# Patient Record
Sex: FEMALE | Race: BLACK OR AFRICAN AMERICAN | NOT HISPANIC OR LATINO | Employment: OTHER | URBAN - METROPOLITAN AREA
[De-identification: names, ages, dates, MRNs, and addresses within clinical notes are randomized per-mention and may not be internally consistent; named-entity substitution may affect disease eponyms.]

---

## 2017-01-03 ENCOUNTER — OFFICE VISIT (OUTPATIENT)
Dept: GYNECOLOGIC ONCOLOGY | Facility: CLINIC | Age: 77
End: 2017-01-03
Payer: MEDICARE

## 2017-01-03 ENCOUNTER — LAB VISIT (OUTPATIENT)
Dept: LAB | Facility: HOSPITAL | Age: 77
End: 2017-01-03
Attending: OBSTETRICS & GYNECOLOGY
Payer: MEDICARE

## 2017-01-03 ENCOUNTER — TELEPHONE (OUTPATIENT)
Dept: GYNECOLOGIC ONCOLOGY | Facility: CLINIC | Age: 77
End: 2017-01-03

## 2017-01-03 VITALS
SYSTOLIC BLOOD PRESSURE: 158 MMHG | HEIGHT: 66 IN | RESPIRATION RATE: 18 BRPM | HEART RATE: 77 BPM | BODY MASS INDEX: 37.84 KG/M2 | WEIGHT: 235.44 LBS | DIASTOLIC BLOOD PRESSURE: 79 MMHG

## 2017-01-03 DIAGNOSIS — Z12.31 SCREENING MAMMOGRAM, ENCOUNTER FOR: ICD-10-CM

## 2017-01-03 DIAGNOSIS — C54.1 ENDOMETRIAL CA: Primary | ICD-10-CM

## 2017-01-03 DIAGNOSIS — G89.29 CHRONIC RIGHT-SIDED LOW BACK PAIN WITHOUT SCIATICA: ICD-10-CM

## 2017-01-03 DIAGNOSIS — C54.1 ENDOMETRIAL CA: ICD-10-CM

## 2017-01-03 DIAGNOSIS — C54.1 MALIGNANT NEOPLASM OF ENDOMETRIUM: ICD-10-CM

## 2017-01-03 DIAGNOSIS — F51.01 PRIMARY INSOMNIA: ICD-10-CM

## 2017-01-03 DIAGNOSIS — M54.50 CHRONIC RIGHT-SIDED LOW BACK PAIN WITHOUT SCIATICA: ICD-10-CM

## 2017-01-03 LAB — CANCER AG125 SERPL-ACNC: 8 U/ML

## 2017-01-03 PROCEDURE — 1126F AMNT PAIN NOTED NONE PRSNT: CPT | Mod: S$GLB,,, | Performed by: OBSTETRICS & GYNECOLOGY

## 2017-01-03 PROCEDURE — 99214 OFFICE O/P EST MOD 30 MIN: CPT | Mod: S$GLB,,, | Performed by: OBSTETRICS & GYNECOLOGY

## 2017-01-03 PROCEDURE — 1157F ADVNC CARE PLAN IN RCRD: CPT | Mod: S$GLB,,, | Performed by: OBSTETRICS & GYNECOLOGY

## 2017-01-03 PROCEDURE — 99999 PR PBB SHADOW E&M-EST. PATIENT-LVL III: CPT | Mod: PBBFAC,,, | Performed by: OBSTETRICS & GYNECOLOGY

## 2017-01-03 PROCEDURE — 1160F RVW MEDS BY RX/DR IN RCRD: CPT | Mod: S$GLB,,, | Performed by: OBSTETRICS & GYNECOLOGY

## 2017-01-03 PROCEDURE — 1159F MED LIST DOCD IN RCRD: CPT | Mod: S$GLB,,, | Performed by: OBSTETRICS & GYNECOLOGY

## 2017-01-03 RX ORDER — ZOLPIDEM TARTRATE 5 MG/1
5 TABLET ORAL NIGHTLY PRN
Qty: 30 TABLET | Refills: 2 | Status: SHIPPED | OUTPATIENT
Start: 2017-01-03 | End: 2018-01-11 | Stop reason: SDUPTHER

## 2017-01-03 RX ORDER — HYDROCODONE BITARTRATE AND ACETAMINOPHEN 5; 325 MG/1; MG/1
1 TABLET ORAL EVERY 4 HOURS PRN
Qty: 40 TABLET | Refills: 0 | Status: SHIPPED | OUTPATIENT
Start: 2017-01-03 | End: 2018-01-11 | Stop reason: SDUPTHER

## 2017-01-03 NOTE — MR AVS SNAPSHOT
[facility] GYN Oncology  [address] [facility] [address]  Phone: [phone]                  Jodi Cole   1/3/2017 11:15 AM   Office Visit    Description:  Female : 1940   Provider:  Pepe Jordan MD   Department:  [facility] - GYN Oncology           Reason for Visit     Endometrial Cancer           Diagnoses this Visit        Comments    Endometrial ca    -  Primary     Chronic right-sided low back pain without sciatica         Malignant neoplasm of endometrium         Primary insomnia         Screening mammogram, encounter for                To Do List           Goals (5 Years of Data)     None      Follow-Up and Disposition     Return in about 3 months (around 4/3/2017).       These Medications        Disp Refills Start End    hydrocodone-acetaminophen 5-325mg (NORCO) 5-325 mg per tablet 40 tablet 0 1/3/2017     Take 1 tablet by mouth every 4 (four) hours as needed for Pain. - Oral    Pharmacy: [facility] [id number] [place] [address] [address] [facility] Ph #: [phone]       zolpidem (AMBIEN) 5 MG Tab 30 tablet 2 1/3/2017 2017    Take 1 tablet (5 mg total) by mouth nightly as needed. - Oral    Pharmacy: [facility] [id number] [place] [address] [address] [facility] [facility] Ph #: [phone]         [facility] On Call     [facility] On Call Nurse Care Line -  Assistance  Registered nurses in the [facility] On Call Center provide clinical advisement, health education, appointment booking, and other advisory services.  Call for this free service at [phone].             Medications           Message regarding Medications     Verify the changes and/or additions to your medication regime listed below are the same as discussed with your clinician today.  If any of these changes or additions are incorrect, please notify your healthcare provider.             Verify that the below list of medications  "is an accurate representation of the medications you are currently taking.  If none reported, the list may be blank. If incorrect, please contact your healthcare provider. Carry this list with you in case of emergency.           Current Medications     hydrocodone-acetaminophen 5-325mg (NORCO) 5-325 mg per tablet Take 1 tablet by mouth every 4 (four) hours as needed for Pain.    ibuprofen (ADVIL,MOTRIN) 600 MG tablet Take 1 tablet (600 mg total) by mouth every 6 (six) hours as needed for Pain.    multivitamin capsule Take 1 capsule by mouth once daily.    vitamins B1 B6 B12 Tab Take by mouth once daily.    zolpidem (AMBIEN) 5 MG Tab Take 1 tablet (5 mg total) by mouth nightly as needed.           Clinical Reference Information           Vital Signs - Last Recorded  Most recent update: 1/3/2017 10:50 AM by Neela Oneill RN    BP Pulse Resp Ht Wt BMI    (!) 158/79 77 18 5' 6" (1.676 m) 106.8 kg (235 lb 7 oz) 38 kg/m2      Blood Pressure          Most Recent Value    BP  (!)  158/79      Allergies as of 1/3/2017     No Known Allergies      Immunizations Administered on Date of Encounter - 1/3/2017     None      Orders Placed During Today's Visit     Future Labs/Procedures Expected by Expires    Mammo Digital Screening Bilat with CAD  3/2/2017 1/3/2018      4/3/2017 1/3/2018      [facility] Sign-Up     Activating your [facility] account is as easy as 1-2-3!     1) Visit [email or web address], select Sign Up Now, enter this activation code and your date of birth, then select Next.  [email or web address]  Expires: 2/17/2017 11:19 AM      2) Create a username and password to use when you visit [facility] in the future and select a security question in case you lose your password and select Next.    3) Enter your e-mail address and click Sign Up!    Additional Information  If you have questions, please e-mail [email or web address] or call [phone] to talk to our [facility] staff. Remember, [facility] is NOT to be used for " urgent needs. For medical emergencies, dial 911.

## 2017-01-03 NOTE — TELEPHONE ENCOUNTER
----- Message from Pepe Jordan MD sent at 1/3/2017  3:36 PM CST -----  Patient informed of normal .

## 2017-01-03 NOTE — PROGRESS NOTES
"Subjective:       Patient ID: Jodi Cole is a 76 y.o. female.    Chief Complaint: Endometrial Cancer (3 month follow-up)    HPI     Patient comes in today for follow up for endometrial cancer.   Denies vaginal bleeding.       is normal at 8.       Mammogram: Feb 29, 2016: normal   Colonoscopy: never       Her oncologic history is:    She underwent a robotic-assisted laparoscopic hysterectomy with bilateral salpingo-oophorectomy and bilateral pelvic and para-aortic lymphadenectomy on 5/11/2015. 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 The depth of invasion was 2 mm out of 27 mm. Nodes were negative as was the cytologic washing.    Finished vaginal cuff brachytherapy on 7/20/2015.    Completed 6 cycles of taxol and carboplatin in October 2015.    CT scan after completion was normal.  was 8.   Review of Systems   Constitutional: Negative for chills, fatigue and fever.   Respiratory: Negative for cough, shortness of breath and wheezing.    Cardiovascular: Negative for chest pain, palpitations and leg swelling.   Gastrointestinal: Negative for abdominal pain, constipation, diarrhea, nausea and vomiting.   Genitourinary: Negative for difficulty urinating, dysuria, frequency, genital sores, hematuria, urgency, vaginal bleeding, vaginal discharge and vaginal pain.   Musculoskeletal: Positive for back pain.   Neurological: Negative for weakness.   Hematological: Negative for adenopathy. Does not bruise/bleed easily.   Psychiatric/Behavioral: The patient is not nervous/anxious.        Objective:       Visit Vitals    BP (!) 158/79    Pulse 77    Resp 18    Ht 5' 6" (1.676 m)    Wt 106.8 kg (235 lb 7 oz)    BMI 38 kg/m2       Physical Exam   Constitutional: She is oriented to person, place, and time. She appears well-developed and well-nourished.   HENT:   Head: Normocephalic and atraumatic.   Eyes: No scleral icterus.   Neck: Neck supple. No tracheal " deviation present. No thyroid mass and no thyromegaly present.   Cardiovascular: Normal rate and regular rhythm.    Pulmonary/Chest: Effort normal and breath sounds normal. She has no wheezes.   Abdominal: Soft. She exhibits no distension and no mass. There is no hepatosplenomegaly. There is no tenderness. There is no rebound and no guarding.   Genitourinary:   Genitourinary Comments: Bimanual exam:  Vulva: no lesions. Normal appearance  Urethra: Normal size and location. No lesions  Bladder: No masses or tenderness.  Vagina: normal mucosa. No lesion  Cervix: absent.   Uterus: absent.  Adnexa: no masses.  Rectovaginal: Not performed     Musculoskeletal: She exhibits no edema or tenderness.   Lymphadenopathy:     She has no cervical adenopathy.     She has no axillary adenopathy.        Right: No inguinal and no supraclavicular adenopathy present.        Left: No inguinal and no supraclavicular adenopathy present.   Neurological: She is alert and oriented to person, place, and time.   Skin: Skin is warm and dry.   Psychiatric: She has a normal mood and affect. Her behavior is normal. Judgment and thought content normal.       Assessment:       1. Endometrial ca    2. Chronic right-sided low back pain without sciatica    3. Malignant neoplasm of endometrium    4. Primary insomnia    5. Screening mammogram, encounter for        Plan:   Endometrial ca   PEG    RTC in 3 months.     -     ; Future; Expected date: 4/3/17    Chronic right-sided low back pain without sciatica    Malignant neoplasm of endometrium  -     hydrocodone-acetaminophen 5-325mg (NORCO) 5-325 mg per tablet; Take 1 tablet by mouth every 4 (four) hours as needed for Pain.  Dispense: 40 tablet; Refill: 0    Primary insomnia  -     zolpidem (AMBIEN) 5 MG Tab; Take 1 tablet (5 mg total) by mouth nightly as needed.  Dispense: 30 tablet; Refill: 2    Screening mammogram, encounter for  -     Mammo Digital Screening Bilat with CAD; Future; Expected  date: 3/2/17

## 2017-03-02 ENCOUNTER — HOSPITAL ENCOUNTER (OUTPATIENT)
Dept: RADIOLOGY | Facility: OTHER | Age: 77
Discharge: HOME | End: 2017-03-02
Attending: OBSTETRICS & GYNECOLOGY
Payer: MEDICARE

## 2017-03-02 DIAGNOSIS — Z12.31 SCREENING MAMMOGRAM, ENCOUNTER FOR: ICD-10-CM

## 2017-03-02 PROCEDURE — 77067 SCR MAMMO BI INCL CAD: CPT | Mod: TC

## 2017-03-02 PROCEDURE — 77063 BREAST TOMOSYNTHESIS BI: CPT | Mod: 26,,, | Performed by: RADIOLOGY

## 2017-03-02 PROCEDURE — 77067 SCR MAMMO BI INCL CAD: CPT | Mod: 26,,, | Performed by: RADIOLOGY

## 2017-04-12 ENCOUNTER — OFFICE VISIT (OUTPATIENT)
Dept: GYNECOLOGIC ONCOLOGY | Facility: CLINIC | Age: 77
End: 2017-04-12
Payer: MEDICARE

## 2017-04-12 ENCOUNTER — LAB VISIT (OUTPATIENT)
Dept: LAB | Facility: HOSPITAL | Age: 77
End: 2017-04-12
Attending: OBSTETRICS & GYNECOLOGY
Payer: MEDICARE

## 2017-04-12 VITALS
BODY MASS INDEX: 37.91 KG/M2 | HEART RATE: 86 BPM | SYSTOLIC BLOOD PRESSURE: 177 MMHG | HEIGHT: 66 IN | DIASTOLIC BLOOD PRESSURE: 86 MMHG | WEIGHT: 235.88 LBS

## 2017-04-12 DIAGNOSIS — Z12.89 ENCOUNTER FOR PELVIC SCREENING FOR MALIGNANT NEOPLASM: ICD-10-CM

## 2017-04-12 DIAGNOSIS — Z01.419 WELL WOMAN EXAM WITH ROUTINE GYNECOLOGICAL EXAM: ICD-10-CM

## 2017-04-12 DIAGNOSIS — C54.1 ENDOMETRIAL CA: ICD-10-CM

## 2017-04-12 DIAGNOSIS — C54.1 ENDOMETRIAL CA: Primary | ICD-10-CM

## 2017-04-12 DIAGNOSIS — Z12.31 SCREENING MAMMOGRAM, ENCOUNTER FOR: ICD-10-CM

## 2017-04-12 LAB — CANCER AG125 SERPL-ACNC: 8 U/ML

## 2017-04-12 PROCEDURE — 86304 IMMUNOASSAY TUMOR CA 125: CPT

## 2017-04-12 PROCEDURE — G0101 CA SCREEN;PELVIC/BREAST EXAM: HCPCS | Mod: S$GLB,,, | Performed by: OBSTETRICS & GYNECOLOGY

## 2017-04-12 PROCEDURE — 36415 COLL VENOUS BLD VENIPUNCTURE: CPT

## 2017-04-12 PROCEDURE — 99999 PR PBB SHADOW E&M-EST. PATIENT-LVL III: CPT | Mod: PBBFAC,,, | Performed by: OBSTETRICS & GYNECOLOGY

## 2017-04-12 NOTE — MR AVS SNAPSHOT
[facility] - GYN Oncology  [address]  [facility] [address]  Phone: [phone]                  Jodi Cole   2017 8:45 AM   Office Visit    Description:  Female : 1940   Provider:  Pepe Jordan MD   Department:  [facility] - [facility] On[facility]           Reason for Visit     Endometrial Cancer           Diagnoses this Visit        Comments    Endometrial ca    -  Primary     Well woman exam with routine gynecological exam         Encounter for pelvic screening for malignant neoplasm         Screening mammogram, encounter for                To Do List           Goals (5 Years of Data)     None      Follow-Up and Disposition     Return in about 3 months (around 2017).      [facility] On Call     [facility] On Call Nurse Care Line -  Assistance  Unless otherwise directed by your provider, please contact [facility] On-Call, our nurse care line that is available for  assistance.     Registered nurses in the [facility] On Call Center provide: appointment scheduling, clinical advisement, health education, and other advisory services.  Call: [phone] (toll free)               Medications           Message regarding Medications     Verify the changes and/or additions to your medication regime listed below are the same as discussed with your clinician today.  If any of these changes or additions are incorrect, please notify your healthcare provider.             Verify that the below list of medications is an accurate representation of the medications you are currently taking.  If none reported, the list may be blank. If incorrect, please contact your healthcare provider. Carry this list with you in case of emergency.           Current Medications     hydrocodone-acetaminophen 5-325mg (NORCO) 5-325 mg per tablet Take 1 tablet by mouth every 4 (four) hours as needed for Pain.    ibuprofen (ADVIL,MOTRIN) 600 MG tablet Take 1 tablet (600 mg total) by mouth every 6 (six) hours as needed for  "Pain.    multivitamin capsule Take 1 capsule by mouth once daily.    vitamins B1 B6 B12 Tab Take by mouth once daily.    zolpidem (AMBIEN) 5 MG Tab Take 1 tablet (5 mg total) by mouth nightly as needed.           Clinical Reference Information           Your Vitals Were     BP Pulse Height Weight BMI    177/86 86 5' 6" (1.676 m) 107 kg (235 lb 14.3 oz) 38.07 kg/m2      Blood Pressure          Most Recent Value    BP  (!)  177/86      Allergies as of 4/12/2017     No Known Allergies      Immunizations Administered on Date of Encounter - 4/12/2017     None      Orders Placed During Today's Visit     Future Labs/Procedures Expected by Expires      4/12/2017 4/12/2018    Mammo Digital Screening Bilat with CAD  3/5/2018 4/12/2018      [facility] Sign-Up     Activating your [facility] account is as easy as 1-2-3!     1) Visit [email or web address], select Sign Up Now, enter this activation code and your date of birth, then select Next.  [email or web address]  Expires: 5/27/2017  9:10 AM      2) Create a username and password to use when you visit [facility] in the future and select a security question in case you lose your password and select Next.    3) Enter your e-mail address and click Sign Up!    Additional Information  If you have questions, please e-mail [email or web address] or call [phone] to talk to our [facility] staff. Remember, [facility] is NOT to be used for urgent needs. For medical emergencies, dial 911.         Language Assistance Services     ATTENTION: Language assistance services are available, free of charge. Please call [phone].      ATENCIÓN: Si habla español, tiene a lucero disposición servicios gratuitos de asistencia lingüística. Llame al [phone].     ANTONIETTA Ý: N?u b?n nói Ti?ng Vi?t, có các d?ch v? h? tr? ngôn ng? mi?n phí dành cho b?n. G?i s? [phone].         [facility] complies with applicable Federal civil rights laws and does not discriminate on the basis of race, " color, national origin, age, disability, or sex.

## 2017-04-12 NOTE — PROGRESS NOTES
"Subjective:       Patient ID: Jodi Cole is a 76 y.o. female.    Chief Complaint: Endometrial Cancer (3 mth)    HPI     Patient comes in today for follow up for endometrial cancer.   Denies vaginal bleeding.       is normal at 8.       Mammogram: Feb 29, 2016: normal   Colonoscopy: never       Her oncologic history is:    She underwent a robotic-assisted laparoscopic hysterectomy with bilateral salpingo-oophorectomy and bilateral pelvic and para-aortic lymphadenectomy on 5/11/2015. 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 The depth of invasion was 2 mm out of 27 mm. Nodes were negative as was the cytologic washing.    Finished vaginal cuff brachytherapy on 7/20/2015.    Completed 6 cycles of taxol and carboplatin in October 2015.    CT scan after completion was normal.  was 8.     Review of Systems   Constitutional: Negative for chills, fatigue and fever.   Eyes: Negative for visual disturbance.   Respiratory: Negative for cough, shortness of breath and wheezing.    Cardiovascular: Negative for chest pain, palpitations and leg swelling.   Gastrointestinal: Negative for abdominal distention, abdominal pain, constipation, diarrhea, nausea and vomiting.   Genitourinary: Negative for difficulty urinating, dysuria, frequency, genital sores, hematuria, pelvic pain, urgency, vaginal bleeding, vaginal discharge and vaginal pain.   Musculoskeletal: Negative for gait problem and neck stiffness.   Skin: Negative for rash.   Neurological: Negative for seizures and weakness.   Hematological: Negative for adenopathy. Does not bruise/bleed easily.   Psychiatric/Behavioral: The patient is not nervous/anxious.        Objective:     BP (!) 177/86  Pulse 86  Ht 5' 6" (1.676 m)  Wt 107 kg (235 lb 14.3 oz)  BMI 38.07 kg/m2    Physical Exam   Constitutional: She is oriented to person, place, and time. She appears well-developed and well-nourished.   HENT:   Head: " Normocephalic and atraumatic.   Eyes: No scleral icterus.   Neck: No tracheal deviation present. No thyroid mass and no thyromegaly present.   Cardiovascular: Normal rate and regular rhythm.    Pulmonary/Chest: Effort normal and breath sounds normal. She has no wheezes. Right breast exhibits no mass, no nipple discharge, no skin change and no tenderness. Left breast exhibits no mass, no nipple discharge, no skin change and no tenderness.   Abdominal: She exhibits no distension and no mass. There is no hepatosplenomegaly. There is no tenderness. There is no rebound and no guarding.   Genitourinary:   Genitourinary Comments: Bimanual exam:  Vulva: no lesions. Normal appearance  Urethra: Normal size and location. No lesions  Bladder: No masses or tenderness.  Vagina: normal mucosa. No lesion  Cervix: absent.   Uterus: absent.  Adnexa: no masses.  Rectovaginal: No posterior cul de sac thickening or nodularity.  Rectal: no masses. Nontender. Normal tone.      Musculoskeletal: She exhibits no edema or tenderness.   Lymphadenopathy:     She has no cervical adenopathy.     She has no axillary adenopathy.        Right: No inguinal and no supraclavicular adenopathy present.        Left: No inguinal and no supraclavicular adenopathy present.   Neurological: She is alert and oriented to person, place, and time.   Skin: Skin is warm and dry. No rash noted.   Psychiatric: She has a normal mood and affect. Her behavior is normal. Judgment and thought content normal.       Assessment:       1. Endometrial ca    2. Well woman exam with routine gynecological exam    3. Encounter for pelvic screening for malignant neoplasm    4. Screening mammogram, encounter for        Plan:   Endometrial ca   PEG   RTC in 4 months      -     ; Future; Expected date: 4/12/17    Well woman exam with routine gynecological exam  Counseling time of 10 minutes discussing calcium, vitamin D and exercise. Questions answered.     Encounter for pelvic  screening for malignant neoplasm    Screening mammogram, encounter for  -     Mammo Digital Screening Bilat with CAD; Future; Expected date: 3/5/18

## 2017-07-06 ENCOUNTER — LAB VISIT (OUTPATIENT)
Dept: LAB | Facility: HOSPITAL | Age: 77
End: 2017-07-06
Attending: OBSTETRICS & GYNECOLOGY
Payer: MEDICARE

## 2017-07-06 ENCOUNTER — OFFICE VISIT (OUTPATIENT)
Dept: GYNECOLOGIC ONCOLOGY | Facility: CLINIC | Age: 77
End: 2017-07-06
Payer: MEDICARE

## 2017-07-06 VITALS
HEART RATE: 92 BPM | DIASTOLIC BLOOD PRESSURE: 81 MMHG | HEIGHT: 66 IN | SYSTOLIC BLOOD PRESSURE: 136 MMHG | WEIGHT: 233.94 LBS | BODY MASS INDEX: 37.6 KG/M2

## 2017-07-06 DIAGNOSIS — C54.1 ENDOMETRIAL CA: Primary | ICD-10-CM

## 2017-07-06 DIAGNOSIS — C54.1 ENDOMETRIAL CA: ICD-10-CM

## 2017-07-06 LAB — CANCER AG125 SERPL-ACNC: 9 U/ML

## 2017-07-06 PROCEDURE — 1126F AMNT PAIN NOTED NONE PRSNT: CPT | Mod: S$GLB,,, | Performed by: OBSTETRICS & GYNECOLOGY

## 2017-07-06 PROCEDURE — 86304 IMMUNOASSAY TUMOR CA 125: CPT

## 2017-07-06 PROCEDURE — 1159F MED LIST DOCD IN RCRD: CPT | Mod: S$GLB,,, | Performed by: OBSTETRICS & GYNECOLOGY

## 2017-07-06 PROCEDURE — 36415 COLL VENOUS BLD VENIPUNCTURE: CPT

## 2017-07-06 PROCEDURE — 99214 OFFICE O/P EST MOD 30 MIN: CPT | Mod: S$GLB,,, | Performed by: OBSTETRICS & GYNECOLOGY

## 2017-07-06 PROCEDURE — 99999 PR PBB SHADOW E&M-EST. PATIENT-LVL III: CPT | Mod: PBBFAC,,, | Performed by: OBSTETRICS & GYNECOLOGY

## 2017-07-06 RX ORDER — CYCLOBENZAPRINE HCL 10 MG
TABLET ORAL
COMMUNITY
Start: 2017-06-14 | End: 2017-07-06

## 2017-07-06 RX ORDER — FLUCONAZOLE 100 MG/1
TABLET ORAL
COMMUNITY
Start: 2017-07-05 | End: 2017-07-06

## 2017-07-06 RX ORDER — TRIAMTERENE/HYDROCHLOROTHIAZID 37.5-25 MG
TABLET ORAL
COMMUNITY
Start: 2017-06-14 | End: 2019-11-19

## 2017-07-06 RX ORDER — PRAVASTATIN SODIUM 20 MG/1
TABLET ORAL
COMMUNITY
Start: 2017-07-05 | End: 2018-01-11

## 2017-07-06 RX ORDER — CLOTRIMAZOLE AND BETAMETHASONE DIPROPIONATE 10; .64 MG/G; MG/G
CREAM TOPICAL
COMMUNITY
Start: 2017-07-05 | End: 2017-07-06

## 2017-07-06 NOTE — PROGRESS NOTES
"Subjective:       Patient ID: Jodi Cole is a 76 y.o. female.    Chief Complaint: Endometrial Cancer (3 mth )    HPI     Patient comes in today for follow up for endometrial cancer.   Denies vaginal bleeding.       is normal at 9.       Mammogram: March 2, 2017: normal   Colonoscopy: never. She is to schedule with her PCP.       Her oncologic history is:    She underwent a robotic-assisted laparoscopic hysterectomy with bilateral salpingo-oophorectomy and bilateral pelvic and para-aortic lymphadenectomy on 5/11/2015. 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 The depth of invasion was 2 mm out of 27 mm. Nodes were negative as was the cytologic washing.    Finished vaginal cuff brachytherapy on 7/20/2015.    Completed 6 cycles of taxol and carboplatin in October 2015.    CT scan after completion was normal.  was 8.      Review of Systems   Constitutional: Negative for chills, fatigue and fever.   Respiratory: Negative for cough, shortness of breath and wheezing.    Cardiovascular: Negative for chest pain, palpitations and leg swelling.   Gastrointestinal: Negative for abdominal pain, constipation, diarrhea, nausea and vomiting.   Genitourinary: Negative for difficulty urinating, dysuria, frequency, genital sores, hematuria, urgency, vaginal bleeding, vaginal discharge and vaginal pain.   Neurological: Negative for weakness.   Hematological: Negative for adenopathy. Does not bruise/bleed easily.   Psychiatric/Behavioral: The patient is not nervous/anxious.        Objective:     /81   Pulse 92   Ht 5' 6" (1.676 m)   Wt 106.1 kg (233 lb 14.5 oz)   BMI 37.75 kg/m²     Physical Exam   Constitutional: She is oriented to person, place, and time. She appears well-developed and well-nourished.   HENT:   Head: Normocephalic and atraumatic.   Eyes: No scleral icterus.   Neck: Neck supple. No tracheal deviation present. No thyroid mass and no " thyromegaly present.   Cardiovascular: Normal rate and regular rhythm.    Pulmonary/Chest: Effort normal and breath sounds normal. She has no wheezes.   Abdominal: Soft. She exhibits no distension and no mass. There is no hepatosplenomegaly. There is no tenderness. There is no rebound and no guarding.   Genitourinary:   Genitourinary Comments: Bimanual exam:  Vulva: no lesions. Normal appearance  Urethra: Normal size and location. No lesions  Bladder: No masses or tenderness.  Vagina: normal mucosa. No lesion  Cervix: absent.   Uterus: absent.  Adnexa: no masses.  Rectovaginal: Not performed     Musculoskeletal: She exhibits no edema or tenderness.   Lymphadenopathy:     She has no cervical adenopathy.     She has no axillary adenopathy.        Right: No inguinal and no supraclavicular adenopathy present.        Left: No inguinal and no supraclavicular adenopathy present.   Neurological: She is alert and oriented to person, place, and time.   Skin: Skin is warm and dry.   Psychiatric: She has a normal mood and affect. Her behavior is normal. Judgment and thought content normal.       Assessment:       1. Endometrial ca        Plan:   Endometrial ca   PEG   RTC 6 months        -     ; Future; Expected date: 01/06/2018

## 2017-12-05 ENCOUNTER — TELEPHONE (OUTPATIENT)
Dept: GYNECOLOGIC ONCOLOGY | Facility: CLINIC | Age: 77
End: 2017-12-05

## 2017-12-05 NOTE — TELEPHONE ENCOUNTER
----- Message from Shaun Mccabe sent at 12/5/2017 12:38 PM CST -----  Contact: Pt   Will like to schedule 3 month f/u lab and appt with Dr. Jordan    Contact::[phone]

## 2018-01-11 ENCOUNTER — LAB VISIT (OUTPATIENT)
Dept: LAB | Facility: HOSPITAL | Age: 78
End: 2018-01-11
Attending: OBSTETRICS & GYNECOLOGY
Payer: MEDICARE

## 2018-01-11 ENCOUNTER — OFFICE VISIT (OUTPATIENT)
Dept: GYNECOLOGIC ONCOLOGY | Facility: CLINIC | Age: 78
End: 2018-01-11
Payer: MEDICARE

## 2018-01-11 VITALS
BODY MASS INDEX: 35.89 KG/M2 | HEART RATE: 96 BPM | HEIGHT: 66 IN | SYSTOLIC BLOOD PRESSURE: 142 MMHG | WEIGHT: 223.31 LBS | DIASTOLIC BLOOD PRESSURE: 82 MMHG

## 2018-01-11 DIAGNOSIS — F51.01 PRIMARY INSOMNIA: ICD-10-CM

## 2018-01-11 DIAGNOSIS — C54.1 ENDOMETRIAL CA: Primary | ICD-10-CM

## 2018-01-11 DIAGNOSIS — G89.29 CHRONIC RIGHT-SIDED LOW BACK PAIN WITHOUT SCIATICA: ICD-10-CM

## 2018-01-11 DIAGNOSIS — C54.1 ENDOMETRIAL CA: ICD-10-CM

## 2018-01-11 DIAGNOSIS — C54.1 MALIGNANT NEOPLASM OF ENDOMETRIUM: ICD-10-CM

## 2018-01-11 DIAGNOSIS — M54.50 CHRONIC RIGHT-SIDED LOW BACK PAIN WITHOUT SCIATICA: ICD-10-CM

## 2018-01-11 LAB — CANCER AG125 SERPL-ACNC: 7 U/ML

## 2018-01-11 PROCEDURE — 99999 PR PBB SHADOW E&M-EST. PATIENT-LVL III: CPT | Mod: PBBFAC,,, | Performed by: OBSTETRICS & GYNECOLOGY

## 2018-01-11 PROCEDURE — 86304 IMMUNOASSAY TUMOR CA 125: CPT

## 2018-01-11 PROCEDURE — 36415 COLL VENOUS BLD VENIPUNCTURE: CPT

## 2018-01-11 PROCEDURE — 99214 OFFICE O/P EST MOD 30 MIN: CPT | Mod: S$GLB,,, | Performed by: OBSTETRICS & GYNECOLOGY

## 2018-01-11 RX ORDER — ZOLPIDEM TARTRATE 5 MG/1
5 TABLET ORAL NIGHTLY PRN
Qty: 30 TABLET | Refills: 2 | Status: SHIPPED | OUTPATIENT
Start: 2018-01-11 | End: 2018-04-19 | Stop reason: SDUPTHER

## 2018-01-11 RX ORDER — HYDROCODONE BITARTRATE AND ACETAMINOPHEN 5; 325 MG/1; MG/1
1 TABLET ORAL EVERY 6 HOURS PRN
Qty: 20 TABLET | Refills: 0 | Status: SHIPPED | OUTPATIENT
Start: 2018-01-11 | End: 2018-07-10 | Stop reason: SDUPTHER

## 2018-01-11 RX ORDER — PRAVASTATIN SODIUM 40 MG/1
TABLET ORAL
Refills: 3 | COMMUNITY
Start: 2017-11-29 | End: 2019-11-19 | Stop reason: SDUPTHER

## 2018-03-05 ENCOUNTER — HOSPITAL ENCOUNTER (OUTPATIENT)
Dept: RADIOLOGY | Facility: OTHER | Age: 78
Discharge: HOME | End: 2018-03-05
Attending: OBSTETRICS & GYNECOLOGY
Payer: MEDICARE

## 2018-03-05 VITALS — HEIGHT: 66 IN | BODY MASS INDEX: 35.84 KG/M2 | WEIGHT: 223 LBS

## 2018-03-05 DIAGNOSIS — Z12.31 SCREENING MAMMOGRAM, ENCOUNTER FOR: ICD-10-CM

## 2018-03-05 PROCEDURE — 77063 BREAST TOMOSYNTHESIS BI: CPT | Mod: 26,,, | Performed by: RADIOLOGY

## 2018-03-05 PROCEDURE — 77067 SCR MAMMO BI INCL CAD: CPT | Mod: 26,,, | Performed by: RADIOLOGY

## 2018-03-05 PROCEDURE — 77067 SCR MAMMO BI INCL CAD: CPT | Mod: TC

## 2018-04-19 DIAGNOSIS — F51.01 PRIMARY INSOMNIA: ICD-10-CM

## 2018-04-19 RX ORDER — ZOLPIDEM TARTRATE 5 MG/1
5 TABLET ORAL NIGHTLY PRN
Qty: 30 TABLET | Refills: 2 | Status: SHIPPED | OUTPATIENT
Start: 2018-04-19 | End: 2018-07-10 | Stop reason: SDUPTHER

## 2018-05-06 ENCOUNTER — HOSPITAL ENCOUNTER (OUTPATIENT)
Facility: HOSPITAL | Age: 78
Discharge: HOME HEALTH | End: 2018-05-07
Attending: EMERGENCY MEDICINE | Admitting: EMERGENCY MEDICINE
Payer: MEDICARE

## 2018-05-06 DIAGNOSIS — M79.10 MUSCLE PAIN: ICD-10-CM

## 2018-05-06 DIAGNOSIS — M25.569 KNEE PAIN: ICD-10-CM

## 2018-05-06 DIAGNOSIS — M25.561 ACUTE PAIN OF RIGHT KNEE: ICD-10-CM

## 2018-05-06 DIAGNOSIS — I10 HYPERTENSION: ICD-10-CM

## 2018-05-06 DIAGNOSIS — M17.11 TRICOMPARTMENT OSTEOARTHRITIS OF RIGHT KNEE: Primary | ICD-10-CM

## 2018-05-06 DIAGNOSIS — M17.11 OSTEOARTHRITIS OF RIGHT KNEE, UNSPECIFIED OSTEOARTHRITIS TYPE: ICD-10-CM

## 2018-05-06 PROBLEM — E66.9 OBESE: Status: ACTIVE | Noted: 2018-05-06

## 2018-05-06 LAB
ALBUMIN SERPL BCP-MCNC: 3.5 G/DL
ALP SERPL-CCNC: 76 U/L
ALT SERPL W/O P-5'-P-CCNC: 7 U/L
ANION GAP SERPL CALC-SCNC: 11 MMOL/L
AST SERPL-CCNC: 12 U/L
BASOPHILS # BLD AUTO: 0.03 K/UL
BASOPHILS NFR BLD: 0.4 %
BILIRUB SERPL-MCNC: 0.3 MG/DL
BUN SERPL-MCNC: 23 MG/DL
CALCIUM SERPL-MCNC: 9.3 MG/DL
CHLORIDE SERPL-SCNC: 106 MMOL/L
CO2 SERPL-SCNC: 27 MMOL/L
CREAT SERPL-MCNC: 0.9 MG/DL
CRP SERPL-MCNC: 7.7 MG/L
DIFFERENTIAL METHOD: ABNORMAL
EOSINOPHIL # BLD AUTO: 0.2 K/UL
EOSINOPHIL NFR BLD: 3.2 %
ERYTHROCYTE [DISTWIDTH] IN BLOOD BY AUTOMATED COUNT: 15.9 %
ERYTHROCYTE [SEDIMENTATION RATE] IN BLOOD BY WESTERGREN METHOD: 39 MM/HR
EST. GFR  (AFRICAN AMERICAN): >60 ML/MIN/1.73 M^2
EST. GFR  (NON AFRICAN AMERICAN): >60 ML/MIN/1.73 M^2
GLUCOSE SERPL-MCNC: 94 MG/DL
HCT VFR BLD AUTO: 35.4 %
HGB BLD-MCNC: 11.1 G/DL
IMM GRANULOCYTES # BLD AUTO: 0.02 K/UL
IMM GRANULOCYTES NFR BLD AUTO: 0.3 %
LYMPHOCYTES # BLD AUTO: 1.6 K/UL
LYMPHOCYTES NFR BLD: 20.6 %
MCH RBC QN AUTO: 26 PG
MCHC RBC AUTO-ENTMCNC: 31.4 G/DL
MCV RBC AUTO: 83 FL
MONOCYTES # BLD AUTO: 0.5 K/UL
MONOCYTES NFR BLD: 6.2 %
NEUTROPHILS # BLD AUTO: 5.3 K/UL
NEUTROPHILS NFR BLD: 69.3 %
NRBC BLD-RTO: 0 /100 WBC
PLATELET # BLD AUTO: 230 K/UL
PMV BLD AUTO: 10.6 FL
POTASSIUM SERPL-SCNC: 3.7 MMOL/L
PROT SERPL-MCNC: 7.2 G/DL
RBC # BLD AUTO: 4.27 M/UL
SODIUM SERPL-SCNC: 144 MMOL/L
WBC # BLD AUTO: 7.59 K/UL

## 2018-05-06 PROCEDURE — 85651 RBC SED RATE NONAUTOMATED: CPT

## 2018-05-06 PROCEDURE — 63600175 PHARM REV CODE 636 W HCPCS: Performed by: PSYCHIATRY & NEUROLOGY

## 2018-05-06 PROCEDURE — 25000003 PHARM REV CODE 250: Performed by: HOSPITALIST

## 2018-05-06 PROCEDURE — 85025 COMPLETE CBC W/AUTO DIFF WBC: CPT

## 2018-05-06 PROCEDURE — 25000003 PHARM REV CODE 250: Performed by: EMERGENCY MEDICINE

## 2018-05-06 PROCEDURE — 86140 C-REACTIVE PROTEIN: CPT

## 2018-05-06 PROCEDURE — 99283 EMERGENCY DEPT VISIT LOW MDM: CPT | Mod: ,,, | Performed by: EMERGENCY MEDICINE

## 2018-05-06 PROCEDURE — G0378 HOSPITAL OBSERVATION PER HR: HCPCS

## 2018-05-06 PROCEDURE — 99220 PR INITIAL OBSERVATION CARE,LEVL III: CPT | Mod: ,,, | Performed by: HOSPITALIST

## 2018-05-06 PROCEDURE — 80053 COMPREHEN METABOLIC PANEL: CPT

## 2018-05-06 PROCEDURE — 25000003 PHARM REV CODE 250: Performed by: PSYCHIATRY & NEUROLOGY

## 2018-05-06 PROCEDURE — 99285 EMERGENCY DEPT VISIT HI MDM: CPT

## 2018-05-06 RX ORDER — DICLOFENAC SODIUM 10 MG/G
GEL TOPICAL 3 TIMES DAILY
Status: DISCONTINUED | OUTPATIENT
Start: 2018-05-06 | End: 2018-05-07 | Stop reason: HOSPADM

## 2018-05-06 RX ORDER — PANTOPRAZOLE SODIUM 40 MG/1
40 TABLET, DELAYED RELEASE ORAL DAILY
Status: DISCONTINUED | OUTPATIENT
Start: 2018-05-06 | End: 2018-05-07 | Stop reason: HOSPADM

## 2018-05-06 RX ORDER — METHYLPREDNISOLONE 4 MG/1
TABLET ORAL
Qty: 1 PACKAGE | Refills: 0 | Status: SHIPPED | OUTPATIENT
Start: 2018-05-06 | End: 2018-05-07 | Stop reason: HOSPADM

## 2018-05-06 RX ORDER — HYDROCODONE BITARTRATE AND ACETAMINOPHEN 5; 325 MG/1; MG/1
1 TABLET ORAL EVERY 6 HOURS PRN
Qty: 10 TABLET | Refills: 0 | Status: SHIPPED | OUTPATIENT
Start: 2018-05-06 | End: 2018-05-07 | Stop reason: HOSPADM

## 2018-05-06 RX ORDER — ENOXAPARIN SODIUM 100 MG/ML
40 INJECTION SUBCUTANEOUS EVERY 24 HOURS
Status: DISCONTINUED | OUTPATIENT
Start: 2018-05-07 | End: 2018-05-07 | Stop reason: HOSPADM

## 2018-05-06 RX ORDER — HYDROCODONE BITARTRATE AND ACETAMINOPHEN 5; 325 MG/1; MG/1
1 TABLET ORAL
Status: COMPLETED | OUTPATIENT
Start: 2018-05-06 | End: 2018-05-06

## 2018-05-06 RX ORDER — PREDNISONE 20 MG/1
60 TABLET ORAL
Status: COMPLETED | OUTPATIENT
Start: 2018-05-06 | End: 2018-05-06

## 2018-05-06 RX ORDER — HYDRALAZINE HYDROCHLORIDE 20 MG/ML
10 INJECTION INTRAMUSCULAR; INTRAVENOUS
Status: DISCONTINUED | OUTPATIENT
Start: 2018-05-06 | End: 2018-05-06

## 2018-05-06 RX ORDER — HYDRALAZINE HYDROCHLORIDE 25 MG/1
25 TABLET, FILM COATED ORAL EVERY 8 HOURS PRN
Status: DISCONTINUED | OUTPATIENT
Start: 2018-05-06 | End: 2018-05-07 | Stop reason: HOSPADM

## 2018-05-06 RX ORDER — TRIAMTERENE AND HYDROCHLOROTHIAZIDE 37.5; 25 MG/1; MG/1
1 CAPSULE ORAL
Status: COMPLETED | OUTPATIENT
Start: 2018-05-06 | End: 2018-05-06

## 2018-05-06 RX ORDER — MELOXICAM 7.5 MG/1
15 TABLET ORAL NIGHTLY
Status: DISCONTINUED | OUTPATIENT
Start: 2018-05-06 | End: 2018-05-07 | Stop reason: HOSPADM

## 2018-05-06 RX ADMIN — HYDRALAZINE HYDROCHLORIDE 25 MG: 25 TABLET, FILM COATED ORAL at 10:05

## 2018-05-06 RX ADMIN — PREDNISONE 60 MG: 20 TABLET ORAL at 06:05

## 2018-05-06 RX ADMIN — PANTOPRAZOLE SODIUM 40 MG: 40 TABLET, DELAYED RELEASE ORAL at 10:05

## 2018-05-06 RX ADMIN — MELOXICAM 15 MG: 7.5 TABLET ORAL at 10:05

## 2018-05-06 RX ADMIN — TRIAMTERENE AND HYDROCHLOROTHIAZIDE 1 CAPSULE: 25; 37.5 CAPSULE ORAL at 07:05

## 2018-05-06 RX ADMIN — HYDROCODONE BITARTRATE AND ACETAMINOPHEN 1 TABLET: 5; 325 TABLET ORAL at 06:05

## 2018-05-06 NOTE — ED PROVIDER NOTES
"Encounter Date: 5/6/2018    SCRIBE #1 NOTE: I, Karlie Mott, am scribing for, and in the presence of,  Dr. Reno. I have scribed the following portions of the note - the EKG reading.       History     Chief Complaint   Patient presents with    Hypertension     HPI   Pt with PMH as reviewed and detailed below presents with her "right knee locked up on me."  The right knee doesn't hurt unless she moves it.  She has had trouble with her knees in the past and has had fluid drained from them and has had cortisone injections in her knees in the past.  She denies falls, trauma or twisting her knee.    She did take Hydrocodone which helped a little.    She denies HA, CP, SOB, recent fever, chills, N/v/D, flank pain, abd pain, new weakness, numbness or tingling in her extremities.    She did not take her BP medications this AM.    Review of patient's allergies indicates:  No Known Allergies  Past Medical History:   Diagnosis Date    Anemia associated with chemotherapy 9/1/2015    Anemia due to chemotherapy 9/22/2015    Chemotherapy induced nausea and vomiting 6/9/2015    Chemotherapy-induced neutropenia 7/28/2015    Constipation 7/7/2015    Endometrial ca 5/26/2015    Endometrial ca 8/10/2015    Ovarian cancer     Pain in both hands 2/19/2016    Right-sided low back pain without sciatica 6/23/2016    Uterine cancer     Well woman exam with routine gynecological exam 2/19/2016     Past Surgical History:   Procedure Laterality Date    D&C Hysteroscopy  March 18, 2015    DILATION AND CURETTAGE OF UTERUS  1969     D&C and uterine suspension.     HYSTERECTOMY  5/11/15    robotic for endometrial cancer    LYMPHADENECTOMY      PELVIC AND PARA-AORTIC LYMPH NODE DISSECTION      AK REMOVAL OF OVARY/TUBE(S)      robotic TLH/BSO and bilatyeral pelvic and para-aortic LND     Family History   Problem Relation Age of Onset    Cancer Brother 65        pancreatic    Ovarian cancer Neg Hx     Uterine cancer Neg Hx  "    Breast cancer Neg Hx     Colon cancer Neg Hx      Social History   Substance Use Topics    Smoking status: Never Smoker    Smokeless tobacco: Never Used    Alcohol use No     Review of Systems   Musculoskeletal: Positive for arthralgias.        Right knee pain.   All other systems reviewed and are negative.      Physical Exam     Initial Vitals [05/06/18 1725]   BP Pulse Resp Temp SpO2   (!) 198/100 86 18 98.2 °F (36.8 °C) (!) 94 %      MAP       132.67         Vitals:    05/06/18 1725 05/06/18 1748 05/06/18 1749   BP: (!) 198/100 (!) 190/97    Pulse: 86  80   Resp: 18     Temp: 98.2 °F (36.8 °C)     TempSrc: Oral     SpO2: (!) 94%  95%       X-Ray Knee 1 or 2 View Right   Final Result      Advanced tricompartmental degenerative changes and small suprapatellar joint effusion.         Electronically signed by: Joey Escalante MD   Date:    05/06/2018   Time:    19:02          Physical Exam  Gen/Constitutional: Interactive. No acute distress  Head: Normocephalic, Atraumatic  Neck: supple, no masses or LAD  Eyes: PERRLA, conjunctiva clear  Ears, Nose and Throat: No rhinorrhea or stridor.  Cardiac: Reg Rhythm, No murmur  Pulmonary: CTA Bilat, no wheezes, rhonchi, rales.  GI: Abdomen soft, non-tender, non-distended; no rebound or guarding  : No CVA tenderness.  Musculoskeletal: Extremities warm, well perfused, no erythema, no edema  Right DP palpable.  Right knee with pain with ROM.  Also some pain around lateral aspect of knee with palpation, no erythema or effusion.  No bony point ttp in the right   Skin: No rashes  Neuro: Alert and Oriented x 3; No focal motor or sensory deficits.    Psych: Normal affect    ED Course   Procedures  Labs Reviewed   CBC W/ AUTO DIFFERENTIAL   COMPREHENSIVE METABOLIC PANEL   SEDIMENTATION RATE, MANUAL   C-REACTIVE PROTEIN     X-Ray Knee 1 or 2 View Right   Final Result      Advanced tricompartmental degenerative changes and small suprapatellar joint effusion.          Electronically signed by: Joey Escalante MD   Date:    05/06/2018   Time:    19:02             EKG: NSR with premature supraventricular complexes, no MARCUS's or STD's, non-specific twave pattern, no STEMI       Medical Decision Making:   History:   Old Medical Records: I decided to obtain old medical records.  Independently Interpreted Test(s):   I have ordered and independently interpreted EKG Reading(s) - see prior notes  Clinical Tests:   Medical Tests: Ordered and Reviewed       APC / Resident Notes:   77 year old female presented to ED for th evaluation of right knee pain and swelling. She reported (right knee locked up on me).    Differentials:  -- Right knee Meniscus or ligament pathology   -- Right Knee arthritic problem    -- HTN - she missed her dose today     Imaging:  -- Knee X-Ray: Advanced tricompartmental degenerative changes and small suprapatellar joint effusion.    Plan:  -- Fall precaution   -- Right knee X ray 2 view   -- Home dose of MAXZIDE-25 - one time ED  -- Prednisone 60 mg one time in ED  -- [place] 5-325 one time ED   -- XR neg for fracture, but positive for tricompartmental osteoarthritis and small suprapatellar joint effusion.  --attempted road test after norco and prednisone given in the ED with knee immobilizer.  Patient refused to attempt to ambulate due to right knee pain.  Clinically I did not feel she had septic arthritis, gouty arthritis or occult fracture.  I felt her pain was due to severe arthritis of the right knee.  Her elderly  is not able to help her ambulate at home and they are requesting admission to the hospital because she stays at home along during the day while he is at work and she cannot move around to get food, water or use the restroom due to severe right knee pain which is why they took an ambulance to the ER today.  I felt she warratned observation on medicine for pain control and PT/OT eval.  Medicine accepte.d  -- Case discuss with Dr Reno           Scribe Attestation:   Scribe #1: I performed the above scribed service and the documentation accurately describes the services I performed. I attest to the accuracy of the note.    Attending Attestation:   Physician Attestation Statement for Resident:  As the supervising MD   Physician Attestation Statement: I have personally seen and examined this patient.   I agree with the above history. -:   As the supervising MD I agree with the above PE.    As the supervising MD I agree with the above treatment, course, plan, and disposition.  I have reviewed and agree with the residents interpretation of the following: x-rays and EKG.            I, Dr. John Reno, personally performed the services described in this documentation. All medical record entries made by the scribe were at my direction and in my presence.  I have reviewed the chart and agree that the record reflects my personal performance and is accurate and complete. John Reno MD.  8:01 PM 05/06/2018               Clinical Impression:   The primary encounter diagnosis was Acute pain of right knee. Diagnoses of Hypertension, Knee pain, and Osteoarthritis of right knee, unspecified osteoarthritis type were also pertinent to this visit.    Disposition:   Disposition: Discharged  Condition: Stable                        Roldan Shaver II, MD  Resident  05/06/18 1856       Roldan Shaver II, MD  Resident  05/06/18 1907       John Reno MD  05/06/18 2002

## 2018-05-06 NOTE — ED TRIAGE NOTES
Patient states that she called the ambulance for she was unable to walk for her right knee is hurting.

## 2018-05-06 NOTE — ED NOTES
LOC: The patient is awake, alert and aware of environment with an appropriate affect, the patient is oriented x 3 and speaking appropriately.  APPEARANCE: Patient resting comfortably and in no acute distress, patient is clean and well groomed, patient's clothing is properly fastened.    SKIN: The skin is warm and dry, patient has normal skin turgor and moist mucus membranes, skin intact, no breakdown or brusing noted.    MUSKULOSKELETAL: Patient is unable to stand right leg due to pain in knee area.  Patient is unable to bend her right knee or tolerate weight bearing.     RESPIRATORY: Airway is open and patent, respirations are spontaneous, patient has a normal effort and rate. Breath sounds are clear and equal bilaterally.    CARDIAC: Normal heart sounds. No peripheral edema. Patient noted to have increased BP     ABDOMEN: Soft and non tender to palpation, no distention noted. Bowel sounds present.    NEURO: No neuro deficits, hand grasp equal, no drift noted, no facial droop noted. Speech is clear.

## 2018-05-07 VITALS
SYSTOLIC BLOOD PRESSURE: 158 MMHG | DIASTOLIC BLOOD PRESSURE: 74 MMHG | WEIGHT: 223.31 LBS | TEMPERATURE: 98 F | RESPIRATION RATE: 16 BRPM | HEART RATE: 65 BPM | HEIGHT: 66 IN | OXYGEN SATURATION: 99 % | BODY MASS INDEX: 35.89 KG/M2

## 2018-05-07 PROBLEM — M25.561 ACUTE PAIN OF RIGHT KNEE: Status: RESOLVED | Noted: 2018-05-06 | Resolved: 2018-05-07

## 2018-05-07 PROCEDURE — G0378 HOSPITAL OBSERVATION PER HR: HCPCS

## 2018-05-07 PROCEDURE — G8978 MOBILITY CURRENT STATUS: HCPCS | Mod: CJ

## 2018-05-07 PROCEDURE — G8989 SELF CARE D/C STATUS: HCPCS | Mod: CI

## 2018-05-07 PROCEDURE — G8987 SELF CARE CURRENT STATUS: HCPCS | Mod: CI

## 2018-05-07 PROCEDURE — G8980 MOBILITY D/C STATUS: HCPCS | Mod: CJ

## 2018-05-07 PROCEDURE — 99217 PR OBSERVATION CARE DISCHARGE: CPT | Mod: ,,, | Performed by: HOSPITALIST

## 2018-05-07 PROCEDURE — 25000003 PHARM REV CODE 250: Performed by: HOSPITALIST

## 2018-05-07 PROCEDURE — G8979 MOBILITY GOAL STATUS: HCPCS | Mod: CJ

## 2018-05-07 PROCEDURE — G8988 SELF CARE GOAL STATUS: HCPCS | Mod: CI

## 2018-05-07 PROCEDURE — 97165 OT EVAL LOW COMPLEX 30 MIN: CPT

## 2018-05-07 PROCEDURE — 97530 THERAPEUTIC ACTIVITIES: CPT

## 2018-05-07 PROCEDURE — 97161 PT EVAL LOW COMPLEX 20 MIN: CPT

## 2018-05-07 RX ORDER — MELOXICAM 15 MG/1
15 TABLET ORAL NIGHTLY
Qty: 30 TABLET | Refills: 2 | Status: SHIPPED | OUTPATIENT
Start: 2018-05-07 | End: 2019-09-26

## 2018-05-07 RX ORDER — PANTOPRAZOLE SODIUM 40 MG/1
40 TABLET, DELAYED RELEASE ORAL DAILY
Qty: 30 TABLET | Refills: 11 | Status: SHIPPED | OUTPATIENT
Start: 2018-05-08 | End: 2018-07-10

## 2018-05-07 RX ORDER — AMLODIPINE BESYLATE 10 MG/1
10 TABLET ORAL DAILY
Qty: 30 TABLET | Refills: 11 | Status: SHIPPED | OUTPATIENT
Start: 2018-05-08 | End: 2019-11-19

## 2018-05-07 RX ORDER — AMLODIPINE BESYLATE 10 MG/1
10 TABLET ORAL DAILY
Status: DISCONTINUED | OUTPATIENT
Start: 2018-05-07 | End: 2018-05-07 | Stop reason: HOSPADM

## 2018-05-07 RX ORDER — DICLOFENAC SODIUM 10 MG/G
GEL TOPICAL 3 TIMES DAILY
Qty: 100 G | Refills: 1 | Status: SHIPPED | OUTPATIENT
Start: 2018-05-07 | End: 2019-04-15

## 2018-05-07 RX ADMIN — DICLOFENAC: 10 GEL TOPICAL at 08:05

## 2018-05-07 RX ADMIN — PANTOPRAZOLE SODIUM 40 MG: 40 TABLET, DELAYED RELEASE ORAL at 08:05

## 2018-05-07 RX ADMIN — HYDRALAZINE HYDROCHLORIDE 25 MG: 25 TABLET, FILM COATED ORAL at 08:05

## 2018-05-07 RX ADMIN — AMLODIPINE BESYLATE 10 MG: 10 TABLET ORAL at 09:05

## 2018-05-07 NOTE — DISCHARGE SUMMARY
[facility] Hospital Medicine  Discharge Summary      Patient Name: Jodi Cole  MRN: [id number]  Admission Date: 5/6/2018  Hospital Length of Stay: 0 days  Discharge Date and Time:  05/07/2018 11:52 AM  Attending Physician: Minh Felix, *   Discharging Provider: Minh Felix MD  Primary Care Provider: Jose Umana MD  [facility] Medicine Team: [facility] MED S Minh Felix MD    HPI:   Ms. Jodi Cole is a 77 y.o. female with history of endometrial cancer s/p hysterectomy, essential hypertension and obesity who presents to the ER because of right sided knee pain.  She was in her usual state of health until Wednesday, when she went to ambulate and her knee just locked up.  She took some Aleve with mild relief and her pain resolved.  She was pain free until Saturday when her pain returned.  Since, then she has been unable to ambulate. She endorses some mild swelling of the right knee, but denies any trauma, numbness, or tingling down the legs.  She does mention that she has had some right knee pain in the past and has required steroid injections.  She denies any chest pain, SOB.     While in the ED, xrays were done which showed tricompartmental osteoarthritis of the right knee.  She was given Prednisone 60mg and a Norco.  She was placed in observation until Hospital Medicine for PT/OT evaluation.    * No surgery found *      Hospital Course:   Ms. Cole was started on mobic and voltaren while in house; prednisone was given while in ED. The following morning, she reported that her knee pain was greatly improved. She worked with PT and will have home health PT. BPs were elevated while in house and she was started on amlodipine. Will f/u with PCP in 1-2 weeks as well as Ortho for joint injection.    Acute Pain of Right Knee  Tricompartment Osteoarthritis of Right Knee  · XR with OA  · Was given Prednisone and Norco in the ED  · Start Mobic 15mg PO daily and  topical Voltaren gel - started PPI to prevent ulcer formation  · Will need Ortho outpatient for steroid injections, referral sent  · PT/OT: will discharge with rolling walker and home health PT     History of Endometrial Cancer  S/p Laparoscopic Hysterectomy  · Chronic and stable  · No acute issues  · Follows with Dr. Jordan for surveillance     Essential HTN  · Reports to never having a problem with her blood pressure at home, although it seems they run in high 140s so still above goal  · BP >190 on admit, reports no current pain in her knee but mentions she didn't take her BP medicine for the last 3 days and has been using alleve  · Continue Triamterene-HCTZ 37.5-25mg PO daily  · Initiate amlodipine 10mg daily; will f/u with PCP in 1-2 weeks for BP check and medication titration     Obese  · Encourage diet, weight loss, and exercise when able - currently limited by knee pain    Vitals:    05/07/18 0833   BP: (!) 194/91   Pulse: 67   Resp: 16   Temp: 97 °F (36.1 °C)       Constitutional: Appears well-developed and well-nourished.   Head: Normocephalic and atraumatic.   Mouth/Throat: Oropharynx is clear and moist.   Eyes: EOM are normal. Pupils are equal, round, and reactive to light. No scleral icterus.   Neck: Normal range of motion. Neck supple.   Cardiovascular: Normal rate and regular rhythm.  No murmur heard.  Pulmonary/Chest: Effort normal and breath sounds normal. No respiratory distress. No wheezes, rales, or rhonchi  Abdominal: Soft. Bowel sounds are normal.  No distension or tenderness  Musculoskeletal: Right knee with mild swelling and painful ROM, but no warmth, erythema, or tenderness to palpation.  Neurological: Alert and oriented to person, place, and time.   Skin: Skin is warm and dry.   Psychiatric: Normal mood and affect. Behavior is normal.      Consults:     No new Assessment & Plan notes have been filed under this hospital service since the last note was generated.  Service: [facility]  Medicine    Final Active Diagnoses:    Diagnosis Date Noted POA    PRINCIPAL PROBLEM:  Tricompartment osteoarthritis of right knee [M17.11] 05/06/2018 Yes    Obese [E66.9] 05/06/2018 Yes    Endometrial ca [C54.1] 05/26/2015 Yes    Essential hypertension [I10] 05/12/2015 Yes    S/P laparoscopic hysterectomy [Z90.710] 05/11/2015 Yes      Problems Resolved During this Admission:    Diagnosis Date Noted Date Resolved POA    Acute pain of right knee [M25.561] 05/06/2018 05/07/2018 Yes       Discharged Condition: good    Disposition: Home or Self Care    Follow Up:  Follow-up Information     Jose Umana MD In 1 week.    Specialty:  Geriatric Medicine  Why:  For follow up on blood pressure and knee pain  Contact information:  [address] [address]  [phone]             [facility].    Specialty:  Emergency Medicine  Why:  As needed, If symptoms worsen  Contact information:  [address]  [facility] [place] [facility] [address] [phone]           [facility].    Specialty:  Orthopedics  Why:  Call for an oppointment if dont hear by tuesday  Contact information:  [address]  [facility] [place] [address] [phone]  Additional information:  [facility] - [address]               Patient Instructions:     Ambulatory Referral to Orthopedics   Referral Priority: Urgent Referral Type: Consultation   Requested Specialty: Orthopedic Surgery    Number of Visits Requested: 1      Ambulatory Referral to Orthopedics   Referral Priority: Routine Referral Type: Consultation   Requested Specialty: Orthopedic Surgery    Number of Visits Requested: 1      Diet Cardiac     Activity as tolerated     Notify your health care provider if you experience any of the following:  severe uncontrolled pain     Notify your health care provider if you experience any of the following:  severe persistent headache     Notify your health care provider if you experience any of  the following:  difficulty breathing or increased cough         Significant Diagnostic Studies: Radiology: X-Ray: knee with osteoarthritis    Pending Diagnostic Studies:     None         Medications:  Reconciled Home Medications:      Medication List      START taking these medications    amLODIPine 10 MG tablet  Commonly known as:  NORVASC  Take 1 tablet (10 mg total) by mouth once daily.  Start taking on:  5/8/2018     diclofenac sodium 1 % Gel  Apply topically 3 (three) times daily.     meloxicam 15 MG tablet  Commonly known as:  MOBIC  Take 1 tablet (15 mg total) by mouth every evening.     pantoprazole 40 MG tablet  Commonly known as:  PROTONIX  Take 1 tablet (40 mg total) by mouth once daily.  Start taking on:  5/8/2018        CONTINUE taking these medications    hydrocodone-acetaminophen 5-325mg 5-325 mg per tablet  Commonly known as:  NORCO  Take 1 tablet by mouth every 6 (six) hours as needed for Pain.     multivitamin capsule  Take 1 capsule by mouth once daily.     pravastatin 40 MG tablet  Commonly known as:  PRAVACHOL  TK 1 T PO D     triamterene-hydrochlorothiazide 37.5-25 mg 37.5-25 mg per tablet  Commonly known as:  MAXZIDE-25     vitamins B1 B6 B12 Tab  Take by mouth once daily.     zolpidem 5 MG Tab  Commonly known as:  AMBIEN  Take 1 tablet (5 mg total) by mouth nightly as needed.            Indwelling Lines/Drains at time of discharge:   Lines/Drains/Airways          No matching active lines, drains, or airways          Time spent on the discharge of patient: 30 minutes  Patient was seen and examined on the date of discharge and determined to be suitable for discharge.         Minh Felix MD  Department of Hospital Medicine  [facility]

## 2018-05-07 NOTE — H&P
Hospital Medicine  History and Physical      Patient Name: Jodi Cole  MRN:  [id number]  [facility] Medicine Team: [facility] Kiara Rueda MD  Date of Admission:  5/6/2018     Principal Problem:  Acute pain of right knee   Primary Care Physician: Jose Umana MD       History of Present Illness:    Ms. Jodi Cole is a 77 y.o. female with history of endometrial cancer s/p hysterectomy, essential hypertension and obesity who presents to the ER because of right sided knee pain.  She was in her usual state of health until Wednesday, when she went to ambulate and her knee just locked up.  She took some Aleve with mild relief and her pain resolved.  She was pain free until Saturday when her pain returned.  Since, then she has been unable to ambulate. She endorses some mild swelling of the right knee, but denies any trauma, numbness, or tingling down the legs.  She does mention that she has had some right knee pain in the past and has required steroid injections.  She denies any chest pain, SOB.    While in the ED, xrays were done which showed tricompartmental osteoarthritis of the right knee.  She was given Prednisone 60mg and a Norco.  She was placed in observation until Hospital Medicine for PT/OT evaluation.      Review of Systems:  Constitutional: Negative for chills, fatigue, fever.   HENT: Negative for sore throat, trouble swallowing.    Eyes: Negative for photophobia, visual disturbance.   Respiratory: Negative for cough, shortness of breath.    Cardiovascular: Negative for chest pain, palpitations, leg swelling.   Gastrointestinal: Negative for abdominal pain, constipation, diarrhea, nausea, vomiting.   Endocrine: Negative for cold intolerance, heat intolerance.   Genitourinary: Negative for dysuria, frequency.   Musculoskeletal: + right knee pain  Skin: Negative for rash, wound, erythema   Neurological: Negative for dizziness, syncope, weakness, light-headedness.   Psychiatric/Behavioral:  Negative for confusion, hallucinations, anxiety  All other systems reviewed and are negative.      Past Medical History: Patient has a past medical history of Anemia associated with chemotherapy (9/1/2015); Anemia due to chemotherapy (9/22/2015); Chemotherapy induced nausea and vomiting (6/9/2015); Chemotherapy-induced neutropenia (7/28/2015); Constipation (7/7/2015); Endometrial ca (5/26/2015); Endometrial ca (8/10/2015); Ovarian cancer; Pain in both hands (2/19/2016); Right-sided low back pain without sciatica (6/23/2016); Uterine cancer; and Well woman exam with routine gynecological exam (2/19/2016).    Past Surgical History: Patient has a past surgical history that includes D&C Hysteroscopy (March 18, 2015); Dilation and curettage of uterus (1969 ); Lymphadenectomy; Hysterectomy (5/11/15); Pelvic and para-aortic lymph node dissection; and pr removal of ovary/tube(s).    Social History: Patient reports that she has never smoked. She has never used smokeless tobacco. She reports that she does not drink alcohol or use drugs.    Family History: family history includes Cancer (age of onset: 65) in her brother.    Medications: Scheduled Meds:   diclofenac sodium   Topical (Top) TID     Continuous Infusions:  PRN Meds:.    Allergies: Patient has No Known Allergies.      Physical Exam:    Temp:  [98.2 °F (36.8 °C)]   Pulse:  [80-86]   Resp:  [18]   BP: (190-198)/()   SpO2:  [94 %-95 %]     Constitutional: Appears well-developed and well-nourished.   Head: Normocephalic and atraumatic.   Mouth/Throat: Oropharynx is clear and moist.   Eyes: EOM are normal. Pupils are equal, round, and reactive to light. No scleral icterus.   Neck: Normal range of motion. Neck supple.   Cardiovascular: Normal rate and regular rhythm.  No murmur heard.  Pulmonary/Chest: Effort normal and breath sounds normal. No respiratory distress. No wheezes, rales, or rhonchi  Abdominal: Soft. Bowel sounds are normal.  No distension or  tenderness  Musculoskeletal: Right knee with mild swelling and painful ROM, but no warmth, erythema, or tenderness to palpation.  Neurological: Alert and oriented to person, place, and time.   Skin: Skin is warm and dry.   Psychiatric: Normal mood and affect. Behavior is normal.       No intake or output data in the 24 hours ending 05/06/18 2039    Recent Labs  Lab 05/06/18 1952   WBC 7.59   HGB 11.1*   HCT 35.4*          Recent Labs  Lab 05/06/18 1952      K 3.7      CO2 27   BUN 23   CREATININE 0.9   GLU 94   CALCIUM 9.3       Recent Labs  Lab 05/06/18 1952   ALKPHOS 76   ALT 7*   AST 12   ALBUMIN 3.5   PROT 7.2   BILITOT 0.3      No results for input(s): POCTGLUCOSE in the last 168 hours.  No results for input(s): CPK, CPKMB, MB, TROPONINI in the last 72 hours.    No results for input(s): LACTATE in the last 72 hours.       Assessment and Plan:    Ms. Jodi Cole is a 77 y.o. female who presented to [facility] on 5/6/2018 with right knee pain.    Active Hospital Problems    Diagnosis  POA    *Acute pain of right knee [M25.561]  Yes    Tricompartment osteoarthritis of right knee [M17.11]  Yes    Endometrial ca [C54.1]  Yes    Essential hypertension [I10]  Yes    S/P laparoscopic hysterectomy [Z90.710]  Yes      Resolved Hospital Problems    Diagnosis Date Resolved POA   No resolved problems to display.       Acute Pain of Right Knee  Tricompartment Osteoarthritis of Right Knee  · XR with OA  · Was given Prednisone and Norco in the ED.  · Start Mobic 15mg PO daily and topical Voltaren gel - start PPI to prevent ulcer formation  · Will need Ortho outpatient for steroid injections  · PT/OT    History of Endometrial Cancer  S/p Laparoscopic Hysterectomy  · Chronic and stable  · No acute issues  · Follows with Dr. Jordan for surveillance    Essential HTN  · Reports to never having a problem with her blood pressure at home  · BP >190 on admit, reports no current pain in her knee but mentions  she didn't take her BP medicine for the last 3 days  · Continue Triamterene-HCTZ 37.5-25mg PO daily  · Can consider starting Norvasc if BP doesn't get under better control    Obese  · Encourage diet, weight loss, and exercise when able - currently limited by knee pain    Diet:  Cardiac  GI PPx:  PPI  DVT PPx:  Lovenox 40mg PO daily  Goals of Care:  Full    Disposition:  Likely home with home health  Discharge Needs:  Ortho f/u outpatient for steroid injections    Kiara Rueda MD  [facility]  Cell:  [phone]  Spectra:  [phone]  Pager:  [phone]

## 2018-05-07 NOTE — NURSING
Pt discharge to home, condition stable. IV removed no redness or swelling noted to site. Verbalizes understanding of discharge instructions.

## 2018-05-07 NOTE — PLAN OF CARE
Problem: Physical Therapy Goal  Goal: Physical Therapy Goal  Outcome: Outcome(s) achieved Date Met: 05/07/18  No goals set

## 2018-05-07 NOTE — PLAN OF CARE
Problem: Patient Care Overview  Goal: Plan of Care Review  Outcome: Ongoing (interventions implemented as appropriate)  Pt verbalizes understanding of plan of care review.

## 2018-05-07 NOTE — NURSING
Pt awake, alert, resp even and unlabored. Skin warm and  Dry to touch. Speech clear able to make her needs known. Blood pressure 194/91 . Hydralazine 25 mg po given at 0838 per order. Safety precautions at the bedside. Spouse at the bedside.

## 2018-05-07 NOTE — ED NOTES
Spoke with Dr. Kulkarni that patient is not wanting to be discharged from hospital for she is unable to ambulate.  Patient is having difficulty with unable to bear weight or move right leg.

## 2018-05-07 NOTE — HOSPITAL COURSE
Ms. Cole was started on mobic and voltaren while in house; prednisone was given while in ED. The following morning, she reported that her knee pain was greatly improved. She worked with PT and will have home health PT. BPs were elevated while in house and she was started on amlodipine. Will f/u with PCP in 1-2 weeks as well as Ortho for joint injection.    Acute Pain of Right Knee  Tricompartment Osteoarthritis of Right Knee  · XR with OA  · Was given Prednisone and Norco in the ED  · Start Mobic 15mg PO daily and topical Voltaren gel - started PPI to prevent ulcer formation  · Will need Ortho outpatient for steroid injections, referral sent  · PT/OT: will discharge with rolling walker and home health PT     History of Endometrial Cancer  S/p Laparoscopic Hysterectomy  · Chronic and stable  · No acute issues  · Follows with Dr. Jordan for surveillance     Essential HTN  · Reports to never having a problem with her blood pressure at home, although it seems they run in high 140s so still above goal  · BP >190 on admit, reports no current pain in her knee but mentions she didn't take her BP medicine for the last 3 days and has been using alleve  · Continue Triamterene-HCTZ 37.5-25mg PO daily  · Initiate amlodipine 10mg daily; will f/u with PCP in 1-2 weeks for BP check and medication titration     Obese  · Encourage diet, weight loss, and exercise when able - currently limited by knee pain    Vitals:    05/07/18 0833   BP: (!) 194/91   Pulse: 67   Resp: 16   Temp: 97 °F (36.1 °C)       Constitutional: Appears well-developed and well-nourished.   Head: Normocephalic and atraumatic.   Mouth/Throat: Oropharynx is clear and moist.   Eyes: EOM are normal. Pupils are equal, round, and reactive to light. No scleral icterus.   Neck: Normal range of motion. Neck supple.   Cardiovascular: Normal rate and regular rhythm.  No murmur heard.  Pulmonary/Chest: Effort normal and breath sounds normal. No respiratory distress. No  wheezes, rales, or rhonchi  Abdominal: Soft. Bowel sounds are normal.  No distension or tenderness  Musculoskeletal: Right knee with mild swelling and painful ROM, but no warmth, erythema, or tenderness to palpation.  Neurological: Alert and oriented to person, place, and time.   Skin: Skin is warm and dry.   Psychiatric: Normal mood and affect. Behavior is normal.

## 2018-05-07 NOTE — PLAN OF CARE
Covering SW sent home health referral to [facility].     Debora Chandra, ANDREW  [facility]  Ext. [phone]

## 2018-05-07 NOTE — PT/OT/SLP EVAL
Occupational Therapy   Evaluation and Discharge Note    Name: Jodi Cole  MRN: [id number]  Admitting Diagnosis:  Tricompartment osteoarthritis of right knee      Recommendations:     Discharge Recommendations: home  Discharge Equipment Recommendations:  walker, rolling, grab bar  Barriers to discharge:  Decreased caregiver support    History:     Occupational Profile:  Living Environment: Per pt report, pt lives with  in a two story home with no steps to entry. Pt bedroom/bathroom is located on the first floor. Pt has a tub/shower combo.   Previous level of function: Per pt report, pt was independent with all ADLs and mobility prior. Pt was driving prior.   Roles and Routines: Wife, spending time with family   Equipment Owned:  raised toilet, shower chair  Assistance upon Discharge: Per pt report, pt will have limited assist at home secondary to children and spouse working full time.     Past Medical History:   Diagnosis Date    Anemia associated with chemotherapy 9/1/2015    Anemia due to chemotherapy 9/22/2015    Chemotherapy induced nausea and vomiting 6/9/2015    Chemotherapy-induced neutropenia 7/28/2015    Constipation 7/7/2015    Endometrial ca 5/26/2015    Endometrial ca 8/10/2015    Ovarian cancer     Pain in both hands 2/19/2016    Right-sided low back pain without sciatica 6/23/2016    Uterine cancer     Well woman exam with routine gynecological exam 2/19/2016       Past Surgical History:   Procedure Laterality Date    D&C Hysteroscopy  March 18, 2015    DILATION AND CURETTAGE OF UTERUS  1969     D&C and uterine suspension.     HYSTERECTOMY  5/11/15    robotic for endometrial cancer    LYMPHADENECTOMY      PELVIC AND PARA-AORTIC LYMPH NODE DISSECTION      AR REMOVAL OF OVARY/TUBE(S)      robotic TLH/BSO and bilatyeral pelvic and para-aortic LND       Subjective     Chief Complaint: headache   Patient/Family stated goals: To get home.  Communicated with: RN prior to session. Pt  agreeable to participate with OT evaluation.   Pain/Comfort:  · Pain Rating 1: 5/10  · Location - Side 1: Bilateral  · Location - Orientation 1: generalized  · Location 1: head  · Pain Addressed 1: Reposition, Distraction, Cessation of Activity  · Pain Rating Post-Intervention 1: 5/10    Patients cultural, spiritual, Gnosticism conflicts given the current situation: none noted or reported     Objective:     Patient found with: peripheral IV    General Precautions: Standard, fall   Orthopedic Precautions:N/A   Braces: N/A     Occupational Performance:    Bed Mobility:    · Patient completed Rolling/Turning to Left with  supervision  · Patient completed Supine to Sit with supervision with HOB elevated     Functional Mobility/Transfers:  · Patient completed Sit <> Stand Transfer with stand by assistance  with  rolling walker   · Functional Mobility: Pt performed functional mobility for household/community distances (100 feet) with SBA using RW with min standing rest breaks required secondary to noted/reported fatigue.     Activities of Daily Living:  · UB Dressing: modified independence to don gown like a jacket sitting EOB   · LB Dressing: stand by assistance to don shoes/socks sitting EOB     Cognitive/Visual Perceptual:  Cognitive/Psychosocial Skills:     -       Oriented to: Person, Place, Time and Situation   -       Follows Commands/attention:Follows multistep  commands  -       Safety awareness/insight to disability: intact   Visual/Perceptual:      -Intact    Physical Exam:  Balance:    -       SBA dynamic standing balance   Postural examination/scapula alignment:    -       Rounded shoulders  Skin integrity: Visible skin intact  Edema:  None noted  Sensation:    -       Intact  Dominant hand:    -       Right   Upper Extremity Range of Motion:     -       Right Upper Extremity: WFL  -       Left Upper Extremity: WFL  Upper Extremity Strength:    -       Right Upper Extremity: WFL  -       Left Upper Extremity:  "WFL   Strength:    -       Right Upper Extremity: WFL  -       Left Upper Extremity: WFL    Patient left sitting EOB  with all lines intact, call button in reach and family  present    [facility] 6 Click:  [facility] Total Score: 22    Treatment & Education:  · Pt and pt family educated on safety awareness with functional transfers and with completion of ADLS and OT POC   · Pt and pt family educated on role of OT and purpose of evaluation and goals for therapy  · Pt completed ADLS and functional mobility for treatment session as noted above  · Pt and pt family educated on importance of completing OOB with nursing staff assistance to increase pt functional activity tolerance and for the prevention of secondary complications   · Pt educated on line management in various set-ups (sitting in recliner, supine in bed, functional mobility tasks)  · White board/Communication board updated     Education:    Assessment:     Jodi Cole is a 77 y.o. female with a medical diagnosis of Tricompartment osteoarthritis of right knee. At this time, patient is functioning at their prior level of function and does not require further acute OT services.     Clinical Decision Makin.  OT Low:  "Pt evaluation falls under low complexity for evaluation coding due to performance deficits noted in 1-3 areas as stated above and 0 co-morbities affecting current functional status. Data obtained from problem focused assessments. No modifications or assistance was required for completion of evaluation. Only brief occupational profile and history review completed."     Plan:     During this hospitalization, patient does not require further acute OT services.  Please re-consult if situation changes.    · Plan of Care Reviewed with: patient, spouse    This Plan of care has been discussed with the patient who was involved in its development and understands and is in agreement with the identified goals and treatment plan    GOALS:    Occupational " Therapy Goals     Not on file          Multidisciplinary Problems (Resolved)        Problem: Occupational Therapy Goal    Goal Priority Disciplines Outcome Interventions   Occupational Therapy Goal   (Resolved)     OT, PT/OT Outcome(s) achieved                    Time Tracking:     OT Date of Treatment: 05/07/18  OT Start Time: 1110  OT Stop Time: 1130  OT Total Time (min): 20 min    Billable Minutes:Evaluation 20    Jamar Ceja OT  5/7/2018

## 2018-05-07 NOTE — PT/OT/SLP EVAL
Physical Therapy Evaluation and Discharge Note    Patient Name:  Jodi Cole   MRN:  [id number]    Recommendations:     Discharge Recommendations:  home health PT   Discharge Equipment Recommendations: grab bar, walker, rolling   Barriers to discharge: None    Assessment:     Jodi Cole is a 77 y.o. female admitted with a medical diagnosis of Tricompartment osteoarthritis of right knee. .  At this time, patient is functioning at their prior level of function and does not require further acute PT services.     Recent Surgery: * No surgery found *      Plan:     During this hospitalization, patient does not require further acute PT services.  Please re-consult if situation changes.     Plan of Care Reviewed with: spouse    Subjective     Communicated with nursing prior to session.  Patient found supine upon PT entry to room, agreeable to evaluation.      Chief Complaint: HA and (R) knee soreness  Patient comments/goals: to go home  Pain/Comfort:  · Pain Rating 1: 5/10  · Location - Orientation 1: generalized  · Location 1: head (and (R) knee soreness)  · Pain Addressed 1: Reposition, Cessation of Activity, Nurse notified  · Pain Rating Post-Intervention 1: 5/10    Patients cultural, spiritual, Mu-ism conflicts given the current situation: no    Living Environment:  Pt. Lives with spouse in 2-story home, but bedroom and bathroom on 1st floor and no MARCUS.  Prior to admission, patients level of function was indep.  Patient has the following equipment: raised toilet, shower chair.  Upon discharge, patient will have assistance from spouse, when he is not at work.    Objective:     Patient found with: peripheral IV     General Precautions: Standard, fall   Orthopedic Precautions:N/A   Braces:       Exams:  · RUE ROM: WFL  · RUE Strength: WFL  · LUE ROM: WFL  · LUE Strength: WFL  · RLE ROM: WFL  · RLE Strength: WFL  · LLE ROM: WFL  · LLE Strength: WFL    Functional Mobility:  · Bed Mobility:     · Rolling Left:   supervision  · Scooting: supervision  · Supine to Sit: supervision  · Sit to Supine: supervision  · Transfers:     · Sit to Stand:  supervision with no AD  · Gait: 100' with RW and SBA without LOB  · Balance: fair+    AM-PAC 6 CLICK MOBILITY  Total Score:22       Therapeutic Activities and Exercises:   Discussed therapy/DME needs, benefits of HH PT, and PT POC.    Patient left supine with all lines intact and call button in reach.    GOALS:    Physical Therapy Goals     Not on file          Multidisciplinary Problems (Resolved)        Problem: Physical Therapy Goal    Goal Priority Disciplines Outcome Goal Variances Interventions   Physical Therapy Goal   (Resolved)     PT/OT, PT Outcome(s) achieved                     History:     Past Medical History:   Diagnosis Date    Anemia associated with chemotherapy 9/1/2015    Anemia due to chemotherapy 9/22/2015    Chemotherapy induced nausea and vomiting 6/9/2015    Chemotherapy-induced neutropenia 7/28/2015    Constipation 7/7/2015    Endometrial ca 5/26/2015    Endometrial ca 8/10/2015    Ovarian cancer     Pain in both hands 2/19/2016    Right-sided low back pain without sciatica 6/23/2016    Uterine cancer     Well woman exam with routine gynecological exam 2/19/2016       Past Surgical History:   Procedure Laterality Date    D&C Hysteroscopy  March 18, 2015    DILATION AND CURETTAGE OF UTERUS  1969     D&C and uterine suspension.     HYSTERECTOMY  5/11/15    robotic for endometrial cancer    LYMPHADENECTOMY      PELVIC AND PARA-AORTIC LYMPH NODE DISSECTION      [place] REMOVAL OF OVARY/TUBE(S)      robotic TLH/BSO and bilatyeral pelvic and para-aortic LND       Clinical Decision Making:     History  Co-morbidities and personal factors that may impact the plan of care Examination  Body Structures and Functions, activity limitations and participation restrictions that may impact the plan of care Clinical Presentation   Decision Making/ Complexity Score    Co-morbidities:   [] Time since onset of injury / illness / exacerbation  [] Status of current condition  []Patient's cognitive status and safety concerns    [] Multiple Medical Problems (see med hx)  Personal Factors:   [] Patient's age  [] Prior Level of function   [] Patient's home situation (environment and family support)  [] Patient's level of motivation  [] Expected progression of patient      HISTORY:(criteria)    [] 69065 - no personal factors/history    [] 34234 - has 1-2 personal factor/comorbidity     [] 39957 - has >3 personal factor/comorbidity     Body Regions:  [] Objective examination findings  [] Head     []  Neck  [] Trunk   [] Upper Extremity  [] Lower Extremity    Body Systems:  [] For communication ability, affect, cognition, language, and learning style: the assessment of the ability to make needs known, consciousness, orientation (person, place, and time), expected emotional /behavioral responses, and learning preferences (eg, learning barriers, education  needs)  [] For the neuromuscular system: a general assessment of gross coordinated movement (eg, balance, gait, locomotion, transfers, and transitions) and motor function  (motor control and motor learning)  [] For the musculoskeletal system: the assessment of gross symmetry, gross range of motion, gross strength, height, and weight  [] For the integumentary system: the assessment of pliability(texture), presence of scar formation, skin color, and skin integrity  [] For cardiovascular/pulmonary system: the assessment of heart rate, respiratory rate, blood pressure, and edema     Activity limitations:    [] Patient's cognitive status and saf ety concerns          [] Status of current condition      [] Weight bearing restriction  [] Cardiopulmunary Restriction    Participation Restrictions:   [] Goals and goal agreement with the patient     [] Rehab potential (prognosis) and probable outcome      Examination of Body System: (criteria)    []  15159 - addressing 1-2 elements    [] 07551 - addressing a total of 3 or more elements     [] 54723 -  Addressing a total of 4 or more elements         Clinical Presentation: (criteria)  Choose one     On examination of body system using standardized tests and measures patient presents with (CHOOSE ONE) elements from any of the following: body structures and functions, activity limitations, and/or participation restrictions.  Leading to a clinical presentation that is considered (CHOOSE ONE)                              Clinical Decision Making  (Eval Complexity):  Choose One     Time Tracking:     PT Received On: 05/07/18  PT Start Time: 1110     PT Stop Time: 1130  PT Total Time (min): 20 min     Billable Minutes: Evaluation 12 and Therapeutic Activity 8      Shelton Arellano, PT  05/07/2018

## 2018-05-07 NOTE — PLAN OF CARE
Spoke to the pt at the bedside, she confirmed address and contact number on the face sheet are correct. Advised she was being d/c'd with  and orders for a FWW. Confirmed she does not have any DME at home and she has no preference as to any particular HH agency as long as they accept her insurance. Advised will arrange for FWW to be delivered to her room prior to d/c and someone from  will call her tomorrow. SHe stated she understood.     1233- Spoke to Guerline with [facility], advised her of this pt, she stated she would review her and let me  Know if they can accept.      05/07/18 1223   Discharge Assessment   Assessment Type Discharge Planning Assessment   Confirmed/corrected address and phone number on facesheet? Yes   Assessment information obtained from? Patient   Prior to hospitilization cognitive status: Alert/Oriented   Prior to hospitalization functional status: Independent   Current cognitive status: Alert/Oriented   Current Functional Status: Independent   Lives With spouse   Able to Return to Prior Arrangements yes   Is patient able to care for self after discharge? Yes   Patient's perception of discharge disposition home health   Readmission Within The Last 30 Days no previous admission in last 30 days   Patient currently being followed by outpatient case management? No   Patient currently receives any other outside agency services? No   Equipment Currently Used at Home none   Do you have any problems affording any of your prescribed medications? No   Is the patient taking medications as prescribed? yes   Does the patient have transportation home? Yes   Transportation Available family or friend will provide   Does the patient receive services at the [facility]? No   Discharge Plan A Home;Home with family;Home Health   Discharge Plan B Home;Home with family;Home Health   Patient/Family In Agreement With Plan yes

## 2018-05-07 NOTE — HPI
Ms. Jodi Cole is a 77 y.o. female with history of endometrial cancer s/p hysterectomy, essential hypertension and obesity who presents to the ER because of right sided knee pain.  She was in her usual state of health until Wednesday, when she went to ambulate and her knee just locked up.  She took some Aleve with mild relief and her pain resolved.  She was pain free until Saturday when her pain returned.  Since, then she has been unable to ambulate. She endorses some mild swelling of the right knee, but denies any trauma, numbness, or tingling down the legs.  She does mention that she has had some right knee pain in the past and has required steroid injections.  She denies any chest pain, SOB.     While in the ED, xrays were done which showed tricompartmental osteoarthritis of the right knee.  She was given Prednisone 60mg and a Norco.  She was placed in observation until Hospital Medicine for PT/OT evaluation.

## 2018-05-07 NOTE — PLAN OF CARE
Problem: Occupational Therapy Goal  Goal: Occupational Therapy Goal  Outcome: Outcome(s) achieved Date Met: 05/07/18  Pt OT initial evaluation completed. Pt does not require skilled OT services at this time, as patient is currently functioning at [facility]. Please re-consult therapy if functional status changes.     Jamar Ceja, OT  5/7/2018

## 2018-05-07 NOTE — PLAN OF CARE
CM visited with pt to inform plan to dc home with home health. CM offered HH list, declined. Wishes for first available. Address correct on face sheet. Pt aware of dc today. Will order rolling walker. [facility] due [facility]. CM left voice mail to Debora SW.       05/07/18 1200   Discharge Assessment   Assessment Type Discharge Planning Assessment   Confirmed/corrected address and phone number on facesheet? Yes   Assessment information obtained from? Patient   Expected Length of Stay (days) 1   Communicated expected length of stay with patient/caregiver yes   Prior to hospitilization cognitive status: Alert/Oriented   Prior to hospitalization functional status: Independent   Current cognitive status: Alert/Oriented   Current Functional Status: Independent;Assistive Equipment   Lives With spouse   Able to Return to Prior Arrangements yes   Is patient able to care for self after discharge? Yes   Who are your caregiver(s) and their phone number(s)? Spouse, [phone] cell   Patient's perception of discharge disposition home or selfcare   Readmission Within The Last 30 Days no previous admission in last 30 days   Patient currently being followed by outpatient case management? No   Patient currently receives any other outside agency services? No   Equipment Currently Used at Home none   Do you have any problems affording any of your prescribed medications? No   Is the patient taking medications as prescribed? yes   Does the patient have transportation home? Yes   Transportation Available family or friend will provide   Discharge Plan A Home with family;Home Health   Discharge Plan B Home with family   Patient/Family In Agreement With Plan yes

## 2018-05-23 ENCOUNTER — TELEPHONE (OUTPATIENT)
Dept: ADMINISTRATIVE | Facility: CLINIC | Age: 78
End: 2018-05-23

## 2018-05-31 ENCOUNTER — TELEPHONE (OUTPATIENT)
Dept: GYNECOLOGIC ONCOLOGY | Facility: CLINIC | Age: 78
End: 2018-05-31

## 2018-05-31 NOTE — TELEPHONE ENCOUNTER
Left message on patient voice mail regarding lab appt 7/9 and f/u appt with Dr. Jordan 7/10 appt also mailed to patient

## 2018-06-05 ENCOUNTER — OFFICE VISIT (OUTPATIENT)
Dept: ORTHOPEDICS | Facility: CLINIC | Age: 78
End: 2018-06-05
Payer: MEDICARE

## 2018-06-05 VITALS
SYSTOLIC BLOOD PRESSURE: 135 MMHG | WEIGHT: 222 LBS | DIASTOLIC BLOOD PRESSURE: 79 MMHG | BODY MASS INDEX: 35.68 KG/M2 | HEART RATE: 81 BPM | HEIGHT: 66 IN

## 2018-06-05 DIAGNOSIS — M17.11 PRIMARY OSTEOARTHRITIS OF RIGHT KNEE: Primary | ICD-10-CM

## 2018-06-05 PROCEDURE — 3078F DIAST BP <80 MM HG: CPT | Mod: CPTII,S$GLB,, | Performed by: PHYSICIAN ASSISTANT

## 2018-06-05 PROCEDURE — 3075F SYST BP GE 130 - 139MM HG: CPT | Mod: CPTII,S$GLB,, | Performed by: PHYSICIAN ASSISTANT

## 2018-06-05 PROCEDURE — 99203 OFFICE O/P NEW LOW 30 MIN: CPT | Mod: 25,S$GLB,, | Performed by: PHYSICIAN ASSISTANT

## 2018-06-05 PROCEDURE — 20610 DRAIN/INJ JOINT/BURSA W/O US: CPT | Mod: RT,S$GLB,, | Performed by: PHYSICIAN ASSISTANT

## 2018-06-05 PROCEDURE — 99999 PR PBB SHADOW E&M-EST. PATIENT-LVL III: CPT | Mod: PBBFAC,,, | Performed by: PHYSICIAN ASSISTANT

## 2018-06-05 RX ORDER — TRIAMCINOLONE ACETONIDE 40 MG/ML
60 INJECTION, SUSPENSION INTRA-ARTICULAR; INTRAMUSCULAR
Status: COMPLETED | OUTPATIENT
Start: 2018-06-05 | End: 2018-06-05

## 2018-06-05 RX ADMIN — TRIAMCINOLONE ACETONIDE 60 MG: 40 INJECTION, SUSPENSION INTRA-ARTICULAR; INTRAMUSCULAR at 09:06

## 2018-06-05 NOTE — PROGRESS NOTES
"  SUBJECTIVE:     Chief Complaint : right knee pain    History of Present Illness:  Jodi Cole is a 77 y.o. female seen in clinic today with a chief complaint of right knee pain. Acute on chronic. Pt has had longstanding knee OA that she can deal with. One month ago she cleaned her house and developed right knee pain that was more severe and has been bothering her since. She has difficulty with ADL due to new level of pain. Pain is anterior and lateral. She has had difficulty weight bearing and was unable to walk without walker up until a few days ago. The last time she had injections was in 2004. Her left knee is historically her worse knee. She is taking Mobic, occasional Norco, and using topical voltaren.     Past Medical History:   Diagnosis Date    Anemia associated with chemotherapy 9/1/2015    Anemia due to chemotherapy 9/22/2015    Chemotherapy induced nausea and vomiting 6/9/2015    Chemotherapy-induced neutropenia 7/28/2015    Constipation 7/7/2015    Endometrial ca 5/26/2015    Endometrial ca 8/10/2015    Ovarian cancer     Pain in both hands 2/19/2016    Right-sided low back pain without sciatica 6/23/2016    Uterine cancer     Well woman exam with routine gynecological exam 2/19/2016       Review of Systems:  Constitutional: no fever or chills  ENT: no nasal congestion or sore throat  Respiratory: no cough or shortness of breath  Cardiovascular: no chest pain or palpitations  Gastrointestinal: no nausea or vomiting, tolerating diet    OBJECTIVE:     PHYSICAL EXAM:  Blood pressure 135/79, pulse 81, height 5' 6" (1.676 m), weight 100.7 kg (222 lb 0.1 oz).   General Appearance: WDWN, NAD  Gait: Abnormal  Neuro/Psych: Mood & affect appropriate  Lungs: Respirations equal and unlabored.   CV: 2+ bilateral upper and lower extremity pulses.   Skin: Intact throughout LE  Extremities: No LE edema    Right Knee Exam  Range of Motion:5-115 active   Effusion:yes  Condition of skin:intact  Location " of tenderness:Medial joint line and Lateral joint line   Strength:4 of 5 quadriceps strength and 4 of 5 hamstring strength    Left Knee Exam  Range of Motion:5-120 active   Effusion:none  Condition of skin:intact  Location of tenderness:Medial joint line and Lateral joint line   Strength:4 of 5 quadriceps strength and 4 of 5 hamstring strength    Right Hip Examination: no pain with PROM    RADIOGRAPHS: non-weight bearing AP and lateral from outside dept reveal advanced degenerative changes     ASSESSMENT/PLAN:   Primary osteoarthritis both knees  - Arthrocentesis and injection of right knee  - F/u prn    Knee Arthrocentesis With Injection Procedure Note  Diagnosis: right knee osteoarthritis with effusion  Indications: Knee pain  Procedure Details: Verbal consent obtained and allergies reviewed. Area prepped with alcohol.  An 18 gauge needle was inserted into superolateral aspect of knee. 20 ml of clear yellow was removed from the joint and discarded.  The joint was then injected through the same needle with 60 mg of Kenalog and 3 ml 1% lidocaine. The needle was removed and the area was cleansed and dressed.    Complications: None.

## 2018-07-09 ENCOUNTER — TELEPHONE (OUTPATIENT)
Dept: GYNECOLOGIC ONCOLOGY | Facility: CLINIC | Age: 78
End: 2018-07-09

## 2018-07-10 ENCOUNTER — LAB VISIT (OUTPATIENT)
Dept: LAB | Facility: HOSPITAL | Age: 78
End: 2018-07-10
Payer: MEDICARE

## 2018-07-10 ENCOUNTER — TELEPHONE (OUTPATIENT)
Dept: GYNECOLOGIC ONCOLOGY | Facility: CLINIC | Age: 78
End: 2018-07-10

## 2018-07-10 ENCOUNTER — OFFICE VISIT (OUTPATIENT)
Dept: GYNECOLOGIC ONCOLOGY | Facility: CLINIC | Age: 78
End: 2018-07-10
Payer: MEDICARE

## 2018-07-10 VITALS
HEART RATE: 93 BPM | DIASTOLIC BLOOD PRESSURE: 81 MMHG | SYSTOLIC BLOOD PRESSURE: 140 MMHG | WEIGHT: 222.69 LBS | BODY MASS INDEX: 35.79 KG/M2 | HEIGHT: 66 IN

## 2018-07-10 DIAGNOSIS — C54.1 ENDOMETRIAL CA: ICD-10-CM

## 2018-07-10 DIAGNOSIS — C54.1 ENDOMETRIAL CA: Primary | ICD-10-CM

## 2018-07-10 DIAGNOSIS — Z01.419 WELL WOMAN EXAM WITH ROUTINE GYNECOLOGICAL EXAM: ICD-10-CM

## 2018-07-10 DIAGNOSIS — F51.01 PRIMARY INSOMNIA: ICD-10-CM

## 2018-07-10 DIAGNOSIS — Z12.89 ENCOUNTER FOR PELVIC SCREENING FOR CANCER: ICD-10-CM

## 2018-07-10 DIAGNOSIS — C54.1 MALIGNANT NEOPLASM OF ENDOMETRIUM: ICD-10-CM

## 2018-07-10 LAB — CANCER AG125 SERPL-ACNC: 10 U/ML

## 2018-07-10 PROCEDURE — G0101 CA SCREEN;PELVIC/BREAST EXAM: HCPCS | Mod: S$GLB,,, | Performed by: OBSTETRICS & GYNECOLOGY

## 2018-07-10 PROCEDURE — 36415 COLL VENOUS BLD VENIPUNCTURE: CPT

## 2018-07-10 PROCEDURE — 99999 PR PBB SHADOW E&M-EST. PATIENT-LVL III: CPT | Mod: PBBFAC,,, | Performed by: OBSTETRICS & GYNECOLOGY

## 2018-07-10 PROCEDURE — 86304 IMMUNOASSAY TUMOR CA 125: CPT

## 2018-07-10 RX ORDER — HYDROCODONE BITARTRATE AND ACETAMINOPHEN 5; 325 MG/1; MG/1
1 TABLET ORAL EVERY 6 HOURS PRN
Qty: 20 TABLET | Refills: 0 | Status: SHIPPED | OUTPATIENT
Start: 2018-07-10 | End: 2019-01-23 | Stop reason: SDUPTHER

## 2018-07-10 RX ORDER — ZOLPIDEM TARTRATE 5 MG/1
5 TABLET ORAL NIGHTLY PRN
Qty: 30 TABLET | Refills: 2 | Status: ON HOLD | OUTPATIENT
Start: 2018-07-10 | End: 2019-10-02 | Stop reason: HOSPADM

## 2018-07-10 NOTE — PROGRESS NOTES
"Subjective:       Patient ID: Jodi Cole is a 77 y.o. female.    Chief Complaint: Endometrial Cancer (6 mth ) and Well Woman    HPI     Patient comes in today for her WWE and  follow up for endometrial cancer.   Denies vaginal bleeding. Reports knee pain. Takes [place] prn.        is 12.       Mammogram: March 2, 2017: normal   Colonoscopy: never. She is to schedule with her PCP.       Her oncologic history is:    She underwent a robotic-assisted laparoscopic hysterectomy with bilateral salpingo-oophorectomy and bilateral pelvic and para-aortic lymphadenectomy on 5/11/2015. 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 The depth of invasion was 2 mm out of 27 mm. Nodes were negative as was the cytologic washing.    Finished vaginal cuff brachytherapy on 7/20/2015.    Completed 6 cycles of taxol and carboplatin in October 2015.    CT scan after completion was normal.  was 8.      Review of Systems   Constitutional: Negative for chills, fatigue and fever.   Eyes: Negative for visual disturbance.   Respiratory: Negative for cough, shortness of breath and wheezing.    Cardiovascular: Negative for chest pain, palpitations and leg swelling.   Gastrointestinal: Negative for abdominal distention, abdominal pain, constipation, diarrhea, nausea and vomiting.   Genitourinary: Negative for difficulty urinating, dysuria, frequency, genital sores, hematuria, pelvic pain, urgency, vaginal bleeding, vaginal discharge and vaginal pain.   Musculoskeletal: Negative for gait problem and neck stiffness.   Skin: Negative for rash.   Neurological: Negative for seizures and weakness.   Hematological: Negative for adenopathy. Does not bruise/bleed easily.   Psychiatric/Behavioral: The patient is not nervous/anxious.        Objective:   BP (!) 140/81   Pulse 93   Ht 5' 6" (1.676 m)   Wt 101 kg (222 lb 10.6 oz)   BMI 35.94 kg/m²      Physical Exam   Constitutional: She is " oriented to person, place, and time. She appears well-developed and well-nourished.   HENT:   Head: Normocephalic and atraumatic.   Eyes: No scleral icterus.   Neck: No tracheal deviation present. No thyroid mass and no thyromegaly present.   Cardiovascular: Normal rate and regular rhythm.    Pulmonary/Chest: Effort normal and breath sounds normal. She has no wheezes. Right breast exhibits no mass, no nipple discharge, no skin change and no tenderness. Left breast exhibits no mass, no nipple discharge, no skin change and no tenderness.   Abdominal: She exhibits no distension and no mass. There is no hepatosplenomegaly. There is no tenderness. There is no rebound and no guarding.   Genitourinary:   Genitourinary Comments: Bimanual exam:  Vulva: no lesions. Normal appearance  Urethra: Normal size and location. No lesions  Bladder: No masses or tenderness.  Vagina: normal mucosa. No lesion  Cervix: absent.   Uterus: absent.  Adnexa: no masses.  Rectovaginal: No posterior cul de sac thickening or nodularity.  Rectal: no masses. Nontender. Normal tone.      Musculoskeletal: She exhibits no edema or tenderness.   Lymphadenopathy:     She has no cervical adenopathy.     She has no axillary adenopathy.        Right: No inguinal and no supraclavicular adenopathy present.        Left: No inguinal and no supraclavicular adenopathy present.   Neurological: She is alert and oriented to person, place, and time.   Skin: Skin is warm and dry. No rash noted.   Psychiatric: She has a normal mood and affect. Her behavior is normal. Judgment and thought content normal.       Assessment:       1. Endometrial ca    2. Well woman exam with routine gynecological exam    3. Encounter for pelvic screening for cancer        Plan:   Endometrial ca   PEG   RTC 6 months  -     ; Future; Expected date: 01/10/2019    Well woman exam with routine gynecological exam  Counseling time of 10 minutes discussing calcium, vitamin D and exercise.  Questions answered.     Encounter for pelvic screening for cancer

## 2018-07-10 NOTE — TELEPHONE ENCOUNTER
Called patient per Dr. Jordan letting her know her  is normal at a 10. Patient voiced understanding

## 2018-07-11 ENCOUNTER — TELEPHONE (OUTPATIENT)
Dept: GYNECOLOGIC ONCOLOGY | Facility: CLINIC | Age: 78
End: 2018-07-11

## 2018-07-30 ENCOUNTER — TELEPHONE (OUTPATIENT)
Dept: ENDOSCOPY | Facility: HOSPITAL | Age: 78
End: 2018-07-30

## 2018-07-30 NOTE — TELEPHONE ENCOUNTER
----- Message from Maira Wooten sent at 7/30/2018  9:21 AM CDT -----  RE: IQRA RODRÍGUEZ [[id number]]     Please contact Ms Rodríguez at [phone]     Dr Jordan (gyn/onc) put a case request in epic for Ms Rodríguez to have endoscopy contact her to have colonoscopy

## 2018-12-14 ENCOUNTER — TELEPHONE (OUTPATIENT)
Dept: GYNECOLOGIC ONCOLOGY | Facility: CLINIC | Age: 78
End: 2018-12-14

## 2019-01-21 NOTE — PROGRESS NOTES
"Subjective:       Patient ID: Jodi Cole is a 78 y.o. female.    Chief Complaint: Endometrial Cancer (6 month)    HPI   Patient comes in today for follow up for endometrial cancer.   Denies vaginal bleeding.       is .       Mammogram: March 5, 2018: normal CBE: July 2018: normal.   Colonoscopy: never. She is to schedule with her PCP.       Her oncologic history is:    May 2015: She underwent a robotic-assisted laparoscopic hysterectomy with bilateral salpingo-oophorectomy and bilateral pelvic and para-aortic lymphadenectomy on 5/11/2015. 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 The depth of invasion was 2 mm out of 27 mm. Nodes were negative as was the cytologic washing.    Finished vaginal cuff brachytherapy on 7/20/2015.    Oct 2015: Completed 6 cycles of taxol and carboplatin in October 2015.    CT scan after completion was normal.  was 8.      Review of Systems   Constitutional: Negative for chills, fatigue and fever.   Respiratory: Negative for cough, shortness of breath and wheezing.    Cardiovascular: Negative for chest pain, palpitations and leg swelling.   Gastrointestinal: Negative for abdominal pain, constipation, diarrhea, nausea and vomiting.   Genitourinary: Negative for difficulty urinating, dysuria, frequency, genital sores, hematuria, urgency, vaginal bleeding, vaginal discharge and vaginal pain.   Neurological: Negative for weakness.   Hematological: Negative for adenopathy. Does not bruise/bleed easily.   Psychiatric/Behavioral: The patient is not nervous/anxious.        Objective:   BP (!) 163/82   Pulse 86   Ht 5' 6" (1.676 m)   Wt 99.7 kg (219 lb 12.8 oz)   BMI 35.48 kg/m²      Physical Exam   Constitutional: She is oriented to person, place, and time. She appears well-developed and well-nourished.   HENT:   Head: Normocephalic and atraumatic.   Eyes: No scleral icterus.   Neck: Neck supple. No tracheal deviation present. " No thyroid mass and no thyromegaly present.   Cardiovascular: Normal rate and regular rhythm.   Pulmonary/Chest: Effort normal and breath sounds normal. She has no wheezes.   Abdominal: Soft. She exhibits no distension and no mass. There is no hepatosplenomegaly. There is no tenderness. There is no rebound and no guarding.   Genitourinary:   Genitourinary Comments: Bimanual exam:  Vulva: no lesions. Normal appearance  Urethra: Normal size and location. No lesions  Bladder: No masses or tenderness.  Vagina: normal mucosa. No lesion  Cervix: absent.   Uterus: absent.  Adnexa: no masses.  Rectovaginal: Not performed     Musculoskeletal: She exhibits no edema or tenderness.   Lymphadenopathy:     She has no cervical adenopathy.     She has no axillary adenopathy.        Right: No inguinal and no supraclavicular adenopathy present.        Left: No inguinal and no supraclavicular adenopathy present.   Neurological: She is alert and oriented to person, place, and time.   Skin: Skin is warm and dry.   Psychiatric: She has a normal mood and affect. Her behavior is normal. Judgment and thought content normal.       Assessment:       1. Endometrial ca    2. Screening mammogram, encounter for    3. Screen for colon cancer        Plan:   Endometrial ca   PEG   RTC 6 months for WWE and follow up   Refill norco for bilateral knee pain.     -     ; Future; Expected date: 07/23/2019    Screening mammogram, encounter for  -     Mammo Digital Screening Bilat; Future; Expected date: 03/06/2019  -     Case request GI: COLONOSCOPY    Screen for colon cancer

## 2019-01-22 ENCOUNTER — TELEPHONE (OUTPATIENT)
Dept: GYNECOLOGIC ONCOLOGY | Facility: CLINIC | Age: 79
End: 2019-01-22

## 2019-01-23 ENCOUNTER — TELEPHONE (OUTPATIENT)
Dept: GYNECOLOGIC ONCOLOGY | Facility: CLINIC | Age: 79
End: 2019-01-23

## 2019-01-23 ENCOUNTER — LAB VISIT (OUTPATIENT)
Dept: LAB | Facility: HOSPITAL | Age: 79
End: 2019-01-23
Attending: OBSTETRICS & GYNECOLOGY
Payer: MEDICARE

## 2019-01-23 ENCOUNTER — OFFICE VISIT (OUTPATIENT)
Dept: GYNECOLOGIC ONCOLOGY | Facility: CLINIC | Age: 79
End: 2019-01-23
Payer: MEDICARE

## 2019-01-23 VITALS
HEIGHT: 66 IN | WEIGHT: 219.81 LBS | HEART RATE: 86 BPM | DIASTOLIC BLOOD PRESSURE: 82 MMHG | BODY MASS INDEX: 35.32 KG/M2 | SYSTOLIC BLOOD PRESSURE: 163 MMHG

## 2019-01-23 DIAGNOSIS — C54.1 MALIGNANT NEOPLASM OF ENDOMETRIUM: ICD-10-CM

## 2019-01-23 DIAGNOSIS — Z12.11 SCREEN FOR COLON CANCER: ICD-10-CM

## 2019-01-23 DIAGNOSIS — C54.1 ENDOMETRIAL CA: Primary | ICD-10-CM

## 2019-01-23 DIAGNOSIS — C54.1 ENDOMETRIAL CA: ICD-10-CM

## 2019-01-23 DIAGNOSIS — Z12.31 SCREENING MAMMOGRAM, ENCOUNTER FOR: ICD-10-CM

## 2019-01-23 LAB — CANCER AG125 SERPL-ACNC: 8 U/ML

## 2019-01-23 PROCEDURE — 36415 COLL VENOUS BLD VENIPUNCTURE: CPT

## 2019-01-23 PROCEDURE — 1101F PR PT FALLS ASSESS DOC 0-1 FALLS W/OUT INJ PAST YR: ICD-10-PCS | Mod: CPTII,S$GLB,, | Performed by: OBSTETRICS & GYNECOLOGY

## 2019-01-23 PROCEDURE — 99999 PR PBB SHADOW E&M-EST. PATIENT-LVL III: CPT | Mod: PBBFAC,,, | Performed by: OBSTETRICS & GYNECOLOGY

## 2019-01-23 PROCEDURE — 86304 IMMUNOASSAY TUMOR CA 125: CPT

## 2019-01-23 PROCEDURE — 1101F PT FALLS ASSESS-DOCD LE1/YR: CPT | Mod: CPTII,S$GLB,, | Performed by: OBSTETRICS & GYNECOLOGY

## 2019-01-23 PROCEDURE — 3077F SYST BP >= 140 MM HG: CPT | Mod: CPTII,S$GLB,, | Performed by: OBSTETRICS & GYNECOLOGY

## 2019-01-23 PROCEDURE — 99214 OFFICE O/P EST MOD 30 MIN: CPT | Mod: S$GLB,,, | Performed by: OBSTETRICS & GYNECOLOGY

## 2019-01-23 PROCEDURE — 99999 PR PBB SHADOW E&M-EST. PATIENT-LVL III: ICD-10-PCS | Mod: PBBFAC,,, | Performed by: OBSTETRICS & GYNECOLOGY

## 2019-01-23 PROCEDURE — 99214 PR OFFICE/OUTPT VISIT, EST, LEVL IV, 30-39 MIN: ICD-10-PCS | Mod: S$GLB,,, | Performed by: OBSTETRICS & GYNECOLOGY

## 2019-01-23 PROCEDURE — 3079F PR MOST RECENT DIASTOLIC BLOOD PRESSURE 80-89 MM HG: ICD-10-PCS | Mod: CPTII,S$GLB,, | Performed by: OBSTETRICS & GYNECOLOGY

## 2019-01-23 PROCEDURE — 3079F DIAST BP 80-89 MM HG: CPT | Mod: CPTII,S$GLB,, | Performed by: OBSTETRICS & GYNECOLOGY

## 2019-01-23 PROCEDURE — 3077F PR MOST RECENT SYSTOLIC BLOOD PRESSURE >= 140 MM HG: ICD-10-PCS | Mod: CPTII,S$GLB,, | Performed by: OBSTETRICS & GYNECOLOGY

## 2019-01-23 RX ORDER — HYDROCODONE BITARTRATE AND ACETAMINOPHEN 5; 325 MG/1; MG/1
1 TABLET ORAL EVERY 6 HOURS PRN
Qty: 20 TABLET | Refills: 0 | Status: SHIPPED | OUTPATIENT
Start: 2019-01-23 | End: 2019-09-26

## 2019-03-08 ENCOUNTER — TELEPHONE (OUTPATIENT)
Dept: ENDOSCOPY | Facility: HOSPITAL | Age: 79
End: 2019-03-08

## 2019-03-08 ENCOUNTER — TELEPHONE (OUTPATIENT)
Dept: GYNECOLOGIC ONCOLOGY | Facility: CLINIC | Age: 79
End: 2019-03-08

## 2019-03-11 ENCOUNTER — TELEPHONE (OUTPATIENT)
Dept: ENDOSCOPY | Facility: HOSPITAL | Age: 79
End: 2019-03-11

## 2019-03-11 DIAGNOSIS — Z12.11 SPECIAL SCREENING FOR MALIGNANT NEOPLASMS, COLON: Primary | ICD-10-CM

## 2019-03-11 RX ORDER — POLYETHYLENE GLYCOL 3350, SODIUM SULFATE ANHYDROUS, SODIUM BICARBONATE, SODIUM CHLORIDE, POTASSIUM CHLORIDE 236; 22.74; 6.74; 5.86; 2.97 G/4L; G/4L; G/4L; G/4L; G/4L
4 POWDER, FOR SOLUTION ORAL ONCE
Qty: 4000 ML | Refills: 0 | Status: SHIPPED | OUTPATIENT
Start: 2019-03-11 | End: 2019-03-11

## 2019-03-20 ENCOUNTER — HOSPITAL ENCOUNTER (OUTPATIENT)
Dept: RADIOLOGY | Facility: OTHER | Age: 79
Discharge: HOME | End: 2019-03-20
Attending: OBSTETRICS & GYNECOLOGY
Payer: MEDICARE

## 2019-03-20 DIAGNOSIS — Z12.31 SCREENING MAMMOGRAM, ENCOUNTER FOR: ICD-10-CM

## 2019-03-20 PROCEDURE — 77067 SCR MAMMO BI INCL CAD: CPT | Mod: TC

## 2019-03-20 PROCEDURE — 77067 MAMMO DIGITAL SCREENING BILAT WITH CAD: ICD-10-PCS | Mod: 26,,, | Performed by: INTERNAL MEDICINE

## 2019-03-20 PROCEDURE — 77067 SCR MAMMO BI INCL CAD: CPT | Mod: 26,,, | Performed by: INTERNAL MEDICINE

## 2019-04-11 DIAGNOSIS — M25.561 PAIN IN BOTH KNEES, UNSPECIFIED CHRONICITY: Primary | ICD-10-CM

## 2019-04-11 DIAGNOSIS — M25.562 PAIN IN BOTH KNEES, UNSPECIFIED CHRONICITY: Primary | ICD-10-CM

## 2019-04-15 ENCOUNTER — OFFICE VISIT (OUTPATIENT)
Dept: ORTHOPEDICS | Facility: CLINIC | Age: 79
End: 2019-04-15
Payer: MEDICARE

## 2019-04-15 ENCOUNTER — HOSPITAL ENCOUNTER (OUTPATIENT)
Dept: RADIOLOGY | Facility: HOSPITAL | Age: 79
Discharge: HOME | End: 2019-04-15
Attending: ORTHOPAEDIC SURGERY
Payer: MEDICARE

## 2019-04-15 VITALS — WEIGHT: 228.19 LBS | HEIGHT: 66 IN | BODY MASS INDEX: 36.67 KG/M2

## 2019-04-15 DIAGNOSIS — M25.561 PAIN IN BOTH KNEES, UNSPECIFIED CHRONICITY: ICD-10-CM

## 2019-04-15 DIAGNOSIS — M17.12 PRIMARY OSTEOARTHRITIS OF LEFT KNEE: ICD-10-CM

## 2019-04-15 DIAGNOSIS — M25.562 PAIN IN BOTH KNEES, UNSPECIFIED CHRONICITY: ICD-10-CM

## 2019-04-15 DIAGNOSIS — M17.11 PRIMARY OSTEOARTHRITIS OF RIGHT KNEE: Primary | ICD-10-CM

## 2019-04-15 PROCEDURE — 73562 X-RAY EXAM OF KNEE 3: CPT | Mod: TC,50

## 2019-04-15 PROCEDURE — 1101F PT FALLS ASSESS-DOCD LE1/YR: CPT | Mod: CPTII,S$GLB,, | Performed by: ORTHOPAEDIC SURGERY

## 2019-04-15 PROCEDURE — 20610 PR DRAIN/INJECT LARGE JOINT/BURSA: ICD-10-PCS | Mod: 50,S$GLB,, | Performed by: ORTHOPAEDIC SURGERY

## 2019-04-15 PROCEDURE — 99214 PR OFFICE/OUTPT VISIT, EST, LEVL IV, 30-39 MIN: ICD-10-PCS | Mod: 25,S$GLB,, | Performed by: ORTHOPAEDIC SURGERY

## 2019-04-15 PROCEDURE — 99999 PR PBB SHADOW E&M-EST. PATIENT-LVL III: ICD-10-PCS | Mod: PBBFAC,,, | Performed by: ORTHOPAEDIC SURGERY

## 2019-04-15 PROCEDURE — 99214 OFFICE O/P EST MOD 30 MIN: CPT | Mod: 25,S$GLB,, | Performed by: ORTHOPAEDIC SURGERY

## 2019-04-15 PROCEDURE — 99999 PR PBB SHADOW E&M-EST. PATIENT-LVL III: CPT | Mod: PBBFAC,,, | Performed by: ORTHOPAEDIC SURGERY

## 2019-04-15 PROCEDURE — 20610 DRAIN/INJ JOINT/BURSA W/O US: CPT | Mod: 50,S$GLB,, | Performed by: ORTHOPAEDIC SURGERY

## 2019-04-15 PROCEDURE — 73562 XR KNEE ORTHO BILAT: ICD-10-PCS | Mod: 26,50,, | Performed by: RADIOLOGY

## 2019-04-15 PROCEDURE — 73562 X-RAY EXAM OF KNEE 3: CPT | Mod: 26,50,, | Performed by: RADIOLOGY

## 2019-04-15 PROCEDURE — 1101F PR PT FALLS ASSESS DOC 0-1 FALLS W/OUT INJ PAST YR: ICD-10-PCS | Mod: CPTII,S$GLB,, | Performed by: ORTHOPAEDIC SURGERY

## 2019-04-15 RX ORDER — TRIAMCINOLONE ACETONIDE 40 MG/ML
40 INJECTION, SUSPENSION INTRA-ARTICULAR; INTRAMUSCULAR
Status: COMPLETED | OUTPATIENT
Start: 2019-04-15 | End: 2019-04-15

## 2019-04-15 RX ADMIN — TRIAMCINOLONE ACETONIDE 40 MG: 40 INJECTION, SUSPENSION INTRA-ARTICULAR; INTRAMUSCULAR at 03:04

## 2019-04-15 NOTE — PROGRESS NOTES
Subjective:      Patient ID: Jodi Cole is a 78 y.o. female.    Chief Complaint: Pain of the Right Knee and Pain of the Left Knee    HPI  Jodi Cole is a 78 year old female here with a 15 year history of bilateral knee pain, left greater than right originally but now the right bothers her more than the left. . 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 There was not a history of trauma.  The pain is moderate The pain is located in the global aspect of both knees.  There is not catching or locking.   The pain is described as sharp and shooting pain all over the knees. The patient has not had prior surgery. It is aggravated by sitting and walking.  It is alleviated by rest. There is not numbness or tingling of the lower extremity.  There is not back pain.  She  has tried medications or injections.  She used to have her knees aspirated for swelling 40 years and had CSI. She had a recent CSI by Fátima into the right knee in June which gave her some relief for 4 months.  She does have difficulty getting in or out of a car, getting dressed, or going up or down stairs.  The patient does use an assistive device.    Past Medical History:   Diagnosis Date    Anemia associated with chemotherapy 9/1/2015    Anemia due to chemotherapy 9/22/2015    Chemotherapy induced nausea and vomiting 6/9/2015    Chemotherapy-induced neutropenia 7/28/2015    Constipation 7/7/2015    Endometrial ca 5/26/2015    Endometrial ca 8/10/2015    Essential hypertension     Ovarian cancer     Pain in both hands 2/19/2016    Right-sided low back pain without sciatica 6/23/2016    Uterine cancer     Well woman exam with routine gynecological exam 2/19/2016       Current Outpatient Medications on File Prior to Visit   Medication Sig Dispense Refill    amLODIPine (NORVASC) 10 MG tablet Take 1 tablet (10 mg total) by mouth once daily. 30 tablet 11    HYDROcodone-acetaminophen (NORCO) 5-325  mg per tablet Take 1 tablet by mouth every 6 (six) hours as needed for Pain. 20 tablet 0    multivitamin capsule Take 1 capsule by mouth once daily.      pravastatin (PRAVACHOL) 40 MG tablet TK 1 T PO D  3    triamterene-hydrochlorothiazide 37.5-25 mg (MAXZIDE-25) 37.5-25 mg per tablet       vitamins B1 B6 B12 Tab Take by mouth once daily.      meloxicam (MOBIC) 15 MG tablet Take 1 tablet (15 mg total) by mouth every evening. 30 tablet 2    zolpidem (AMBIEN) 5 MG Tab Take 1 tablet (5 mg total) by mouth nightly as needed. 30 tablet 2    [DISCONTINUED] diclofenac sodium 1 % Gel Apply topically 3 (three) times daily. 100 g 1     No current facility-administered medications on file prior to visit.        Past Surgical History:   Procedure Laterality Date    D&C Hysteroscopy  March 18, 2015    DILATION AND CURETTAGE OF UTERUS  1969     D&C and uterine suspension.     HYSTERECTOMY  5/11/15    robotic for endometrial cancer    GAVLICSOGCCS-[id number] N/A 3/18/2015    [facility] OR    LYMPHADENECTOMY      PELVIC AND PARA-AORTIC LYMPH NODE DISSECTION      [place] REMOVAL OF OVARY/TUBE(S)      robotic TLH/BSO and bilatyeral pelvic and para-aortic LND    ROBOT ASSISTED LAPAROSCOPIC SALPINGO-OOPHERECTOMY Bilateral 5/11/2015    [facility] 2ND FLR    ROBOTIC ASSISTED LAPAROSCOPIC HYSTERECTOMY WITH POSSIBLE STAGING Bilateral 5/11/2015    [facility] OR 2ND FLR    STAGING N/A 5/11/2015    [facility] OR 2ND FLR       Family History   Problem Relation Age of Onset    Cancer Brother 65        pancreatic    Ovarian cancer Neg Hx     Uterine cancer Neg Hx     Breast cancer Neg Hx     Colon cancer Neg Hx        Social History     Socioeconomic History    Marital status:      Spouse name: Not on file    Number of children: Not on file    Years of education: Not on file    Highest  education level: Not on file   Occupational History    Not on file   Social Needs    Financial resource strain: Not on file    Food insecurity:     Worry: Not on file     Inability: Not on file    Transportation needs:     Medical: Not on file     Non-medical: Not on file   Tobacco Use    Smoking status: Never Smoker    Smokeless tobacco: Never Used   Substance and Sexual Activity    Alcohol use: No    Drug use: No    Sexual activity: Yes     Partners: Male   Lifestyle    Physical activity:     Days per week: Not on file     Minutes per session: Not on file    Stress: Not on file   Relationships    Social connections:     Talks on phone: Not on file     Gets together: Not on file     Attends Gnosticist service: Not on file     Active member of club or organization: Not on file     Attends meetings of clubs or organizations: Not on file     Relationship status: Not on file   Other Topics Concern    Not on file   Social History Narrative    Not on file     Review of Systems   Constitution: Negative for fever.   HENT: Negative for congestion and ear discharge.    Eyes: Negative for pain.   Cardiovascular: Negative for chest pain.   Respiratory: Negative for shortness of breath.    Endocrine: Negative for cold intolerance.   Skin: Negative for dry skin.   Musculoskeletal: Negative for arthritis.   Gastrointestinal: Negative for anorexia.   Genitourinary: Negative for dysuria.   Neurological: Negative for brief paralysis.   Psychiatric/Behavioral: Negative for depression.         Objective:      Body mass index is 36.83 kg/m².        General    Nursing note and vitals reviewed.  Constitutional: She appears well-developed and well-nourished. No distress.   HENT:   Nose: Nose normal.   Eyes: Pupils are equal, round, and reactive to light.   Neck: Normal range of motion.   Cardiovascular: Normal rate.    Pulmonary/Chest: Effort normal.   Abdominal: Soft.   Neurological: She is alert.   Psychiatric: She has a  normal mood and affect.     General Musculoskeletal Exam   Gait: abnormal and antalgic       Right Knee Exam     Inspection   Erythema: absent  Scars: absent  Swelling: absent  Effusion: absent  Deformity: absent  Bruising: absent    Tenderness   The patient is tender to palpation of the lateral joint line, medial joint line and patella.    Crepitus   The patient has crepitus of the patella, lateral joint line and medial joint line.    Range of Motion   Extension: 15   Flexion: 100     Tests   Ligament Examination Lachman: normal (-1 to 2mm) PCL-Posterior Drawer: normal (0 to 2mm)     MCL - Valgus: normal (0 to 2mm)  LCL - Varus: normal  Patella   Passive Patellar Tilt: neutral    Other   Sensation: normal    Left Knee Exam     Inspection   Erythema: absent  Scars: absent  Swelling: absent  Effusion: absent  Deformity: absent  Bruising: absent    Tenderness   The patient tender to palpation of the medial joint line, lateral joint line and patella.    Crepitus   The patient has crepitus of the patella, lateral joint line and medial joint line.    Range of Motion   Extension: 10   Flexion: 100     Tests   Stability Lachman: normal (-1 to 2mm) PCL-Posterior Drawer: normal (0 to 2mm)  MCL - Valgus: normal (0 to 2mm)  LCL - Varus: normal (0 to 2mm)  Patella   Passive Patellar Tilt: neutral    Other   Sensation: normal    Muscle Strength   Right Lower Extremity   Hip Abduction: 5/5   Quadriceps:  5/5   Hamstrin/5   Left Lower Extremity   Hip Abduction: 5/5   Quadriceps:  5/5   Hamstrin/5     Reflexes     Left Side  Quadriceps:  2+  Achilles:  2+    Right Side   Quadriceps:  2+  Achilles:  2+    Vascular Exam     Right Pulses  Dorsalis Pedis:      1+          Left Pulses  Dorsalis Pedis:      1+          Edema  Right Lower Leg: absent  Left Lower Leg: absent  Radiographs taken today and reviewed by me demonstrate severe arthritic change of the bilateral hip(s).There  is not bone destruction.  There is not a  fracture.              Assessment:       Encounter Diagnoses   Name Primary?    Primary osteoarthritis of right knee Yes    Primary osteoarthritis of left knee           Plan:       Jodi was seen today for pain and pain.    Diagnoses and all orders for this visit:    Primary osteoarthritis of right knee    Primary osteoarthritis of left knee    Other orders  -     triamcinolone acetonide injection 40 mg  -     triamcinolone acetonide injection 40 mg      Treatment options have been discussed.  We have decided to proceed with a  cortico- steroid injection.  The risk of elevated blood glucose and the extremely low risk of infection were discussed. Verbal consent was obtained. .    After time out was performed and patient ID, side, and site were verified, both knees  were prepped in the standard sterile fashion.  A 22-gauge needle was introduced into the both knee joints from an jerilyn-lateral site without complication. The  knees were then injected with 40mg of triamcinolone.  Sterile dressing was applied.  The patient was informed that they may resume activities as tolerated. She was instructed to call if there were any problems.     We will see Jodi Cole  back in 6 weeks to see how she has responded.

## 2019-04-16 ENCOUNTER — TELEPHONE (OUTPATIENT)
Dept: ENDOSCOPY | Facility: HOSPITAL | Age: 79
End: 2019-04-16

## 2019-05-21 ENCOUNTER — ANESTHESIA EVENT (OUTPATIENT)
Dept: ENDOSCOPY | Facility: HOSPITAL | Age: 79
End: 2019-05-21
Payer: MEDICARE

## 2019-05-22 ENCOUNTER — HOSPITAL ENCOUNTER (OUTPATIENT)
Facility: HOSPITAL | Age: 79
Discharge: HOME | End: 2019-05-22
Attending: INTERNAL MEDICINE | Admitting: INTERNAL MEDICINE
Payer: MEDICARE

## 2019-05-22 ENCOUNTER — ANESTHESIA (OUTPATIENT)
Dept: ENDOSCOPY | Facility: HOSPITAL | Age: 79
End: 2019-05-22
Payer: MEDICARE

## 2019-05-22 VITALS
HEART RATE: 75 BPM | DIASTOLIC BLOOD PRESSURE: 86 MMHG | OXYGEN SATURATION: 97 % | TEMPERATURE: 98 F | SYSTOLIC BLOOD PRESSURE: 168 MMHG | RESPIRATION RATE: 16 BRPM | WEIGHT: 210 LBS | HEIGHT: 66 IN | BODY MASS INDEX: 33.75 KG/M2

## 2019-05-22 DIAGNOSIS — Z12.11 COLON CANCER SCREENING: Primary | ICD-10-CM

## 2019-05-22 PROCEDURE — 88305 TISSUE EXAM BY PATHOLOGIST: CPT | Mod: 26,,, | Performed by: PATHOLOGY

## 2019-05-22 PROCEDURE — 45385 COLONOSCOPY W/LESION REMOVAL: CPT | Mod: PT,,, | Performed by: INTERNAL MEDICINE

## 2019-05-22 PROCEDURE — E9220 PRA ENDO ANESTHESIA: HCPCS | Mod: PT,,, | Performed by: NURSE ANESTHETIST, CERTIFIED REGISTERED

## 2019-05-22 PROCEDURE — 25000003 PHARM REV CODE 250: Performed by: INTERNAL MEDICINE

## 2019-05-22 PROCEDURE — 63600175 PHARM REV CODE 636 W HCPCS: Performed by: NURSE ANESTHETIST, CERTIFIED REGISTERED

## 2019-05-22 PROCEDURE — 37000008 HC ANESTHESIA 1ST 15 MINUTES: Performed by: INTERNAL MEDICINE

## 2019-05-22 PROCEDURE — 27201089 HC SNARE, DISP (ANY): Performed by: INTERNAL MEDICINE

## 2019-05-22 PROCEDURE — 45385 COLONOSCOPY W/LESION REMOVAL: CPT | Performed by: INTERNAL MEDICINE

## 2019-05-22 PROCEDURE — 88305 TISSUE SPECIMEN TO PATHOLOGY - SURGERY: ICD-10-PCS | Mod: 26,,, | Performed by: PATHOLOGY

## 2019-05-22 PROCEDURE — E9220 PRA ENDO ANESTHESIA: ICD-10-PCS | Mod: PT,,, | Performed by: NURSE ANESTHETIST, CERTIFIED REGISTERED

## 2019-05-22 PROCEDURE — 37000009 HC ANESTHESIA EA ADD 15 MINS: Performed by: INTERNAL MEDICINE

## 2019-05-22 PROCEDURE — 45385 PR COLONOSCOPY,REMV LESN,SNARE: ICD-10-PCS | Mod: PT,,, | Performed by: INTERNAL MEDICINE

## 2019-05-22 PROCEDURE — 88305 TISSUE EXAM BY PATHOLOGIST: CPT | Mod: 59 | Performed by: PATHOLOGY

## 2019-05-22 RX ORDER — SODIUM CHLORIDE 9 MG/ML
INJECTION, SOLUTION INTRAVENOUS CONTINUOUS
Status: DISCONTINUED | OUTPATIENT
Start: 2019-05-22 | End: 2019-05-22 | Stop reason: HOSPADM

## 2019-05-22 RX ORDER — LIDOCAINE HCL/PF 100 MG/5ML
SYRINGE (ML) INTRAVENOUS
Status: DISCONTINUED | OUTPATIENT
Start: 2019-05-22 | End: 2019-05-22

## 2019-05-22 RX ORDER — PROPOFOL 10 MG/ML
VIAL (ML) INTRAVENOUS CONTINUOUS PRN
Status: DISCONTINUED | OUTPATIENT
Start: 2019-05-22 | End: 2019-05-22

## 2019-05-22 RX ORDER — PROPOFOL 10 MG/ML
VIAL (ML) INTRAVENOUS
Status: DISCONTINUED | OUTPATIENT
Start: 2019-05-22 | End: 2019-05-22

## 2019-05-22 RX ADMIN — PROPOFOL 125 MCG/KG/MIN: 10 INJECTION, EMULSION INTRAVENOUS at 11:05

## 2019-05-22 RX ADMIN — LIDOCAINE HYDROCHLORIDE 40 MG: 20 INJECTION, SOLUTION INTRAVENOUS at 11:05

## 2019-05-22 RX ADMIN — PROPOFOL 60 MG: 10 INJECTION, EMULSION INTRAVENOUS at 11:05

## 2019-05-22 RX ADMIN — SODIUM CHLORIDE: 0.9 INJECTION, SOLUTION INTRAVENOUS at 10:05

## 2019-05-22 NOTE — ANESTHESIA PREPROCEDURE EVALUATION
05/22/2019  Jodi Cole is a 78 y.o., female.  Patient Active Problem List   Diagnosis    S/P laparoscopic hysterectomy    Chronic anemia (with asymptomatic acute blood loss)    Essential hypertension    Endometrial ca    Insomnia    Muscle pain    Constipation    Chemotherapy-induced neutropenia    Anemia associated with chemotherapy    Anemia due to chemotherapy    Pain in both hands    Right-sided low back pain without sciatica    Tricompartment osteoarthritis of right knee    Obese    Colon cancer screening         Anesthesia Evaluation    I have reviewed the Patient Summary Reports.     I have reviewed the Medications.     Review of Systems  Anesthesia Hx:  No problems with previous Anesthesia  Denies Family Hx of Anesthesia complications.   Denies Personal Hx of Anesthesia complications.   Hematology/Oncology:  Hematology Normal   Oncology Normal     EENT/Dental:EENT/Dental Normal   Cardiovascular:   Exercise tolerance: good Hypertension    Pulmonary:  Pulmonary Normal    Renal/:  Renal/ Normal     Hepatic/GI:  Hepatic/GI Normal    Musculoskeletal:   Arthritis     Neurological:  Neurology Normal    Endocrine:  Endocrine Normal    Dermatological:  Skin Normal    Psych:  Psychiatric Normal           Physical Exam  General:  Obesity    Airway/Jaw/Neck:  Airway Findings: Mouth Opening: Normal Tongue: Normal  General Airway Assessment: Adult  TM Distance: Normal, at least 6 cm       Chest/Lungs:  Chest/Lungs Clear    Heart/Vascular:  Heart Findings: Normal Heart murmur: negative            Anesthesia Plan  Type of Anesthesia, risks & benefits discussed:  Anesthesia Type:  general  Patient's Preference:   Intra-op Monitoring Plan: standard ASA monitors  Intra-op Monitoring Plan Comments:   Post Op Pain Control Plan:   Post Op Pain Control Plan Comments:   Induction:   IV  Beta Blocker:   Patient is not currently on a Beta-Blocker (No further documentation required).       Informed Consent: Patient understands risks and agrees with Anesthesia plan.  Questions answered. Anesthesia consent signed with patient.  ASA Score: 3     Day of Surgery Review of History & Physical:    H&P update referred to the surgeon.         Ready For Surgery From Anesthesia Perspective.

## 2019-05-22 NOTE — H&P
Short Stay Endoscopy History and Physical    PCP - Jose Umana MD    Procedure - Colonoscopy  ASA - per anesthesia  Mallampati - per anesthesia  History of Anesthesia problems - no  Family history Anesthesia problems - no   Plan of anesthesia - General    HPI:  78 year old female with a history of HTN and Endometrial cancer who presents for colonoscopy.    ROS:  Constitutional: No fevers, chills, No weight loss  CV: No chest pain  Pulm: No cough, No shortness of breath  GI: see HPI  Derm: No rash    Medical History:  has a past medical history of Anemia associated with chemotherapy (9/1/2015), Anemia due to chemotherapy (9/22/2015), Chemotherapy induced nausea and vomiting (6/9/2015), Chemotherapy-induced neutropenia (7/28/2015), Constipation (7/7/2015), Endometrial ca (5/26/2015), Endometrial ca (8/10/2015), Essential hypertension, Ovarian cancer, Pain in both hands (2/19/2016), Right-sided low back pain without sciatica (6/23/2016), Uterine cancer, and Well woman exam with routine gynecological exam (2/19/2016).    Surgical History:  has a past surgical history that includes D&C Hysteroscopy (March 18, 2015); Dilation and curettage of uterus (1969 ); Lymphadenectomy; Hysterectomy (5/11/15); Pelvic and para-aortic lymph node dissection; and pr removal of ovary/tube(s).    Family History: family history includes Cancer (age of onset: 65) in her brother.. Otherwise no colon cancer, inflammatory bowel disease, or GI malignancies.    Social History:  reports that she has never smoked. She has never used smokeless tobacco. She reports that she does not drink alcohol or use drugs.    Review of patient's allergies indicates:  No Known Allergies    Medications:   Medications Prior to Admission   Medication Sig Dispense Refill Last Dose    meloxicam (MOBIC) 15 MG tablet Take 1 tablet (15 mg total) by mouth every evening. 30 tablet 2 Past Month at Unknown time    multivitamin capsule Take 1 capsule by mouth once  daily.   Past Week at Unknown time    pravastatin (PRAVACHOL) 40 MG tablet TK 1 T PO D  3 Past Week at Unknown time    triamterene-hydrochlorothiazide 37.5-25 mg (MAXZIDE-25) 37.5-25 mg per tablet    5/22/2019 at Unknown time    vitamins B1 B6 B12 Tab Take by mouth once daily.   Past Week at Unknown time    amLODIPine (NORVASC) 10 MG tablet Take 1 tablet (10 mg total) by mouth once daily. 30 tablet 11 Taking    HYDROcodone-acetaminophen (NORCO) 5-325 mg per tablet Take 1 tablet by mouth every 6 (six) hours as needed for Pain. 20 tablet 0 More than a month at Unknown time    zolpidem (AMBIEN) 5 MG Tab Take 1 tablet (5 mg total) by mouth nightly as needed. 30 tablet 2          Physical Exam:    Vital Signs:   Vitals:    05/22/19 1035   BP: (!) 148/86   Pulse: 104   Resp: 16   Temp: 97.2 °F (36.2 °C)       General Appearance: Well appearing in no acute distress  Eyes:    No scleral icterus  ENT: Neck supple, Lips, mucosa, and tongue normal; teeth and gums normal  Lungs: CTA bilaterally  Heart:  Sinus tachycardia  Abdomen: Soft, non tender, non distended with positive bowel sounds. No hepatosplenomegaly, ascites, or mass.  Extremities: 2+ pulses, no clubbing, cyanosis or edema  Skin: No rash      Labs:  Lab Results   Component Value Date    WBC 7.59 05/06/2018    HGB 11.1 (L) 05/06/2018    HCT 35.4 (L) 05/06/2018     05/06/2018    ALT 7 (L) 05/06/2018    AST 12 05/06/2018     05/06/2018    K 3.7 05/06/2018     05/06/2018    CREATININE 0.9 05/06/2018    BUN 23 05/06/2018    CO2 27 05/06/2018       I have explained the risks and benefits of endoscopy procedures to the patient including but not limited to bleeding, perforation, infection, and death.      Ines Poon M.D.  Gastroenterology Fellow, PGY-V  Pager: [phone]  [facility]

## 2019-05-22 NOTE — TRANSFER OF CARE
"Anesthesia Transfer of Care Note    Patient: Jodi Cole    Procedure(s) Performed: Procedure(s) (LRB):  COLONOSCOPY (N/A)    Patient location: PACU    Anesthesia Type: general    Transport from OR: Transported from OR on room air with adequate spontaneous ventilation    Post pain: adequate analgesia    Post assessment: no apparent anesthetic complications and tolerated procedure well    Post vital signs: stable    Level of consciousness: awake    Nausea/Vomiting: no nausea/vomiting    Complications: none    Transfer of care protocol was followed      Last vitals:   Visit Vitals  BP (!) 148/86 (BP Location: Left arm, Patient Position: Lying)   Pulse 104   Temp 36.2 °C (97.2 °F) (Temporal)   Resp 16   Ht 5' 6" (1.676 m)   Wt 95.3 kg (210 lb)   SpO2 97%   Breastfeeding? No   BMI 33.89 kg/m²     "

## 2019-05-22 NOTE — ANESTHESIA POSTPROCEDURE EVALUATION
Anesthesia Post Evaluation    Patient: Jodi Cole    Procedure(s) Performed: Procedure(s) (LRB):  COLONOSCOPY (N/A)    Final Anesthesia Type: general  Patient location during evaluation: GI PACU  Patient participation: Yes- Able to Participate  Level of consciousness: awake and alert, awake and oriented  Post-procedure vital signs: reviewed and stable  Pain management: adequate  Airway patency: patent  PONV status at discharge: No PONV  Anesthetic complications: no      Cardiovascular status: stable  Respiratory status: unassisted, spontaneous ventilation and room air  Hydration status: euvolemic  Follow-up not needed.          Vitals Value Taken Time   /86 5/22/2019 12:34 PM   Temp 36.5 °C (97.7 °F) 5/22/2019 12:07 PM   Pulse 75 5/22/2019 12:34 PM   Resp 16 5/22/2019 12:34 PM   SpO2 97 % 5/22/2019 12:34 PM         Event Time     Out of Recovery 12:36:57          Pain/Jose Score: Jose Score: 8 (5/22/2019 12:07 PM)

## 2019-05-22 NOTE — [employer]OVATION PATIENT INSTRUCTIONS
Discharge Summary/Instructions after an Endoscopic Procedure  Patient Name: Jodi Cole  Patient MRN: [id number]  Patient YOB: 1940  Wednesday, May 22, 2019  Kong Rodarte MD  RESTRICTIONS:  During your procedure today, you received medications for sedation.  These   medications may affect your judgment, balance and coordination.  Therefore,   for 24 hours, you have the following restrictions:   - DO NOT drive a car, operate machinery, make legal/financial decisions,   sign important papers or drink alcohol.    ACTIVITY:  Today: no heavy lifting, straining or running due to procedural   sedation/anesthesia.  The following day: return to full activity including work.  DIET:  Eat and drink normally unless instructed otherwise.     TREATMENT FOR COMMON SIDE EFFECTS:  - Mild abdominal pain, nausea, belching, bloating or excessive gas:  rest,   eat lightly and use a heating pad.  - Sore Throat: treat with throat lozenges and/or gargle with warm salt   water.  - Because air was used during the procedure, expelling large amounts of air   from your rectum or belching is normal.  - If a bowel prep was taken, you may not have a bowel movement for 1-3 days.    This is normal.  SYMPTOMS TO WATCH FOR AND REPORT TO YOUR PHYSICIAN:  1. Abdominal pain or bloating, other than gas cramps.  2. Chest pain.  3. Back pain.  4. Signs of infection such as: chills or fever occurring within 24 hours   after the procedure.  5. Rectal bleeding, which would show as bright red, maroon, or black stools.   (A tablespoon of blood from the rectum is not serious, especially if   hemorrhoids are present.)  6. Vomiting.  7. Weakness or dizziness.  GO DIRECTLY TO THE NEAREST EMERGENCY ROOM IF YOU HAVE ANY OF THE FOLLOWING:      Difficulty breathing              Chills and/or fever over 101 F   Persistent vomiting and/or vomiting blood   Severe abdominal pain   Severe chest pain   Black, tarry stools   Bleeding- more than one tablespoon   Any  other symptom or condition that you feel may need urgent attention  Your doctor recommends these additional instructions:  If any biopsies were taken, your doctors clinic will contact you in 1 to 2   weeks with any results.  - Patient has a contact number available for emergencies.  The signs and   symptoms of potential delayed complications were discussed with the   patient.  Return to normal activities tomorrow.  Written discharge   instructions were provided to the patient.   - Discharge patient to home.   - Await pathology results.   - Repeat colonoscopy is not recommended due to current age (66 years or   older) for surveillance based on pathology results.   - The findings and recommendations were discussed with the designated   responsible adult.  For questions, problems or results please call your physician - Kong Rodarte MD at Work:  [phone].  [facility], EMERGENCY ROOM PHONE NUMBER: [phone]  IF A COMPLICATION OR EMERGENCY SITUATION ARISES AND YOU ARE UNABLE TO REACH   YOUR PHYSICIAN - GO DIRECTLY TO THE EMERGENCY ROOM.  Kong Rodarte MD  5/22/2019 12:04:10 PM  This report has been verified and signed electronically.  [employer]

## 2019-05-22 NOTE — DISCHARGE INSTRUCTIONS
Colonoscopy     A camera attached to a flexible tube with a viewing lens is used to take video pictures.     Colonoscopy is a test to view the inside of your lower digestive tract (colon and rectum). Sometimes it can show the last part of the small intestine (ileum). During the test, small pieces of tissue may be removed for testing. This is called a biopsy. Small growths, such as polyps, may also be removed.   Why is colonoscopy done?  The test is done to help look for colon cancer. And it can help find the source of abdominal pain, bleeding, and changes in bowel habits. It may be needed once a year, depending on factors such as your:  · Age  · Health history  · Family health history  · Symptoms  · Results from any prior colonoscopy  Risks and possible complications  These include:  · Bleeding               · A puncture or tear in the colon   · Risks of anesthesia  · A cancer lesion not being seen  Getting ready   To prepare for the test:  · Talk with your healthcare provider about the risks of the test (see below). Also ask your healthcare provider about alternatives to the test.  · Tell your healthcare provider about any medicines you take. Also tell him or her about any health conditions you may have.  · Make sure your rectum and colon are empty for the test. Follow the diet and bowel prep instructions exactly. If you dont, the test may need to be rescheduled.  · Plan for a friend or family member to drive you home after the test.     Colonoscopy provides an inside view of the entire colon.     You may discuss the results with your doctor right away or at a future visit.  During the test   The test is usually done in the hospital on an outpatient basis. This means you go home the same day. The procedure takes about 30 minutes. During that time:  · You are given relaxing (sedating) medicine through an IV line. You may be drowsy, or fully asleep.  · The healthcare provider will first give you a physical exam to  check for anal and rectal problems.  · Then the anus is lubricated and the scope inserted.  · If you are awake, you may have a feeling similar to needing to have a bowel movement. You may also feel pressure as air is pumped into the colon. Its OK to pass gas during the procedure.  · Biopsy, polyp removal, or other treatments may be done during the test.  After the test   You may have gas right after the test. It can help to try to pass it to help prevent later bloating. Your healthcare provider may discuss the results with you right away. Or you may need to schedule a follow-up visit to talk about the results. After the test, you can go back to your normal eating and other activities. You may be tired from the sedation and need to rest for a few hours.  Date Last Reviewed: 11/1/2016 © 2000-2017 [employer] [place]. [address] [place] [address], [place] [address]. All rights reserved. This information is not intended as a substitute for professional medical care. Always follow your healthcare professional's instructions.

## 2019-05-29 ENCOUNTER — TELEPHONE (OUTPATIENT)
Dept: ENDOSCOPY | Facility: HOSPITAL | Age: 79
End: 2019-05-29

## 2019-07-23 ENCOUNTER — TELEPHONE (OUTPATIENT)
Dept: GYNECOLOGIC ONCOLOGY | Facility: CLINIC | Age: 79
End: 2019-07-23

## 2019-07-23 NOTE — PROGRESS NOTES
"Subjective:       Patient ID: Jodi Cole is a 78 y.o. female.    Chief Complaint: Endometrial Cancer; Follow-up; and Well Woman    HPI     Patient comes in today for her WWE and follow up for endometrial cancer.   Denies vaginal bleeding.       is 10.       Mammogram: March 20, 2019: normal CBE: July 2018: normal.   Colonoscopy: May 2019: normal. No need to repeat       Her oncologic history is:    May 2015: She underwent a robotic-assisted laparoscopic hysterectomy with bilateral salpingo-oophorectomy and bilateral pelvic and para-aortic lymphadenectomy on 5/11/2015. 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 The depth of invasion was 2 mm out of 27 mm. Nodes were negative as was the cytologic washing.    Finished vaginal cuff brachytherapy on 7/20/2015.    Oct 2015: Completed 6 cycles of taxol and carboplatin in October 2015.    CT scan after completion was normal.  was 10.      Review of Systems   Constitutional: Negative for chills, fatigue and fever.   Eyes: Negative for visual disturbance.   Respiratory: Negative for cough, shortness of breath and wheezing.    Cardiovascular: Negative for chest pain, palpitations and leg swelling.   Gastrointestinal: Negative for abdominal distention, abdominal pain, constipation, diarrhea, nausea and vomiting.   Genitourinary: Negative for difficulty urinating, dysuria, frequency, genital sores, hematuria, pelvic pain, urgency, vaginal bleeding, vaginal discharge and vaginal pain.   Musculoskeletal: Negative for gait problem and neck stiffness.   Skin: Negative for rash.   Neurological: Negative for seizures and weakness.   Hematological: Negative for adenopathy. Does not bruise/bleed easily.   Psychiatric/Behavioral: The patient is not nervous/anxious.        Objective:   BP (!) 152/74   Pulse 81   Ht 5' 6" (1.676 m)   Wt 105 kg (231 lb 7.7 oz)   BMI 37.36 kg/m²      Physical Exam   Constitutional: She is " oriented to person, place, and time. She appears well-developed and well-nourished.   HENT:   Head: Normocephalic and atraumatic.   Eyes: No scleral icterus.   Neck: No tracheal deviation present. No thyroid mass and no thyromegaly present.   Cardiovascular: Normal rate and regular rhythm.   Pulmonary/Chest: Effort normal and breath sounds normal. She has no wheezes. Right breast exhibits no mass, no nipple discharge, no skin change and no tenderness. Left breast exhibits no mass, no nipple discharge, no skin change and no tenderness.   Abdominal: She exhibits no distension and no mass. There is no hepatosplenomegaly. There is no tenderness. There is no rebound and no guarding.   Genitourinary:   Genitourinary Comments: Bimanual exam:  Vulva: no lesions. Normal appearance  Urethra: Normal size and location. No lesions  Bladder: No masses or tenderness.  Vagina: normal mucosa. No lesion  Cervix: absent.   Uterus: absent.  Adnexa: no masses.  Rectovaginal: No posterior cul de sac thickening or nodularity.  Rectal: no masses. Nontender. Normal tone.      Musculoskeletal: She exhibits no edema or tenderness.   Lymphadenopathy:     She has no cervical adenopathy.     She has no axillary adenopathy.        Right: No inguinal and no supraclavicular adenopathy present.        Left: No inguinal and no supraclavicular adenopathy present.   Neurological: She is alert and oriented to person, place, and time.   Skin: Skin is warm and dry. No rash noted.   Psychiatric: She has a normal mood and affect. Her behavior is normal. Judgment and thought content normal.       Assessment:       1. Endometrial ca    2. Well woman exam with routine gynecological exam        Plan:   Endometrial ca   PEG   RTC 6 months  -     ; Future; Expected date: 01/24/2020    Well woman exam with routine gynecological exam  Counseling time of 10 minutes discussing calcium, vitamin D and exercise. Questions answered.

## 2019-07-24 ENCOUNTER — OFFICE VISIT (OUTPATIENT)
Dept: GYNECOLOGIC ONCOLOGY | Facility: CLINIC | Age: 79
End: 2019-07-24
Payer: MEDICARE

## 2019-07-24 ENCOUNTER — LAB VISIT (OUTPATIENT)
Dept: LAB | Facility: HOSPITAL | Age: 79
End: 2019-07-24
Attending: OBSTETRICS & GYNECOLOGY
Payer: MEDICARE

## 2019-07-24 VITALS
HEART RATE: 81 BPM | HEIGHT: 66 IN | BODY MASS INDEX: 37.21 KG/M2 | DIASTOLIC BLOOD PRESSURE: 74 MMHG | SYSTOLIC BLOOD PRESSURE: 152 MMHG | WEIGHT: 231.5 LBS

## 2019-07-24 DIAGNOSIS — C54.1 ENDOMETRIAL CA: ICD-10-CM

## 2019-07-24 DIAGNOSIS — Z01.419 WELL WOMAN EXAM WITH ROUTINE GYNECOLOGICAL EXAM: ICD-10-CM

## 2019-07-24 DIAGNOSIS — C54.1 ENDOMETRIAL CA: Primary | ICD-10-CM

## 2019-07-24 LAB — CANCER AG125 SERPL-ACNC: 8 U/ML (ref 0–30)

## 2019-07-24 PROCEDURE — 3077F PR MOST RECENT SYSTOLIC BLOOD PRESSURE >= 140 MM HG: ICD-10-PCS | Mod: CPTII,S$GLB,, | Performed by: OBSTETRICS & GYNECOLOGY

## 2019-07-24 PROCEDURE — 86304 IMMUNOASSAY TUMOR CA 125: CPT

## 2019-07-24 PROCEDURE — 3077F SYST BP >= 140 MM HG: CPT | Mod: CPTII,S$GLB,, | Performed by: OBSTETRICS & GYNECOLOGY

## 2019-07-24 PROCEDURE — 99999 PR PBB SHADOW E&M-EST. PATIENT-LVL III: CPT | Mod: PBBFAC,,, | Performed by: OBSTETRICS & GYNECOLOGY

## 2019-07-24 PROCEDURE — 3078F PR MOST RECENT DIASTOLIC BLOOD PRESSURE < 80 MM HG: ICD-10-PCS | Mod: CPTII,S$GLB,, | Performed by: OBSTETRICS & GYNECOLOGY

## 2019-07-24 PROCEDURE — 3078F DIAST BP <80 MM HG: CPT | Mod: CPTII,S$GLB,, | Performed by: OBSTETRICS & GYNECOLOGY

## 2019-07-24 PROCEDURE — G0101 PR CA SCREEN;PELVIC/BREAST EXAM: ICD-10-PCS | Mod: S$GLB,,, | Performed by: OBSTETRICS & GYNECOLOGY

## 2019-07-24 PROCEDURE — G0101 CA SCREEN;PELVIC/BREAST EXAM: HCPCS | Mod: S$GLB,,, | Performed by: OBSTETRICS & GYNECOLOGY

## 2019-07-24 PROCEDURE — 99999 PR PBB SHADOW E&M-EST. PATIENT-LVL III: ICD-10-PCS | Mod: PBBFAC,,, | Performed by: OBSTETRICS & GYNECOLOGY

## 2019-07-24 PROCEDURE — 36415 COLL VENOUS BLD VENIPUNCTURE: CPT

## 2019-08-19 ENCOUNTER — OFFICE VISIT (OUTPATIENT)
Dept: ORTHOPEDICS | Facility: CLINIC | Age: 79
End: 2019-08-19
Payer: MEDICARE

## 2019-08-19 VITALS — BODY MASS INDEX: 36.95 KG/M2 | HEIGHT: 66 IN | WEIGHT: 229.94 LBS

## 2019-08-19 DIAGNOSIS — M17.11 PRIMARY OSTEOARTHRITIS OF RIGHT KNEE: Primary | ICD-10-CM

## 2019-08-19 PROCEDURE — 99999 PR PBB SHADOW E&M-EST. PATIENT-LVL III: ICD-10-PCS | Mod: PBBFAC,,, | Performed by: ORTHOPAEDIC SURGERY

## 2019-08-19 PROCEDURE — 1101F PR PT FALLS ASSESS DOC 0-1 FALLS W/OUT INJ PAST YR: ICD-10-PCS | Mod: CPTII,S$GLB,, | Performed by: ORTHOPAEDIC SURGERY

## 2019-08-19 PROCEDURE — 99213 PR OFFICE/OUTPT VISIT, EST, LEVL III, 20-29 MIN: ICD-10-PCS | Mod: S$GLB,,, | Performed by: ORTHOPAEDIC SURGERY

## 2019-08-19 PROCEDURE — 99213 OFFICE O/P EST LOW 20 MIN: CPT | Mod: S$GLB,,, | Performed by: ORTHOPAEDIC SURGERY

## 2019-08-19 PROCEDURE — 99999 PR PBB SHADOW E&M-EST. PATIENT-LVL III: CPT | Mod: PBBFAC,,, | Performed by: ORTHOPAEDIC SURGERY

## 2019-08-19 PROCEDURE — 1101F PT FALLS ASSESS-DOCD LE1/YR: CPT | Mod: CPTII,S$GLB,, | Performed by: ORTHOPAEDIC SURGERY

## 2019-08-19 NOTE — PROGRESS NOTES
"Subjective:      Patient ID: Jodi Cole is a 78 y.o. female.    Chief Complaint: Pain of the Left Knee and Pain of the Right Knee    HPI  Jodi Cole has bilateral knee pain. Right is much worse than left.  Injection lasted about 2 months.  The pain has worsened. The symptoms have worsened to the point where it is interfering with her activities of daily living.  She has difficulty with stairs, getting dressed and getting in an out of a car.  The pain is located in the global aspect of the knee.  There  is not radiation.  There is associated stiffness.   There is catching and locking. The pain is described as achy. The pain is aggravated by standing, walking, prolonged sitting.  It is alleviated by nothing consistently.  There is not associated back pain.  Her history, medications and problem list were reviewed.    Review of Systems   Constitution: Negative for chills, fever and night sweats.   HENT: Negative for hearing loss.    Eyes: Negative for blurred vision and double vision.   Cardiovascular: Negative for chest pain, claudication and leg swelling.   Respiratory: Negative for shortness of breath.    Endocrine: Negative for polydipsia, polyphagia and polyuria.   Hematologic/Lymphatic: Negative for adenopathy and bleeding problem. Does not bruise/bleed easily.   Skin: Negative for poor wound healing.   Gastrointestinal: Negative for diarrhea and heartburn.   Genitourinary: Negative for bladder incontinence.   Neurological: Negative for focal weakness, headaches, numbness, paresthesias and sensory change.   Psychiatric/Behavioral: The patient is not nervous/anxious.    Allergic/Immunologic: Negative for persistent infections.         Objective:      Body mass index is 37.11 kg/m².  Vitals:    08/19/19 1059   Weight: 104.3 kg (229 lb 15 oz)   Height: 5' 6" (1.676 m)           General    Constitutional: She is oriented to person, place, and time. She appears well-developed and well-nourished.   HENT: "   Head: Normocephalic and atraumatic.   Eyes: EOM are normal.   Cardiovascular: Normal rate.    Pulmonary/Chest: Effort normal.   Neurological: She is alert and oriented to person, place, and time.   Psychiatric: She has a normal mood and affect. Her behavior is normal.     General Musculoskeletal Exam   Gait: normal       Right Knee Exam     Inspection   Erythema: absent  Scars: absent  Swelling: present  Effusion: absent  Deformity: absent  Bruising: absent    Tenderness   The patient is tender to palpation of the medial joint line, patella and lateral joint line.    Crepitus   The patient has crepitus of the patella, medial joint line and lateral joint line.    Range of Motion   Extension: 10   Flexion: 100     Tests   Ligament Examination Lachman: normal (-1 to 2mm)   MCL - Valgus: normal (0 to 2mm)  LCL - Varus: normal  Patella   Passive Patellar Tilt: neutral    Other   Sensation: normal    Left Knee Exam     Inspection   Erythema: absent  Scars: absent  Swelling: present  Effusion: absent  Deformity: absent  Bruising: absent    Tenderness   The patient tender to palpation of the medial joint line, patella and lateral joint line.    Crepitus   The patient has crepitus of the patella, lateral joint line and medial joint line.    Range of Motion   Extension: 10   Flexion: 110     Tests   Stability Lachman: normal (-1 to 2mm)   MCL - Valgus: normal (0 to 2mm)  LCL - Varus: normal (0 to 2mm)  Patella   Passive Patellar Tilt: neutral    Other   Sensation: normal    Muscle Strength   Right Lower Extremity   Hip Abduction: 5/5   Quadriceps:  5/5   Hamstrin/5   Left Lower Extremity   Hip Abduction: 5/5   Quadriceps:  5/5   Hamstrin/5     Reflexes     Left Side  Quadriceps:  2+    Right Side   Quadriceps:  2+    Vascular Exam     Right Pulses  Dorsalis Pedis:      2+          Left Pulses  Dorsalis Pedis:      2+          Edema  Right Lower Leg: absent  Left Lower Leg: absent              Assessment:        Encounter Diagnosis   Name Primary?    Primary osteoarthritis of right knee Yes          Plan:       Jodi was seen today for pain and pain.    Diagnoses and all orders for this visit:    Primary osteoarthritis of right knee      Treatment options were discussed. The surgical process of right knee replacement was discussed in detail with the patient including a detailed discussion of the procedure itself (including visual model, x-ray review, and literature review). The typical perioperative and post-operative course was discussed and perioperative risks were discussed to the patient's satisfaction.  Risks and complications discussed included but were not limited to the risks of anesthetic complications, infection, bleeding, wound healing complications, stiffness, aseptic loosening, instability, limb length inequality, neurologic dysfunction including numbness and weakness, additional surgery,  DVT, pulmonary embolism, perioperative medical risks (cardiac, pulmonary, renal, neurologic), and death and the patient elects to proceed.  The patient should get medically cleared and attend the joint seminar.  ASA for DVT prophylaxis--endometrial CA in remission.

## 2019-08-22 DIAGNOSIS — M17.11 PRIMARY OSTEOARTHRITIS OF RIGHT KNEE: Primary | ICD-10-CM

## 2019-09-11 PROBLEM — M25.661 DECREASED ROM OF RIGHT KNEE: Status: ACTIVE | Noted: 2019-09-11

## 2019-09-11 PROBLEM — R29.898 WEAKNESS OF RIGHT LEG: Status: ACTIVE | Noted: 2019-09-11

## 2019-09-11 NOTE — PROGRESS NOTES
[facility]  Physical Therapy Initial Evaluation    Name: Jodi Cole  Clinic Number: [id number]    Therapy Diagnosis:   Encounter Diagnoses   Name Primary?    Decreased ROM of right knee     Weakness of right leg      Physician: Joey Fuentes MD    Physician Orders: PT Eval and Treat   Medical Diagnosis from Referral: Primary OA R knee  Evaluation Date: 9/12/2019  Authorization Period Expiration: 12/31/19  Plan of Care Expiration: 10/11/19  Visit # / Visits authorized: 1/ 20    Time In: 800 AM  Time Out: 900 AM  Total Billable Time: 60 minutes    Precautions: Standard    Subjective   Date of onset: Chronic  History of current condition - Jodi reports: she has been having a lot of trouble walking. When she walks it feels like something is sticking in both knees. She did have knee injections since she was in her 30's. She states they stopped working. Her R knee is the worst, and she will be having a TKR. Date of surgery scheduled for 10/3/19. Her MD wants her to do prehab. She is currently ambulating without an AD. IADLs and ADLs are challenging but she pushes through.    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    Anemia due to chemotherapy 9/22/2015    Chemotherapy induced nausea and vomiting 6/9/2015    Chemotherapy-induced neutropenia 7/28/2015    Constipation 7/7/2015    Endometrial ca 5/26/2015    Endometrial ca 8/10/2015    Essential hypertension     Ovarian cancer     Pain in both hands 2/19/2016    Right-sided low back pain without sciatica 6/23/2016    Uterine cancer     Well woman exam with routine gynecological exam 2/19/2016       Surgical History:   Jodi Cole  has a past surgical history that includes D&C Hysteroscopy (March 18, 2015); Dilation and curettage of uterus (1969 ); Lymphadenectomy; Hysterectomy (5/11/15); Pelvic and para-aortic lymph node dissection; pr removal of  ovary/tube(s); and Colonoscopy (N/A, 5/22/2019).    Medications:   Jodi has a current medication list which includes the following prescription(s): amlodipine, hydrocodone-acetaminophen, meloxicam, multivitamin, pravastatin, triamterene-hydrochlorothiazide 37.5-25 mg, vitamins b1 b6 b12, and zolpidem.    Allergies:   Review of patient's allergies indicates:  No Known Allergies     Imaging, see chart    Prior Therapy: No  Social History: Pt lives with their spouse. She has 12 steps in her house but she does not go up there.  Occupation: Pt is retired at this time.  Prior Level of Function: able to stand and walk further distance  Current Level of Function: only able to stand for 15 minutes and walk about a block before she needs a break    Pain:  Current 3/10, worst 7/10, best 2/10   Location: bilateral knee (R is the worst)  Description: sticking and stabbing  Aggravating Factors: Standing, Walking, Getting out of bed/chair and stairs  Easing Factors: massage, lying down, rest and over the counter creams    Pts goals: to learn what to do before surgery    Objective     Gait Observation: Amb with B knees in flexion throughout gait cycle, decreased heel strike at initial stance, decrease knee extension in mid stance, antalgic with slow deepak and decreased step length B    Range of Motion:   Knee Left active Left Passive Right Active R passive   Flexion 70 74 85 88   Extension -9 -7 -20 -17     Fair quad activation B with quad set    Lower Extremity Strength  Right LE  Left LE    Knee extension: 4+/5 Knee extension: 4+/5   Knee flexion: 4/5 Knee flexion: 4/5   Hip flexion: 4/5 Hip flexion: 4/5   Hip extension:  NT Hip extension: NT   Hip abduction: 4-/5 Hip abduction: 4-/5   Hip adduction: 4/5 Hip adduction 4/5   Ankle dorsiflexion: 4+/5 Ankle dorsiflexion: 4+/5   Ankle plantarflexion: 4/5 Ankle plantarflexion: 4/5       Function:    - SLS R: <3 sec  - SLS L: <3 sec  - Sit <--> Stand: B UE required, painful   -  "Bed Mobility: mod I      Joint Mobility: guarded patellar- hard to assess mobility, limited tibiofemoral P/A and A/P mobs    Palpation: R knee +TTP distal quad with significant muscle "knot" present, + TTP hamstring insertions at the knee both medial and lateral    Sensation: in tact      CMS Impairment/Limitation/Restriction for Knee    ** based on clinical judgement as FOTO was n ot issued at this visit  Therapist reviewed FOTO scores for Jodi Cole on 9/12/2019.   FOTO documents entered into [employer] - see Media section.    Limitation Score: 60%  Category: Mobility    Current : CK = at least 40% but < 60% impaired, limited or restricted  Goal: CK = at least 40% but < 60% impaired, limited or restricted  Discharge: CK = at least 40% but < 60% impaired, limited or restricted         TREATMENT   Treatment Time In: 835 AM  Treatment Time Out: 900 AM  Total Treatment time separate from Evaluation: 25 minutes    Jodi received therapeutic exercises to develop strength, endurance, ROM and flexibility for 25 minutes including:  Quad set 10 x 5s  SAQ 10 x 5s  Heel slides 10 x 10s  Glute sqeeuze 10 x 5s  SLR  2 x 10 B  SL abd x 10 B  Seated gastroc stretch 3 x 30s    Home Exercises and Patient Education Provided    Education provided:   - expectations with therapy post surgery    Written Home Exercises Provided: yes.  Exercises were reviewed and Jodi was able to demonstrate them prior to the end of the session.  Jodi demonstrated good  understanding of the education provided.     See EMR under Patient Instructions for exercises provided 9/12/2019.    Assessment   Jodi is a 78 y.o. female referred to outpatient Physical Therapy with a medical diagnosis of rehab for R knee OA. Pt presents with decreased R knee ROM, strength, and stability. R TKR scheduled for 10/1/19. Pt came to therapy for an HEP to perform to prepare her for surgery. She was able to demo independence with all issued activities.    Pt prognosis " is Good.   Pt will benefit from skilled outpatient Physical Therapy to address the deficits stated above and in the chart below, provide pt/family education, and to maximize pt's level of independence.     Plan of care discussed with patient: Yes  Pt's spiritual, cultural and educational needs considered and patient is agreeable to the plan of care and goals as stated below:     Anticipated Barriers for therapy: none    Medical Necessity is demonstrated by the following  History  Co-morbidities and personal factors that may impact the plan of care Co-morbidities:   HTN and anemia, hx of endometrial cancer, s/p hysterectomy    Personal Factors:   age     high   Examination  Body Structures and Functions, activity limitations and participation restrictions that may impact the plan of care Body Regions:   lower extremities    Body Systems:    gross symmetry  ROM  strength  gross coordinated movement    Participation Restrictions:   Limited with walking, standing, transitional movements    Activity limitations:   Learning and applying knowledge  no deficits    General Tasks and Commands  no deficits    Communication  no deficits    Mobility  lifting and carrying objects  walking  driving (bike, car, motorcycle)    Self care  no deficits    Domestic Life  shopping  cooking  doing house work (cleaning house, washing dishes, laundry)    Interactions/Relationships  family relationships    Life Areas  no deficits    Community and Social Life  community life  recreation and leisure         high   Clinical Presentation stable and uncomplicated low   Decision Making/ Complexity Score: low     No goals created as pt is discharged with intent to return to outpatient PT following surgery    Plan     Discharge from PT with home HEP to continue before surgery. Will return to therapy following R TKR in October.    Ashley Holstein, PT

## 2019-09-12 ENCOUNTER — CLINICAL SUPPORT (OUTPATIENT)
Dept: REHABILITATION | Facility: HOSPITAL | Age: 79
End: 2019-09-12
Attending: ORTHOPAEDIC SURGERY
Payer: MEDICARE

## 2019-09-12 DIAGNOSIS — M25.661 DECREASED ROM OF RIGHT KNEE: ICD-10-CM

## 2019-09-12 DIAGNOSIS — R29.898 WEAKNESS OF RIGHT LEG: ICD-10-CM

## 2019-09-12 PROCEDURE — 97161 PT EVAL LOW COMPLEX 20 MIN: CPT | Mod: PO

## 2019-09-12 PROCEDURE — 97110 THERAPEUTIC EXERCISES: CPT | Mod: PO

## 2019-09-18 ENCOUNTER — ANESTHESIA EVENT (OUTPATIENT)
Dept: SURGERY | Facility: HOSPITAL | Age: 79
End: 2019-09-18
Payer: MEDICARE

## 2019-09-18 DIAGNOSIS — M79.604 PAIN OF RIGHT LOWER EXTREMITY: ICD-10-CM

## 2019-09-18 DIAGNOSIS — Z01.818 PRE-OP TESTING: Primary | ICD-10-CM

## 2019-09-18 NOTE — PRE ADMISSION SCREENING
Anesthesia Assessment: Preoperative EQUATION    Planned Procedure: Procedure(s) (LRB):  REPLACEMENT-KNEE-TOTAL-SIGHT ASSISTED (Right)  Requested Anesthesia Type:Regional  Surgeon: Joey Fuentes MD  Service: Orthopedics  Known or anticipated Date of Surgery:10/1/2019    Surgeon notes: reviewed; Gina 8/19/19      Previous anesthesia records:No problems; Colonoscopy 5/22/19    Last PCP note: None available  Subspecialty notes: Gyn-Onc ([place] 7/24/19)    Other important co-morbidities: HTN/Chronic anemia/Hx Endometrial Cancer/Chemo-induced neutropenia     Tests already available:  Available tests,  > 1 year ago , within [facility] . EKG 5/16/18                Optimization:  Anesthesia Preop Clinic Assessment  Indicated    Medical Opinion Indicated           Plan:    Testing:  Hematology Profile, BMP and PT/INR   Pre-anesthesia  visit       Visit focus: possible regional anesthesia and/or nerve block      Consultation:IM Perioperative Hospitalist     Patient  has previously scheduled Medical Appointment:    Navigation: Tests Scheduled.              Consults scheduled.             Results will be tracked by Preop Clinic.

## 2019-09-19 ENCOUNTER — TELEPHONE (OUTPATIENT)
Dept: PREADMISSION TESTING | Facility: HOSPITAL | Age: 79
End: 2019-09-19

## 2019-09-19 NOTE — TELEPHONE ENCOUNTER
----- Message from Maddi Villanueva LPN sent at 9/18/2019 11:18 AM CDT -----  Surgery-10/1/19  Pre-op-9/23/19    Patient needs CBC,BMP,PT/INR,POC AND OPOC.

## 2019-09-23 ENCOUNTER — OFFICE VISIT (OUTPATIENT)
Dept: ORTHOPEDICS | Facility: CLINIC | Age: 79
End: 2019-09-23
Payer: MEDICARE

## 2019-09-23 ENCOUNTER — HOSPITAL ENCOUNTER (OUTPATIENT)
Dept: RADIOLOGY | Facility: HOSPITAL | Age: 79
Discharge: HOME | End: 2019-09-23
Attending: PHYSICIAN ASSISTANT
Payer: MEDICARE

## 2019-09-23 VITALS
SYSTOLIC BLOOD PRESSURE: 162 MMHG | BODY MASS INDEX: 36.95 KG/M2 | DIASTOLIC BLOOD PRESSURE: 87 MMHG | HEIGHT: 66 IN | HEART RATE: 76 BPM | WEIGHT: 229.94 LBS

## 2019-09-23 DIAGNOSIS — M17.11 PRIMARY OSTEOARTHRITIS OF RIGHT KNEE: ICD-10-CM

## 2019-09-23 DIAGNOSIS — M17.11 PRIMARY OSTEOARTHRITIS OF RIGHT KNEE: Primary | ICD-10-CM

## 2019-09-23 PROCEDURE — 99999 PR PBB SHADOW E&M-EST. PATIENT-LVL IV: ICD-10-PCS | Mod: PBBFAC,,, | Performed by: PHYSICIAN ASSISTANT

## 2019-09-23 PROCEDURE — 73560 X-RAY EXAM OF KNEE 1 OR 2: CPT | Mod: 26,RT,, | Performed by: RADIOLOGY

## 2019-09-23 PROCEDURE — 99499 NO LOS: ICD-10-PCS | Mod: S$GLB,,, | Performed by: PHYSICIAN ASSISTANT

## 2019-09-23 PROCEDURE — 99499 UNLISTED E&M SERVICE: CPT | Mod: S$GLB,,, | Performed by: PHYSICIAN ASSISTANT

## 2019-09-23 PROCEDURE — 99999 PR PBB SHADOW E&M-EST. PATIENT-LVL IV: CPT | Mod: PBBFAC,,, | Performed by: PHYSICIAN ASSISTANT

## 2019-09-23 PROCEDURE — 73560 XR KNEE 1 OR 2 VIEW RIGHT: ICD-10-PCS | Mod: 26,RT,, | Performed by: RADIOLOGY

## 2019-09-23 PROCEDURE — 73560 X-RAY EXAM OF KNEE 1 OR 2: CPT | Mod: TC,RT

## 2019-09-24 RX ORDER — MORPHINE SULFATE 10 MG/ML
2 INJECTION, SOLUTION INTRAMUSCULAR; INTRAVENOUS
Status: CANCELLED | OUTPATIENT
Start: 2019-09-24

## 2019-09-24 RX ORDER — AMOXICILLIN 250 MG
1 CAPSULE ORAL 2 TIMES DAILY
Status: CANCELLED | OUTPATIENT
Start: 2019-09-24

## 2019-09-24 RX ORDER — OXYCODONE HYDROCHLORIDE 5 MG/1
15 TABLET ORAL
Status: CANCELLED | OUTPATIENT
Start: 2019-09-24

## 2019-09-24 RX ORDER — NALOXONE HCL 0.4 MG/ML
0.02 VIAL (ML) INJECTION
Status: CANCELLED | OUTPATIENT
Start: 2019-09-24

## 2019-09-24 RX ORDER — FAMOTIDINE 20 MG/1
20 TABLET, FILM COATED ORAL 2 TIMES DAILY
Status: CANCELLED | OUTPATIENT
Start: 2019-09-24

## 2019-09-24 RX ORDER — POLYETHYLENE GLYCOL 3350 17 G/17G
17 POWDER, FOR SOLUTION ORAL DAILY
Status: CANCELLED | OUTPATIENT
Start: 2019-09-24

## 2019-09-24 RX ORDER — OXYCODONE HYDROCHLORIDE 5 MG/1
10 TABLET ORAL
Status: CANCELLED | OUTPATIENT
Start: 2019-09-24

## 2019-09-24 RX ORDER — CELECOXIB 100 MG/1
400 CAPSULE ORAL
Status: CANCELLED | OUTPATIENT
Start: 2019-09-24

## 2019-09-24 RX ORDER — ASPIRIN 81 MG/1
81 TABLET ORAL 2 TIMES DAILY
Status: CANCELLED | OUTPATIENT
Start: 2019-09-24

## 2019-09-24 RX ORDER — MIDAZOLAM HYDROCHLORIDE 1 MG/ML
1 INJECTION INTRAMUSCULAR; INTRAVENOUS EVERY 5 MIN PRN
Status: CANCELLED | OUTPATIENT
Start: 2019-09-24

## 2019-09-24 RX ORDER — ROPIVACAINE HYDROCHLORIDE 2 MG/ML
8 INJECTION, SOLUTION EPIDURAL; INFILTRATION; PERINEURAL CONTINUOUS
Status: CANCELLED | OUTPATIENT
Start: 2019-09-24

## 2019-09-24 RX ORDER — FENTANYL CITRATE 50 UG/ML
25 INJECTION, SOLUTION INTRAMUSCULAR; INTRAVENOUS EVERY 5 MIN PRN
Status: CANCELLED | OUTPATIENT
Start: 2019-09-24

## 2019-09-24 RX ORDER — LIDOCAINE HYDROCHLORIDE 10 MG/ML
1 INJECTION, SOLUTION EPIDURAL; INFILTRATION; INTRACAUDAL; PERINEURAL
Status: CANCELLED | OUTPATIENT
Start: 2019-09-24

## 2019-09-24 RX ORDER — MUPIROCIN 20 MG/G
1 OINTMENT TOPICAL
Status: CANCELLED | OUTPATIENT
Start: 2019-09-24

## 2019-09-24 RX ORDER — ONDANSETRON 2 MG/ML
4 INJECTION INTRAMUSCULAR; INTRAVENOUS EVERY 8 HOURS PRN
Status: CANCELLED | OUTPATIENT
Start: 2019-09-24

## 2019-09-24 RX ORDER — ACETAMINOPHEN 10 MG/ML
1000 INJECTION, SOLUTION INTRAVENOUS ONCE
Status: CANCELLED | OUTPATIENT
Start: 2019-09-24 | End: 2019-09-24

## 2019-09-24 RX ORDER — SODIUM CHLORIDE 9 MG/ML
INJECTION, SOLUTION INTRAVENOUS CONTINUOUS
Status: CANCELLED | OUTPATIENT
Start: 2019-09-24 | End: 2019-09-25

## 2019-09-24 RX ORDER — SODIUM CHLORIDE 9 MG/ML
INJECTION, SOLUTION INTRAVENOUS
Status: CANCELLED | OUTPATIENT
Start: 2019-09-24

## 2019-09-24 RX ORDER — BISACODYL 10 MG
10 SUPPOSITORY, RECTAL RECTAL EVERY 12 HOURS PRN
Status: CANCELLED | OUTPATIENT
Start: 2019-09-24

## 2019-09-24 RX ORDER — CELECOXIB 100 MG/1
200 CAPSULE ORAL DAILY
Status: CANCELLED | OUTPATIENT
Start: 2019-09-24

## 2019-09-24 RX ORDER — RAMELTEON 8 MG/1
8 TABLET ORAL NIGHTLY PRN
Status: CANCELLED | OUTPATIENT
Start: 2019-09-24

## 2019-09-24 RX ORDER — OXYCODONE HYDROCHLORIDE 5 MG/1
5 TABLET ORAL
Status: CANCELLED | OUTPATIENT
Start: 2019-09-24

## 2019-09-24 RX ORDER — PREGABALIN 25 MG/1
75 CAPSULE ORAL
Status: CANCELLED | OUTPATIENT
Start: 2019-09-24

## 2019-09-24 RX ORDER — MUPIROCIN 20 MG/G
1 OINTMENT TOPICAL 2 TIMES DAILY
Status: CANCELLED | OUTPATIENT
Start: 2019-09-24 | End: 2019-09-29

## 2019-09-24 RX ORDER — ACETAMINOPHEN 500 MG
1000 TABLET ORAL EVERY 6 HOURS
Status: CANCELLED | OUTPATIENT
Start: 2019-09-24 | End: 2019-09-26

## 2019-09-24 RX ORDER — PREGABALIN 25 MG/1
75 CAPSULE ORAL NIGHTLY
Status: CANCELLED | OUTPATIENT
Start: 2019-09-24

## 2019-09-24 RX ORDER — SODIUM CHLORIDE 0.9 % (FLUSH) 0.9 %
10 SYRINGE (ML) INJECTION
Status: CANCELLED | OUTPATIENT
Start: 2019-09-24

## 2019-09-24 NOTE — PROGRESS NOTES
Jodi Cole is a 78 y.o. year old here today for a pre-operative visit in preparation for a Right total knee arthroplasty to be performed by  Dr. Fuentes on 10/1/2019.  she was last seen and treated in the clinic on 8/19/2019. she will be medically optimized by the pre op center. There has been no significant change in medical status since last visit. No fever, chills, malaise, or unexplained weight change.      Allergies, Medications, past medical and surgical history reviewed.    Focused examination performed.    Patient declined to see Dr. Fuentes today in clinic.  All questions answered. Patient encouraged to call with questions. Contact information given.     Pre, sonia, and post operative procedures and expectations discussed. Questions were answered. Jodi Cole has been educated and is ready to proceed with surgery. Approximately 30 minutes was spent discussing surgical outcomes, plans, procedures pre, sonia, and post operative expections and care.  Surgical consent signed.    Jodi Cole will contact us if there are any questions, concerns, or changes in medical status prior to surgery.

## 2019-09-24 NOTE — H&P (VIEW-ONLY)
CC: Right knee pain    Jodi Cole is a 78 y.o. female with 2 year history of Right knee pain. Pain is worse with activity and weight bearing.  Patient has experienced interference of activities of daily living due to decreased range of motion and an increase in joint pain and swelling.  Patient has failed non-operative treatment including NSAIDs, corticosteroid injections, viscosupplement injections, and activity modification.  Jodi Cole currently ambulates independently.     Relevant medical conditions of significance in perioperative period:  HTN: on meds followed by PCP  Anemia: chemo induced  Endometrial cancer: s/p hysterectomy 2015    Past Medical History:   Diagnosis Date    Anemia associated with chemotherapy 9/1/2015    Anemia due to chemotherapy 9/22/2015    Chemotherapy induced nausea and vomiting 6/9/2015    Chemotherapy-induced neutropenia 7/28/2015    Constipation 7/7/2015    Endometrial ca 5/26/2015    Endometrial ca 8/10/2015    Essential hypertension     Ovarian cancer     Pain in both hands 2/19/2016    Right-sided low back pain without sciatica 6/23/2016    Uterine cancer     Well woman exam with routine gynecological exam 2/19/2016       Past Surgical History:   Procedure Laterality Date    COLONOSCOPY N/A 5/22/2019    Procedure: COLONOSCOPY;  Surgeon: Kong Rodarte MD;  Location: [facility] ([address] [place] [address]);  Service: Endoscopy;  Laterality: N/A;    D&C Hysteroscopy  March 18, 2015    DILATION AND CURETTAGE OF UTERUS  1969     D&C and uterine suspension.     HYSTERECTOMY  5/11/15    robotic for endometrial cancer    LYMPHADENECTOMY      PELVIC AND PARA-AORTIC LYMPH NODE DISSECTION      VT REMOVAL OF OVARY/TUBE(S)      robotic TLH/BSO and bilatyeral pelvic and para-aortic LND       Family History   Problem Relation Age of Onset    Cancer Brother 65        pancreatic    Ovarian cancer Neg Hx     Uterine cancer Neg Hx     Breast cancer Neg Hx     Colon cancer Neg Hx   "      Review of patient's allergies indicates:  No Known Allergies      Current Outpatient Medications:     multivitamin capsule, Take 1 capsule by mouth once daily., Disp: , Rfl:     pravastatin (PRAVACHOL) 40 MG tablet, TK 1 T PO D, Disp: , Rfl: 3    triamterene-hydrochlorothiazide 37.5-25 mg (MAXZIDE-25) 37.5-25 mg per tablet, , Disp: , Rfl:     vitamins B1 B6 B12 Tab, Take by mouth once daily., Disp: , Rfl:     amLODIPine (NORVASC) 10 MG tablet, Take 1 tablet (10 mg total) by mouth once daily., Disp: 30 tablet, Rfl: 11    HYDROcodone-acetaminophen (NORCO) 5-325 mg per tablet, Take 1 tablet by mouth every 6 (six) hours as needed for Pain., Disp: 20 tablet, Rfl: 0    meloxicam (MOBIC) 15 MG tablet, Take 1 tablet (15 mg total) by mouth every evening., Disp: 30 tablet, Rfl: 2    zolpidem (AMBIEN) 5 MG Tab, Take 1 tablet (5 mg total) by mouth nightly as needed., Disp: 30 tablet, Rfl: 2    Review of Systems:  Constitutional: no fever or chills  Eyes: no visual changes  ENT: no nasal congestion or sore throat  Respiratory: no cough or shortness of breath  Cardiovascular: no chest pain or palpitations  Gastrointestinal: no nausea or vomiting, tolerating diet  Genitourinary: no hematuria or dysuria  Integument/Breast: no rash or pruritis  Hematologic/Lymphatic: no easy bruising or lymphadenopathy  Musculoskeletal: positive for knee pain  Neurological: no seizures or tremors  Behavioral/Psych: no auditory or visual hallucinations  Endocrine: no heat or cold intolerance    PE:  BP (!) 162/87 (BP Location: Right arm, Patient Position: Sitting, BP Method: Medium (Automatic))   Pulse 76   Ht 5' 6" (1.676 m)   Wt 104.3 kg (229 lb 15 oz)   BMI 37.11 kg/m²   General: Pleasant, cooperative, NAD   Gait: antalgic  HEENT: NCAT, sclera nonicteric   Lungs: Respirations clear bilaterally; equal and unlabored.   CV: S1S2; 2+ bilateral upper and lower extremity pulses.   Skin: Intact throughout with no rashes, erythema, or " lesions  Extremities: No LE edema,  no erythema or warmth of the skin in either lower extremity.    Right knee exam:  Knee Range of Motion: active, pain with passive range of motion  Effusion:none  Condition of skin:intact  Location of tenderness:Medial joint line   Strength:4 of 5 quadriceps strength and 5 of 5 hamstring strength  Stability: stable to testing    Hip Examination:full painless range of motion, without tenderness    Radiographs: Radiographs reveal advanced degenerative changes including subchondral cyst formation, subchondral sclerosis, osteophyte formation, joint space narrowing.     Knee Alignment:  Moderate varus    Diagnosis: osteoarthritis Right knee    Plan: Right total knee arthroplasty    Due to the serious nature of total joint infection and the high prevalence of community acquired MRSA, vancomycin will be used perioperatively.

## 2019-09-24 NOTE — H&P
CC: Right knee pain    Jodi Cole is a 78 y.o. female with 2 year history of Right knee pain. Pain is worse with activity and weight bearing.  Patient has experienced interference of activities of daily living due to decreased range of motion and an increase in joint pain and swelling.  Patient has failed non-operative treatment including NSAIDs, corticosteroid injections, viscosupplement injections, and activity modification.  Jodi Cole currently ambulates independently.     Relevant medical conditions of significance in perioperative period:  HTN: on meds followed by PCP  Anemia: chemo induced  Endometrial cancer: s/p hysterectomy 2015    Past Medical History:   Diagnosis Date    Anemia associated with chemotherapy 9/1/2015    Anemia due to chemotherapy 9/22/2015    Chemotherapy induced nausea and vomiting 6/9/2015    Chemotherapy-induced neutropenia 7/28/2015    Constipation 7/7/2015    Endometrial ca 5/26/2015    Endometrial ca 8/10/2015    Essential hypertension     Ovarian cancer     Pain in both hands 2/19/2016    Right-sided low back pain without sciatica 6/23/2016    Uterine cancer     Well woman exam with routine gynecological exam 2/19/2016       Past Surgical History:   Procedure Laterality Date    COLONOSCOPY N/A 5/22/2019    Procedure: COLONOSCOPY;  Surgeon: Kong Rodarte MD;  Location: [facility] ([address] [place] [address]);  Service: Endoscopy;  Laterality: N/A;    D&C Hysteroscopy  March 18, 2015    DILATION AND CURETTAGE OF UTERUS  1969     D&C and uterine suspension.     HYSTERECTOMY  5/11/15    robotic for endometrial cancer    LYMPHADENECTOMY      PELVIC AND PARA-AORTIC LYMPH NODE DISSECTION      NE REMOVAL OF OVARY/TUBE(S)      robotic TLH/BSO and bilatyeral pelvic and para-aortic LND       Family History   Problem Relation Age of Onset    Cancer Brother 65        pancreatic    Ovarian cancer Neg Hx     Uterine cancer Neg Hx     Breast cancer Neg Hx     Colon cancer Neg Hx   "      Review of patient's allergies indicates:  No Known Allergies      Current Outpatient Medications:     multivitamin capsule, Take 1 capsule by mouth once daily., Disp: , Rfl:     pravastatin (PRAVACHOL) 40 MG tablet, TK 1 T PO D, Disp: , Rfl: 3    triamterene-hydrochlorothiazide 37.5-25 mg (MAXZIDE-25) 37.5-25 mg per tablet, , Disp: , Rfl:     vitamins B1 B6 B12 Tab, Take by mouth once daily., Disp: , Rfl:     amLODIPine (NORVASC) 10 MG tablet, Take 1 tablet (10 mg total) by mouth once daily., Disp: 30 tablet, Rfl: 11    HYDROcodone-acetaminophen (NORCO) 5-325 mg per tablet, Take 1 tablet by mouth every 6 (six) hours as needed for Pain., Disp: 20 tablet, Rfl: 0    meloxicam (MOBIC) 15 MG tablet, Take 1 tablet (15 mg total) by mouth every evening., Disp: 30 tablet, Rfl: 2    zolpidem (AMBIEN) 5 MG Tab, Take 1 tablet (5 mg total) by mouth nightly as needed., Disp: 30 tablet, Rfl: 2    Review of Systems:  Constitutional: no fever or chills  Eyes: no visual changes  ENT: no nasal congestion or sore throat  Respiratory: no cough or shortness of breath  Cardiovascular: no chest pain or palpitations  Gastrointestinal: no nausea or vomiting, tolerating diet  Genitourinary: no hematuria or dysuria  Integument/Breast: no rash or pruritis  Hematologic/Lymphatic: no easy bruising or lymphadenopathy  Musculoskeletal: positive for knee pain  Neurological: no seizures or tremors  Behavioral/Psych: no auditory or visual hallucinations  Endocrine: no heat or cold intolerance    PE:  BP (!) 162/87 (BP Location: Right arm, Patient Position: Sitting, BP Method: Medium (Automatic))   Pulse 76   Ht 5' 6" (1.676 m)   Wt 104.3 kg (229 lb 15 oz)   BMI 37.11 kg/m²   General: Pleasant, cooperative, NAD   Gait: antalgic  HEENT: NCAT, sclera nonicteric   Lungs: Respirations clear bilaterally; equal and unlabored.   CV: S1S2; 2+ bilateral upper and lower extremity pulses.   Skin: Intact throughout with no rashes, erythema, or " lesions  Extremities: No LE edema,  no erythema or warmth of the skin in either lower extremity.    Right knee exam:  Knee Range of Motion: active, pain with passive range of motion  Effusion:none  Condition of skin:intact  Location of tenderness:Medial joint line   Strength:4 of 5 quadriceps strength and 5 of 5 hamstring strength  Stability: stable to testing    Hip Examination:full painless range of motion, without tenderness    Radiographs: Radiographs reveal advanced degenerative changes including subchondral cyst formation, subchondral sclerosis, osteophyte formation, joint space narrowing.     Knee Alignment:  Moderate varus    Diagnosis: osteoarthritis Right knee    Plan: Right total knee arthroplasty    Due to the serious nature of total joint infection and the high prevalence of community acquired MRSA, vancomycin will be used perioperatively.

## 2019-09-26 ENCOUNTER — INITIAL CONSULT (OUTPATIENT)
Dept: INTERNAL MEDICINE | Facility: CLINIC | Age: 79
End: 2019-09-26
Payer: MEDICARE

## 2019-09-26 ENCOUNTER — HOSPITAL ENCOUNTER (OUTPATIENT)
Dept: PREADMISSION TESTING | Facility: HOSPITAL | Age: 79
Discharge: HOME | End: 2019-09-26
Attending: ANESTHESIOLOGY
Payer: MEDICARE

## 2019-09-26 VITALS
HEART RATE: 77 BPM | OXYGEN SATURATION: 96 % | WEIGHT: 229.69 LBS | BODY MASS INDEX: 36.92 KG/M2 | DIASTOLIC BLOOD PRESSURE: 98 MMHG | TEMPERATURE: 97 F | SYSTOLIC BLOOD PRESSURE: 154 MMHG | HEIGHT: 66 IN

## 2019-09-26 VITALS — DIASTOLIC BLOOD PRESSURE: 88 MMHG | SYSTOLIC BLOOD PRESSURE: 148 MMHG

## 2019-09-26 DIAGNOSIS — I10 ESSENTIAL HYPERTENSION: ICD-10-CM

## 2019-09-26 DIAGNOSIS — N28.9 RENAL IMPAIRMENT: ICD-10-CM

## 2019-09-26 DIAGNOSIS — Z01.818 PREOP EXAMINATION: Primary | ICD-10-CM

## 2019-09-26 DIAGNOSIS — E87.6 HYPOKALEMIA: ICD-10-CM

## 2019-09-26 DIAGNOSIS — R60.9 EDEMA, UNSPECIFIED TYPE: ICD-10-CM

## 2019-09-26 DIAGNOSIS — E66.9 CLASS 2 OBESITY WITH BODY MASS INDEX (BMI) OF 37.0 TO 37.9 IN ADULT, UNSPECIFIED OBESITY TYPE, UNSPECIFIED WHETHER SERIOUS COMORBIDITY PRESENT: ICD-10-CM

## 2019-09-26 DIAGNOSIS — Z79.1 NSAID LONG-TERM USE: ICD-10-CM

## 2019-09-26 DIAGNOSIS — C54.1 ENDOMETRIAL CA: ICD-10-CM

## 2019-09-26 PROCEDURE — 1101F PT FALLS ASSESS-DOCD LE1/YR: CPT | Mod: CPTII,S$GLB,, | Performed by: HOSPITALIST

## 2019-09-26 PROCEDURE — 99999 PR PBB SHADOW E&M-EST. PATIENT-LVL I: ICD-10-PCS | Mod: PBBFAC,,, | Performed by: HOSPITALIST

## 2019-09-26 PROCEDURE — 99214 OFFICE O/P EST MOD 30 MIN: CPT | Mod: S$GLB,,, | Performed by: HOSPITALIST

## 2019-09-26 PROCEDURE — 99214 PR OFFICE/OUTPT VISIT, EST, LEVL IV, 30-39 MIN: ICD-10-PCS | Mod: S$GLB,,, | Performed by: HOSPITALIST

## 2019-09-26 PROCEDURE — 3077F SYST BP >= 140 MM HG: CPT | Mod: CPTII,S$GLB,, | Performed by: HOSPITALIST

## 2019-09-26 PROCEDURE — 99999 PR PBB SHADOW E&M-EST. PATIENT-LVL I: CPT | Mod: PBBFAC,,, | Performed by: HOSPITALIST

## 2019-09-26 PROCEDURE — 3077F PR MOST RECENT SYSTOLIC BLOOD PRESSURE >= 140 MM HG: ICD-10-PCS | Mod: CPTII,S$GLB,, | Performed by: HOSPITALIST

## 2019-09-26 PROCEDURE — 1101F PR PT FALLS ASSESS DOC 0-1 FALLS W/OUT INJ PAST YR: ICD-10-PCS | Mod: CPTII,S$GLB,, | Performed by: HOSPITALIST

## 2019-09-26 PROCEDURE — 3080F DIAST BP >= 90 MM HG: CPT | Mod: CPTII,S$GLB,, | Performed by: HOSPITALIST

## 2019-09-26 PROCEDURE — 3080F PR MOST RECENT DIASTOLIC BLOOD PRESSURE >= 90 MM HG: ICD-10-PCS | Mod: CPTII,S$GLB,, | Performed by: HOSPITALIST

## 2019-09-26 RX ORDER — IBUPROFEN 200 MG
200 TABLET ORAL 2 TIMES DAILY PRN
Status: ON HOLD | COMMUNITY
End: 2019-10-02 | Stop reason: HOSPADM

## 2019-09-26 NOTE — ASSESSMENT & PLAN NOTE
Endometrial cancer: s/p hysterectomy 2015    May 2015: She underwent a robotic-assisted laparoscopic hysterectomy with bilateral salpingo-oophorectomy and bilateral pelvic and para-aortic lymphadenectomy on 5/11/2015.     Finished vaginal cuff brachytherapy on 7/20/2015.    Finished chemo- late 2015 Under follow up   No recurrence

## 2019-09-26 NOTE — ASSESSMENT & PLAN NOTE
Triamterene - HCTZ    Home BP readings -134/82  Recent BP readings in the record-160/80  Hypertension-  Blood pressure is acceptable . . I suggest holding Triamterene - HCTZ- on the morning of the surgery and can continue that  post operatively under blood pressure, electrolyte and renal function monitoring as long as they are acceptable.I suggest addressing pain control as uncontrolled pain can increased blood pressure   Suggested checking the accuracy of  home BP machine with that of the PCP    Was on amlodipine in the past     Does not eat salted food in a regular basis

## 2019-09-26 NOTE — HPI
,  History of present illness- I had the pleasure of meeting this pleasant 78 y.o. lady in the pre op clinic prior to her elective Orthopedic surgery. The patient is new to me . Jodi was accompanied by  [place].    I have obtained the history by speaking to the patient and by reviewing the electronic health records.    Events leading up to surgery / History of presenting illness -    She has been troubled with moderate  Rt knee  pain for 1 year . Pain increases with activity and decreases with resting.    Relevant health conditions of significance for the perioperative period/ History of presenting illness -    Subjectively describes health as good      Health conditions of significance for the perioperative period - HTN    Not known to have heart disease , Diabetes Mellitus, Lung disease     Lives with  in a 2 level house  Bed room down stairs    works part time  Family help available post op

## 2019-09-26 NOTE — ASSESSMENT & PLAN NOTE
Has HTN, Osteo arthritis     Not known to  have sleep apnea , diabetes , fatty liver, acid reflux   reports no snoring , but no apnea  Encouraged weight loss    Suggested avoidance of Coke - helps with weight loss, BP

## 2019-09-26 NOTE — PROGRESS NOTES
[facility] - Pre Op Consult  Progress Note    Patient Name: Jodi Cole  MRN: [id number]  Date of Evaluation- 09/26/2019  PCP- Jose Umana MD    Future cases for Jodi Cole [[id number]]     Case ID Status Date Time Ilya Procedure Provider Location    [id number] [facility] 10/1/2019  7:00  REPLACEMENT-KNEE-TOTAL-SIGHT ASSISTED Joey Fuentes MD [[id number]] [facility] OR      Rt     HPI:  ,  History of present illness- I had the pleasure of meeting this pleasant 78 y.o. lady in the pre op clinic prior to her elective Orthopedic surgery. The patient is new to me . Jodi was accompanied by  [place].    I have obtained the history by speaking to the patient and by reviewing the electronic health records.    Events leading up to surgery / History of presenting illness -    She has been troubled with moderate  Rt knee  pain for 1 year . Pain increases with activity and decreases with resting.    Relevant health conditions of significance for the perioperative period/ History of presenting illness -    Subjectively describes health as good      Health conditions of significance for the perioperative period - HTN    Not known to have heart disease , Diabetes Mellitus, Lung disease     Lives with  in a 2 level house  Bed room down stairs    works part time  Family help available post op               Subjective/ Objective:       Chief complaint-Preoperative evaluation, Perioperative Medical management, complication reduction plan     Active cardiac conditions- none    Revised cardiac risk index predictors- none    Functional capacity -Examples of physical activity , walks in the house, house work, likes gardening,   house work and can take a flight of stairs holding on to the railing----- She can undertake all the above activities without  chest pain,chest tightness, Shortness of breath ,dizziness,lightheadedness making her exercise tolerance more, than 4 Mets.     Review of Systems   Constitutional:  Negative for chills and fever.          Weight gain from reduced activity   HENT:        STOPBANG score 3 / 8      HTN  BMI> 35   Age over 50        Eyes:        No new visual changes- None   Respiratory:        No cough , phlegm    No Hemoptysis   Cardiovascular:        As noted   Gastrointestinal:        No overt GI/ blood losses  Bowel movements- Regular    Endocrine:        Prednisone use > 20 mg daily for 3 weeks- none   Genitourinary: Negative for dysuria.        No urinary hesitancy    Musculoskeletal:        As above      Skin: Negative for rash.   Neurological: Negative for syncope.        No unilateral weakness   Hematological:        Current use of Anticoagulants  None   Psychiatric/Behavioral:        No Depression,Anxiety     No vascular stenting             No anesthesia, bleeding, cardiac problems , PONV with previous surgeries/procedures.  Medications and Allergies reviewed in epic.   FH- No anesthesia,bleeding / venous thrombosis ,  in family   Physical Exam    /98  Pulse 77   Temp 97   Sat 96 %       Physical Exam  Constitutional- Vitals - There is no height or weight on file to calculate BMI., There were no vitals filed for this visit.  General appearance-Conscious,Coherent  Eyes- No conjunctival icterus,pupils  round  and reactive to light   ENT-Oral cavity- moist  and  upper denture , Hearing grossly normal   Neck- No thyromegaly ,Trachea -central, No jugular venous distension,   No Carotid Bruit   Cardiovascular -Heart Sounds- Normal  and  no murmur   , No gallop rhythm   Respiratory - Normal Respiratory Effort, Normal breath sounds,  no wheeze  and  no forced expiratory wheeze    Peripheral pitting pedal edema-- mild, no calf pain   Gastrointestinal -Soft abdomen, No palpable masses, Non Tender,Liver,Spleen not palpable. No-- free fluid and shifting dullness  Musculoskeletal- No finger Clubbing. Strength grossly normal   Lymphatic-No Palpable cervical, axillary,Inguinal lymphadenopathy    Psychiatric - normal effect,Orientation  Rt Dorsalis pedis pulses-palpable    Lt Dorsalis pedis pulses- palpable   Rt Posterior tibial pulses -palpable  and non palpable   Left posterior tibial pulses -palpable   Miscellaneous -  no asterixis,  Surgical scarLower abdomen   and  no renal bruit  Investigations  Lab and Imaging have been reviewed in epic.    Review of Medicine tests    EKG- I had independently reviewed the EKG from--May 2018   It was reported to be showing     No acute changes    Review of clinical lab tests:  Lab Results   Component Value Date    CREATININE 0.9 05/06/2018    HGB 11.1 (L) 05/06/2018     05/06/2018         Review of old records- Was done and information gathered regards to events leading to surgery and health conditions of significance in the perioperative period.        Preoperative cardiac risk assessment-  The patient does not have any active cardiac conditions . Revised cardiac risk index predictors- 0---.Functional capacity is more than 4 Mets. She will be undergoing a Orthopedic procedure that carries a intermediate risk     Risk of a major Cardiac event ( Defined as death, myocardial infarction, or cardiac arrest at 30 days after noncardiac surgery), based on RCRI score     3.9%       No further cardiac work up is indicated prior to proceeding with the surgery          American Society of Anesthesiologists Physical status classification ( ASA ) class: 3     Postoperative pulmonary complication risk assessment:      ARISCAT ( [place]) risk index- risk class -  Low, if duration of surgery is under 3 hours, intermediate, if duration of surgery is over 3 hours       Assessment/Plan:     Essential hypertension    Triamterene - HCTZ    Home BP readings -134/82  Recent BP readings in the record-160/80  Hypertension-  Blood pressure is acceptable . . I suggest holding Triamterene - HCTZ- on the morning of the surgery and can continue that  post operatively under blood pressure,  electrolyte and renal function monitoring as long as they are acceptable.I suggest addressing pain control as uncontrolled pain can increased blood pressure   Suggested checking the accuracy of  home BP machine with that of the PCP    Was on amlodipine in the past     Does not eat salted food in a regular basis    Endometrial ca  Endometrial cancer: s/p hysterectomy 2015    May 2015: She underwent a robotic-assisted laparoscopic hysterectomy with bilateral salpingo-oophorectomy and bilateral pelvic and para-aortic lymphadenectomy on 5/11/2015.     Finished vaginal cuff brachytherapy on 7/20/2015.    Finished chemo- late 2015 Under follow up   No recurrence     Obese  Has HTN, Osteo arthritis     Not known to  have sleep apnea , diabetes , fatty liver, acid reflux   reports no snoring , but no apnea  Encouraged weight loss    Suggested avoidance of Coke - helps with weight loss, BP    NSAID long-term use  Ulcer , GI ,Liver problem discussed     Edema  Edema- I suggested avoidance of added salt,avoidance of NSAID's, unless advised or ordered  and suggested Limb elevation and tara hose use    Renal impairment      I  suggest monitoring renal function, in put and out put status sonia-operatively. I  suggest avoiding nephrotoxic medication including NSAIDs, COX2 inhibitors, intravenous contrast agent,avoiding hypotension to prevent further renal impairment.     Hypokalemia  Likely HCTZ intake   Suggest dietary intake , cardiac monitoring   Suggested follow up        Preventive perioperative care    Thromboembolic prophylaxis:  Her risk factors for thrombosis include surgical procedure and age.I suggest  thromboembolic prophylaxis ( mechanical/pharmacological, weighing the risk benefits of pharmacological agent use considering sonia procedural bleeding )  during the perioperative period.I suggested being active in the post operative period.    The patient is a candidate for extended DVT prophylaxis    Postoperative  pulmonary complication prophylaxis-Risk factors for post operative pulmonary complications include age over 65 years and ASA class >2- I suggest incentive spirometry use, early ambulation and end tidal carbon dioxide monitoring  , oral care , head end of bed elevation      Renal complication prophylaxis-Risk factors for renal complications include hypertension . I suggest keeping her well hydrated  in the perioperative period.     Surgical site Infection Prophylaxis-I  suggest appropriate antibiotic for Prophylaxis against Surgical site infections     Delirium prophylaxis-Risk factors - Advanced Age - I suggest avoidance / minimizing the use of  Benzodiazepines ( unless the patient has been taking it on a regular basis ),Anticholinergic medication,Antihistamines ( like  Benadryl).I suggest minimizing the use of opioid medication and use of IV tylenol,if it is appropriate. I suggest using the lowest possible dose of opioids for the shortest duration possible in the perioperative period. I suggest to Keep shades/blinds open during the day, lights off and shades closed at night to encourage normal sleep/wake cycle.I encourage the presence of the family member with the patient at all times, if at all possible as mental status changes can be picked up early by the family members and they help with reorientation. I encouraged the presence of family to help with orientation in the perioperative period. Benadryl avoidance suggested      In view of regional anesthesia the patient  is at risk of postoperative urinary retention.  I suggest avoidance / minimizing the of  Benzodiazepines,Anticholinergic medication,antihistamines ( Benadryl) , if possible in the perioperative period. I suggest using the minimum possible use of opioids for the minimum period of time in the perioperative period. Benadryl avoidance suggested      This visit was focused on Preoperative evaluation, Perioperative Medical management, complication  reduction plans. I suggest that the patient follows up with primary care or relevant sub specialists for ongoing health care.    I appreciate the opportunity to be involved in this patients care. Please feel free to contact me if there were any questions about this consultation.    Patient is optimized   Patient  was instructed to call and update me about any changes to health,  medication, office visits ,testing out side of the sonia operative care center , hospitalizations between now and surgery     Tamie Mora MD  Perioperative Medicine  [facility]   Pager [phone]  --  9/26- 18 34     BP (!) 148/88 (BP Location: Right arm, Patient Position: Sitting)      Lab from 9/26   Hb, HCT,PLT - N  Potassium 3.3  Creatinine 1- Likely CKD2     Called and spoke to her   Stages of CKD discussed  Deleterious effects NSAID's ,unless prescribed  Beneficial effects of Hydration discussed   Tylenol as needed for pain     I  suggest monitoring renal function, in put and out put status sonia-operatively. I  suggest avoiding nephrotoxic medication including NSAIDs, COX2 inhibitors, intravenous contrast agent,avoiding hypotension to prevent further renal impairment.     Call with BP reading suggested   --  9/27- 2001    Message from office dated 9/27   Pt called and left a message  -- 143/74    Called to speak to her   Left a message- BP acceptable   Suggested Continued BP monitoring and to keep us informed    --  9/30- 8 24  Called to follow up , to address any concerns with the up coming surgery or any questions on Medication instructions   Unable to speak   Left a message to call, if needed   -  9/30- 19 17   Message from office   /83

## 2019-09-26 NOTE — OUTPATIENT SUBJECTIVE & OBJECTIVE
Outpatient Subjective & Objective     Chief complaint-Preoperative evaluation, Perioperative Medical management, complication reduction plan     Active cardiac conditions- none    Revised cardiac risk index predictors- none    Functional capacity -Examples of physical activity , walks in the house, house work, likes gardening,   house work and can take a flight of stairs holding on to the railing----- She can undertake all the above activities without  chest pain,chest tightness, Shortness of breath ,dizziness,lightheadedness making her exercise tolerance more, than 4 Mets.     Review of Systems   Constitutional: Negative for chills and fever.          Weight gain from reduced activity   HENT:        STOPBANG score 3 / 8      HTN  BMI> 35   Age over 50        Eyes:        No new visual changes- None   Respiratory:        No cough , phlegm    No Hemoptysis   Cardiovascular:        As noted   Gastrointestinal:        No overt GI/ blood losses  Bowel movements- Regular    Endocrine:        Prednisone use > 20 mg daily for 3 weeks- none   Genitourinary: Negative for dysuria.        No urinary hesitancy    Musculoskeletal:        As above      Skin: Negative for rash.   Neurological: Negative for syncope.        No unilateral weakness   Hematological:        Current use of Anticoagulants  None   Psychiatric/Behavioral:        No Depression,Anxiety     No vascular stenting             No anesthesia, bleeding, cardiac problems , PONV with previous surgeries/procedures.  Medications and Allergies reviewed in epic.   FH- No anesthesia,bleeding / venous thrombosis ,  in family   Physical Exam    /98  Pulse 77   Temp 97   Sat 96 %       Physical Exam  Constitutional- Vitals - There is no height or weight on file to calculate BMI., There were no vitals filed for this visit.  General appearance-Conscious,Coherent  Eyes- No conjunctival icterus,pupils  round  and reactive to light   ENT-Oral cavity- moist  and  upper  denture , Hearing grossly normal   Neck- No thyromegaly ,Trachea -central, No jugular venous distension,   No Carotid Bruit   Cardiovascular -Heart Sounds- Normal  and  no murmur   , No gallop rhythm   Respiratory - Normal Respiratory Effort, Normal breath sounds,  no wheeze  and  no forced expiratory wheeze    Peripheral pitting pedal edema-- mild, no calf pain   Gastrointestinal -Soft abdomen, No palpable masses, Non Tender,Liver,Spleen not palpable. No-- free fluid and shifting dullness  Musculoskeletal- No finger Clubbing. Strength grossly normal   Lymphatic-No Palpable cervical, axillary,Inguinal lymphadenopathy   Psychiatric - normal effect,Orientation  Rt Dorsalis pedis pulses-palpable    Lt Dorsalis pedis pulses- palpable   Rt Posterior tibial pulses -palpable  and non palpable   Left posterior tibial pulses -palpable   Miscellaneous -  no asterixis,  Surgical scarLower abdomen   and  no renal bruit  Investigations  Lab and Imaging have been reviewed in epic.    Review of Medicine tests    EKG- I had independently reviewed the EKG from--May 2018   It was reported to be showing     No acute changes    Review of clinical lab tests:  Lab Results   Component Value Date    CREATININE 0.9 05/06/2018    HGB 11.1 (L) 05/06/2018     05/06/2018         Review of old records- Was done and information gathered regards to events leading to surgery and health conditions of significance in the perioperative period.    Outpatient Subjective & Objective

## 2019-09-26 NOTE — ASSESSMENT & PLAN NOTE
Edema- I suggested avoidance of added salt,avoidance of NSAID's, unless advised or ordered  and suggested Limb elevation and tara hose use

## 2019-09-26 NOTE — DISCHARGE INSTRUCTIONS
Your surgery has been scheduled for:__________________________________________    You should report to:  ____[facility], located on the [place] side of the first floor of the           [facility] ([phone])  ____[facility], located on the [facility] side of the            Second floor of the [facility] ([phone])  ____3rd Floor [facility] located on the [facility] side of the [facility] [phone]  ____[facility]: located at [address] [place] [address]. Check in on the first floor of the hospital.  Please Note   - Tell your doctor if you take Aspirin, products containing Aspirin, herbal medications  or blood thinners, such as Coumadin, Ticlid, or Plavix.  (Consult your provider regarding holding or stopping before surgery).  - Arrange for someone to drive you home following surgery.  You will not be allowed to leave the surgical facility alone or drive yourself home following sedation and anesthesia.  Before Surgery  - Stop taking all herbal medications 14 days prior to surgery  - No Motrin/Advil (Ibuprofen)  1 day before surgery  - No Aleve (Naproxen) 7 days before surgery  - Stop Taking Asprin, products containing Asprin _____days before surgery  - Stop taking blood thinners_______days before surgery  - No Goody's/BC  Powder 7 days before surgery  - Refrain from drinking alcoholic beverages for 24hours before and after surgery  - Stop or limit smoking _________days before surgery  - You may take Tylenol for pain  Night before Surgery  - Do not eat or drink after midnight  - Take a shower or bath (shower is recommended).  Bathe with Hibiclens soap or an antibacterial soap from the neck down.  If not supplied by your surgeon, hibiclens soap will need to be purchased over the counter in pharmacy.  Rinse soap off thoroughly.  - Shampoo your hair with your regular  shampoo  The Day of Surgery  - Take another bath or shower with hibiclens or any antibacterial soap, to reduce the chance of infection.  - Take heart and blood pressure medications with a small sip of water, as advised by the perioperative team.  - Do not take fluid pills  - You may brush your teeth and rinse your mouth, but do not swall any additional water.   - Do not apply perfumes, powder, body lotions or deodorant on the day of surgery.  - Nail polish should be removed.  - Do not wear makeup or moisturizer  - Wear comfortable clothes, such as a button front shirt and loose fitting pants.  - Leave all jewelry, including body piercings, and valuables at home.    - Bring any devices you will neeed after surgery such as crutches or canes.  - If you have sleep apnea, please bring your CPAP machine  In the event that your physical condition changes including the onset of a cold or respiratory illness, or if you have to delay or cancel your surgery, please notify your surgeon.   Anesthesia: Regional Anesthesia    Youre scheduled for surgery. During surgery, youll receive medicine called anesthesia to keep you comfortable and pain-free. Your surgeon has decided that youll receive regional anesthesia. This sheet tells you what to expect with this type of anesthesia.  What is regional anesthesia?  Regional anesthesia numbs one region of your body. The anesthesia may be given around nerves or into veins in your arms, neck, or legs (nerve block or Beronica block). Or it may be sent into the spinal fluid (spinal anesthesia) or into the space just outside the spinal fluid (epidural anesthesia). You may also be given sedatives to help you relax.  Nerve block or [place] block  A small area of the body, such as an arm or leg, can be numbed using a nerve block or Beronica block.  · Nerve block. During a nerve block, your skin is numbed. A needle is then inserted near nerves that serve the area to be numbed. Anesthetic is sent through  the needle.  · IV regional or Lead block. For this type of block, an IV line is put into a vein. The blood flow to the area to be numbed is blocked for a short time. Anesthetic is sent through the IV.  Spinal anesthesia  Spinal anesthesia numbs your body from about the waist down.  · Anesthetic is injected into the spinal fluid. This is a substance that surrounds the spinal cord in your spinal column. The anesthetic blocks pain traveling from the body to the brain.  · To receive the anesthetic, your skin is numbed at the injection site on your back.  · A needle is then inserted into the spinal space. Anesthetic is sent into the spinal fluid through the needle.  Epidural anesthesia  Epidural anesthesia is most commonly used during childbirth and may also be used after surgical procedures of the chest, belly, and legs.  · Anesthetic is injected into the epidural space. This is just outside the dural sac which contains the spinal fluid.  · To receive the anesthetic, your skin is numbed at the injection site on your back.  · A needle is then inserted into the epidural space. Anesthetic is sent into the epidural space through the needle.  · A small flexible catheter may be attached to the needle and left in place. This allows for continuous injections or infusions of anesthetic.  Anesthesia tools and medicines that might be near you during your procedure  · Local anesthetic. This medicine is given through a needle numbs one region of your body.  · Electrocardiography leads (electrodes). These are used to record your heart rate and rhythm.  · Blood pressure cuff. A cuff is placed on your arm to keep track of your blood pressure.  · Pulse oximeter. This small clip is placed on the end of the finger. It measures your blood oxygen level.  · Sedatives. These medicines may be given through an IV. They help to relax you and keep you comfortable. You may stay awake or sleep lightly.  · Oxygen. You may be given oxygen through a  facemask.  Risks and possible complications  Regional anesthesia carries some risks. These include:  · Nausea and vomiting  · Headache  · Backache  · Decreased blood pressure  · Allergic reaction to the anesthetic  · Ongoing numbness (rare)  · Irregular heartbeat (rare)  · Cardiac arrest (rare)   Date Last Reviewed: 12/1/2016  © 6185-5663 [employer]. [address] [place], [place] [address]. All rights reserved. This information is not intended as a substitute for professional medical care. Always follow your healthcare professional's instructions.

## 2019-09-26 NOTE — LETTER
September 26, 2019      Joey Fuentes MD  [address]  [place] [address]           [facility]  [address] [facility] [address]  Phone: [phone]          Patient: Jodi Cole   MR Number: [id number]   YOB: 1940   Date of Visit: 9/26/2019       Dear Dr. Joey Fuentes:    Thank you for referring Jodi Cole to me for evaluation. Attached you will find relevant portions of my assessment and plan of care.    If you have questions, please do not hesitate to call me. I look forward to following Jodi Cole along with you.    Sincerely,    Tamie Mora MD    Enclosure  CC:  MD Jose Corcoran MD    If you would like to receive this communication electronically, please contact [email or web address] or [phone] to request more information on [employer] access.    For providers and/or their staff who would like to refer a patient to [facility], please contact us through our one-stop-shop provider referral line, [facility], at [phone].    If you feel you have received this communication in error or would no longer like to receive these types of communications, please e-mail [email or web address]

## 2019-09-26 NOTE — ASSESSMENT & PLAN NOTE
I  suggest monitoring renal function, in put and out put status sonia-operatively. I  suggest avoiding nephrotoxic medication including NSAIDs, COX2 inhibitors, intravenous contrast agent,avoiding hypotension to prevent further renal impairment.

## 2019-09-30 ENCOUNTER — TELEPHONE (OUTPATIENT)
Dept: ORTHOPEDICS | Facility: CLINIC | Age: 79
End: 2019-09-30

## 2019-09-30 DIAGNOSIS — Z96.651 STATUS POST TOTAL RIGHT KNEE REPLACEMENT: Primary | ICD-10-CM

## 2019-09-30 RX ORDER — ONDANSETRON 8 MG/1
8 TABLET, ORALLY DISINTEGRATING ORAL EVERY 12 HOURS PRN
Qty: 10 TABLET | Refills: 0 | Status: SHIPPED | OUTPATIENT
Start: 2019-09-30 | End: 2019-11-19

## 2019-09-30 RX ORDER — ASPIRIN 81 MG/1
81 TABLET ORAL 2 TIMES DAILY
Qty: 60 TABLET | Refills: 0 | Status: SHIPPED | OUTPATIENT
Start: 2019-09-30 | End: 2021-05-14

## 2019-09-30 RX ORDER — CELECOXIB 200 MG/1
200 CAPSULE ORAL DAILY
Qty: 30 CAPSULE | Refills: 0 | Status: SHIPPED | OUTPATIENT
Start: 2019-09-30 | End: 2019-10-31

## 2019-09-30 RX ORDER — DEXTROMETHORPHAN HYDROBROMIDE, GUAIFENESIN 5; 100 MG/5ML; MG/5ML
650 LIQUID ORAL EVERY 8 HOURS PRN
Qty: 120 TABLET | Refills: 0 | Status: SHIPPED | OUTPATIENT
Start: 2019-09-30 | End: 2021-07-07 | Stop reason: HOSPADM

## 2019-09-30 RX ORDER — OXYCODONE HYDROCHLORIDE 5 MG/1
5 TABLET ORAL EVERY 6 HOURS PRN
Qty: 30 TABLET | Refills: 0 | Status: SHIPPED | OUTPATIENT
Start: 2019-09-30 | End: 2019-10-18 | Stop reason: SDUPTHER

## 2019-09-30 RX ORDER — DOCUSATE SODIUM 100 MG/1
100 CAPSULE, LIQUID FILLED ORAL 2 TIMES DAILY PRN
Qty: 60 CAPSULE | Refills: 0 | Status: SHIPPED | OUTPATIENT
Start: 2019-09-30 | End: 2019-11-19

## 2019-09-30 NOTE — TELEPHONE ENCOUNTER
Spoke with pt, notified her of her 0500 arrival time for surgery at [facility]. Pt verbalized understanding and had no further questions.

## 2019-10-01 ENCOUNTER — ANESTHESIA (OUTPATIENT)
Dept: SURGERY | Facility: HOSPITAL | Age: 79
End: 2019-10-01
Payer: MEDICARE

## 2019-10-01 ENCOUNTER — HOSPITAL ENCOUNTER (OUTPATIENT)
Facility: HOSPITAL | Age: 79
Discharge: HOME | End: 2019-10-02
Attending: ORTHOPAEDIC SURGERY | Admitting: ORTHOPAEDIC SURGERY
Payer: MEDICARE

## 2019-10-01 DIAGNOSIS — M17.11 PRIMARY OSTEOARTHRITIS OF RIGHT KNEE: Primary | ICD-10-CM

## 2019-10-01 PROCEDURE — 99900035 HC TECH TIME PER 15 MIN (STAT)

## 2019-10-01 PROCEDURE — D9220A PRA ANESTHESIA: Mod: ANES,,, | Performed by: ANESTHESIOLOGY

## 2019-10-01 PROCEDURE — 76942 ECHO GUIDE FOR BIOPSY: CPT | Mod: 26,,, | Performed by: ANESTHESIOLOGY

## 2019-10-01 PROCEDURE — 71000033 HC RECOVERY, INTIAL HOUR: Performed by: ORTHOPAEDIC SURGERY

## 2019-10-01 PROCEDURE — 25000003 PHARM REV CODE 250: Performed by: ANESTHESIOLOGY

## 2019-10-01 PROCEDURE — 97161 PT EVAL LOW COMPLEX 20 MIN: CPT

## 2019-10-01 PROCEDURE — 94799 UNLISTED PULMONARY SVC/PX: CPT

## 2019-10-01 PROCEDURE — 88311 TISSUE SPECIMEN TO PATHOLOGY - SURGERY: ICD-10-PCS | Mod: 26,,, | Performed by: PATHOLOGY

## 2019-10-01 PROCEDURE — 27447 TOTAL KNEE ARTHROPLASTY: CPT | Mod: RT,,, | Performed by: ORTHOPAEDIC SURGERY

## 2019-10-01 PROCEDURE — 63600175 PHARM REV CODE 636 W HCPCS: Performed by: PHYSICIAN ASSISTANT

## 2019-10-01 PROCEDURE — 25000003 PHARM REV CODE 250: Performed by: PHYSICIAN ASSISTANT

## 2019-10-01 PROCEDURE — 63600175 PHARM REV CODE 636 W HCPCS: Performed by: ANESTHESIOLOGY

## 2019-10-01 PROCEDURE — 88305 TISSUE EXAM BY PATHOLOGIST: CPT | Mod: 26,,, | Performed by: PATHOLOGY

## 2019-10-01 PROCEDURE — 94761 N-INVAS EAR/PLS OXIMETRY MLT: CPT

## 2019-10-01 PROCEDURE — 27447 PR TOTAL KNEE ARTHROPLASTY: ICD-10-PCS | Mod: AS,RT,, | Performed by: PHYSICIAN ASSISTANT

## 2019-10-01 PROCEDURE — 64448 PR NERVE BLOCK INJ, ANES/STEROID, FEMORAL, CONT INFUSION, INCL IMAG GUIDANCE: ICD-10-PCS | Mod: 59,RT,, | Performed by: ANESTHESIOLOGY

## 2019-10-01 PROCEDURE — 64448 NJX AA&/STRD FEM NRV NFS IMG: CPT | Performed by: ANESTHESIOLOGY

## 2019-10-01 PROCEDURE — 97116 GAIT TRAINING THERAPY: CPT

## 2019-10-01 PROCEDURE — 88311 DECALCIFY TISSUE: CPT | Mod: 26,,, | Performed by: PATHOLOGY

## 2019-10-01 PROCEDURE — 37000008 HC ANESTHESIA 1ST 15 MINUTES: Performed by: ORTHOPAEDIC SURGERY

## 2019-10-01 PROCEDURE — C1713 ANCHOR/SCREW BN/BN,TIS/BN: HCPCS | Performed by: ORTHOPAEDIC SURGERY

## 2019-10-01 PROCEDURE — 36000711: Performed by: ORTHOPAEDIC SURGERY

## 2019-10-01 PROCEDURE — 88305 TISSUE EXAM BY PATHOLOGIST: CPT | Performed by: PATHOLOGY

## 2019-10-01 PROCEDURE — 76942 ECHO GUIDE FOR BIOPSY: CPT | Performed by: ANESTHESIOLOGY

## 2019-10-01 PROCEDURE — 97165 OT EVAL LOW COMPLEX 30 MIN: CPT

## 2019-10-01 PROCEDURE — 36000710: Performed by: ORTHOPAEDIC SURGERY

## 2019-10-01 PROCEDURE — 88305 TISSUE SPECIMEN TO PATHOLOGY - SURGERY: ICD-10-PCS | Mod: 26,,, | Performed by: PATHOLOGY

## 2019-10-01 PROCEDURE — D9220A PRA ANESTHESIA: ICD-10-PCS | Mod: ANES,,, | Performed by: ANESTHESIOLOGY

## 2019-10-01 PROCEDURE — D9220A PRA ANESTHESIA: Mod: CRNA,,, | Performed by: NURSE ANESTHETIST, CERTIFIED REGISTERED

## 2019-10-01 PROCEDURE — 27201423 OPTIME MED/SURG SUP & DEVICES STERILE SUPPLY: Performed by: ORTHOPAEDIC SURGERY

## 2019-10-01 PROCEDURE — 64448 NJX AA&/STRD FEM NRV NFS IMG: CPT | Mod: 59,RT,, | Performed by: ANESTHESIOLOGY

## 2019-10-01 PROCEDURE — 94770 HC EXHALED C02 TEST: CPT | Mod: 59

## 2019-10-01 PROCEDURE — C1776 JOINT DEVICE (IMPLANTABLE): HCPCS | Performed by: ORTHOPAEDIC SURGERY

## 2019-10-01 PROCEDURE — 25000003 PHARM REV CODE 250: Performed by: NURSE ANESTHETIST, CERTIFIED REGISTERED

## 2019-10-01 PROCEDURE — D9220A PRA ANESTHESIA: ICD-10-PCS | Mod: CRNA,,, | Performed by: NURSE ANESTHETIST, CERTIFIED REGISTERED

## 2019-10-01 PROCEDURE — 94760 N-INVAS EAR/PLS OXIMETRY 1: CPT

## 2019-10-01 PROCEDURE — 97530 THERAPEUTIC ACTIVITIES: CPT

## 2019-10-01 PROCEDURE — 97535 SELF CARE MNGMENT TRAINING: CPT

## 2019-10-01 PROCEDURE — 27447 TOTAL KNEE ARTHROPLASTY: CPT | Mod: AS,RT,, | Performed by: PHYSICIAN ASSISTANT

## 2019-10-01 PROCEDURE — 71000039 HC RECOVERY, EACH ADD'L HOUR: Performed by: ORTHOPAEDIC SURGERY

## 2019-10-01 PROCEDURE — 27447 PR TOTAL KNEE ARTHROPLASTY: ICD-10-PCS | Mod: RT,,, | Performed by: ORTHOPAEDIC SURGERY

## 2019-10-01 PROCEDURE — 76942 PR U/S GUIDANCE FOR NEEDLE GUIDANCE: ICD-10-PCS | Mod: 26,,, | Performed by: ANESTHESIOLOGY

## 2019-10-01 PROCEDURE — 37000009 HC ANESTHESIA EA ADD 15 MINS: Performed by: ORTHOPAEDIC SURGERY

## 2019-10-01 PROCEDURE — 63600175 PHARM REV CODE 636 W HCPCS: Performed by: NURSE ANESTHETIST, CERTIFIED REGISTERED

## 2019-10-01 DEVICE — CEMENT BONE WHOLE BATCH: Type: IMPLANTABLE DEVICE | Site: KNEE | Status: FUNCTIONAL

## 2019-10-01 RX ORDER — FENTANYL CITRATE 50 UG/ML
25 INJECTION, SOLUTION INTRAMUSCULAR; INTRAVENOUS EVERY 5 MIN PRN
Status: DISCONTINUED | OUTPATIENT
Start: 2019-10-01 | End: 2019-10-01 | Stop reason: HOSPADM

## 2019-10-01 RX ORDER — ASPIRIN 81 MG/1
81 TABLET ORAL 2 TIMES DAILY
Status: DISCONTINUED | OUTPATIENT
Start: 2019-10-01 | End: 2019-10-02 | Stop reason: HOSPADM

## 2019-10-01 RX ORDER — LIDOCAINE HCL/PF 100 MG/5ML
SYRINGE (ML) INTRAVENOUS
Status: DISCONTINUED | OUTPATIENT
Start: 2019-10-01 | End: 2019-10-01

## 2019-10-01 RX ORDER — FAMOTIDINE 20 MG/1
20 TABLET, FILM COATED ORAL 2 TIMES DAILY
Status: DISCONTINUED | OUTPATIENT
Start: 2019-10-01 | End: 2019-10-02 | Stop reason: HOSPADM

## 2019-10-01 RX ORDER — MIDAZOLAM HYDROCHLORIDE 1 MG/ML
0.5 INJECTION INTRAMUSCULAR; INTRAVENOUS
Status: DISPENSED | OUTPATIENT
Start: 2019-10-01 | End: 2019-10-01

## 2019-10-01 RX ORDER — BISACODYL 10 MG
10 SUPPOSITORY, RECTAL RECTAL EVERY 12 HOURS PRN
Status: DISCONTINUED | OUTPATIENT
Start: 2019-10-01 | End: 2019-10-02 | Stop reason: HOSPADM

## 2019-10-01 RX ORDER — AMOXICILLIN 250 MG
1 CAPSULE ORAL 2 TIMES DAILY
Status: DISCONTINUED | OUTPATIENT
Start: 2019-10-01 | End: 2019-10-02 | Stop reason: HOSPADM

## 2019-10-01 RX ORDER — CLONIDINE 100 UG/ML
INJECTION, SOLUTION EPIDURAL
Status: DISPENSED
Start: 2019-10-01 | End: 2019-10-01

## 2019-10-01 RX ORDER — PREGABALIN 75 MG/1
75 CAPSULE ORAL NIGHTLY
Status: DISCONTINUED | OUTPATIENT
Start: 2019-10-01 | End: 2019-10-02 | Stop reason: HOSPADM

## 2019-10-01 RX ORDER — CEFAZOLIN SODIUM 1 G/3ML
2 INJECTION, POWDER, FOR SOLUTION INTRAMUSCULAR; INTRAVENOUS
Status: DISPENSED | OUTPATIENT
Start: 2019-10-01 | End: 2019-10-02

## 2019-10-01 RX ORDER — ROPIVACAINE HYDROCHLORIDE 2 MG/ML
5 INJECTION, SOLUTION EPIDURAL; INFILTRATION; PERINEURAL ONCE
Status: COMPLETED | OUTPATIENT
Start: 2019-10-01 | End: 2019-10-01

## 2019-10-01 RX ORDER — ROPIVACAINE HYDROCHLORIDE 5 MG/ML
INJECTION, SOLUTION EPIDURAL; INFILTRATION; PERINEURAL
Status: DISPENSED
Start: 2019-10-01 | End: 2019-10-01

## 2019-10-01 RX ORDER — SODIUM CHLORIDE 0.9 % (FLUSH) 0.9 %
10 SYRINGE (ML) INJECTION
Status: DISCONTINUED | OUTPATIENT
Start: 2019-10-01 | End: 2019-10-01 | Stop reason: HOSPADM

## 2019-10-01 RX ORDER — MUPIROCIN 20 MG/G
1 OINTMENT TOPICAL 2 TIMES DAILY
Status: DISCONTINUED | OUTPATIENT
Start: 2019-10-01 | End: 2019-10-02 | Stop reason: HOSPADM

## 2019-10-01 RX ORDER — ACETAMINOPHEN 10 MG/ML
1000 INJECTION, SOLUTION INTRAVENOUS ONCE
Status: COMPLETED | OUTPATIENT
Start: 2019-10-01 | End: 2019-10-01

## 2019-10-01 RX ORDER — RAMELTEON 8 MG/1
8 TABLET ORAL NIGHTLY PRN
Status: DISCONTINUED | OUTPATIENT
Start: 2019-10-01 | End: 2019-10-02 | Stop reason: HOSPADM

## 2019-10-01 RX ORDER — PROPOFOL 10 MG/ML
VIAL (ML) INTRAVENOUS
Status: DISCONTINUED | OUTPATIENT
Start: 2019-10-01 | End: 2019-10-01

## 2019-10-01 RX ORDER — SODIUM CHLORIDE 9 MG/ML
INJECTION, SOLUTION INTRAVENOUS
Status: COMPLETED | OUTPATIENT
Start: 2019-10-01 | End: 2019-10-01

## 2019-10-01 RX ORDER — MIDAZOLAM HYDROCHLORIDE 1 MG/ML
1 INJECTION INTRAMUSCULAR; INTRAVENOUS EVERY 5 MIN PRN
Status: DISCONTINUED | OUTPATIENT
Start: 2019-10-01 | End: 2019-10-01 | Stop reason: HOSPADM

## 2019-10-01 RX ORDER — SODIUM CHLORIDE 0.9 % (FLUSH) 0.9 %
3 SYRINGE (ML) INJECTION EVERY 6 HOURS
Status: DISCONTINUED | OUTPATIENT
Start: 2019-10-01 | End: 2019-10-01 | Stop reason: HOSPADM

## 2019-10-01 RX ORDER — MIDAZOLAM HYDROCHLORIDE 1 MG/ML
0.5 INJECTION INTRAMUSCULAR; INTRAVENOUS
Status: ACTIVE | OUTPATIENT
Start: 2019-10-01

## 2019-10-01 RX ORDER — CELECOXIB 200 MG/1
400 CAPSULE ORAL
Status: COMPLETED | OUTPATIENT
Start: 2019-10-01 | End: 2019-10-01

## 2019-10-01 RX ORDER — FENTANYL CITRATE 50 UG/ML
100 INJECTION, SOLUTION INTRAMUSCULAR; INTRAVENOUS EVERY 5 MIN PRN
Status: DISCONTINUED | OUTPATIENT
Start: 2019-10-01 | End: 2019-10-01 | Stop reason: HOSPADM

## 2019-10-01 RX ORDER — FENTANYL CITRATE 50 UG/ML
25 INJECTION, SOLUTION INTRAMUSCULAR; INTRAVENOUS EVERY 5 MIN PRN
Status: COMPLETED | OUTPATIENT
Start: 2019-10-01 | End: 2019-10-01

## 2019-10-01 RX ORDER — OXYCODONE HYDROCHLORIDE 5 MG/1
5 TABLET ORAL
Status: DISCONTINUED | OUTPATIENT
Start: 2019-10-01 | End: 2019-10-02 | Stop reason: HOSPADM

## 2019-10-01 RX ORDER — TRIAMTERENE AND HYDROCHLOROTHIAZIDE 37.5; 25 MG/1; MG/1
1 CAPSULE ORAL DAILY
Status: DISCONTINUED | OUTPATIENT
Start: 2019-10-01 | End: 2019-10-02 | Stop reason: HOSPADM

## 2019-10-01 RX ORDER — ROPIVACAINE HYDROCHLORIDE 2 MG/ML
8 INJECTION, SOLUTION EPIDURAL; INFILTRATION; PERINEURAL CONTINUOUS
Status: DISCONTINUED | OUTPATIENT
Start: 2019-10-01 | End: 2019-10-02 | Stop reason: HOSPADM

## 2019-10-01 RX ORDER — CELECOXIB 100 MG/1
200 CAPSULE ORAL DAILY
Status: DISCONTINUED | OUTPATIENT
Start: 2019-10-02 | End: 2019-10-02 | Stop reason: HOSPADM

## 2019-10-01 RX ORDER — MORPHINE SULFATE 2 MG/ML
2 INJECTION, SOLUTION INTRAMUSCULAR; INTRAVENOUS
Status: DISCONTINUED | OUTPATIENT
Start: 2019-10-01 | End: 2019-10-02 | Stop reason: HOSPADM

## 2019-10-01 RX ORDER — EPINEPHRINE 1 MG/ML
INJECTION, SOLUTION INTRACARDIAC; INTRAMUSCULAR; INTRAVENOUS; SUBCUTANEOUS
Status: DISPENSED
Start: 2019-10-01 | End: 2019-10-01

## 2019-10-01 RX ORDER — BUPIVACAINE HYDROCHLORIDE 2.5 MG/ML
INJECTION, SOLUTION EPIDURAL; INFILTRATION; INTRACAUDAL
Status: DISPENSED
Start: 2019-10-01 | End: 2019-10-01

## 2019-10-01 RX ORDER — BUPIVACAINE HYDROCHLORIDE 5 MG/ML
INJECTION, SOLUTION EPIDURAL; INTRACAUDAL
Status: DISPENSED
Start: 2019-10-01 | End: 2019-10-01

## 2019-10-01 RX ORDER — ONDANSETRON 2 MG/ML
INJECTION INTRAMUSCULAR; INTRAVENOUS
Status: DISCONTINUED | OUTPATIENT
Start: 2019-10-01 | End: 2019-10-01

## 2019-10-01 RX ORDER — NALOXONE HCL 0.4 MG/ML
0.02 VIAL (ML) INJECTION
Status: DISCONTINUED | OUTPATIENT
Start: 2019-10-01 | End: 2019-10-02 | Stop reason: HOSPADM

## 2019-10-01 RX ORDER — PREGABALIN 75 MG/1
75 CAPSULE ORAL
Status: COMPLETED | OUTPATIENT
Start: 2019-10-01 | End: 2019-10-01

## 2019-10-01 RX ORDER — MUPIROCIN 20 MG/G
1 OINTMENT TOPICAL
Status: COMPLETED | OUTPATIENT
Start: 2019-10-01 | End: 2019-10-01

## 2019-10-01 RX ORDER — CEFAZOLIN SODIUM 1 G/3ML
2 INJECTION, POWDER, FOR SOLUTION INTRAMUSCULAR; INTRAVENOUS
Status: COMPLETED | OUTPATIENT
Start: 2019-10-01 | End: 2019-10-01

## 2019-10-01 RX ORDER — SODIUM CHLORIDE 9 MG/ML
INJECTION, SOLUTION INTRAVENOUS CONTINUOUS PRN
Status: DISCONTINUED | OUTPATIENT
Start: 2019-10-01 | End: 2019-10-01

## 2019-10-01 RX ORDER — BUPIVACAINE HYDROCHLORIDE AND EPINEPHRINE 2.5; 5 MG/ML; UG/ML
INJECTION, SOLUTION EPIDURAL; INFILTRATION; INTRACAUDAL; PERINEURAL
Status: COMPLETED | OUTPATIENT
Start: 2019-10-01 | End: 2019-10-01

## 2019-10-01 RX ORDER — POLYETHYLENE GLYCOL 3350 17 G/17G
17 POWDER, FOR SOLUTION ORAL DAILY
Status: DISCONTINUED | OUTPATIENT
Start: 2019-10-01 | End: 2019-10-02 | Stop reason: HOSPADM

## 2019-10-01 RX ORDER — PROPOFOL 10 MG/ML
VIAL (ML) INTRAVENOUS CONTINUOUS PRN
Status: DISCONTINUED | OUTPATIENT
Start: 2019-10-01 | End: 2019-10-01

## 2019-10-01 RX ORDER — HYDRALAZINE HYDROCHLORIDE 25 MG/1
25 TABLET, FILM COATED ORAL EVERY 8 HOURS PRN
Status: DISCONTINUED | OUTPATIENT
Start: 2019-10-01 | End: 2019-10-02 | Stop reason: HOSPADM

## 2019-10-01 RX ORDER — AMLODIPINE BESYLATE 10 MG/1
10 TABLET ORAL DAILY
Status: DISCONTINUED | OUTPATIENT
Start: 2019-10-02 | End: 2019-10-02 | Stop reason: HOSPADM

## 2019-10-01 RX ORDER — OXYCODONE HYDROCHLORIDE 10 MG/1
10 TABLET ORAL
Status: DISCONTINUED | OUTPATIENT
Start: 2019-10-01 | End: 2019-10-02 | Stop reason: HOSPADM

## 2019-10-01 RX ORDER — SODIUM CHLORIDE 9 MG/ML
INJECTION, SOLUTION INTRAVENOUS CONTINUOUS
Status: ACTIVE | OUTPATIENT
Start: 2019-10-01 | End: 2019-10-02

## 2019-10-01 RX ORDER — AMLODIPINE BESYLATE 10 MG/1
10 TABLET ORAL DAILY
Status: DISCONTINUED | OUTPATIENT
Start: 2019-10-01 | End: 2019-10-01

## 2019-10-01 RX ORDER — ONDANSETRON 2 MG/ML
4 INJECTION INTRAMUSCULAR; INTRAVENOUS EVERY 8 HOURS PRN
Status: DISCONTINUED | OUTPATIENT
Start: 2019-10-01 | End: 2019-10-02 | Stop reason: HOSPADM

## 2019-10-01 RX ORDER — LIDOCAINE HYDROCHLORIDE 10 MG/ML
1 INJECTION, SOLUTION EPIDURAL; INFILTRATION; INTRACAUDAL; PERINEURAL
Status: DISCONTINUED | OUTPATIENT
Start: 2019-10-01 | End: 2019-10-01 | Stop reason: HOSPADM

## 2019-10-01 RX ORDER — DEXAMETHASONE SODIUM PHOSPHATE 4 MG/ML
INJECTION, SOLUTION INTRA-ARTICULAR; INTRALESIONAL; INTRAMUSCULAR; INTRAVENOUS; SOFT TISSUE
Status: DISCONTINUED | OUTPATIENT
Start: 2019-10-01 | End: 2019-10-01

## 2019-10-01 RX ORDER — PRAVASTATIN SODIUM 20 MG/1
40 TABLET ORAL NIGHTLY
Status: DISCONTINUED | OUTPATIENT
Start: 2019-10-01 | End: 2019-10-02 | Stop reason: HOSPADM

## 2019-10-01 RX ORDER — DEXAMETHASONE SODIUM PHOSPHATE 10 MG/ML
INJECTION INTRAMUSCULAR; INTRAVENOUS
Status: DISPENSED
Start: 2019-10-01 | End: 2019-10-01

## 2019-10-01 RX ORDER — KETAMINE HYDROCHLORIDE 10 MG/ML
INJECTION, SOLUTION INTRAMUSCULAR; INTRAVENOUS
Status: DISCONTINUED | OUTPATIENT
Start: 2019-10-01 | End: 2019-10-01

## 2019-10-01 RX ORDER — ACETAMINOPHEN 500 MG
1000 TABLET ORAL EVERY 6 HOURS
Status: DISCONTINUED | OUTPATIENT
Start: 2019-10-01 | End: 2019-10-02 | Stop reason: HOSPADM

## 2019-10-01 RX ADMIN — MUPIROCIN 1 G: 20 OINTMENT TOPICAL at 01:10

## 2019-10-01 RX ADMIN — MUPIROCIN 1 G: 20 OINTMENT TOPICAL at 08:10

## 2019-10-01 RX ADMIN — CEFAZOLIN 2 G: 330 INJECTION, POWDER, FOR SOLUTION INTRAMUSCULAR; INTRAVENOUS at 08:10

## 2019-10-01 RX ADMIN — PROPOFOL 50 MG: 10 INJECTION, EMULSION INTRAVENOUS at 07:10

## 2019-10-01 RX ADMIN — MUPIROCIN 1 G: 20 OINTMENT TOPICAL at 05:10

## 2019-10-01 RX ADMIN — SODIUM CHLORIDE: 0.9 INJECTION, SOLUTION INTRAVENOUS at 05:10

## 2019-10-01 RX ADMIN — SODIUM CHLORIDE, SODIUM GLUCONATE, SODIUM ACETATE, POTASSIUM CHLORIDE, MAGNESIUM CHLORIDE, SODIUM PHOSPHATE, DIBASIC, AND POTASSIUM PHOSPHATE: .53; .5; .37; .037; .03; .012; .00082 INJECTION, SOLUTION INTRAVENOUS at 08:10

## 2019-10-01 RX ADMIN — MEPIVACAINE HYDROCHLORIDE 3 ML: 15 INJECTION, SOLUTION EPIDURAL; INFILTRATION at 07:10

## 2019-10-01 RX ADMIN — CELECOXIB 400 MG: 200 CAPSULE ORAL at 05:10

## 2019-10-01 RX ADMIN — OXYCODONE HYDROCHLORIDE 5 MG: 5 TABLET ORAL at 01:10

## 2019-10-01 RX ADMIN — PREGABALIN 75 MG: 75 CAPSULE ORAL at 05:10

## 2019-10-01 RX ADMIN — VANCOMYCIN HYDROCHLORIDE 1500 MG: 1.5 INJECTION, POWDER, LYOPHILIZED, FOR SOLUTION INTRAVENOUS at 05:10

## 2019-10-01 RX ADMIN — FENTANYL CITRATE 25 MCG: 50 INJECTION INTRAMUSCULAR; INTRAVENOUS at 10:10

## 2019-10-01 RX ADMIN — CEFAZOLIN 2 G: 330 INJECTION, POWDER, FOR SOLUTION INTRAMUSCULAR; INTRAVENOUS at 07:10

## 2019-10-01 RX ADMIN — DEXAMETHASONE SODIUM PHOSPHATE 8 MG: 4 INJECTION, SOLUTION INTRAMUSCULAR; INTRAVENOUS at 07:10

## 2019-10-01 RX ADMIN — LIDOCAINE HYDROCHLORIDE 50 MG: 20 INJECTION, SOLUTION INTRAVENOUS at 07:10

## 2019-10-01 RX ADMIN — FAMOTIDINE 20 MG: 20 TABLET ORAL at 08:10

## 2019-10-01 RX ADMIN — ONDANSETRON 4 MG: 2 INJECTION INTRAMUSCULAR; INTRAVENOUS at 07:10

## 2019-10-01 RX ADMIN — PREGABALIN 75 MG: 75 CAPSULE ORAL at 08:10

## 2019-10-01 RX ADMIN — POLYETHYLENE GLYCOL 3350 17 G: 17 POWDER, FOR SOLUTION ORAL at 01:10

## 2019-10-01 RX ADMIN — ASPIRIN 81 MG: 81 TABLET, COATED ORAL at 08:10

## 2019-10-01 RX ADMIN — SODIUM CHLORIDE: 0.9 INJECTION, SOLUTION INTRAVENOUS at 08:10

## 2019-10-01 RX ADMIN — ACETAMINOPHEN 1000 MG: 10 INJECTION, SOLUTION INTRAVENOUS at 09:10

## 2019-10-01 RX ADMIN — FENTANYL CITRATE 25 MCG: 50 INJECTION INTRAMUSCULAR; INTRAVENOUS at 06:10

## 2019-10-01 RX ADMIN — SODIUM CHLORIDE, SODIUM GLUCONATE, SODIUM ACETATE, POTASSIUM CHLORIDE, MAGNESIUM CHLORIDE, SODIUM PHOSPHATE, DIBASIC, AND POTASSIUM PHOSPHATE: .53; .5; .37; .037; .03; .012; .00082 INJECTION, SOLUTION INTRAVENOUS at 07:10

## 2019-10-01 RX ADMIN — ACETAMINOPHEN 1000 MG: 500 TABLET ORAL at 01:10

## 2019-10-01 RX ADMIN — SODIUM CHLORIDE: 0.9 INJECTION, SOLUTION INTRAVENOUS at 09:10

## 2019-10-01 RX ADMIN — BUPIVACAINE HYDROCHLORIDE AND EPINEPHRINE 20 ML: 2.5; 5 INJECTION, SOLUTION EPIDURAL; INFILTRATION; INTRACAUDAL; PERINEURAL at 06:10

## 2019-10-01 RX ADMIN — ROPIVACAINE HYDROCHLORIDE 8 ML/HR: 2 INJECTION, SOLUTION EPIDURAL; INFILTRATION at 09:10

## 2019-10-01 RX ADMIN — FENTANYL CITRATE 25 MCG: 50 INJECTION INTRAMUSCULAR; INTRAVENOUS at 09:10

## 2019-10-01 RX ADMIN — ROPIVACAINE HYDROCHLORIDE 8 ML/HR: 2 INJECTION, SOLUTION EPIDURAL; INFILTRATION at 08:10

## 2019-10-01 RX ADMIN — OXYCODONE HYDROCHLORIDE 10 MG: 5 TABLET ORAL at 09:10

## 2019-10-01 RX ADMIN — PRAVASTATIN SODIUM 40 MG: 20 TABLET ORAL at 08:10

## 2019-10-01 RX ADMIN — MIDAZOLAM HYDROCHLORIDE 2 MG: 1 INJECTION, SOLUTION INTRAMUSCULAR; INTRAVENOUS at 06:10

## 2019-10-01 RX ADMIN — KETAMINE HYDROCHLORIDE 25 MG: 10 INJECTION, SOLUTION INTRAMUSCULAR; INTRAVENOUS at 07:10

## 2019-10-01 RX ADMIN — SENNOSIDES,DOCUSATE SODIUM 1 TABLET: 8.6; 5 TABLET, FILM COATED ORAL at 08:10

## 2019-10-01 RX ADMIN — ROPIVACAINE HYDROCHLORIDE 100 ML: 2 INJECTION, SOLUTION EPIDURAL; INFILTRATION; PERINEURAL at 05:10

## 2019-10-01 RX ADMIN — SODIUM CHLORIDE: 0.9 INJECTION, SOLUTION INTRAVENOUS at 06:10

## 2019-10-01 RX ADMIN — PROPOFOL 75 MCG/KG/MIN: 10 INJECTION, EMULSION INTRAVENOUS at 07:10

## 2019-10-01 NOTE — ANESTHESIA PROCEDURE NOTES
Spinal    Diagnosis: Right knee OA  Patient location during procedure: OR  Start time: 10/1/2019 6:56 AM  Timeout: 10/1/2019 6:55 AM  End time: 10/1/2019 7:00 AM    Staffing  Authorizing Provider: Henry Reynoso MD  Performing Provider: Henry Reynoso MD    Preanesthetic Checklist  Completed: patient identified, site marked, surgical consent, pre-op evaluation, timeout performed, IV checked, risks and benefits discussed and monitors and equipment checked  Spinal Block  Patient position: sitting  Prep: ChloraPrep  Patient monitoring: heart rate, continuous pulse ox and frequent blood pressure checks  Approach: midline  Location: L3-4  Injection technique: single shot  CSF Fluid: clear free-flowing CSF  Needle  Needle type: Cleopatra   Needle gauge: 25 G  Needle length: 3.5 in  Additional Documentation: negative aspiration for heme and no paresthesia on injection  Needle localization: anatomical landmarks  Assessment  Sensory level: T8   Dermatomal levels determined by pinch or prick  Ease of block: easy  Patient's tolerance of the procedure: comfortable throughout block and no complaints  Additional Notes  VSS  No complications

## 2019-10-01 NOTE — PLAN OF CARE
Patient tolerated PT evaluation fairly today due to complaint of dizziness throughout. On first standing trial she ambulated 3 side steps along edge of bed with RW and CGA. On second stand trial she took ~5 steps from bed to bedside chair with RW and CGA. RN notified that patient was limited in PT due to dizziness. She would continue to benefit from PT in order to get back to PLOF.     Problem: Physical Therapy Goal  Goal: Physical Therapy Goal  Description  Goals to be met by: 10/8/19    Patient will increase functional independence with mobility by performin. Supine to sit with supervision  2. Sit to supine with supervision  3. Sit to stand transfer with Supervision  4. Gait x1500 feet with Supervision using Rolling Walker  5. Ascend/Descend 1 curb step with Contact Guard Assistance using Rolling Walker  6. Lower extremity exercise program x30 reps per handout, with supervision       Outcome: Ongoing, Progressing

## 2019-10-01 NOTE — TRANSFER OF CARE
"Anesthesia Transfer of Care Note    Patient: Jodi Cole    Procedure(s) Performed: Procedure(s) (LRB):  REPLACEMENT-KNEE-TOTAL-SIGHT ASSISTED (Right)    Patient location: PACU    Anesthesia Type: general    Transport from OR: Transported from OR on room air with adequate spontaneous ventilation    Post pain: adequate analgesia    Post assessment: no apparent anesthetic complications and tolerated procedure well    Post vital signs: stable    Level of consciousness: awake and alert    Nausea/Vomiting: no nausea/vomiting    Complications: none    Transfer of care protocol was followed      Last vitals:   Visit Vitals  BP (!) 161/87   Pulse 80   Temp 37 °C (98.6 °F) (Oral)   Resp (!) 22   Ht 5' 6" (1.676 m)   Wt 102.1 kg (225 lb)   SpO2 99%   Breastfeeding? No   BMI 36.32 kg/m²     "

## 2019-10-01 NOTE — INTERVAL H&P NOTE
Jodi Cole was interviewed, examined and the H and P reviewed.  There has been no interval change in her History and Physical.

## 2019-10-01 NOTE — PT/OT/SLP EVAL
Occupational Therapy   Evaluation    Name: Jodi Cole  MRN: [id number]  Admitting Diagnosis:  Primary osteoarthritis of right knee Day of Surgery    Recommendations:     Discharge Recommendations: home  Discharge Equipment Recommendations:  none  Barriers to discharge:  None    Assessment:     Jodi Cole is a 78 y.o. female with a medical diagnosis of Primary osteoarthritis of right knee.  She was able to perform supine/sit T/F c SBA and sit/stand T/F c min A/SBA and RW.  Pt was very dizzy throughout assessment.  B UE are WFL.  Able to perform UB/LB dressing c total assist. Performance deficits affecting function: impaired self care skills, impaired functional mobilty, orthopedic precautions.      Rehab Prognosis: Good; patient would benefit from acute skilled OT services to address these deficits and reach maximum level of function.       Plan:     Patient to be seen daily to address the above listed problems via self-care/home management, therapeutic activities, therapeutic exercises  · Plan of Care Expires: 10/05/19  · Plan of Care Reviewed with: patient, family    Subjective     Chief Complaint: Pt is s/p R TKA and is WBAT R LE  Patient/Family Comments/goals: To get better.    Occupational Profile:  Living Environment: Pt lives in a two story home but does not go upstairs.  Has no MARCUS and has a tub/shower combo.  Previous level of function: I PTA  Equipment Used at Home:  walker, rolling, bedside commode, shower chair  Assistance upon Discharge:     Pain/Comfort:  · Pain Rating 1: 5/10    Patients cultural, spiritual, Sabianist conflicts given the current situation: no    Objective:     Communicated with: RN prior to session.  Patient found supine with perineural catheter, peripheral IV, cryotherapy, FCD upon OT entry to room.    General Precautions: Standard, fall   Orthopedic Precautions:RLE weight bearing as tolerated   Braces: N/A     Occupational Performance:    Bed Mobility:    · Patient  completed Supine to Sit with stand by assistance    Functional Mobility/Transfers:  · Patient completed Sit <> Stand Transfer with stand by assistance and minimum assistance  with  rolling walker   · Patient completed Bed <> Chair Transfer using Stand Pivot technique with minimum assistance with rolling walker  · Functional Mobility: Pt c max c/o dizziness while standing and performing T/F.    Activities of Daily Living:  · Upper Body Dressing: total assistance to don hospital gown.  · Lower Body Dressing: total assistance to don socks.    Cognitive/Visual Perceptual:  Cognitive/Psychosocial Skills:     -       Oriented to: Person, Place, Time and Situation   -       Follows Commands/attention:Follows multistep  commands    Physical Exam:  Upper Extremity Range of Motion:     -       Right Upper Extremity: WFL  -       Left Upper Extremity: WFL  Upper Extremity Strength:    -       Right Upper Extremity: WFL  -       Left Upper Extremity: WFL    AMPAC 6 Click ADL:  AMPAC Total Score: 15      Education:    Patient left up in chair with all lines intact, call button in reach, RN notified and family present    GOALS:   Multidisciplinary Problems     Occupational Therapy Goals        Problem: Occupational Therapy Goal    Goal Priority Disciplines Outcome Interventions   Occupational Therapy Goal     OT, PT/OT Ongoing, Progressing    Description:  Goals to be met by: 10/5/19     Patient will increase functional independence with ADLs by performing:    UE Dressing with [place].  LE Dressing with [place].  Grooming while standing at sink with Modified [place].  Toileting from bedside commode with Modified [place] for hygiene and clothing management.   Bathing from  shower chair/bench with Modified [place].  Toilet transfer to bedside commode with Modified [place].  Increased functional strength to WFL for B UE.  Upper extremity exercise program x15 reps per handout, with independence.                       History:     Past Medical History:   Diagnosis Date    Anemia associated with chemotherapy 9/1/2015    Anemia due to chemotherapy 9/22/2015    Chemotherapy induced nausea and vomiting 6/9/2015    Chemotherapy-induced neutropenia 7/28/2015    Constipation 7/7/2015    Endometrial ca 5/26/2015    Endometrial ca 8/10/2015    Essential hypertension     Ovarian cancer     Pain in both hands 2/19/2016    Right-sided low back pain without sciatica 6/23/2016    Uterine cancer     Well woman exam with routine gynecological exam 2/19/2016       Past Surgical History:   Procedure Laterality Date    COLONOSCOPY N/A 5/22/2019    Procedure: COLONOSCOPY;  Surgeon: Kong Rodarte MD;  [facility]  Service: Endoscopy;  Laterality: N/A;    D&C Hysteroscopy  March 18, 2015    DILATION AND CURETTAGE OF UTERUS  1969     D&C and uterine suspension.     HYSTERECTOMY  5/11/15    robotic for endometrial cancer    LYMPHADENECTOMY      PELVIC AND PARA-AORTIC LYMPH NODE DISSECTION      VT REMOVAL OF OVARY/TUBE(S)      robotic TLH/BSO and bilatyeral pelvic and para-aortic LND       Time Tracking:     OT Date of Treatment: 10/01/19  OT Start Time: 1304  OT Stop Time: 1335  OT Total Time (min): 31 min    Billable Minutes:Evaluation 16  Therapeutic Activity 15    STEPH Miller  10/1/2019

## 2019-10-01 NOTE — PROGRESS NOTES
Acute Pain Service and Perioperative Surgical Home Progress Note    HPI  Jodi Cole is a 78 y.o., female, who is resting quietly s/post-op day 1 right total knee replacement.  Reports her pain is well-controlled and denies any at this time. Reports no other symptomatology or complaints at this time.    Interval history      Surgery:  Procedure(s) (LRB):  REPLACEMENT-KNEE-TOTAL-SIGHT ASSISTED (Right)    Post Op Day #: 1    Catheter type: Perineural Adductor Canal    Infusion type: Ropivacaine 0.2%  8 ml/hr basal    Problem List:    Active Hospital Problems    Diagnosis  POA    *Primary osteoarthritis of right knee [M17.11]  Yes      Resolved Hospital Problems   No resolved problems to display.       Subjective:       General appearance of alert, oriented, no complaints   Pain with rest: 1    Numbers   Pain with movement: 1    Numbers   Side Effects    1. Pruritis No    2. Nausea No    3. Motor Blockade No, 0=Ability to raise lower extremities off bed    4. Sedation No, S=sleep, easy to arouse    Schedule Medications:    acetaminophen  1,000 mg Oral Q6H    amLODIPine  10 mg Oral Daily    aspirin  81 mg Oral BID    celecoxib  200 mg Oral Daily    famotidine  20 mg Oral BID    mupirocin  1 g Nasal BID    polyethylene glycol  17 g Oral Daily    pravastatin  40 mg Oral QHS    pregabalin  75 mg Oral QHS    senna-docusate 8.6-50 mg  1 tablet Oral BID    triamterene-hydrochlorothiazide 37.5-25 mg  1 capsule Oral Daily        Continuous Infusions:   sodium chloride 0.9% 150 mL/hr at 10/01/19 2050    ropivacaine (PF) 2 mg/ml (0.2%) 8 mL/hr (10/01/19 2050)    ropivacaine          PRN Medications:  bisacodyl, hydrALAZINE, midazolam, morphine, morphine, naloxone, ondansetron, oxyCODONE, oxyCODONE, promethazine (PHENERGAN) IVPB, ramelteon, ropivacaine       Antibiotics:  Antibiotics (From admission, onward)    Start     Stop Route Frequency Ordered    10/01/19 1300  mupirocin 2 % ointment 1 g      10/06 0859  Nasl 2 times daily 10/01/19 1245    10/01/19 0927  ceFAZolin injection 2 g      10/02 0129 IV Every 8 hours (non-standard times) 10/01/19 0927             Objective:     Catheter site clean, dry, intact          Vital Signs (Most Recent):  Temp: 36.1 °C (96.9 °F) (10/02/19 0407)  Pulse: 68 (10/02/19 0407)  Resp: 16 (10/02/19 0407)  BP: 128/61 (10/02/19 0407)  SpO2: 95 % (10/02/19 0407) Vital Signs Range (Last 24H):  Temp:  [36.1 °C (96.9 °F)-36.7 °C (98.1 °F)]   Pulse:  [67-85]   Resp:  [16-25]   BP: (128-183)/(61-89)   SpO2:  [90 %-100 %]          I & O (Last 24H):    Intake/Output Summary (Last 24 hours) at 10/2/2019 0608  Last data filed at 10/1/2019 2200  Gross per 24 hour   Intake 2314.77 ml   Output 1050 ml   Net 1264.77 ml       Physical Exam:    GA: Alert, comfortable, no acute distress.   Pulmonary: Clear to auscultation A/P/L. No wheezing, crackles, or rhonchi.  Cardiac: RRR S1 & S2 w/o rubs/murmurs/gallops.   Abdominal:Bowel sounds present. No tenderness to palpation or distension. No appreciable hepatosplenomegaly.   Skin: No jaundice, rashes, or visible lesions.    Anticoagulant dose ASA at 81mg daily    Assessment:       Pain control adequate    Plan:     1) Pain:    Adductor canal perineural catheter in place and infusing. Good level. Multimodal pain regimen with acetaminophen, Celebrex, Lyrica, and prn oxycodone given  Will continue to monitor. Plan to D/C perineural catheter in AM or home with On-Q if patient discharged today.   2) FEN/GI: Tolerating liquids, advance diet as tolerated. No flatus. No BM as of yet.   3) Dispo: Pt working well with PT/OT. Case management and SW for placement as needed. Plan for D/C tomorrow morning or possibly today if patient works well with PT and meets goals.          Evaluator Toussaint Battley III, FNP      I have reviewed and concur with the resident's history, physical, assessment, and plan.  I have personally interviewed and examined the patient at bedside.   See below addendum for my evaluation and additional findings.    Patient doing very well. No issues overnight.  Vitals and labs are stable. Patient not taking amlodipine.  She is tolerating PO.  Pain very well controlled.  Plan for discharge this am with On, home health, home PT.

## 2019-10-01 NOTE — OPERATIVE NOTE ADDENDUM
Certification of Assistant at Surgery       Surgery Date: 10/1/2019     Participating Surgeons:  Surgeon(s) and Role:     * Joey Fuentes MD - Primary    Procedures:  Procedure(s) (LRB):  REPLACEMENT-KNEE-TOTAL-SIGHT ASSISTED (Right)    Assistant Surgeon's Certification of Necessity:  I understand that section 1842 (b) (6) (d) of the Social Security Act generally prohibits Medicare Part B reasonable charge payment for the services of assistants at surgery in teaching hospitals when qualified residents are available to furnish such services. I certify that the services for which payment is claimed were medically necessary, and that no qualified resident was available to perform the services. I further understand that these services are subject to post-payment review by the Medicare carrier.      Ayse Shine PA-C    10/01/2019  9:30 AM

## 2019-10-01 NOTE — PLAN OF CARE
Patient tolerating oral liquids without difficulty. Pain controlled with PNC and multimodal medications, see MAR. Transfer instructions reviewed with patient and spouse with good verbal feedback received.

## 2019-10-01 NOTE — ANESTHESIA PROCEDURE NOTES
Adductor Canal Catheter    Patient location during procedure: pre-op   Block not for primary anesthetic.  Reason for block: at surgeon's request and post-op pain management   Post-op Pain Location: Right knee pain  Start time: 10/1/2019 6:20 AM  Timeout: 10/1/2019 6:18 AM   End time: 10/1/2019 6:30 AM    Staffing  Authorizing Provider: Henry Reynoso MD  Performing Provider: Henry Reynoso MD    Preanesthetic Checklist  Completed: patient identified, site marked, surgical consent, pre-op evaluation, timeout performed, IV checked, risks and benefits discussed and monitors and equipment checked  Peripheral Block  Patient position: supine  Prep: ChloraPrep and site prepped and draped  Patient monitoring: heart rate, cardiac monitor, continuous pulse ox, continuous capnometry and frequent blood pressure checks  Block type: adductor canal  Laterality: right  Injection technique: continuous  Needle  Needle type: Tuohy   Needle gauge: 17 G  Needle length: 3.5 in  Needle localization: anatomical landmarks and ultrasound guidance  Catheter type: spring wound  Catheter size: 19 G  Catheter at skin depth: 11 cm  Test dose: lidocaine 1.5% with Epi 1-to-200,000 and negative   -ultrasound image captured on disc.  Assessment  Injection assessment: negative aspiration, negative parasthesia and local visualized surrounding nerve  Paresthesia pain: none  Heart rate change: no  Slow fractionated injection: yes  Additional Notes  VSS.  [facility] RN monitoring vitals throughout procedure.  Patient tolerated procedure well.

## 2019-10-01 NOTE — PT/OT/SLP EVAL
Physical Therapy Evaluation and Treatment    Patient Name:  Jodi Cole   MRN:  [id number]    Recommendations:     Discharge Recommendations:  outpatient PT   Discharge Equipment Recommendations: none   Barriers to discharge: None    Assessment:     Jodi Cole is a 78 y.o. female admitted with a medical diagnosis of Primary osteoarthritis of right knee.  She presents with the following impairments/functional limitations:  weakness, impaired endurance, gait instability, impaired balance, impaired functional mobilty, decreased lower extremity function, impaired joint extensibility, decreased ROM, pain, impaired skin, orthopedic precautions, edema. Patient tolerated PT evaluation fairly today due to complaint of dizziness throughout. On first standing trial she ambulated 3 side steps along edge of bed with RW and CGA. On second stand trial she took ~5 steps from bed to bedside chair with RW and CGA. RN notified that patient was limited in PT due to dizziness. She would continue to benefit from PT in order to get back to [facility].     Rehab Prognosis: Good; patient would benefit from acute skilled PT services to address these deficits and reach maximum level of function.    Recent Surgery: Procedure(s) (LRB):  REPLACEMENT-KNEE-TOTAL-SIGHT ASSISTED (Right) Day of Surgery    Plan:     During this hospitalization, patient to be seen daily to address the identified rehab impairments via gait training, therapeutic activities, therapeutic exercises and progress toward the following goals:    · Plan of Care Expires:  10/08/19    Subjective     Chief Complaint: Dizzy once seated on edge of bed.   Patient/Family Comments/goals: To walk without pain.   Pain/Comfort:  · Pain Rating 1: 5/10  · Location - Side 1: Right  · Location 1: knee  · Pain Addressed 1: Pre-medicate for activity, Reposition, Distraction, Cessation of Activity, Nurse notified    Living Environment:  Patient lives with her  in a 2SH with no steps to  enter. Her bedroom/bathroom are on the first floor. She has a tub/shower combo. Prior to admission, patients level of function was independent for functional mobility. Equipment at home: walker, rolling, bedside commode, shower chair. Upon discharge, patient will have assistance from .    Objective:     Communicated with Rn prior to session. Patient found supine with FCD, peripheral IV, perineural catheter, cryotherapy upon PT entry to room.  present throughout PT session.     General Precautions: Standard, fall   Orthopedic Precautions:RLE weight bearing as tolerated   Braces: N/A     Exams:  · Cognitive Exam:  Patient is oriented to Person, Place, Time and Situation  · Sensation:    · -       Intact  · RLE ROM: WFL except limited at knee due to recent surgery  · RLE Strength: appears WFL but unable to formally assess due to pain   · LLE ROM: WFL  · LLE Strength: WFL    Functional Mobility:  · Bed Mobility:     · Supine to Sit: stand by assistance  · Transfers:     · Sit to Stand:  minimum assistance with rolling walker x2 trials from edge of bed with verbal/tactile cues for technique and RW management   · Bed to BedsideChair: On second standing trial patient was able to take ~5 steps from bed to bedside chair with contact guard assistance and  rolling walker. She required verbal/tactile cues for technique and RW management.   ·  Gait: On first standing trial, patient ambulated 3 side steps along edge of bed with contact guard assistance and rolling walker. She required verbal/tactile cues for technique and RW management.       Therapeutic Activities and Exercises:  Patient educated in:  -PT role and POC  -safety with transfers including hand placement  -gait sequencing and RW management  -OOB activity to maximize recovery     AM-PAC 6 CLICK MOBILITY  Total Score:17     Patient left up in chair with all lines intact, call button in reach, RN notified that patient was dizzy throughout PT session, and   present.    GOALS:   Multidisciplinary Problems     Physical Therapy Goals        Problem: Physical Therapy Goal    Goal Priority Disciplines Outcome Goal Variances Interventions   Physical Therapy Goal     PT, PT/OT Ongoing, Progressing     Description:  Goals to be met by: 10/8/19    Patient will increase functional independence with mobility by performin. Supine to sit with supervision  2. Sit to supine with supervision  3. Sit to stand transfer with Supervision  4. Gait x1500 feet with Supervision using Rolling Walker  5. Ascend/Descend 1 curb step with Contact Guard Assistance using Rolling Walker  6. Lower extremity exercise program x30 reps per handout, with supervision                        History:     Past Medical History:   Diagnosis Date    Anemia associated with chemotherapy 2015    Anemia due to chemotherapy 2015    Chemotherapy induced nausea and vomiting 2015    Chemotherapy-induced neutropenia 2015    Constipation 2015    Endometrial ca 2015    Endometrial ca 8/10/2015    Essential hypertension     Ovarian cancer     Pain in both hands 2016    Right-sided low back pain without sciatica 2016    Uterine cancer     Well woman exam with routine gynecological exam 2016       Past Surgical History:   Procedure Laterality Date    COLONOSCOPY N/A 2019    Procedure: COLONOSCOPY;  Surgeon: Kong Rodarte MD;  Location: [facility] ([address] [place] [address]);  Service: Endoscopy;  Laterality: N/A;    D&C Hysteroscopy  2015    DILATION AND CURETTAGE OF UTERUS  1969     D&C and uterine suspension.     HYSTERECTOMY  5/11/15    robotic for endometrial cancer    LYMPHADENECTOMY      PELVIC AND PARA-AORTIC LYMPH NODE DISSECTION      TX REMOVAL OF OVARY/TUBE(S)      robotic TLH/BSO and bilatyeral pelvic and para-aortic LND       Time Tracking:     PT Received On: 10/01/19  PT Start Time: 1309     PT Stop Time: 1336  PT Total Time (min): 27 min      Billable Minutes: Evaluation 14 and Gait Training 12    Juanis Villegas, PT  10/01/2019

## 2019-10-01 NOTE — PLAN OF CARE
Problem: Occupational Therapy Goal  Goal: Occupational Therapy Goal  Description  Goals to be met by: 10/5/19     Patient will increase functional independence with ADLs by performing:    UE Dressing with [place].  LE Dressing with [place].  Grooming while standing at sink with Modified [place].  Toileting from bedside commode with Modified [place] for hygiene and clothing management.   Bathing from  shower chair/bench with Modified [place].  Toilet transfer to bedside commode with Modified [place].  Increased functional strength to WFL for B UE.  Upper extremity exercise program x15 reps per handout, with independence.     Outcome: Ongoing, Progressing

## 2019-10-01 NOTE — OP NOTE
DATE OF PROCEDURE: 10/1/19  PREOPERATIVE DIAGNOSIS: Arthritis, Right knee.   POSTOPERATIVE DIAGNOSIS: Arthritis, Right knee.   PROCEDURES PERFORMED: Right total knee arthroplasty.   SURGEON: Joey Fuentes M.D.   ASSISTANT: Ayse Shine PA-C (due to the fact that there was no available   qualified resident, Physician's Assistant Ayse Shine acted as first   assistant during this case).     ANESTHESIA: Regional.   COMPLICATIONS: None.   COUNTS: Correct.   DISPOSITION: Recovery Room, stable.   SPECIMENS: Bone and cartilage.   FINDINGS: Tricompartmental degenerative change.   FLUIDS: 2000 mL.   BLOOD LOSS: Less than 50 mL.   IMPLANTS: Wander, size F Right posterior stabilized   cemented femoral component with a 5 cemented tibial tray, a 32 mm 3-peg   patella and a size E-F,5-6, 10 mm posterior stabilized polyethylene insert.   INDICATIONS FOR PROCEDURE: Jodi Cole is a 78-year-old female who has   severe arthritis in the Right knee . She is having continued pain in the   Right knee and did not respond to nonoperative measures, decided to undergo   Right knee replacement. She is aware of reasonable treatment options as   well as risks and benefits. {She did not respond to NSAIDS or injections. She received a course or pre-operative physical therapy.  PROCEDURE IN DETAIL: After appropriate consent was obtained, the patient   Was brought in the Operating Room, anesthesia was administered. She received   antibiotic prophylaxis. Cast   padding and tourniquet was applied to the proximal Right thigh. Right  lower extremity was prepped and draped in usual sterile fashion. Limb was   elevated, tourniquet was inflated. The knee was flexed. An incision was   made from the tibial tubercle just proximal to the superior pole of the   patella. It was taken down through the skin and retinacular. A medial   parapatellar arthrotomy was performed followed by a standard medial   release. Patellar fat pad was partially excised.  ACL was resected.   Femoral punch was used to make the guide hole for the femoral drill. The   distal cutting guide was set at 6 degrees valgus right. I opted to take 2   extra mm off the distal femur secondary to preoperative flexion   contracture. Distal femoral cut was made.We were pleased with the alignment and quality of the cut.  The PCL retractor was used to bring   the tibia into the field. Meniscal remnants were resected. The   extramedullary tibial guide was pinned in place using the medail side, as most   defective, 2 mm bone was taken off of this. We checked the alignment in   both planes both before and after making the cut. We were satisfied with the   alignment of the cut and the amount of bone removed.The femur was then  sized, found to be a size F. A size F cutting guide was pinned in 3   degrees of external rotation. This was based off the posterior condyle,   checked the transepicondylar axis. Anterior, posterior and chamfer cuts   were made. The box guide was pinned in position. Femoral box cut was   made. Bone fragments were removed. Lamina spreaders were   then used to open up posteriorly. The PCL was resected and some soft   tissue debris was removed.  A 10 mm spacer block gave excellent flexion-extension gap balance.   I was also satisfied with the medial and lateral stability. At this   point, the femoral trial was placed. The tibia was sized, found to be a   Size 5. A size 5 tibial trial was pinned in appropriate alignment and   rotation. This was based on the medial one third of the tibial tubercle.  A stem extension hole was drilled. A keel hole was punched and a   trial reduction was performed with a size 5, 10 mm insert. She  achieved full   extension. There was no recurvatum. She easily had 125 degrees of flexion.   The femoral component ranged symmetrically in the tibial tray. There was   no lift off. There was no instability of full extension, 30 degrees of   flexion, mid flexion and  full flexion. Patella was measured and found to   be 23 mm thick, 8 mm of bone removed. The 3-peg holes were drilled 32 mm   3-peg trial was placed. The knee was brought through range of motion. The   patella tracked extremely well. Therefore, at this point, we were   satisfied with knee range of motion and stability, component position,   sizing and alignment as well as patella tracking. All trial components   were removed. Bone was prepared for cementing by pulsatile lavage and   drying. Components were cemented into place, femur followed by tibia.   Meticulous care was taken to remove excess cement. Tibial insert was   firmly seated in the tibial tray. The knee was inspected. There was no   loose body, foreign body or soft tissue interposition. Knee was then   reduced, brought into extension. Patella button was cemented in place.   Excess cement was removed. The wound was then copiously irrigated with   antibiotic-impregnated solution. Once the cement was dried, tranexamic   acid was applied. The arthrotomy was closed with #1 Vicryl. Once the   arthrotomy was closed, the knee was brought through range of motion, stable   as previously described. Patella tracked very well. Retinacular was   closed with 0 Vicryl, subcutaneous layer with 2-0 Vicryl, skin with   monocryl and dermabond. Sterile dressing was applied. Tourniquet was let down.  She was   transferred to a stretcher, brought to Recovery Room in stable condition,   tolerated the procedure well, no known complications.                3

## 2019-10-01 NOTE — PROGRESS NOTES
Dr. Reynoso at bedside. Confirmed order for triamterene/hctz for floor administation.  DMITRY Lazo at bedside to administer bolus to pnc.  Report called to DMITRY Dunn room 316.

## 2019-10-01 NOTE — ANESTHESIA POSTPROCEDURE EVALUATION
Anesthesia Post Evaluation    Patient: Jodi Cole    Procedure(s) Performed: Procedure(s) (LRB):  REPLACEMENT-KNEE-TOTAL-SIGHT ASSISTED (Right)    Final Anesthesia Type: spinal  Patient location during evaluation: PACU  Patient participation: Yes- Able to Participate  Level of consciousness: awake and alert  Post-procedure vital signs: reviewed and stable  Pain management: adequate  Airway patency: patent  PONV status at discharge: No PONV  Anesthetic complications: no      Cardiovascular status: blood pressure returned to baseline  Respiratory status: unassisted, spontaneous ventilation and room air  Hydration status: euvolemic  Follow-up not needed.          Vitals Value Taken Time   /86 10/1/2019 10:17 AM   Temp 36.6 °C (97.8 °F) 10/1/2019  9:15 AM   Pulse 76 10/1/2019 10:19 AM   Resp 24 10/1/2019 10:19 AM   SpO2 98 % 10/1/2019 10:19 AM   Vitals shown include unvalidated device data.      No case tracking events are documented in the log.      Pain/Jose Score: Pain Rating Prior to Med Admin: 8 (10/1/2019  9:56 AM)  Pain Rating Post Med Admin: 0 (10/1/2019  6:20 AM)  Jose Score: 9 (10/1/2019  9:45 AM)

## 2019-10-01 NOTE — PT/OT/SLP PROGRESS
Occupational Therapy   Treatment    Name: Jodi Cole  MRN: [id number]  Admitting Diagnosis:  Primary osteoarthritis of right knee  Day of Surgery    Recommendations:     Discharge Recommendations: home  Discharge Equipment Recommendations:  none  Barriers to discharge:  None    Assessment:     Jodi Cole is a 78 y.o. female with a medical diagnosis of Primary osteoarthritis of right knee.  She was able to perform sit/stand, BSC, and sit/supine T/F c min A/CGA and RW.  Called into room by RN to assist c T/F to BSC.  Pt pulling back on RW and was not putting weight through R LE.  Pt was able to perform T/F's c cues.  Able to perform toilet hygiene c max A. Performance deficits affecting function are impaired self care skills, impaired functional mobilty, orthopedic precautions.     Rehab Prognosis:  Good; patient would benefit from acute skilled OT services to address these deficits and reach maximum level of function.       Plan:     Patient to be seen daily to address the above listed problems via self-care/home management, therapeutic activities, therapeutic exercises  · Plan of Care Expires: 10/05/19  · Plan of Care Reviewed with: patient, spouse    Subjective     Pain/Comfort:  · Pain Rating 1: (Pt did not rate)    Objective:     Communicated with: RN prior to session.  Patient found up in chair with FCD, peripheral IV, perineural catheter, cryotherapy upon OT entry to room.    General Precautions: Standard, fall   Orthopedic Precautions:RLE weight bearing as tolerated   Braces: N/A     Occupational Performance:     Bed Mobility:    · Patient completed Sit to Supine with stand by assistance     Functional Mobility/Transfers:  · Patient completed Sit <> Stand Transfer with minimum assistance  with  rolling walker   · Patient completed Bed <> Chair Transfer using Stand Pivot technique with minimum assistance with rolling walker  · Patient completed Toilet Transfer Stand Pivot technique with minimum  assistance with  rolling walker and bedside commode      Activities of Daily Living:  · Upper Body Dressing: total assistance to [facility] gown.  · Toileting: maximal assistance for toilet hyigene.      [facility] 6 Click ADL: 15        Patient left supine with all lines intact, call button in reach, RN notified and family presentEducation:      GOALS:   Multidisciplinary Problems     Occupational Therapy Goals        Problem: Occupational Therapy Goal    Goal Priority Disciplines Outcome Interventions   Occupational Therapy Goal     OT, PT/OT Ongoing, Progressing    Description:  Goals to be met by: 10/5/19     Patient will increase functional independence with ADLs by performing:    UE Dressing with [place].  LE Dressing with [place].  Grooming while standing at sink with Modified [place].  Toileting from bedside commode with Modified [place] for hygiene and clothing management.   Bathing from  shower chair/bench with Modified [place].  Toilet transfer to bedside commode with Modified [place].  Increased functional strength to WFL for B UE.  Upper extremity exercise program x15 reps per handout, with independence.                      Time Tracking:     OT Date of Treatment: 10/01/19  OT Start Time: 1630  OT Stop Time: 1645  OT Total Time (min): 15 min    Billable Minutes:Self Care/Home Management 15    STEPH Miller  10/1/2019

## 2019-10-02 VITALS
DIASTOLIC BLOOD PRESSURE: 67 MMHG | HEIGHT: 66 IN | SYSTOLIC BLOOD PRESSURE: 136 MMHG | HEART RATE: 63 BPM | TEMPERATURE: 98 F | OXYGEN SATURATION: 93 % | WEIGHT: 225 LBS | BODY MASS INDEX: 36.16 KG/M2 | RESPIRATION RATE: 18 BRPM

## 2019-10-02 PROCEDURE — 97110 THERAPEUTIC EXERCISES: CPT

## 2019-10-02 PROCEDURE — 97530 THERAPEUTIC ACTIVITIES: CPT

## 2019-10-02 PROCEDURE — 94760 N-INVAS EAR/PLS OXIMETRY 1: CPT

## 2019-10-02 PROCEDURE — 99900035 HC TECH TIME PER 15 MIN (STAT)

## 2019-10-02 PROCEDURE — 99212 PR OFFICE/OUTPT VISIT, EST, LEVL II, 10-19 MIN: ICD-10-PCS | Mod: ,,, | Performed by: ANESTHESIOLOGY

## 2019-10-02 PROCEDURE — 97535 SELF CARE MNGMENT TRAINING: CPT

## 2019-10-02 PROCEDURE — 25000003 PHARM REV CODE 250: Performed by: PHYSICIAN ASSISTANT

## 2019-10-02 PROCEDURE — 97116 GAIT TRAINING THERAPY: CPT | Mod: 59

## 2019-10-02 PROCEDURE — 25000003 PHARM REV CODE 250: Performed by: ANESTHESIOLOGY

## 2019-10-02 PROCEDURE — 99212 OFFICE O/P EST SF 10 MIN: CPT | Mod: ,,, | Performed by: ANESTHESIOLOGY

## 2019-10-02 RX ADMIN — TRIAMTERENE AND HYDROCHLOROTHIAZIDE 1 CAPSULE: 25; 37.5 CAPSULE ORAL at 09:10

## 2019-10-02 RX ADMIN — OXYCODONE HYDROCHLORIDE 5 MG: 5 TABLET ORAL at 11:10

## 2019-10-02 RX ADMIN — POLYETHYLENE GLYCOL 3350 17 G: 17 POWDER, FOR SOLUTION ORAL at 09:10

## 2019-10-02 RX ADMIN — CELECOXIB 200 MG: 100 CAPSULE ORAL at 09:10

## 2019-10-02 RX ADMIN — ACETAMINOPHEN 1000 MG: 500 TABLET ORAL at 11:10

## 2019-10-02 RX ADMIN — ASPIRIN 81 MG: 81 TABLET, COATED ORAL at 09:10

## 2019-10-02 RX ADMIN — MUPIROCIN 1 G: 20 OINTMENT TOPICAL at 09:10

## 2019-10-02 RX ADMIN — OXYCODONE HYDROCHLORIDE 5 MG: 5 TABLET ORAL at 06:10

## 2019-10-02 RX ADMIN — ACETAMINOPHEN 1000 MG: 500 TABLET ORAL at 06:10

## 2019-10-02 RX ADMIN — AMLODIPINE BESYLATE 10 MG: 10 TABLET ORAL at 09:10

## 2019-10-02 RX ADMIN — ACETAMINOPHEN 1000 MG: 500 TABLET ORAL at 12:10

## 2019-10-02 RX ADMIN — SENNOSIDES,DOCUSATE SODIUM 1 TABLET: 8.6; 5 TABLET, FILM COATED ORAL at 09:10

## 2019-10-02 RX ADMIN — FAMOTIDINE 20 MG: 20 TABLET ORAL at 09:10

## 2019-10-02 NOTE — PT/OT/SLP PROGRESS
Occupational Therapy   Treatment/Discharge Note    Name: Jodi Cole  MRN: [id number]  Admitting Diagnosis:  Primary osteoarthritis of right knee  1 Day Post-Op    Recommendations:     Discharge Recommendations: home  Discharge Equipment Recommendations:  none  Barriers to discharge:  None    Assessment:     Jodi Cole is a 78 y.o. female with a medical diagnosis of Primary osteoarthritis of right knee.  She was able to walk from bathroom to chair c SBA and RW.  Able to perform grooming task, toilet hygiene, and LB dressing c set-up.  Per RN report pt was able to don dress c mod I.  Pt is progressing well. Performance deficits affecting function are impaired self care skills, impaired functional mobilty, orthopedic precautions.     Rehab Prognosis:  Good; patient would benefit from acute skilled OT services to address these deficits and reach maximum level of function.       Plan:     Patient to be seen daily to address the above listed problems via self-care/home management, therapeutic activities, therapeutic exercises  · Plan of Care Expires: 10/05/19  · Plan of Care Reviewed with: patient, spouse    Subjective     Pain/Comfort:  · Pain Rating 1: 0/10    Objective:     Communicated with: RN prior to session.  Patient found in bathroom c RN with perineural catheter upon OT entry to room.    General Precautions: Standard, fall   Orthopedic Precautions:RLE weight bearing as tolerated   Braces: N/A     Occupational Performance:         Functional Mobility/Transfers:  · Patient completed Sit <> Stand Transfer with stand by assistance  with  rolling walker   · Patient completed Bed <> Chair Transfer using Stand Pivot technique with stand by assistance with rolling walker  · Patient completed Toilet Transfer Stand Pivot technique with stand by assistance with  rolling walker  · Functional Mobility: Pt was able to walk to bathroom c SBA and RW.    Activities of Daily Living:  · Grooming: independence to brush teeth  while standing at sink.  · Upper Body Dressing: modified independence to don dress.  · Lower Body Dressing: modified independence to don sandals and to doff socks.  · Toileting: modified independence for toilet hygiene.      [facility] 6 Click ADL: 24    Treatment & Education:  Educated pt on bathing and car T/F's.    Patient left up in chair with all lines intact, call button in reach, RN notified and family presentEducation:      GOALS:   Multidisciplinary Problems     Occupational Therapy Goals     Not on file          Multidisciplinary Problems (Resolved)        Problem: Occupational Therapy Goal    Goal Priority Disciplines Outcome Interventions   Occupational Therapy Goal   (Resolved)     OT, PT/OT Met    Description:  Goals to be met by: 10/5/19     Patient will increase functional independence with ADLs by performing:    UE Dressing with [place].  LE Dressing with [place].  Grooming while standing at sink with Modified [place].  Toileting from bedside commode with Modified [place] for hygiene and clothing management.   Bathing from  shower chair/bench with Modified [place].  Toilet transfer to bedside commode with Modified [place].  Increased functional strength to WFL for B UE.  Upper extremity exercise program x15 reps per handout, with independence.                      Time Tracking:     OT Date of Treatment: 10/02/19  OT Start Time: 1047  OT Stop Time: 1114  OT Total Time (min): 27 min    Billable Minutes:Self Care/Home Management 14  Therapeutic Activity 13    STEPH Miller  10/2/2019

## 2019-10-02 NOTE — PLAN OF CARE
Problem: Occupational Therapy Goal  Goal: Occupational Therapy Goal  Description  Goals to be met by: 10/5/19     Patient will increase functional independence with ADLs by performing:    UE Dressing with [place].  LE Dressing with [place].  Grooming while standing at sink with Modified [place].  Toileting from bedside commode with Modified [place] for hygiene and clothing management.   Bathing from  shower chair/bench with Modified [place].  Toilet transfer to bedside commode with Modified [place].  Increased functional strength to WFL for B UE.  Upper extremity exercise program x15 reps per handout, with independence.     Outcome: Met

## 2019-10-02 NOTE — PLAN OF CARE
Patient tolerated PT session fairly well today. She ambulated 80ft with RW and SBA. She ascended/descended 1 curb step with RW and CGA. She performed R LE therex x10 reps. She is ready for discharge home from PT standpoint.       Problem: Physical Therapy Goal  Goal: Physical Therapy Goal  Description  Goals to be met by: 10/8/19    Patient will increase functional independence with mobility by performin. Supine to sit with supervision  2. Sit to supine with supervision  3. Sit to stand transfer with Supervision  4. Gait x1500 feet with Supervision using Rolling Walker  5. Ascend/Descend 1 curb step with Contact Guard Assistance using Rolling Walker  6. Lower extremity exercise program x30 reps per handout, with supervision       Outcome: Ongoing, Progressing

## 2019-10-02 NOTE — ASSESSMENT & PLAN NOTE
Jodi Cole is a 78 y.o. female s/p R TKA  - Antibiotics: post-op Ancef  - Weight bearing status: wbat  - Labs: reviewed  - DVT Prophylaxis: mechanical, ASA 81 mg BID  - Lines/Drains: PIV, PNC  - Pain control: multimodal per anesthesia    Dispo: home with outpatient PT

## 2019-10-02 NOTE — DISCHARGE SUMMARY
[facility]  Orthopedics  Discharge Summary      Patient Name: Jodi Cole  MRN: [id number]  Admission Date: 10/1/2019  Hospital Length of Stay: 0 days  Discharge Date and Time:  10/02/2019 8:16 AM  Attending Physician: Joey Fuentes MD   Discharging Provider: Kiara Salazar MD  Primary Care Provider: Jose Umana MD    HPI:   Jodi Cole is a 78 y.o. female with right knee osteoarthritis.    Procedure(s) (LRB):  REPLACEMENT-KNEE-TOTAL-SIGHT ASSISTED (Right)      Hospital Course:  Patient presented for above procedure.  Tolerated it well and was discharged home POD1 after voiding, tolerating diet, ambulating, pain controlled.  Discharge instructions, follow-up appointment, and med rec are below.      Consults (From admission, onward)        Status Ordering Provider     Inpatient consult to Pain Management  Once     Provider:  (Not yet assigned)    Acknowledged AYSE SHINE     Inpatient consult to Respiratory Care  Once     Provider:  (Not yet assigned)    Acknowledged AYSE SHINE     Inpatient consult to Social Work  Once     Provider:  (Not yet assigned)    Acknowledged AYSE SHINE          Significant Diagnostic Studies: No pertinent studies.    Pending Diagnostic Studies:     Procedure Component Value Units Date/Time    X-Ray Knee 1 or 2 View Right [[id number]] Resulted:  10/01/19 0929    Order Status:  Sent Lab Status:  In process Updated:  10/01/19 0938        Final Active Diagnoses:    Diagnosis Date Noted POA    PRINCIPAL PROBLEM:  Primary osteoarthritis of right knee [M17.11] 10/01/2019 Yes    Constipation [K59.00] 07/07/2015 Yes    Essential hypertension [I10] 05/12/2015 Yes      Problems Resolved During this Admission:      Discharged Condition: good    Disposition: Home or Self Care    Follow Up:  Follow-up Information     Ayse Shine PA-C On 10/18/2019.    Specialty:  Orthopedic Surgery  Why:  For wound re-check  Contact  information:  [address]  [facility] [address]  [phone]                 Patient Instructions:      Activity as tolerated     Call MD for:  difficulty breathing, headache or visual disturbances     Call MD for:  extreme fatigue     Call MD for:  hives     Call MD for:  persistent dizziness or light-headedness     Call MD for:  persistent nausea and vomiting     Call MD for:  redness, tenderness, or signs of infection (pain, swelling, redness, odor or green/yellow discharge around incision site)     Call MD for:  severe uncontrolled pain     Call MD for:  temperature >100.4     Keep surgical extremity elevated     Leave dressing on - Keep it clean, dry, and intact until clinic visit   Order Comments: Do not remove surgical dressing for 2 weeks post-op. This will be done only by MD at initial post-op visit. If dressing is completely saturated, replace with identical dressing - silver-impregnated hydrocolloid dressing.     Do not get dressings wet. Do not shower.     If dressing continues to be saturated or there are signs of infection, please call [facility] [phone] for further instructions and to make appt to be seen.     Lifting restrictions   Order Comments: No strenuous exercise or lifting of > 10 lbs     No driving, operating heavy equipment or signing legal documents while taking pain medication     Sponge bath only until clinic visit     Weight bearing as tolerated     Medications:  Reconciled Home Medications:      Medication List      CONTINUE taking these medications    acetaminophen 650 MG Tbsr  Commonly known as:  TYLENOL  Take 1 tablet (650 mg total) by mouth every 8 (eight) hours as needed.     amLODIPine 10 MG tablet  Commonly known as:  NORVASC  Take 1 tablet (10 mg total) by mouth once daily.     aspirin 81 MG EC tablet  Commonly known as:  ECOTRIN  Take 1 tablet (81 mg total) by mouth 2 (two) times daily.     celecoxib 200 MG capsule  Commonly known as:  CeleBREX  Take 1 capsule  (200 mg total) by mouth once daily.     multivitamin capsule  Take 1 capsule by mouth once daily.     ondansetron 8 MG Tbdl  Commonly known as:  ZOFRAN-ODT  Dissolve 1 tablet (8 mg total) by mouth every 12 (twelve) hours as needed (nausea).     oxyCODONE 5 MG immediate release tablet  Commonly known as:  ROXICODONE  Take 1 tablet (5 mg total) by mouth every 6 (six) hours as needed for Pain.     pravastatin 40 MG tablet  Commonly known as:  PRAVACHOL  TK 1 T PO D     STOOL SOFTENER 100 MG capsule  Generic drug:  docusate sodium  Take 1 capsule (100 mg total) by mouth 2 (two) times daily as needed for Constipation.     triamterene-hydrochlorothiazide 37.5-25 mg 37.5-25 mg per tablet  Commonly known as:  MAXZIDE-25     vitamins B1 B6 B12 Tab  Take by mouth once daily.        STOP taking these medications    ibuprofen 200 MG tablet  Commonly known as:  ADVIL,MOTRIN     zolpidem 5 MG Tab  Commonly known as:  LATOYA Salazar MD  Orthopedics  [facility]

## 2019-10-02 NOTE — PLAN OF CARE
Patient A/Ox4. Patient in bed resting at this time. Bed locked and in the lowest position. Patient instructed to call for assistance. Call light in reach. Q2 hour rounds made. Noted 18 g to right wrist infusing NS at 150, site CDI. Polar Ice in place, place on the left knee. Ropivacaine infusing 8ml/hr. Site CDI. Dressing to the left knee is CDI.  Walker and commode at the bedside. Patient up to the restroom as tolerated. Pain managed with prn pain medication. Safety maintained, no fall noted. Will continue to observe and follow the current plan of care.

## 2019-10-02 NOTE — PT/OT/SLP PROGRESS
Physical Therapy Treatment and Discharge    Patient Name:  Jodi Cole   MRN:  [id number]    Recommendations:     Discharge Recommendations:  outpatient PT   Discharge Equipment Recommendations: none   Barriers to discharge: None    Assessment:     Jodi Cole is a 78 y.o. female admitted with a medical diagnosis of Primary osteoarthritis of right knee.  She presents with the following impairments/functional limitations:  weakness, impaired endurance, gait instability, impaired balance, impaired functional mobilty, decreased lower extremity function, impaired joint extensibility, decreased ROM, pain, impaired skin, orthopedic precautions, edema. Patient tolerated PT session fairly well today. She ambulated 80ft with RW and SBA. She ascended/descended 1 curb step with RW and CGA. She performed R LE therex x10 reps. She is ready for discharge home from PT standpoint.     Rehab Prognosis: Good; patient would benefit from acute skilled PT services to address these deficits and reach maximum level of function.    Recent Surgery: Procedure(s) (LRB):  REPLACEMENT-KNEE-TOTAL-SIGHT ASSISTED (Right) 1 Day Post-Op    Plan:     During this hospitalization, patient to be seen daily to address the identified rehab impairments via gait training, therapeutic activities, therapeutic exercises and progress toward the following goals:    · Plan of Care Expires:  10/08/19    Subjective     Chief Complaint: Pain in right knee.   Patient/Family Comments/goals: To walk without pain.   Pain/Comfort:  · Pain Rating 1: (yes but did not rate with number)  · Location - Side 1: Right  · Location - Orientation 1: anterior  · Location 1: knee  · Pain Addressed 1: Pre-medicate for activity, Reposition, Cessation of Activity, Nurse notified      Objective:     Communicated with RN prior to session.  Patient found supine with perineural catheter upon PT entry to room.     General Precautions: Standard, fall   Orthopedic Precautions:RLE weight  "bearing as tolerated   Braces: N/A     Functional Mobility:  · Bed Mobility:     · Supine to Sit: stand by assistance  · Mat Mobility:    · Supine to Sit: minimum assistance  · Sit to Supine: minimum assistance  · Transfers:     · Sit to Stand:  contact guard assistance with rolling walker x1 from bed, x3 from wheelchair, and x1 from mat with verbal/tactile cues for safe technique   · Bed to Chair: ~6ft x2 trials with stand by assistance and  rolling walker from mat to wheelchair then wheelchair to bedside chair   · Gait:  She ambulated 80ft with RW and SBA. No LOB noted. She was limited in ambulation distance due to B UE fatigue and right knee pain.   · Stairs:  Pt ascended/descended 6" curb step with Rolling Walkerwith Contact Guard Assistance.       AM-PAC 6 CLICK MOBILITY  Turning over in bed (including adjusting bedclothes, sheets and blankets)?: 4  Sitting down on and standing up from a chair with arms (e.g., wheelchair, bedside commode, etc.): 4  Moving from lying on back to sitting on the side of the bed?: 4  Moving to and from a bed to a chair (including a wheelchair)?: 4  Need to walk in hospital room?: 4  Climbing 3-5 steps with a railing?: 3  Basic Mobility Total Score: 23       Therapeutic Activities and Exercises:  Patient educated in and performed R LE exercises x10 reps for ankle pumps, quad set, glute set, SAQ over bolster, heel slides, hip abd/add, SLR, and LAQ.      Patient educated in:  -PT role and POC  -safety with transfers including hand placement  -gait sequencing and RW management  -OOB activity to maximize recovery including ambulating every hour to an hour and a half   -car transfer  -stair training using RW  -HEP for therex at home with handout provided       Patient left up in chair with all lines intact, call button in reach, RN notified and spouse present.    GOALS:   Multidisciplinary Problems     Physical Therapy Goals        Problem: Physical Therapy Goal    Goal Priority " Disciplines Outcome Goal Variances Interventions   Physical Therapy Goal     PT, PT/OT Ongoing, Progressing     Description:  Goals to be met by: 10/8/19    Patient will increase functional independence with mobility by performin. Supine to sit with supervision  2. Sit to supine with supervision  3. Sit to stand transfer with Supervision  4. Gait x1500 feet with Supervision using Rolling Walker  5. Ascend/Descend 1 curb step with Contact Guard Assistance using Rolling Walker  6. Lower extremity exercise program x30 reps per handout, with supervision                        Time Tracking:     PT Received On: 10/02/19  PT Start Time: 954     PT Stop Time: 1038  PT Total Time (min): 44 min     Billable Minutes: Gait Training 30 and Therapeutic Exercise 14    Treatment Type: Treatment  PT/PTA: PT       Juanis Villegas, PT  10/02/2019

## 2019-10-02 NOTE — ADDENDUM NOTE
Addendum  created 10/02/19 0610 by Toussaint Battley III, FNP    Pend clinical note, Sign clinical note

## 2019-10-02 NOTE — ADDENDUM NOTE
Addendum  created 10/02/19 0945 by Socorro Huggins MD    Charge Capture section accepted, Sign clinical note

## 2019-10-02 NOTE — NURSING
Discharge instructions given to pt and family, on ball pain ball teaching completed.   RX delivered to bedside, pt has bedside commode and walker at home, nad. resp unlabored.

## 2019-10-02 NOTE — SUBJECTIVE & OBJECTIVE
"Principal Problem:Primary osteoarthritis of right knee    Principal Orthopedic Problem: same    Interval History: AFVSS. Walked a few steps with PT yesterday, had some dizziness at that time.    Review of patient's allergies indicates:  No Known Allergies    Current Facility-Administered Medications   Medication    0.9%  NaCl infusion    acetaminophen tablet 1,000 mg    amLODIPine tablet 10 mg    aspirin EC tablet 81 mg    bisacodyl suppository 10 mg    celecoxib capsule 200 mg    famotidine tablet 20 mg    hydrALAZINE tablet 25 mg    midazolam (VERSED) 1 mg/mL injection 0.5 mg    morphine injection 2 mg    morphine injection 2 mg    mupirocin 2 % ointment 1 g    naloxone 0.4 mg/mL injection 0.02 mg    ondansetron injection 4 mg    oxyCODONE immediate release tablet 10 mg    oxyCODONE immediate release tablet 5 mg    polyethylene glycol packet 17 g    pravastatin tablet 40 mg    pregabalin capsule 75 mg    promethazine (PHENERGAN) 6.25 mg in dextrose 5 % 50 mL IVPB    ramelteon tablet 8 mg    ropivacaine (PF) 2 mg/ml (0.2%) infusion    ropivacaine 0.2% ON-Q C-BLOC 400 ML (SELECT A FLOW)    senna-docusate 8.6-50 mg per tablet 1 tablet    triamterene-hydrochlorothiazide 37.5-25 mg per capsule 1 capsule     Objective:     Vital Signs (Most Recent):  Temp: 96.9 °F (36.1 °C) (10/02/19 0407)  Pulse: 68 (10/02/19 0407)  Resp: 16 (10/02/19 0407)  BP: 128/61 (10/02/19 0407)  SpO2: 95 % (10/02/19 0407) Vital Signs (24h Range):  Temp:  [96.9 °F (36.1 °C)-98.1 °F (36.7 °C)] 96.9 °F (36.1 °C)  Pulse:  [67-85] 68  Resp:  [16-25] 16  SpO2:  [90 %-100 %] 95 %  BP: (128-179)/(61-87) 128/61     Weight: 102.1 kg (225 lb)  Height: 5' 6" (167.6 cm)  Body mass index is 36.32 kg/m².      Intake/Output Summary (Last 24 hours) at 10/2/2019 0620  Last data filed at 10/1/2019 2200  Gross per 24 hour   Intake 2314.77 ml   Output 1050 ml   Net 1264.77 ml       Ortho/SPM Exam   HEENT: normocephalic, atraumatic  Resp: no " increased work of breathing  CV: regular rate and rhythm  MSK: moves b/l upper extremities well    Right lower extremity:  Aquacel c/d/i  No ecchymoses, erythema, or swelling  Sensation intact SP/DP/T/Sural/Saphenous nerves  Motor intact EHL/FHL/TA/gastroc  Palpable DP/PT pulses      Significant Labs: All pertinent labs within the past 24 hours have been reviewed.    Significant Imaging: I have reviewed all pertinent imaging results/findings.

## 2019-10-03 ENCOUNTER — CLINICAL SUPPORT (OUTPATIENT)
Dept: REHABILITATION | Facility: HOSPITAL | Age: 79
End: 2019-10-03
Attending: ORTHOPAEDIC SURGERY
Payer: MEDICARE

## 2019-10-03 DIAGNOSIS — M25.661 KNEE STIFFNESS, RIGHT: ICD-10-CM

## 2019-10-03 PROCEDURE — 97530 THERAPEUTIC ACTIVITIES: CPT | Mod: PO

## 2019-10-03 PROCEDURE — G8978 MOBILITY CURRENT STATUS: HCPCS | Mod: CL,PO

## 2019-10-03 PROCEDURE — G8979 MOBILITY GOAL STATUS: HCPCS | Mod: CK,PO

## 2019-10-03 PROCEDURE — 97161 PT EVAL LOW COMPLEX 20 MIN: CPT | Mod: PO

## 2019-10-03 NOTE — PROGRESS NOTES
10/3/19-Patient called at home.  Pain controlled with OnQ.  Patient denies signs of local anesthetic toxicity.  Dressing clean , dry, and intact.ed at home.  All questions answered.  Encouraged to call if any issues arise

## 2019-10-03 NOTE — PLAN OF CARE
[facility]  Physical Therapy Initial Evaluation    Name: Jodi Cole  Clinic Number: [id number]    Therapy Diagnosis:   Encounter Diagnosis   Name Primary?    Knee stiffness, right      Physician: Joey Fuentes MD    Physician Orders: PT Eval and Treat 3 x/ wk for 8 weeks  Medical Diagnosis from Referral: Primary osteoarthritis of right knee  Evaluation Date: 10/3/2019  Authorization Period Expiration: 8/21/2020  Plan of Care Expiration: 11/29/2019  Visit # / Visits authorized: 1/ 1    Time In: 9:00  Time Out: 10:00  Total Billable Time: 60 minutes    Precautions: Standard    Subjective   Date of onset: TKA 10/2/2019  History of current condition - Jodi reports: Pt reports a 20 year Hx of R knee pain.  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    Anemia due to chemotherapy 9/22/2015    Chemotherapy induced nausea and vomiting 6/9/2015    Chemotherapy-induced neutropenia 7/28/2015    Constipation 7/7/2015    Endometrial ca 5/26/2015    Endometrial ca 8/10/2015    Essential hypertension     Ovarian cancer     Pain in both hands 2/19/2016    Right-sided low back pain without sciatica 6/23/2016    Uterine cancer     Well woman exam with routine gynecological exam 2/19/2016       Surgical History:   Jodi Cole  has a past surgical history that includes D&C Hysteroscopy (March 18, 2015); Dilation and curettage of uterus (1969 ); Lymphadenectomy; Hysterectomy (5/11/15); Pelvic and para-aortic lymph node dissection; pr removal of ovary/tube(s); and Colonoscopy (N/A, 5/22/2019).    Medications:   Jodi has a current medication list which includes the following prescription(s): acetaminophen, amlodipine, aspirin, celecoxib, docusate sodium, multivitamin, ondansetron, oxycodone, pravastatin, triamterene-hydrochlorothiazide 37.5-25 mg, and  vitamins b1 b6 b12, and the following Facility-Administered Medications: midazolam.    Allergies:   Review of patient's allergies indicates:  No Known Allergies     Imaging, X-ray:     Prior Therapy: yes  Social History: Pt lives with their spouse. She has 12 steps in her house but she does not go up there. Occupation: retired  Prior Level of Function: Pt was able to stand for 15 or walk about a block before she needed to sit down  Current Level of Function: Pt is ambulating with a RW secondary to R TKA yesterday.    Pain:  Current 5/10, worst 9/10, best 5/10   Location: right knee   Description: Aching  Aggravating Factors: movement  Easing Factors: pain medication, ice and rest    Pts goals: To be able to walk and stand  Pain free    Objective   Observation: Pt ambulated into the clinic with a RW and SBA    Posture: Flexed knees B      Range of Motion:   Knee Left active Left Passive Right Active R passive   Flexion 75 80 50 70   Extension 20 20 25 25           Lower Extremity Strength  Right LE  Left LE    Knee extension: nt Knee extension: nt   Knee flexion: nt Knee flexion: nt   Hip flexion: nt Hip flexion: nt   Hip extension:  nt Hip extension: nt   Hip abduction: nt Hip abduction: nt   Hip adduction: nt Hip adduction nt   Ankle dorsiflexion: nt Ankle dorsiflexion: nt   Ankle plantarflexion: nt Ankle plantarflexion: nt         Function:    - SLS R: nt  - SLS L: nt  - Squat: nt   - Sit <--> Stand:SBA   - Bed Mobility: min assist        Joint Mobility: Hypomobile B knees      Palpation: tender throughout R knee    Sensation: intact    Flexibility: NT  Edema: moderate        CMS Impairment/Limitation/Restriction for FOTO Knee Survey    Therapist reviewed FOTO scores for Jodi Cole on 10/3/2019.   FOTO documents entered into [employer] - see Media section.    Limitation Score: 74%  Category: Mobility    Current : CL = least 60% but < 80% impaired, limited or restricted  Goal: CK = at least 40% but < 60% impaired,  limited or restricted  Discharge:          TREATMENT   Treatment Time In: 9:30  Treatment Time Out: 10;00  Total Treatment time separate from Evaluation: 30 minutes    Jodi received therapeutic exercises to develop strength and ROM for 20 minutes including:  AA Heel slides 10 x 3 with a strap  Heel prop  Quad sets with towel x 10  Gluteal sets x 10    Jodi received the following manual therapy techniques: Joint mobilizations were applied to the: R knee for 8 minutes, including:  R knee mid range flexion and extension ROM  Home Exercises and Patient Education Provided    Education provided:   - yes    Written Home Exercises Provided: yes.  Exercises were reviewed and Jodi was able to demonstrate them prior to the end of the session.  Jodi demonstrated good  understanding of the education provided.     See EMR under Patient Instructions for exercises provided 10/3/2019.    Assessment   Jodi is a 78 y.o. female referred to outpatient Physical Therapy with a medical diagnosis of Primary osteoarthritis of right knee. Pt presents with R knee one day s/p TKA with moderate swelling, decreased ROM and decreased strength.  She requires SBA to ambulate with a  ft and Min assist to transfer sit to supine and supine to sit.    Pt prognosis is Good.   Pt will benefit from skilled outpatient Physical Therapy to address the deficits stated above and in the chart below, provide pt/family education, and to maximize pt's level of independence.     Plan of care discussed with patient: Yes  Pt's spiritual, cultural and educational needs considered and patient is agreeable to the plan of care and goals as stated below:     Anticipated Barriers for therapy: none    Medical Necessity is demonstrated by the following  History  Co-morbidities and personal factors that may impact the plan of care Co-morbidities:   advanced age, high BMI, history of cancer and R side low back pian with sciatica    Personal Factors:   age      moderate   Examination  Body Structures and Functions, activity limitations and participation restrictions that may impact the plan of care Body Regions:   lower extremities    Body Systems:    ROM  strength  balance  gait  transfers    Participation Restrictions:   Prolonged walking    Activity limitations:   Learning and applying knowledge  no deficits    General Tasks and Commands  no deficits    Communication  no deficits    Mobility  lifting and carrying objects  driving (bike, car, motorcycle)    Self care  washing oneself (bathing, drying, washing hands)  dressing    Domestic Life  shopping  cooking  doing house work (cleaning house, washing dishes, laundry)    Interactions/Relationships  no deficits    Life Areas  no deficits    Community and Social Life  recreation and leisure         moderate   Clinical Presentation stable and uncomplicated low   Decision Making/ Complexity Score: low     Goals:  Short Term Goals: 4 weeks   Pt will be independent in a HEP to address R knee ROM and R LE strength   Pt will be independent in bed mobility  Pt will be an independent ambulator with a RW x 300 ft  R knee flexion ROM to >/= 100 degrees  R knee extension ROM to </= 5 degrees    Long Term Goals: 12 weeks   R knee flexion ROM to >/= 110 degrees  R knee extension ROM to 0 degrees  Pt will be an independent ambulator without an assistive device to improve her functional ability  Pt will be able to stand/walk for 30 min without increased R knee pain  Pt will report improved functional ability through an improved score on the FOTO Knee survey.    Plan   Plan of care Certification: 10/3/2019 to 11/29/2019.    Outpatient Physical Therapy 3 times weekly for 12 weeks to include the following interventions: Electrical Stimulation Russian/IFC, Gait Training, Manual Therapy, Moist Heat/ Ice, Neuromuscular Re-ed, Patient Education, Self Care, Therapeutic Activites, Therapeutic Exercise and Dry needling.     Pepe Covarrubias,  PT

## 2019-10-04 NOTE — NURSING
10/4/19-  Discussed discontinuation of perineural catheter with patient and caretaker.  Catheter removed and blue tip in tact.  Site clean and dry per patient.  All questions answered.

## 2019-10-14 ENCOUNTER — CLINICAL SUPPORT (OUTPATIENT)
Dept: REHABILITATION | Facility: HOSPITAL | Age: 79
End: 2019-10-14
Attending: ORTHOPAEDIC SURGERY
Payer: MEDICARE

## 2019-10-14 DIAGNOSIS — M25.661 KNEE STIFFNESS, RIGHT: ICD-10-CM

## 2019-10-14 PROCEDURE — 97110 THERAPEUTIC EXERCISES: CPT | Mod: PO

## 2019-10-14 NOTE — PROGRESS NOTES
Physical Therapy Daily Treatment Note     Name: Jodi Cole  Clinic Number: [id number]    Therapy Diagnosis:   Encounter Diagnosis   Name Primary?    Knee stiffness, right      Physician: Joey Fuentes MD    Visit Date: 10/14/2019    Physician Orders: PT Eval and Treat 3 x/ wk for 8 weeks  Medical Diagnosis from Referral: Primary osteoarthritis of right knee  Evaluation Date: 10/3/2019  Authorization Period Expiration: 19  Plan of Care Expiration: 2019  Visit # / Visits authorized:      Time In: 2:00 pm  Time Out: 3:00 pm  Total Billable Time: 60 minutes (TE-4)     Precautions: Standard, s/p TKA 10/1/2019    Subjective     Pt reports: she is not feeling too well , she took her celebrex and pain pill together about an hour before coming and has felt dizzy since. Pt stated her knee is doing well it just feels really tight, she does her exercises and especially her heel slides multiple times a day.   She was compliant with home exercise program.  Response to previous treatment: no adverse effects   Functional change:     Pain: 210  Location: right knee      Objective     BP taken manually upon arrival : 138/74  BP taken manually 30 minutes into session : 132 / 84     AROM: 11- 72 degrees  PROM:  NT -  76 degrees    TU seconds (w/ RW assistive device)    30 second sit-to-stand test (with U/E support on handrails ): x4      Jodi received therapeutic exercises to develop strength, endurance and ROM for 60 minutes including:    AA Heel slides 10 x 3 with a strap  Heel prop c/ towel under ankle : 3 x 1'' each   Quad sets with towel under ankle and knee : 2 x 10 , 5'' hold each   Gluteal sets 2 x 10  SAQ c/ small bolster : 2 x 10   Abductions c/ slide board : 2 x 10   QS to SLR : 2 x 0  gatsroc stretch with strap: 3 x 30'       Home Exercises Provided and Patient Education Provided     Education provided:   - Continue previous HEP and updated HEP provided today     Written Home Exercises  Provided: yes.  Exercises were reviewed and Jodi was able to demonstrate them prior to the end of the session.  Jodi demonstrated good  understanding of the education provided.     See EMR under Patient Instructions for exercises provided 10/14/2019.      Assessment     Pt presented with c/o dizziness today possibly due to her pain medication;  her blood pressure was taken and within normal limits. She was agreeable to participate in session although 30 minutes into session she became pale, sweaty and reported dizziness again. She was provided water and her blood pressure was taken again which remained within normal limits. After a 5-7 minute rest break she reported feeling better and was able to complete the rest of session . Pt is limited in knee extension and with a limited tolerance to heel prop for posterior knee stretch. She was instructed to continue to focus on her stretches and heel slides at home multiple times daily. Pt was advised to cut her pain pill in half or to not take it at all if able since her pain levels remain low and controled which she is agreeable to try.  Will continue to progress as tolerated and     Jodi is progressing well towards her goals.   Pt prognosis is Good.     Pt will continue to benefit from skilled outpatient physical therapy to address the deficits listed in the problem list box on initial evaluation, provide pt/family education and to maximize pt's level of independence in the home and community environment.   Pt's spiritual, cultural and educational needs considered and pt agreeable to plan of care and goals.    Anticipated barriers to physical therapy: none    Goals:  Short Term Goals: 4 weeks   Pt will be independent in a HEP to address R knee ROM and R LE strength   Pt will be independent in bed mobility  Pt will be an independent ambulator with a RW x 300 ft  R knee flexion ROM to >/= 100 degrees  R knee extension ROM to </= 5 degrees     Long Term Goals: 12  weeks   R knee flexion ROM to >/= 110 degrees  R knee extension ROM to 0 degrees  Pt will be an independent ambulator without an assistive device to improve her functional ability  Pt will be able to stand/walk for 30 min without increased R knee pain  Pt will report improved functional ability through an improved score on the FOTO Knee survey.    Plan     Continue per TKA protocol.     Kareen Alan, PTA

## 2019-10-16 ENCOUNTER — CLINICAL SUPPORT (OUTPATIENT)
Dept: REHABILITATION | Facility: HOSPITAL | Age: 79
End: 2019-10-16
Attending: ORTHOPAEDIC SURGERY
Payer: MEDICARE

## 2019-10-16 DIAGNOSIS — M25.661 KNEE STIFFNESS, RIGHT: ICD-10-CM

## 2019-10-16 PROCEDURE — 97110 THERAPEUTIC EXERCISES: CPT | Mod: PO

## 2019-10-16 NOTE — PROGRESS NOTES
Physical Therapy Daily Treatment Note     Name: Jodi Cole  Clinic Number: [id number]    Therapy Diagnosis:   Encounter Diagnosis   Name Primary?    Knee stiffness, right      Physician: Joey Fuentes MD    Visit Date: 10/16/2019    Physician Orders: PT Eval and Treat 3 x/ wk for 8 weeks  Medical Diagnosis from Referral: Primary osteoarthritis of right knee  Evaluation Date: 10/3/2019  Authorization Period Expiration: 12/31/19  Plan of Care Expiration: 11/29/2019  Visit # / Visits authorized: 3/20     Time In: 8:58 AM  Time Out: 9:57 AM  Total Billable Time: 30 minutes (TE-2)     Precautions: Standard, s/p TKA 10/1/2019    Subjective     Pt reports: she is feeling good today. Patient reports she feels more of a soreness than pain. Patient reports she did not take her Celebrex or pain medication this morning.     She was compliant with home exercise program.  Response to previous treatment: no adverse effects   Functional change:     Pain: 2/10  Location: right knee      Objective       Jodi received therapeutic exercises to develop strength, endurance and ROM for 59 minutes including:    AA Heel slides 10 x 3 with a strap  Heel prop c/ towel under ankle : 3 x 1'' each   Quad sets with towel under ankle and knee : 3 x 10 , 5'' hold each   Gluteal sets 2 x 10  SAQ c/ small bolster : 2 x 10   Abductions c/ slide board : 2 x 10   QS to SLR : 2 x 8  gatsroc stretch with strap: 3 x 30'  hooklying clams OTB 2x10   Seated knee flexion with overpressure from L LE x 15       Home Exercises Provided and Patient Education Provided     Education provided:   - Continue previous HEP and updated HEP provided today     Written Home Exercises Provided: yes.  Exercises were reviewed and Jodi was able to demonstrate them prior to the end of the session.  Jodi demonstrated good  understanding of the education provided.     See EMR under Patient Instructions for exercises provided 10/14/2019.      Assessment     Pt  with improved tolerance to today's PT session. Patient able to progress slightly with additional exercises today. Patient remains limited with knee ROM especially knee extension and was encouraged to continue to perform HEP. Will continue to progress as tolerated.     Jodi is progressing well towards her goals.   Pt prognosis is Good.     Pt will continue to benefit from skilled outpatient physical therapy to address the deficits listed in the problem list box on initial evaluation, provide pt/family education and to maximize pt's level of independence in the home and community environment.   Pt's spiritual, cultural and educational needs considered and pt agreeable to plan of care and goals.    Anticipated barriers to physical therapy: none    Goals:  Short Term Goals: 4 weeks   Pt will be independent in a HEP to address R knee ROM and R LE strength   Pt will be independent in bed mobility  Pt will be an independent ambulator with a RW x 300 ft  R knee flexion ROM to >/= 100 degrees  R knee extension ROM to </= 5 degrees     Long Term Goals: 12 weeks   R knee flexion ROM to >/= 110 degrees  R knee extension ROM to 0 degrees  Pt will be an independent ambulator without an assistive device to improve her functional ability  Pt will be able to stand/walk for 30 min without increased R knee pain  Pt will report improved functional ability through an improved score on the FOTO Knee survey.    Plan     Continue per TKA protocol.     Antonia Hubbard, ANGELA

## 2019-10-18 ENCOUNTER — OFFICE VISIT (OUTPATIENT)
Dept: ORTHOPEDICS | Facility: CLINIC | Age: 79
End: 2019-10-18
Payer: MEDICARE

## 2019-10-18 VITALS
BODY MASS INDEX: 36.07 KG/M2 | HEART RATE: 91 BPM | HEIGHT: 66 IN | WEIGHT: 224.44 LBS | SYSTOLIC BLOOD PRESSURE: 163 MMHG | DIASTOLIC BLOOD PRESSURE: 93 MMHG | TEMPERATURE: 97 F

## 2019-10-18 DIAGNOSIS — Z96.651 STATUS POST TOTAL RIGHT KNEE REPLACEMENT: Primary | ICD-10-CM

## 2019-10-18 PROCEDURE — 99024 PR POST-OP FOLLOW-UP VISIT: ICD-10-PCS | Mod: S$GLB,,, | Performed by: PHYSICIAN ASSISTANT

## 2019-10-18 PROCEDURE — 99024 POSTOP FOLLOW-UP VISIT: CPT | Mod: S$GLB,,, | Performed by: PHYSICIAN ASSISTANT

## 2019-10-18 PROCEDURE — 99999 PR PBB SHADOW E&M-EST. PATIENT-LVL IV: ICD-10-PCS | Mod: PBBFAC,,, | Performed by: PHYSICIAN ASSISTANT

## 2019-10-18 PROCEDURE — 99999 PR PBB SHADOW E&M-EST. PATIENT-LVL IV: CPT | Mod: PBBFAC,,, | Performed by: PHYSICIAN ASSISTANT

## 2019-10-18 RX ORDER — OXYCODONE HYDROCHLORIDE 5 MG/1
5 TABLET ORAL EVERY 8 HOURS PRN
Qty: 28 TABLET | Refills: 0 | Status: SHIPPED | OUTPATIENT
Start: 2019-10-18 | End: 2019-11-19

## 2019-10-18 NOTE — PROGRESS NOTES
"Jodi Cole presents for initial post-operative visit following a right total knee arthroplasty performed by Dr. Fuentes on 10/1/2019. Tolerating pain medication well. She is in outpatient PT at [facility] location.     Exam:   Blood pressure (!) 163/93, pulse 91, temperature 97.2 °F (36.2 °C), height 5' 6" (1.676 m), weight 101.8 kg (224 lb 6.9 oz).   Ambulating well with assistive device.  Incision is clean and dry without drainage or erythema. ROM:0-85    Initial post-operative radiographs reviewed today revealing a well fixed and aligned prosthesis.    A/P:  2 weeks s/p right total knee replacement    - The patient was advised to keep the incision clean and dry for the next 24 hours after which she may wash the area with antibacterial soap in the shower. Will not submerge until the incision is completely healed.   - Continue PT  - Continue ASA for 1 month from surgery.  - Pain medication refilled.   - Follow up in 4 weeks with new x-rays. Pt will call clinic with problems/concerns.     "

## 2019-10-23 ENCOUNTER — CLINICAL SUPPORT (OUTPATIENT)
Dept: REHABILITATION | Facility: HOSPITAL | Age: 79
End: 2019-10-23
Attending: ORTHOPAEDIC SURGERY
Payer: MEDICARE

## 2019-10-23 DIAGNOSIS — M25.661 KNEE STIFFNESS, RIGHT: ICD-10-CM

## 2019-10-23 PROCEDURE — 97110 THERAPEUTIC EXERCISES: CPT | Mod: PO

## 2019-10-23 NOTE — PROGRESS NOTES
Physical Therapy Daily Treatment Note     Name: Jodi Cole  Clinic Number: [id number]    Therapy Diagnosis:   Encounter Diagnosis   Name Primary?    Knee stiffness, right      Physician: Joey Fuentes MD    Visit Date: 10/23/2019    Physician Orders: PT Eval and Treat 3 x/ wk for 8 weeks  Medical Diagnosis from Referral: Primary osteoarthritis of right knee  Evaluation Date: 10/3/2019  Authorization Period Expiration: 19  Plan of Care Expiration: 2019  Visit # / Visits authorized:      Time In: 10:00 am   Time Out: 10:45 am   Total Billable Time: 45 minutes (TE-3)     Precautions: Standard, s/p TKA 10/1/2019    Subjective     Pt reports: shes doing good , she didn't take her medicine and shes not in any pain today. Pt stated she has to leave 15 minutes early today .     She was compliant with home exercise program.  Response to previous treatment: no adverse effects   Functional change: walking at home without her walker    Pain: 0/10  Location: right knee      Objective     TU seconds (No AD)    30 second sit-to-stand test (with U/E support on handrails ): x5    AROM: 19 - 75 degrees  PROM:  NT -  80 degrees    Jodi received therapeutic exercises to develop strength, endurance and ROM for 45 minutes including:    Bike 4' : half revolutions   AA Heel slides 10 x 3 with a strap  Heel prop c/ towel under ankle : 3 x 1'' each   Quad sets with towel under ankle and knee : 3 x 10 , 5'' hold each   Gluteal sets 2 x 10  SAQ c/ small bolster : 2 x 10   Abductions c/ slide board : 2 x 10 - NP out of time  QS to SLR : 2 x 8  gatsroc stretch with strap: 3 x 30'  hooklying clams OTB 2x10 - NP out of time  Seated knee flexion with overpressure from L LE x 15 - NP out of time       Home Exercises Provided and Patient Education Provided     Education provided:   - Continue previous HEP     Written Home Exercises Provided: yes.  Exercises were reviewed and Jodi was able to demonstrate them  prior to the end of the session.  Jodi demonstrated good  understanding of the education provided.     See EMR under Patient Instructions for exercises provided 10/14/2019.    Assessment     Pt with a good tolerance to session today and was able to complete with no adverse effects. She remains significantly limited with extension and showed slight improvements with her flexion ROM today. Her pain levels have remained low and well controlled and only discomfort experienced with end range flexion stretching.     Jodi is progressing well towards her goals.   Pt prognosis is Good.     Pt will continue to benefit from skilled outpatient physical therapy to address the deficits listed in the problem list box on initial evaluation, provide pt/family education and to maximize pt's level of independence in the home and community environment.   Pt's spiritual, cultural and educational needs considered and pt agreeable to plan of care and goals.    Anticipated barriers to physical therapy: none    Goals:  Short Term Goals: 4 weeks   Pt will be independent in a HEP to address R knee ROM and R LE strength   Pt will be independent in bed mobility  Pt will be an independent ambulator with a RW x 300 ft  R knee flexion ROM to >/= 100 degrees  R knee extension ROM to </= 5 degrees     Long Term Goals: 12 weeks   R knee flexion ROM to >/= 110 degrees  R knee extension ROM to 0 degrees  Pt will be an independent ambulator without an assistive device to improve her functional ability  Pt will be able to stand/walk for 30 min without increased R knee pain  Pt will report improved functional ability through an improved score on the FOTO Knee survey.    Plan     Continue per TKA protocol.     Kareen Alan, PTA

## 2019-10-30 ENCOUNTER — CLINICAL SUPPORT (OUTPATIENT)
Dept: REHABILITATION | Facility: HOSPITAL | Age: 79
End: 2019-10-30
Attending: ORTHOPAEDIC SURGERY
Payer: MEDICARE

## 2019-10-30 DIAGNOSIS — M25.661 KNEE STIFFNESS, RIGHT: ICD-10-CM

## 2019-10-30 PROCEDURE — 97140 MANUAL THERAPY 1/> REGIONS: CPT | Mod: PO

## 2019-10-30 PROCEDURE — 97110 THERAPEUTIC EXERCISES: CPT | Mod: PO

## 2019-10-30 NOTE — PROGRESS NOTES
Physical Therapy Daily Treatment Note     Name: Jodi Cole  Clinic Number: [id number]    Therapy Diagnosis:   Encounter Diagnosis   Name Primary?    Knee stiffness, right      Physician: Joey Fuentes MD    Visit Date: 10/30/2019    Physician Orders: PT Eval and Treat 3 x/ wk for 8 weeks  Medical Diagnosis from Referral: Primary osteoarthritis of right knee  Evaluation Date: 10/3/2019  Authorization Period Expiration: 19  Plan of Care Expiration: 2019  Visit # / Visits authorized:      Time In: 8:30 am   Time Out: 9:30 am   Total Billable Time: 45 minutes (TE-3)     Precautions: Standard, s/p TKA 10/1/2019    Subjective     Pt reports: shes doing good , she didn't take her medicine and shes not in any pain today. Pt stated she has to leave 15 minutes early today .     She was compliant with home exercise program.  Response to previous treatment: no adverse effects   Functional change: walking at home without her walker    Pain: 0/10  Location: right knee      Objective     TU seconds (No AD)  not tested today  30 second sit-to-stand test (with U/E support on handrails ): x5  not tested today    AROM: 19 - 78 degrees  PROM:  15 -  85 degrees    Jodi received therapeutic exercises to develop strength, endurance and ROM for 45 minutes including:    Bike 4' : half revolutions   AA Heel slides 10 x 3 with a strap  Heel prop c/ towel under ankle : 3 x 1'' each   Quad sets with towel under ankle and knee : 3 x 10 , 5'' hold each   Gluteal sets 2 x 10  SAQ c/ small bolster : 2 x 10   Abductions c/ slide board : 2 x 10 - NP out of time  QS to SLR : 2 x 8  gatsroc stretch with strap: 3 x 30'  hooklying clams OTB 2x10 - NP out of time  Seated knee flexion with overpressure from L LE x 15 - NP out of time       Home Exercises Provided and Patient Education Provided     Education provided:   - Continue previous HEP     Written Home Exercises Provided: yes.  Exercises were reviewed and  Jodi was able to demonstrate them prior to the end of the session.  Jodi demonstrated good  understanding of the education provided.     See EMR under Patient Instructions for exercises provided 10/14/2019.    Assessment     Pt tolerated tx well.  She was informed that her Dr may want her to use a dynasplint to work on flex and extension ROM.  She remains significantly limited with extension and flexion ROM.   .     Jodi is progressing well towards her goals.   Pt prognosis is Good.     Pt will continue to benefit from skilled outpatient physical therapy to address the deficits listed in the problem list box on initial evaluation, provide pt/family education and to maximize pt's level of independence in the home and community environment.   Pt's spiritual, cultural and educational needs considered and pt agreeable to plan of care and goals.    Anticipated barriers to physical therapy: none    Goals:  Short Term Goals: 4 weeks   Pt will be independent in a HEP to address R knee ROM and R LE strength   Pt will be independent in bed mobility  Pt will be an independent ambulator with a RW x 300 ft  R knee flexion ROM to >/= 100 degrees  R knee extension ROM to </= 5 degrees     Long Term Goals: 12 weeks   R knee flexion ROM to >/= 110 degrees  R knee extension ROM to 0 degrees  Pt will be an independent ambulator without an assistive device to improve her functional ability  Pt will be able to stand/walk for 30 min without increased R knee pain  Pt will report improved functional ability through an improved score on the FOTO Knee survey.    Plan     Continue per TKA protocol.     Pepe Covarrubias, PT

## 2019-11-01 ENCOUNTER — CLINICAL SUPPORT (OUTPATIENT)
Dept: REHABILITATION | Facility: HOSPITAL | Age: 79
End: 2019-11-01
Attending: ORTHOPAEDIC SURGERY
Payer: MEDICARE

## 2019-11-01 DIAGNOSIS — M25.661 KNEE STIFFNESS, RIGHT: ICD-10-CM

## 2019-11-01 PROCEDURE — 97140 MANUAL THERAPY 1/> REGIONS: CPT | Mod: PO

## 2019-11-01 PROCEDURE — 97110 THERAPEUTIC EXERCISES: CPT | Mod: PO

## 2019-11-01 NOTE — PROGRESS NOTES
Physical Therapy Daily Treatment Note     Name: Jodi Cole  Clinic Number: [id number]    Therapy Diagnosis:   Encounter Diagnosis   Name Primary?    Knee stiffness, right      Physician: Joey Fuentes MD    Visit Date: 2019    Physician Orders: PT Eval and Treat 3 x/ wk for 8 weeks  Medical Diagnosis from Referral: Primary osteoarthritis of right knee  Evaluation Date: 10/3/2019  Authorization Period Expiration: 19  Plan of Care Expiration: 2019  Visit # / Visits authorized:      Time In: 8:30 am   Time Out: 9:30 am   Total Billable Time: 45 minutes (TE-3)     Precautions: Standard, s/p TKA 10/1/2019    Subjective     Pt reports: she saw her Dr and was told that she is doing well and that he does not expect her to get to 0 degrees of knee extension.     She was compliant with home exercise program.  Response to previous treatment: no adverse effects   Functional change: walking at home without her walker    Pain: 0/10  Location: right knee      Objective     TU seconds (No AD)  not tested today  30 second sit-to-stand test (with U/E support on handrails ): x5  not tested today    AROM: 19 - 78 degrees  PROM:  15 -  90 degrees 2019    Pt received a hot pack to her R knee in extension to prepare for ROM exercises and increase R knee extension.    Jodi received therapeutic exercises to develop strength, endurance and ROM for 25 minutes including:  AA Heel slides 10 x 3 with a strap  Gluteal sets 2 x 10  SAQ c/ small bolster : 2 x 10   gatsroc stretch with strap: 3 x 30'  Seated knee flexion with manual resistance      Jodi received manual therapy to the R knee x 20 min including:  Soft tissue mobilization to the distal quad in supine an sitting  Patella and patella tendon mobilization.        Not performed today:  Bike 4' : half revolutions   Quad sets with towel under ankle and knee : 3 x 10 , 5'' hold each  hooklying clams OTB 2x10   Heel prop c/ towel under ankle  : 3 x 1'' each   Abductions c/ slide board : 2 x 10 - NP out of time  QS to SLR : 2 x 8      Home Exercises Provided and Patient Education Provided     Education provided:   - Continue previous HEP     Written Home Exercises Provided: yes.  Exercises were reviewed and Jodi was able to demonstrate them prior to the end of the session.  Jodi demonstrated good  understanding of the education provided.     See EMR under Patient Instructions for exercises provided 10/14/2019.    Assessment     Pt tolerated tx well.  R knee flexion to 90 degrees.  .     Jodi is progressing well towards her goals.   Pt prognosis is Good.     Pt will continue to benefit from skilled outpatient physical therapy to address the deficits listed in the problem list box on initial evaluation, provide pt/family education and to maximize pt's level of independence in the home and community environment.   Pt's spiritual, cultural and educational needs considered and pt agreeable to plan of care and goals.    Anticipated barriers to physical therapy: none    Goals:  Short Term Goals: 4 weeks   Pt will be independent in a HEP to address R knee ROM and R LE strength   Pt will be independent in bed mobility  Pt will be an independent ambulator with a RW x 300 ft  R knee flexion ROM to >/= 100 degrees  R knee extension ROM to </= 5 degrees     Long Term Goals: 12 weeks   R knee flexion ROM to >/= 110 degrees  R knee extension ROM to 0 degrees  Pt will be an independent ambulator without an assistive device to improve her functional ability  Pt will be able to stand/walk for 30 min without increased R knee pain  Pt will report improved functional ability through an improved score on the FOTO Knee survey.    Plan     Continue per TKA protocol.     Pepe Covarrubias, PT

## 2019-11-04 ENCOUNTER — DOCUMENTATION ONLY (OUTPATIENT)
Dept: ORTHOPEDICS | Facility: CLINIC | Age: 79
End: 2019-11-04

## 2019-11-04 NOTE — PROGRESS NOTES
Patient is s/p RTKA. She is PT. AROM 19-78 and PROM 15-90. No abnormality on xray.  Extension Dynasplint.  Pt is returning to clinic 11/14 and we will obtain routine imagining for 6 week post op.

## 2019-11-08 DIAGNOSIS — Z96.651 STATUS POST TOTAL RIGHT KNEE REPLACEMENT: Primary | ICD-10-CM

## 2019-11-13 ENCOUNTER — CLINICAL SUPPORT (OUTPATIENT)
Dept: REHABILITATION | Facility: HOSPITAL | Age: 79
End: 2019-11-13
Attending: ORTHOPAEDIC SURGERY
Payer: MEDICARE

## 2019-11-13 DIAGNOSIS — M25.661 KNEE STIFFNESS, RIGHT: ICD-10-CM

## 2019-11-13 PROCEDURE — 97110 THERAPEUTIC EXERCISES: CPT | Mod: PO

## 2019-11-13 NOTE — PROGRESS NOTES
Physical Therapy Daily Treatment Note     Name: Jodi Cole  Clinic Number: [id number]    Therapy Diagnosis:   Encounter Diagnosis   Name Primary?    Knee stiffness, right      Physician: Joey Fuentes MD    Visit Date: 2019    Physician Orders: PT Eval and Treat 3 x/ wk for 8 weeks  Medical Diagnosis from Referral: Primary osteoarthritis of right knee  Evaluation Date: 10/3/2019  Authorization Period Expiration: 19  Plan of Care Expiration: 2019  Visit # / Visits authorized:      Time In: 9:00 am   Time Out: 10:08 am   Total Billable Time: 30 minutes (TE-2)     Precautions: Standard, s/p TKA 10/1/2019    Subjective     Pt reports: no pain upon arrival.     She was compliant with home exercise program.  Response to previous treatment: no adverse effects   Functional change: walking at home without her walker    Pain: 0/10  Location: right knee      Objective     TU seconds   30 second sit-to-stand test (with U/E support on handrails ): x 8      Measurements taken pre-treatment:  AROM: 14 - 81 degrees  PROM:  14 -  88 degrees    Jodi received therapeutic exercises to develop strength, endurance and ROM for 45 minutes including:    AA Heel slides 10 x 3 with a strap  Quad sets x 10 w/towel roll @ knee  Gluteal sets 2 x 10  SAQ c/ small bolster : 2 x 10   R gastroc stretch with strap: 3 x 30'  Seated knee flexion with manual resistance  R QS to SLR: 2x10      Jodi received manual therapy to the R knee x 10 min including:  Soft tissue mobilization to the distal quad in sitting  Patella and patella tendon mobilization.  Contract-relax for kn flxn x 4 in seated  Contract-relax for kn ext x 4 in seated    Measurements at END of session:  AROM: 13 - 85 degrees  PROM:  12 -  89 degrees    Moist Heat w/ 5# cuff wt on anterior knee X 5 minutes,  towel roll under ankle for ext (decrease cuff wt next visit; pt felt 5# too aggressive. Removed weight after 3 minutes)    Not performed  today:  Bike 4' : half revolutions   Quad sets with towel under ankle and knee : 3 x 10 , 5'' hold each  hooklying clams OTB 2x10   Heel prop c/ towel under ankle : 3 x 1'' each   Abductions c/ slide board : 2 x 10 - NP out of time        Home Exercises Provided and Patient Education Provided     Education provided:   - Continue previous HEP     Written Home Exercises Provided: yes.  Exercises were reviewed and Jodi was able to demonstrate them prior to the end of the session.  Jodi demonstrated good  understanding of the education provided.     See EMR under Patient Instructions for exercises provided 10/14/2019.    Assessment     Pt tolerated treatment well but continues to be limited in range, despite reports of compliance with HEP and self-mobilization of patella. Improved scores today in TUG and sit-to-stand test. Pt continues to be challenged with quad activation but shows improvement when verbal and tactile cues provided. Patient is scheduled to see physician tomorrow. Progress as tolerated and continue with plan of care.   .     Jodi is progressing well towards her goals.   Pt prognosis is Good.     Pt will continue to benefit from skilled outpatient physical therapy to address the deficits listed in the problem list box on initial evaluation, provide pt/family education and to maximize pt's level of independence in the home and community environment.   Pt's spiritual, cultural and educational needs considered and pt agreeable to plan of care and goals.    Anticipated barriers to physical therapy: none    Goals:  Short Term Goals: 4 weeks   Pt will be independent in a HEP to address R knee ROM and R LE strength   Pt will be independent in bed mobility  Pt will be an independent ambulator with a RW x 300 ft  R knee flexion ROM to >/= 100 degrees  R knee extension ROM to </= 5 degrees     Long Term Goals: 12 weeks   R knee flexion ROM to >/= 110 degrees  R knee extension ROM to 0 degrees  Pt will be  an independent ambulator without an assistive device to improve her functional ability  Pt will be able to stand/walk for 30 min without increased R knee pain  Pt will report improved functional ability through an improved score on the FOTO Knee survey.    Plan     Continue per TKA protocol.     Deirdre BERRY, participating in treatment session, Supervised by PTA    I certify that I was present in the room directing the student in service delivery and guiding them using my skilled judgment.  As the co-signing therapist I have reviewed the students documentation and am responsible for the treatment, assessment, and plan    Antonia Hubbard PTA

## 2019-11-14 ENCOUNTER — OFFICE VISIT (OUTPATIENT)
Dept: ORTHOPEDICS | Facility: CLINIC | Age: 79
End: 2019-11-14
Payer: MEDICARE

## 2019-11-14 ENCOUNTER — HOSPITAL ENCOUNTER (OUTPATIENT)
Dept: RADIOLOGY | Facility: HOSPITAL | Age: 79
Discharge: HOME | End: 2019-11-14
Attending: PHYSICIAN ASSISTANT
Payer: MEDICARE

## 2019-11-14 VITALS
SYSTOLIC BLOOD PRESSURE: 156 MMHG | WEIGHT: 222.56 LBS | HEIGHT: 66 IN | DIASTOLIC BLOOD PRESSURE: 86 MMHG | BODY MASS INDEX: 35.77 KG/M2 | HEART RATE: 103 BPM | TEMPERATURE: 98 F

## 2019-11-14 DIAGNOSIS — Z96.651 STATUS POST TOTAL RIGHT KNEE REPLACEMENT: Primary | ICD-10-CM

## 2019-11-14 DIAGNOSIS — Z96.651 STATUS POST TOTAL RIGHT KNEE REPLACEMENT: ICD-10-CM

## 2019-11-14 PROCEDURE — 73560 X-RAY EXAM OF KNEE 1 OR 2: CPT | Mod: TC,LT

## 2019-11-14 PROCEDURE — 73562 X-RAY EXAM OF KNEE 3: CPT | Mod: 26,RT,, | Performed by: RADIOLOGY

## 2019-11-14 PROCEDURE — 99999 PR PBB SHADOW E&M-EST. PATIENT-LVL III: ICD-10-PCS | Mod: PBBFAC,,, | Performed by: PHYSICIAN ASSISTANT

## 2019-11-14 PROCEDURE — 99024 POSTOP FOLLOW-UP VISIT: CPT | Mod: S$GLB,,, | Performed by: PHYSICIAN ASSISTANT

## 2019-11-14 PROCEDURE — 73560 X-RAY EXAM OF KNEE 1 OR 2: CPT | Mod: 26,59,LT, | Performed by: RADIOLOGY

## 2019-11-14 PROCEDURE — 99999 PR PBB SHADOW E&M-EST. PATIENT-LVL III: CPT | Mod: PBBFAC,,, | Performed by: PHYSICIAN ASSISTANT

## 2019-11-14 PROCEDURE — 73562 XR KNEE ORTHO RIGHT: ICD-10-PCS | Mod: 26,RT,, | Performed by: RADIOLOGY

## 2019-11-14 PROCEDURE — 73560 XR KNEE ORTHO RIGHT: ICD-10-PCS | Mod: 26,59,LT, | Performed by: RADIOLOGY

## 2019-11-14 PROCEDURE — 99024 PR POST-OP FOLLOW-UP VISIT: ICD-10-PCS | Mod: S$GLB,,, | Performed by: PHYSICIAN ASSISTANT

## 2019-11-14 PROCEDURE — 73562 X-RAY EXAM OF KNEE 3: CPT | Mod: TC,RT

## 2019-11-14 NOTE — PROGRESS NOTES
"Jodi Cole presents for 6 week post-operative visit following a right total knee arthroplasty performed by Dr. Fuentes on 10/1/2019.  She is happy with TKA. She is having no pain. She had gout in LLE which is improving. She is in outpatient PT at [facility] location and is using extension Dynasplint. She is happy with her ROM     Exam:   Blood pressure (!) 156/86, pulse 103, temperature 97.5 °F (36.4 °C), temperature source Oral, height 5' 6" (1.676 m), weight 101 kg (222 lb 8.9 oz).   Ambulating well with cane.   Incision is well healed. AROM 10-85 (preop ROM )    New radiographs obtained and reviewed today revealing a well fixed and aligned prosthesis.    A/P:  6 weeks s/p right total knee replacement  - Reviewed abx ppx  - Discussed with Dr. Fuentes. Ok to stop PT. Pt is pain free and happy with ROM.   - Follow up in 6 weeks with Dr. Fuentes. Pt will call clinic with problems/concerns.     "

## 2019-11-19 ENCOUNTER — IMMUNIZATION (OUTPATIENT)
Dept: PHARMACY | Facility: CLINIC | Age: 79
End: 2019-11-19
Payer: MEDICARE

## 2019-11-19 ENCOUNTER — LAB VISIT (OUTPATIENT)
Dept: LAB | Facility: HOSPITAL | Age: 79
End: 2019-11-19
Attending: INTERNAL MEDICINE
Payer: MEDICARE

## 2019-11-19 ENCOUNTER — OFFICE VISIT (OUTPATIENT)
Dept: PRIMARY CARE CLINIC | Facility: CLINIC | Age: 79
End: 2019-11-19
Payer: MEDICARE

## 2019-11-19 VITALS
RESPIRATION RATE: 16 BRPM | HEIGHT: 66 IN | DIASTOLIC BLOOD PRESSURE: 76 MMHG | HEART RATE: 61 BPM | SYSTOLIC BLOOD PRESSURE: 138 MMHG | TEMPERATURE: 99 F | OXYGEN SATURATION: 94 % | BODY MASS INDEX: 36.35 KG/M2 | WEIGHT: 226.19 LBS

## 2019-11-19 DIAGNOSIS — E78.5 HYPERLIPIDEMIA, UNSPECIFIED HYPERLIPIDEMIA TYPE: ICD-10-CM

## 2019-11-19 DIAGNOSIS — I10 ESSENTIAL HYPERTENSION: ICD-10-CM

## 2019-11-19 DIAGNOSIS — M19.90 OSTEOARTHRITIS, UNSPECIFIED OSTEOARTHRITIS TYPE, UNSPECIFIED SITE: ICD-10-CM

## 2019-11-19 DIAGNOSIS — I10 ESSENTIAL HYPERTENSION: Primary | ICD-10-CM

## 2019-11-19 DIAGNOSIS — Z85.42 HISTORY OF ENDOMETRIAL CANCER: ICD-10-CM

## 2019-11-19 DIAGNOSIS — Z91.199 NONCOMPLIANCE: ICD-10-CM

## 2019-11-19 DIAGNOSIS — Z23 FLU VACCINE NEED: ICD-10-CM

## 2019-11-19 LAB
ALBUMIN SERPL BCP-MCNC: 3.7 G/DL (ref 3.5–5.2)
ALP SERPL-CCNC: 89 U/L (ref 55–135)
ALT SERPL W/O P-5'-P-CCNC: 6 U/L (ref 10–44)
ANION GAP SERPL CALC-SCNC: 8 MMOL/L (ref 8–16)
AST SERPL-CCNC: 10 U/L (ref 10–40)
BILIRUB SERPL-MCNC: 0.4 MG/DL (ref 0.1–1)
BUN SERPL-MCNC: 16 MG/DL (ref 8–23)
CALCIUM SERPL-MCNC: 9.6 MG/DL (ref 8.7–10.5)
CHLORIDE SERPL-SCNC: 102 MMOL/L (ref 95–110)
CHOLEST SERPL-MCNC: 259 MG/DL (ref 120–199)
CHOLEST/HDLC SERPL: 4.5 {RATIO} (ref 2–5)
CO2 SERPL-SCNC: 32 MMOL/L (ref 23–29)
CREAT SERPL-MCNC: 1 MG/DL (ref 0.5–1.4)
EST. GFR  (AFRICAN AMERICAN): >60 ML/MIN/1.73 M^2
EST. GFR  (NON AFRICAN AMERICAN): 54.5 ML/MIN/1.73 M^2
GLUCOSE SERPL-MCNC: 85 MG/DL (ref 70–110)
HDLC SERPL-MCNC: 57 MG/DL (ref 40–75)
HDLC SERPL: 22 % (ref 20–50)
LDLC SERPL CALC-MCNC: 172.2 MG/DL (ref 63–159)
NONHDLC SERPL-MCNC: 202 MG/DL
POTASSIUM SERPL-SCNC: 3.5 MMOL/L (ref 3.5–5.1)
PROT SERPL-MCNC: 7.3 G/DL (ref 6–8.4)
SODIUM SERPL-SCNC: 142 MMOL/L (ref 136–145)
TRIGL SERPL-MCNC: 149 MG/DL (ref 30–150)

## 2019-11-19 PROCEDURE — 99999 PR PBB SHADOW E&M-EST. PATIENT-LVL III: CPT | Mod: PBBFAC,,, | Performed by: INTERNAL MEDICINE

## 2019-11-19 PROCEDURE — 3078F PR MOST RECENT DIASTOLIC BLOOD PRESSURE < 80 MM HG: ICD-10-PCS | Mod: CPTII,S$GLB,, | Performed by: INTERNAL MEDICINE

## 2019-11-19 PROCEDURE — 1101F PR PT FALLS ASSESS DOC 0-1 FALLS W/OUT INJ PAST YR: ICD-10-PCS | Mod: CPTII,S$GLB,, | Performed by: INTERNAL MEDICINE

## 2019-11-19 PROCEDURE — 80053 COMPREHEN METABOLIC PANEL: CPT

## 2019-11-19 PROCEDURE — 99499 UNLISTED E&M SERVICE: CPT | Mod: S$GLB,,, | Performed by: INTERNAL MEDICINE

## 2019-11-19 PROCEDURE — 36415 COLL VENOUS BLD VENIPUNCTURE: CPT | Mod: PN

## 2019-11-19 PROCEDURE — 3075F SYST BP GE 130 - 139MM HG: CPT | Mod: CPTII,S$GLB,, | Performed by: INTERNAL MEDICINE

## 2019-11-19 PROCEDURE — 3078F DIAST BP <80 MM HG: CPT | Mod: CPTII,S$GLB,, | Performed by: INTERNAL MEDICINE

## 2019-11-19 PROCEDURE — 99499 RISK ADDL DX/OHS AUDIT: ICD-10-PCS | Mod: S$GLB,,, | Performed by: INTERNAL MEDICINE

## 2019-11-19 PROCEDURE — 99999 PR PBB SHADOW E&M-EST. PATIENT-LVL III: ICD-10-PCS | Mod: PBBFAC,,, | Performed by: INTERNAL MEDICINE

## 2019-11-19 PROCEDURE — 3075F PR MOST RECENT SYSTOLIC BLOOD PRESS GE 130-139MM HG: ICD-10-PCS | Mod: CPTII,S$GLB,, | Performed by: INTERNAL MEDICINE

## 2019-11-19 PROCEDURE — 99214 OFFICE O/P EST MOD 30 MIN: CPT | Mod: S$GLB,,, | Performed by: INTERNAL MEDICINE

## 2019-11-19 PROCEDURE — 80061 LIPID PANEL: CPT

## 2019-11-19 PROCEDURE — 99214 PR OFFICE/OUTPT VISIT, EST, LEVL IV, 30-39 MIN: ICD-10-PCS | Mod: S$GLB,,, | Performed by: INTERNAL MEDICINE

## 2019-11-19 PROCEDURE — 1101F PT FALLS ASSESS-DOCD LE1/YR: CPT | Mod: CPTII,S$GLB,, | Performed by: INTERNAL MEDICINE

## 2019-11-19 RX ORDER — PRAVASTATIN SODIUM 40 MG/1
40 TABLET ORAL NIGHTLY
Qty: 90 TABLET | Refills: 3 | Status: SHIPPED | OUTPATIENT
Start: 2019-11-19 | End: 2019-11-19

## 2019-11-19 RX ORDER — HYDROCHLOROTHIAZIDE 12.5 MG/1
12.5 TABLET ORAL DAILY
Qty: 90 TABLET | Refills: 0 | Status: SHIPPED | OUTPATIENT
Start: 2019-11-19 | End: 2019-12-20 | Stop reason: SDUPTHER

## 2019-11-19 RX ORDER — ATORVASTATIN CALCIUM 40 MG/1
40 TABLET, FILM COATED ORAL NIGHTLY
Qty: 90 TABLET | Refills: 1 | Status: SHIPPED | OUTPATIENT
Start: 2019-11-19 | End: 2019-12-20

## 2019-11-19 NOTE — PROGRESS NOTES
Patient, Jodi Cole (MRN #[id number]), presented with a recorded BMI of 36.51 kg/m^2 and a documented comorbidity(s):  - Hypertension  - Hyperlipidemia  to which the severe obesity is a contributing factor. This is consistent with the definition of severe obesity (BMI 35.0-39.9) with comorbidity (ICD-10 E66.01, Z68.35). The patient's severe obesity was monitored, evaluated, addressed and/or treated. This addendum to the medical record is made on 11/19/2019.

## 2019-11-19 NOTE — PROGRESS NOTES
I reviewed pt's labs with pt.    HDL 57 .  Her Cholesterol is not controlled on Pravastatin 40 mg QHS.  Rec changing to Atorvastatin 40 mg po QHS.  Rec low chol diet and exercise for lifestyle modification.  Rec pt start a statin.  Warned pt about potential S.E. including elev LFT's, myalgias, muscle weakness, memory issues, and hyperglycemia.  Pt should call with any concerns.  Will cont to monitor. Her kidney function is stable. I sent the Atorvastatin to the pharm for her.

## 2019-11-19 NOTE — PROGRESS NOTES
"[facility] Note    Chief Complaint      Chief Complaint   Patient presents with    Establish Care       History of Present Illness      Jodi Cole is a 79 y.o. AAF with Endometrial Ca, HTN, Insomnia, OA presents to est PCP and to fu chronic issues.     HTN - Pt on Amlodipine 10mg/d on her list and on her discharge summary from 10/2/19 and it appears it was started in [place] in May 2018 by Dr. Felix.  Pt reports she has not been taking it ever as an out patient.  Will check with Pharm.  She ran out of her Maxide 37.5/25 mg/d 1 wk ago.  I explained the importance of not running out of her meds.  In alking to pharm pt has not picked up Amlodipine since 8/17/18 and has not picked up Maxide since 5/3/19.  In questioning pt if she has been out of maxide for longer than a week she reports "sometimes I didn't take it every day because my pressure would be too low".  Will start HCTZ 12.5 mg/d and have pt RTC in 4 wks.    HLD - Pt is on Pravastatin 40 mg/d.    Oa - Pt is s/p RT TKA 10/1/19. Fu by Dr. Gina Lunsford.  Pt is on Tylenol prn.  She takes ASA 81 mg/d.    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 She was tx with ChemoTx - Carbo and taxol x 6 cycles    Obesity - BMI - 36.5 - Will d/w pt further at fu in 4 wks. Up 4 lb since Ortho visit.  Likely due to recent surgery and dec activity.    HCM - Flu - 11/19/19;  Tdap - ?;  PCV 13 - ?;  PVX 23 - ?;  Shingrix - none - refuses;  MGM - 3/20/19 - repeat 1 yr;  DEXA - ? At [facility]; C-scope - 5/22/19 + polyps - repeat not rec due to age; Ortho - Dr. Fuentes;  Gyn/Onc - Dr. Jordan    Past Medical History:  Past Medical History:   Diagnosis Date    Anemia associated with chemotherapy 9/1/2015    Anemia due to chemotherapy 9/22/2015    Chemotherapy induced nausea and " vomiting 6/9/2015    Chemotherapy-induced neutropenia 7/28/2015    Constipation 7/7/2015    Endometrial ca 5/26/2015    Endometrial ca 8/10/2015    Essential hypertension     Hypertension     Osteoarthritis     Ovarian cancer     Pain in both hands 2/19/2016    Right-sided low back pain without sciatica 6/23/2016    Uterine cancer     Well woman exam with routine gynecological exam 2/19/2016       Past Surgical History:   has a past surgical history that includes D&C Hysteroscopy (March 18, 2015); Dilation and curettage of uterus (1969 ); Lymphadenectomy; Hysterectomy (5/11/15); Pelvic and para-aortic lymph node dissection; pr removal of ovary/tube(s); and Colonoscopy (N/A, 5/22/2019).    Family History:  family history includes Cancer (age of onset: 65) in her brother; Heart disease in her father.     Social History:  Social History     Tobacco Use    Smoking status: Never Smoker    Smokeless tobacco: Never Used   Substance Use Topics    Alcohol use: No    Drug use: Never       Review of Systems   Constitutional: Negative for chills, diaphoresis and fever.   HENT: Negative for congestion, hearing loss, sore throat and tinnitus.    Eyes: Negative for blurred vision and double vision.   Respiratory: Negative for cough and wheezing.    Cardiovascular: Negative for chest pain, palpitations and leg swelling.   Gastrointestinal: Negative for abdominal pain, blood in stool, constipation, diarrhea, heartburn, melena, nausea and vomiting.   Genitourinary: Negative for dysuria and frequency.   Musculoskeletal: Positive for joint pain. Negative for myalgias.        Hands, knees, shoulders   Skin: Negative for itching and rash.   Neurological: Positive for headaches. Negative for dizziness.        Rare   Endo/Heme/Allergies: Negative for polydipsia. Does not bruise/bleed easily.   Psychiatric/Behavioral: Negative for depression. The patient is not nervous/anxious.         Medications:  Outpatient Encounter  Medications as of 11/19/2019   Medication Sig Note Dispense Refill    acetaminophen (TYLENOL) 650 MG TbSR Take 1 tablet (650 mg total) by mouth every 8 (eight) hours as needed.  120 tablet 0    multivitamin capsule Take 1 capsule by mouth once daily. 9/26/2019: HOLD 14 DAYS BEFORE SURGERY        pravastatin (PRAVACHOL) 40 MG tablet TK 1 T PO D 9/26/2019: TAKE AS SCHEDULED (PM)   3    vitamins B1 B6 B12 Tab Take by mouth once daily. 9/26/2019: CONTINUE TO HOLD       [DISCONTINUED] amLODIPine (NORVASC) 10 MG tablet Take 1 tablet (10 mg total) by mouth once daily.  30 tablet 11    [DISCONTINUED] triamterene-hydrochlorothiazide 37.5-25 mg (MAXZIDE-25) 37.5-25 mg per tablet  9/26/2019: HOLD AM OF SURGERY        aspirin (ECOTRIN) 81 MG EC tablet Take 1 tablet (81 mg total) by mouth 2 (two) times daily. (Patient not taking: Reported on 11/19/2019)  60 tablet 0    [DISCONTINUED] docusate sodium (COLACE) 100 MG capsule Take 1 capsule (100 mg total) by mouth 2 (two) times daily as needed for Constipation. (Patient not taking: Reported on 11/19/2019)  60 capsule 0    [DISCONTINUED] ondansetron (ZOFRAN-ODT) 8 MG TbDL Dissolve 1 tablet (8 mg total) by mouth every 12 (twelve) hours as needed (nausea). (Patient not taking: Reported on 11/19/2019)  10 tablet 0    [DISCONTINUED] oxyCODONE (ROXICODONE) 5 MG immediate release tablet Take 1 tablet (5 mg total) by mouth every 8 (eight) hours as needed for Pain. (Patient not taking: Reported on 11/19/2019)  28 tablet 0     Facility-Administered Encounter Medications as of 11/19/2019   Medication Dose Route Frequency Provider Last Rate Last Dose    midazolam (VERSED) 1 mg/mL injection 0.5 mg  0.5 mg Intravenous PRN Henry Reynoso MD           Current Outpatient Medications:     acetaminophen (TYLENOL) 650 MG TbSR, Take 1 tablet (650 mg total) by mouth every 8 (eight) hours as needed., Disp: 120 tablet, Rfl: 0    multivitamin capsule, Take 1 capsule by mouth once daily.,  "Disp: , Rfl:     pravastatin (PRAVACHOL) 40 MG tablet, TK 1 T PO D, Disp: , Rfl: 3    vitamins B1 B6 B12 Tab, Take by mouth once daily., Disp: , Rfl:     aspirin (ECOTRIN) 81 MG EC tablet, Take 1 tablet (81 mg total) by mouth 2 (two) times daily. (Patient not taking: Reported on 11/19/2019), Disp: 60 tablet, Rfl: 0    flu vacc ci3148-29,65yr up,PF (FLUZONE HIGH-DOSE 2019-20, PF,) 180 mcg/0.5 mL Syrg, Inject 0.5 mLs into the muscle once. for 1 dose, Disp: 0.5 mL, Rfl: 0  No current facility-administered medications for this visit.     Facility-Administered Medications Ordered in Other Visits:     midazolam (VERSED) 1 mg/mL injection 0.5 mg, 0.5 mg, Intravenous, PRN, Henry Reynoso MD    Allergies:  Review of patient's allergies indicates:  No Known Allergies    Health Maintenance:    There is no immunization history on file for this patient.   Health Maintenance   Topic Date Due    Lipid Panel  1940    TETANUS VACCINE  10/23/1960    DEXA SCAN  10/23/1982    Pneumococcal Vaccine (65+ High/Highest Risk) (1 of 2 - PCV13) 10/23/2007      Objective:      Vital Signs  Temp: 98.9 °F (37.2 °C)  Temp src: Oral  Pulse: 61  Resp: 16  SpO2: (!) 94 %  BP: 138/76  BP Location: Left arm  Patient Position: Sitting  Pain Score: 0-No pain  Height and Weight  Height: 5' 6" (167.6 cm)  Weight: 102.6 kg (226 lb 3.1 oz)  BSA (Calculated - sq m): 2.19 sq meters  BMI (Calculated): 36.5  Weight in (lb) to have BMI = 25: 154.6]    Laboratory:  CBC:  Recent Labs   Lab 05/06/18 1952 09/26/19  1150   WBC 7.59 5.27   RBC 4.27 4.67   Hemoglobin 11.1 L 12.1   Hematocrit 35.4 L 39.0   Platelets 230 230   Mean Corpuscular Volume 83 84   Mean Corpuscular Hemoglobin 26.0 L 25.9 L   Mean Corpuscular Hemoglobin Conc 31.4 L 31.0 L       CMP:  Recent Labs   Lab 05/06/18 1952 09/26/19  1150   Glucose 94 86   Calcium 9.3 9.8   Albumin 3.5  --    Total Protein 7.2  --    Sodium 144 143   Potassium 3.7 3.3 L   CO2 27 34 H   Chloride 106 " 100   BUN, Bld 23 20   Creatinine 0.9 1.0   eGFR if African American >60.0 >60.0   eGFR if non African American >60.0 54.9 A   Alkaline Phosphatase 76  --    ALT 7 L  --    AST 12  --    Total Bilirubin 0.3  --        Physical Exam   Constitutional: She is oriented to person, place, and time. She appears well-developed and well-nourished. No distress.   obese   HENT:   Head: Normocephalic and atraumatic.   Right Ear: External ear normal.   Left Ear: External ear normal.   Mouth/Throat: Oropharynx is clear and moist.   Poor dentition; upper dentures   Eyes: Pupils are equal, round, and reactive to light. Conjunctivae and EOM are normal.   Neck: Normal range of motion. Neck supple.   Cardiovascular: Normal rate, regular rhythm, normal heart sounds and intact distal pulses.   No murmur heard.  Trace edema to BLE   Pulmonary/Chest: Effort normal and breath sounds normal. No respiratory distress. She has no wheezes.   Abdominal: Soft. Bowel sounds are normal. She exhibits no distension and no mass. There is no tenderness.   Musculoskeletal: Normal range of motion.   Well healed surg scar to RT Knee - +Warmth, no erythema,  + Swelling   Neurological: She is alert and oriented to person, place, and time.   Skin: Skin is warm and dry. She is not diaphoretic.   Psychiatric: She has a normal mood and affect.   Nursing note and vitals reviewed.          Assessment:       1. Essential hypertension    2. Hyperlipidemia, unspecified hyperlipidemia type    3. Noncompliance    4. Osteoarthritis, unspecified osteoarthritis type, unspecified site    5. History of endometrial cancer    6. Flu vaccine need        Jodi RICHARDSON Cole is a 79 y.o.female with:    1. Essential hypertension  - Comprehensive metabolic panel; Future  - Pt has not been taking her BP meds daily and ran out.  Will just start her back on low dose HCTZ 12.5 mg/d and have her fu in 4 wks with BP log.    2. Hyperlipidemia, unspecified hyperlipidemia type  - Lipid panel;  Future  -Cont Pravastatin.  Repeat FLP.     3. Noncompliance  -Rec compliance with meds. RTC in 4 wks with home BP log. She will alert MD for BP >150/90    4. Osteoarthritis, unspecified osteoarthritis type, unspecified site  Stable.  Pt s/p RT TKA.  Fu by Dr. Fuentes.  Pt is on     5. History of endometrial cancer  -Fu by Dr. Jordan.  - 8 -7/24/19.  Pt completed chemoTx in 2015.    6. Flu vaccine need  -Admin today.     Chronic conditions status updated as per HPI.  Other than changes above, cont current medications and maintain follow up with specialists.  Return to clinic in 4 wks to fu HTN.    Margarita Funes MD  [facility]

## 2019-11-22 ENCOUNTER — PATIENT OUTREACH (OUTPATIENT)
Dept: ADMINISTRATIVE | Facility: HOSPITAL | Age: 79
End: 2019-11-22

## 2019-11-22 NOTE — LETTER
AUTHORIZATION FOR RELEASE OF   CONFIDENTIAL INFORMATION    TO: [facility] Records    We are seeing Jodi Cole, date of birth 1940, in the clinic at No Department Specified. Margarita Funes MD is the patient's PCP. Jodi Cole has an outstanding lab/procedure at the time we reviewed her chart. In order to help keep her health information updated, she has authorized us to request the following medical record(s):        (  )  MAMMOGRAM                                      (  )  COLONOSCOPY      (  )  PAP SMEAR                                          (  )  OUTSIDE LAB RESULTS     ( X )  DEXA SCAN                                       (  )  EYE EXAM            (  )  FOOT EXAM                                          (  )  ENTIRE RECORD     (  )  OUTSIDE IMMUNIZATIONS                 (  )  _______________         Please fax records to [facility], Nicole Giambrone, MD, [phone]     If you have any questions, please contact Jayla at [phone].           Patient Name: Jodi Cole  : 1940  Patient Phone #: [phone]

## 2019-11-22 NOTE — LETTER
AUTHORIZATION FOR RELEASE OF   CONFIDENTIAL INFORMATION    Dear Dr. Umana,    We are seeing Jodi Cole, date of birth 1940, in the clinic at No Department Specified. Margarita Funes MD is the patient's PCP. Jodi Cole has an outstanding lab/procedure at the time we reviewed her chart. In order to help keep her health information updated, she has authorized us to request the following medical record(s):        (  )  MAMMOGRAM                                      (  )  COLONOSCOPY      (  )  PAP SMEAR                                          (  )  OUTSIDE LAB RESULTS     (  )  DEXA SCAN                                          (  )  EYE EXAM            (  )  FOOT EXAM                                          (  )  ENTIRE RECORD     ( X )  IMMUNIZATIONS           (  )  _______________         Please fax records to [facility], Nicole Giambrone, MD, [phone]     If you have any questions, please contact Jayla at [phone].           Patient Name: Jodi Cole  : 1940  Patient Phone #: [phone]

## 2019-12-19 NOTE — PROGRESS NOTES
"[facility] Note    Chief Complaint      Chief Complaint   Patient presents with    Follow-up       History of Present Illness      Jodi Cole is a 79 y.o. AAF with Endometrial Ca, HTN, Insomnia, OA presents to fu chronic issues. Last visit - 11/19/19.     HTN - Pt prev noncompliant with Amlodipine 10mg/d as Rx May 2018 by Dr. Felix.  Pt had not been taking it ever as an out patient. She prev ran out of her Maxide 37.5/25 and had not picked up Maxide since 5/3/19.  In questioning pt if she has been out of maxide for longer than a week she reports "sometimes I didn't take it every day because my pressure would be too low".  We recently started  HCTZ 12.5 mg/d at last visit - controlled.      HLD - Pt was on Pravastatin 40 mg/d.    HDL 57 .  Her Cholesterol is not controlled on Pravastatin 40 mg QHS.  Rec changing to Atorvastatin 40 mg po QHS. Rec low chol diet and exercise for lifestyle modification.  Rec pt start a statin.  Warned pt about potential S.E. including elev LFT's, myalgias, muscle weakness, memory issues, and hyperglycemia.  Pt should call with any concerns.  Will cont to monitor. Pt reports pharm gave her the old Rx for Pravastatin and she did not get new Rx for Atorvastatin.  Will resend to pharm.  Will convey to pharm and check with them to see what she recently picked up and when. She will try to cut back to red meat 1/wk.     Oa - Pt is s/p RT TKA 10/1/19. Fu by Dr. Gina Lunsford.  Pt is on Tylenol prn - not helping.  She takes ASA 81 mg/d. Affects hands, shoulder.  Will try Voltaren gel.      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 She was tx with ChemoTx - Carbo and taxol x 6 cycles. Has fu in Jan.      Obesity - BMI - 36.2 - Likely due to recent surgery and dec " activity. She will cut back on sweets.  Exercise is limited due to knees. Rec she joining gym with [employer] and use pool/Stationary bike.      HCM - Flu - 11/19/19;  Tdap - ?;  PCV 13 - 12/20/19;  PVX 23 - 12/20/19;  Shingrix - none - refuses; MGM - 3/20/19 - repeat 1 yr;  DEXA - ? At [facility]; C-scope - 5/22/19 + polyps - repeat not rec due to age; Ortho - Dr. Fuentes;  Gyn/Onc - Dr. Jordan; Prev PCP - Dr. Umana      Past Medical History:  Past Medical History:   Diagnosis Date    Anemia associated with chemotherapy 9/1/2015    Anemia due to chemotherapy 9/22/2015    Chemotherapy induced nausea and vomiting 6/9/2015    Chemotherapy-induced neutropenia 7/28/2015    Constipation 7/7/2015    Endometrial ca 5/26/2015    Endometrial ca 8/10/2015    Essential hypertension     Hypertension     Osteoarthritis     Ovarian cancer     Pain in both hands 2/19/2016    Right-sided low back pain without sciatica 6/23/2016    Uterine cancer     Well woman exam with routine gynecological exam 2/19/2016       Past Surgical History:   has a past surgical history that includes D&C Hysteroscopy (March 18, 2015); Dilation and curettage of uterus (1969 ); Lymphadenectomy; Hysterectomy (5/11/15); Pelvic and para-aortic lymph node dissection; pr removal of ovary/tube(s); and Colonoscopy (N/A, 5/22/2019).    Family History:  family history includes Cancer (age of onset: 65) in her brother; Heart disease in her father.     Social History:  Social History     Tobacco Use    Smoking status: Never Smoker    Smokeless tobacco: Never Used   Substance Use Topics    Alcohol use: No    Drug use: Never       Review of Systems   Constitutional: Negative for chills, diaphoresis and fever.   HENT: Negative for congestion and sore throat.    Eyes: Negative for blurred vision and double vision.   Respiratory: Negative for cough, shortness of breath and wheezing.    Cardiovascular: Negative for chest pain.   Gastrointestinal: Negative for  constipation, diarrhea, nausea and vomiting.   Genitourinary: Negative for dysuria and frequency.   Musculoskeletal: Positive for joint pain.   Neurological: Negative for dizziness and headaches.   Endo/Heme/Allergies: Negative for polydipsia. Does not bruise/bleed easily.   Psychiatric/Behavioral: Negative for depression. The patient is not nervous/anxious.         Medications:  Outpatient Encounter Medications as of 12/20/2019   Medication Sig Note Dispense Refill    acetaminophen (TYLENOL) 650 MG TbSR Take 1 tablet (650 mg total) by mouth every 8 (eight) hours as needed.  120 tablet 0    aspirin (ECOTRIN) 81 MG EC tablet Take 1 tablet (81 mg total) by mouth 2 (two) times daily. (Patient taking differently: Take 81 mg by mouth once. )  60 tablet 0    atorvastatin (LIPITOR) 40 MG tablet Take 1 tablet (40 mg total) by mouth every evening.  90 tablet 1    hydroCHLOROthiazide (HYDRODIURIL) 12.5 MG Tab Take 1 tablet (12.5 mg total) by mouth once daily.  90 tablet 3    multivitamin capsule Take 1 capsule by mouth once daily. 9/26/2019: HOLD 14 DAYS BEFORE SURGERY        vitamins B1 B6 B12 Tab Take by mouth once daily. 9/26/2019: CONTINUE TO HOLD       [DISCONTINUED] atorvastatin (LIPITOR) 40 MG tablet Take 1 tablet (40 mg total) by mouth every evening.  90 tablet 1    [DISCONTINUED] hydroCHLOROthiazide (HYDRODIURIL) 12.5 MG Tab Take 1 tablet (12.5 mg total) by mouth once daily.  90 tablet 0    [DISCONTINUED] pravastatin (PRAVACHOL) 40 MG tablet Take 1 tablet by mouth every evening.       diclofenac sodium (VOLTAREN) 1 % Gel Apply 2 g topically 4 (four) times daily.  100 g 0     Facility-Administered Encounter Medications as of 12/20/2019   Medication Dose Route Frequency Provider Last Rate Last Dose    midazolam (VERSED) 1 mg/mL injection 0.5 mg  0.5 mg Intravenous PRN Henry Reynoso MD           Current Outpatient Medications:     acetaminophen (TYLENOL) 650 MG TbSR, Take 1 tablet (650 mg total) by  "mouth every 8 (eight) hours as needed., Disp: 120 tablet, Rfl: 0    aspirin (ECOTRIN) 81 MG EC tablet, Take 1 tablet (81 mg total) by mouth 2 (two) times daily. (Patient taking differently: Take 81 mg by mouth once. ), Disp: 60 tablet, Rfl: 0    atorvastatin (LIPITOR) 40 MG tablet, Take 1 tablet (40 mg total) by mouth every evening., Disp: 90 tablet, Rfl: 1    hydroCHLOROthiazide (HYDRODIURIL) 12.5 MG Tab, Take 1 tablet (12.5 mg total) by mouth once daily., Disp: 90 tablet, Rfl: 3    multivitamin capsule, Take 1 capsule by mouth once daily., Disp: , Rfl:     vitamins B1 B6 B12 Tab, Take by mouth once daily., Disp: , Rfl:     diclofenac sodium (VOLTAREN) 1 % Gel, Apply 2 g topically 4 (four) times daily., Disp: 100 g, Rfl: 0  No current facility-administered medications for this visit.     Facility-Administered Medications Ordered in Other Visits:     midazolam (VERSED) 1 mg/mL injection 0.5 mg, 0.5 mg, Intravenous, PRN, Henry Reynoso MD    Allergies:  Review of patient's allergies indicates:  No Known Allergies    Health Maintenance:  Immunization History   Administered Date(s) Administered    Influenza - High Dose - PF (65 years and older) 11/19/2019      Health Maintenance   Topic Date Due    TETANUS VACCINE  10/23/1960    DEXA SCAN  10/23/1982    Pneumococcal Vaccine (65+ High/Highest Risk) (1 of 2 - PCV13) 10/23/2007    Lipid Panel  11/19/2024      Objective:      Vital Signs  Temp: 98.6 °F (37 °C)  Temp src: Oral  Pulse: 90  Resp: 16  SpO2: 99 %  BP: 126/80  BP Location: Right arm  Patient Position: Sitting  Pain Score: 0-No pain  Height and Weight  Height: 5' 6" (167.6 cm)  Weight: 101.8 kg (224 lb 6.9 oz)  BSA (Calculated - sq m): 2.18 sq meters  BMI (Calculated): 36.2  Weight in (lb) to have BMI = 25: 154.6]    Laboratory:  CBC:  Recent Labs   Lab 05/06/18 1952 09/26/19  1150   WBC 7.59 5.27   RBC 4.27 4.67   Hemoglobin 11.1 L 12.1   Hematocrit 35.4 L 39.0   Platelets 230 230   Mean " Corpuscular Volume 83 84   Mean Corpuscular Hemoglobin 26.0 L 25.9 L   Mean Corpuscular Hemoglobin Conc 31.4 L 31.0 L       CMP:  Recent Labs   Lab 05/06/18 1952 11/19/19  0922   Glucose 94   < > 85   Calcium 9.3   < > 9.6   Albumin 3.5  --  3.7   Total Protein 7.2  --  7.3   Sodium 144   < > 142   Potassium 3.7   < > 3.5   CO2 27   < > 32 H   Chloride 106   < > 102   BUN, Bld 23   < > 16   Creatinine 0.9   < > 1.0   eGFR if African American >60.0   < > >60.0   eGFR if non  >60.0   < > 54.5 A   Alkaline Phosphatase 76  --  89   ALT 7 L  --  6 L   AST 12  --  10   Total Bilirubin 0.3  --  0.4    < > = values in this interval not displayed.       URINALYSIS:              LIPIDS:  Recent Labs   Lab 11/19/19  0922   HDL 57   Cholesterol 259 H   Triglycerides 149   LDL Cholesterol 172.2 H   Hdl/Cholesterol Ratio 22.0   Non-HDL Cholesterol 202   Total Cholesterol/HDL Ratio 4.5     Physical Exam   Constitutional: She is oriented to person, place, and time. She appears well-developed and well-nourished. No distress.   HENT:   Head: Normocephalic and atraumatic.   Eyes: Pupils are equal, round, and reactive to light. EOM are normal.   tuyet cataracts   Neck: Normal range of motion. Neck supple. No thyromegaly present.   Cardiovascular: Normal rate, regular rhythm and intact distal pulses.   Murmur heard.  I-II/VI Sys Murmur at LUSB radiates to Rt carotid   Pulmonary/Chest: Effort normal and breath sounds normal. No respiratory distress.   Abdominal: Soft. Bowel sounds are normal. She exhibits no distension.   Lymphadenopathy:     She has no cervical adenopathy.   Neurological: She is alert and oriented to person, place, and time.   Skin: Skin is warm and dry. She is not diaphoretic.   Psychiatric: She has a normal mood and affect.   Nursing note and vitals reviewed.          Assessment:       1. Essential hypertension    2. Hyperlipidemia, unspecified hyperlipidemia type    3. Osteoarthritis, unspecified  osteoarthritis type, unspecified site    4. Need for vaccination with 13-polyvalent pneumococcal conjugate vaccine    5. Encounter for vision screening        Jodi Cole is a 79 y.o.female with:    1. Essential hypertension  - hydroCHLOROthiazide (HYDRODIURIL) 12.5 MG Tab; Take 1 tablet (12.5 mg total) by mouth once daily.  Dispense: 90 tablet; Refill: 3  - Basic metabolic panel; Future  -Controlled on current regimen.  Repeat BMP    2. Hyperlipidemia, unspecified hyperlipidemia type  - atorvastatin (LIPITOR) 40 MG tablet; Take 1 tablet (40 mg total) by mouth every evening.  Dispense: 90 tablet; Refill: 1  Rec Lipitor 40 mg QHs.  Call with any concerns.     3. Osteoarthritis, unspecified osteoarthritis type, unspecified site  - diclofenac sodium (VOLTAREN) 1 % Gel; Apply 2 g topically 4 (four) times daily.  Dispense: 100 g; Refill: 0  -Will try Voltaren gel.     4. Class 2 severe obesity due to excess calories with serious comorbidity and body mass index (BMI) of 36.0 to 36.9 in adult  Rec diet and exercise aas above for wt loss.     5. Endometrial ca  -Fu by H/O. Has fu in Jan.     6. Need for vaccination with 13-polyvalent pneumococcal conjugate vaccine  -Admin today.     7. Encounter for vision screening  - Ambulatory referral to Ophthalmology       Chronic conditions status updated as per HPI.  Other than changes above, cont current medications and maintain follow up with specialists.  Return to clinic in 4 months.    Margarita Funes MD  [facility]

## 2019-12-20 ENCOUNTER — LAB VISIT (OUTPATIENT)
Dept: LAB | Facility: HOSPITAL | Age: 79
End: 2019-12-20
Attending: INTERNAL MEDICINE
Payer: MEDICARE

## 2019-12-20 ENCOUNTER — TELEPHONE (OUTPATIENT)
Dept: PRIMARY CARE CLINIC | Facility: CLINIC | Age: 79
End: 2019-12-20

## 2019-12-20 ENCOUNTER — OFFICE VISIT (OUTPATIENT)
Dept: PRIMARY CARE CLINIC | Facility: CLINIC | Age: 79
End: 2019-12-20
Payer: MEDICARE

## 2019-12-20 VITALS
BODY MASS INDEX: 36.07 KG/M2 | SYSTOLIC BLOOD PRESSURE: 126 MMHG | WEIGHT: 224.44 LBS | RESPIRATION RATE: 16 BRPM | TEMPERATURE: 99 F | HEIGHT: 66 IN | DIASTOLIC BLOOD PRESSURE: 80 MMHG | OXYGEN SATURATION: 99 % | HEART RATE: 90 BPM

## 2019-12-20 DIAGNOSIS — I10 ESSENTIAL HYPERTENSION: Primary | ICD-10-CM

## 2019-12-20 DIAGNOSIS — E66.01 CLASS 2 SEVERE OBESITY DUE TO EXCESS CALORIES WITH SERIOUS COMORBIDITY AND BODY MASS INDEX (BMI) OF 36.0 TO 36.9 IN ADULT: ICD-10-CM

## 2019-12-20 DIAGNOSIS — M19.90 OSTEOARTHRITIS, UNSPECIFIED OSTEOARTHRITIS TYPE, UNSPECIFIED SITE: ICD-10-CM

## 2019-12-20 DIAGNOSIS — C54.1 ENDOMETRIAL CA: ICD-10-CM

## 2019-12-20 DIAGNOSIS — I10 ESSENTIAL HYPERTENSION: ICD-10-CM

## 2019-12-20 DIAGNOSIS — Z23 NEED FOR VACCINATION WITH 13-POLYVALENT PNEUMOCOCCAL CONJUGATE VACCINE: ICD-10-CM

## 2019-12-20 DIAGNOSIS — E78.5 HYPERLIPIDEMIA, UNSPECIFIED HYPERLIPIDEMIA TYPE: ICD-10-CM

## 2019-12-20 DIAGNOSIS — Z01.00 ENCOUNTER FOR VISION SCREENING: ICD-10-CM

## 2019-12-20 LAB
ANION GAP SERPL CALC-SCNC: 8 MMOL/L (ref 8–16)
BUN SERPL-MCNC: 34 MG/DL (ref 8–23)
CALCIUM SERPL-MCNC: 9.8 MG/DL (ref 8.7–10.5)
CHLORIDE SERPL-SCNC: 100 MMOL/L (ref 95–110)
CO2 SERPL-SCNC: 37 MMOL/L (ref 23–29)
CREAT SERPL-MCNC: 1.4 MG/DL (ref 0.5–1.4)
EST. GFR  (AFRICAN AMERICAN): 41.8 ML/MIN/1.73 M^2
EST. GFR  (NON AFRICAN AMERICAN): 36.3 ML/MIN/1.73 M^2
GLUCOSE SERPL-MCNC: 88 MG/DL (ref 70–110)
POTASSIUM SERPL-SCNC: 3.7 MMOL/L (ref 3.5–5.1)
SODIUM SERPL-SCNC: 145 MMOL/L (ref 136–145)

## 2019-12-20 PROCEDURE — 1101F PT FALLS ASSESS-DOCD LE1/YR: CPT | Mod: CPTII,S$GLB,, | Performed by: INTERNAL MEDICINE

## 2019-12-20 PROCEDURE — 1126F PR PAIN SEVERITY QUANTIFIED, NO PAIN PRESENT: ICD-10-PCS | Mod: S$GLB,,, | Performed by: INTERNAL MEDICINE

## 2019-12-20 PROCEDURE — 1126F AMNT PAIN NOTED NONE PRSNT: CPT | Mod: S$GLB,,, | Performed by: INTERNAL MEDICINE

## 2019-12-20 PROCEDURE — 1159F PR MEDICATION LIST DOCUMENTED IN MEDICAL RECORD: ICD-10-PCS | Mod: S$GLB,,, | Performed by: INTERNAL MEDICINE

## 2019-12-20 PROCEDURE — 3074F SYST BP LT 130 MM HG: CPT | Mod: CPTII,S$GLB,, | Performed by: INTERNAL MEDICINE

## 2019-12-20 PROCEDURE — 3079F DIAST BP 80-89 MM HG: CPT | Mod: CPTII,S$GLB,, | Performed by: INTERNAL MEDICINE

## 2019-12-20 PROCEDURE — 1159F MED LIST DOCD IN RCRD: CPT | Mod: S$GLB,,, | Performed by: INTERNAL MEDICINE

## 2019-12-20 PROCEDURE — 3074F PR MOST RECENT SYSTOLIC BLOOD PRESSURE < 130 MM HG: ICD-10-PCS | Mod: CPTII,S$GLB,, | Performed by: INTERNAL MEDICINE

## 2019-12-20 PROCEDURE — 99214 PR OFFICE/OUTPT VISIT, EST, LEVL IV, 30-39 MIN: ICD-10-PCS | Mod: S$GLB,,, | Performed by: INTERNAL MEDICINE

## 2019-12-20 PROCEDURE — 80048 BASIC METABOLIC PNL TOTAL CA: CPT

## 2019-12-20 PROCEDURE — 99999 PR PBB SHADOW E&M-EST. PATIENT-LVL IV: ICD-10-PCS | Mod: PBBFAC,,, | Performed by: INTERNAL MEDICINE

## 2019-12-20 PROCEDURE — 99999 PR PBB SHADOW E&M-EST. PATIENT-LVL IV: CPT | Mod: PBBFAC,,, | Performed by: INTERNAL MEDICINE

## 2019-12-20 PROCEDURE — 36415 COLL VENOUS BLD VENIPUNCTURE: CPT | Mod: PN

## 2019-12-20 PROCEDURE — 3079F PR MOST RECENT DIASTOLIC BLOOD PRESSURE 80-89 MM HG: ICD-10-PCS | Mod: CPTII,S$GLB,, | Performed by: INTERNAL MEDICINE

## 2019-12-20 PROCEDURE — 1101F PR PT FALLS ASSESS DOC 0-1 FALLS W/OUT INJ PAST YR: ICD-10-PCS | Mod: CPTII,S$GLB,, | Performed by: INTERNAL MEDICINE

## 2019-12-20 PROCEDURE — 99214 OFFICE O/P EST MOD 30 MIN: CPT | Mod: S$GLB,,, | Performed by: INTERNAL MEDICINE

## 2019-12-20 RX ORDER — PRAVASTATIN SODIUM 40 MG/1
1 TABLET ORAL NIGHTLY
COMMUNITY
Start: 2019-11-19 | End: 2019-12-20

## 2019-12-20 RX ORDER — ATORVASTATIN CALCIUM 40 MG/1
40 TABLET, FILM COATED ORAL NIGHTLY
Qty: 90 TABLET | Refills: 1 | Status: SHIPPED | OUTPATIENT
Start: 2019-12-20 | End: 2020-11-13 | Stop reason: SDUPTHER

## 2019-12-20 RX ORDER — DICLOFENAC SODIUM 10 MG/G
2 GEL TOPICAL 4 TIMES DAILY
Qty: 100 G | Refills: 0 | Status: SHIPPED | OUTPATIENT
Start: 2019-12-20 | End: 2023-09-21

## 2019-12-20 RX ORDER — HYDROCHLOROTHIAZIDE 12.5 MG/1
12.5 TABLET ORAL DAILY
Qty: 90 TABLET | Refills: 3 | Status: SHIPPED | OUTPATIENT
Start: 2019-12-20 | End: 2020-01-03

## 2019-12-20 NOTE — TELEPHONE ENCOUNTER
Call placed to pharmacy , stated pt picked up medication pravastatin in September. Pt was informed that new medication Atorvastatin is ready to be picked up from pharmacy.  Trav Reynaga LPN

## 2019-12-20 NOTE — TELEPHONE ENCOUNTER
----- Message from Margarita Funes MD sent at 12/20/2019  8:51 AM CST -----  Pt reports pharm gave her the old Rx for Pravastatin and she did not get new Rx for Atorvastatin.  Will resend to pharm.  Please convey to pharm and check with them to see what she recently picked up and when.    Dr. CLARK

## 2020-01-03 ENCOUNTER — TELEPHONE (OUTPATIENT)
Dept: PRIMARY CARE CLINIC | Facility: CLINIC | Age: 80
End: 2020-01-03

## 2020-01-03 DIAGNOSIS — I10 ESSENTIAL HYPERTENSION: Primary | ICD-10-CM

## 2020-01-03 DIAGNOSIS — I10 ESSENTIAL HYPERTENSION: ICD-10-CM

## 2020-01-03 PROBLEM — E78.5 HYPERLIPIDEMIA: Status: ACTIVE | Noted: 2020-01-03

## 2020-01-03 RX ORDER — AMLODIPINE BESYLATE 2.5 MG/1
2.5 TABLET ORAL DAILY
Qty: 30 TABLET | Refills: 0 | Status: SHIPPED | OUTPATIENT
Start: 2020-01-03 | End: 2020-01-05

## 2020-01-03 NOTE — PROGRESS NOTES
Please call the patient regarding her abnormal result. Her kidney function went down slightly since we restarted her HCTZ. I rec we stop her HCTZ and have her start amlodipine 2.5 mg/d.  Make sure she understands this and alert her pharmacy. She should fu with me in 2 wks and have a repeat BMP.    Dr. CLARK

## 2020-01-03 NOTE — TELEPHONE ENCOUNTER
----- Message from Margarita Funes MD sent at 1/3/2020  4:01 PM CST -----  Please call the patient regarding her abnormal result. Her kidney function went down slightly since we restarted her HCTZ. I rec we stop her HCTZ and have her start amlodipine 2.5 mg/d.  Make sure she understands this and alert her pharmacy. She should fu with me in 2 wks and have a repeat BMP.    Dr. CLARK

## 2020-01-03 NOTE — TELEPHONE ENCOUNTER
I sw pt and she expressed understanding. We set up an a 2 wk f/u. I also informed Odette with [facility] and she will put a note on pts profile

## 2020-01-05 RX ORDER — AMLODIPINE BESYLATE 2.5 MG/1
TABLET ORAL
Qty: 30 TABLET | Refills: 0 | Status: SHIPPED | OUTPATIENT
Start: 2020-01-05 | End: 2020-02-12

## 2020-01-07 ENCOUNTER — TELEPHONE (OUTPATIENT)
Dept: PRIMARY CARE CLINIC | Facility: CLINIC | Age: 80
End: 2020-01-07

## 2020-01-07 NOTE — TELEPHONE ENCOUNTER
----- Message from Rigoberto Muhammad sent at 1/7/2020 12:40 PM CST -----  Contact: Pt   Patient is requesting a callback regarding her up coming appointment . Please give the patient a callback at [phone].

## 2020-01-07 NOTE — TELEPHONE ENCOUNTER
I sw patient and explained to her that her f/u is following her recent abn labs(we recently d/c HCTZ and started Amlodipine due to her kidney function). She expressed understanding and confirmed that she has stopped the HCTZ and is taking Amlodipine

## 2020-01-17 ENCOUNTER — TELEPHONE (OUTPATIENT)
Dept: PRIMARY CARE CLINIC | Facility: CLINIC | Age: 80
End: 2020-01-17

## 2020-01-17 ENCOUNTER — LAB VISIT (OUTPATIENT)
Dept: LAB | Facility: HOSPITAL | Age: 80
End: 2020-01-17
Attending: INTERNAL MEDICINE
Payer: MEDICARE

## 2020-01-17 ENCOUNTER — OFFICE VISIT (OUTPATIENT)
Dept: PRIMARY CARE CLINIC | Facility: CLINIC | Age: 80
End: 2020-01-17
Payer: MEDICARE

## 2020-01-17 ENCOUNTER — IMMUNIZATION (OUTPATIENT)
Dept: PHARMACY | Facility: CLINIC | Age: 80
End: 2020-01-17
Payer: MEDICARE

## 2020-01-17 ENCOUNTER — TELEPHONE (OUTPATIENT)
Dept: ADMINISTRATIVE | Facility: HOSPITAL | Age: 80
End: 2020-01-17

## 2020-01-17 ENCOUNTER — PATIENT OUTREACH (OUTPATIENT)
Dept: ADMINISTRATIVE | Facility: HOSPITAL | Age: 80
End: 2020-01-17

## 2020-01-17 VITALS
OXYGEN SATURATION: 94 % | DIASTOLIC BLOOD PRESSURE: 70 MMHG | SYSTOLIC BLOOD PRESSURE: 130 MMHG | HEIGHT: 65 IN | BODY MASS INDEX: 37.1 KG/M2 | HEART RATE: 80 BPM | WEIGHT: 222.69 LBS | TEMPERATURE: 99 F

## 2020-01-17 DIAGNOSIS — N17.9 AKI (ACUTE KIDNEY INJURY): ICD-10-CM

## 2020-01-17 DIAGNOSIS — Z78.0 POSTMENOPAUSAL: ICD-10-CM

## 2020-01-17 DIAGNOSIS — N17.9 AKI (ACUTE KIDNEY INJURY): Primary | ICD-10-CM

## 2020-01-17 DIAGNOSIS — E87.6 HYPOKALEMIA: ICD-10-CM

## 2020-01-17 DIAGNOSIS — I10 ESSENTIAL HYPERTENSION: ICD-10-CM

## 2020-01-17 DIAGNOSIS — E87.6 HYPOKALEMIA: Primary | ICD-10-CM

## 2020-01-17 DIAGNOSIS — E78.5 HYPERLIPIDEMIA, UNSPECIFIED HYPERLIPIDEMIA TYPE: ICD-10-CM

## 2020-01-17 DIAGNOSIS — E66.01 CLASS 2 SEVERE OBESITY DUE TO EXCESS CALORIES WITH SERIOUS COMORBIDITY AND BODY MASS INDEX (BMI) OF 36.0 TO 36.9 IN ADULT: ICD-10-CM

## 2020-01-17 DIAGNOSIS — C54.1 ENDOMETRIAL CA: ICD-10-CM

## 2020-01-17 PROBLEM — Z79.1 NSAID LONG-TERM USE: Status: RESOLVED | Noted: 2019-09-26 | Resolved: 2020-01-17

## 2020-01-17 PROBLEM — E66.812 CLASS 2 SEVERE OBESITY DUE TO EXCESS CALORIES WITH SERIOUS COMORBIDITY AND BODY MASS INDEX (BMI) OF 36.0 TO 36.9 IN ADULT: Status: ACTIVE | Noted: 2018-05-06

## 2020-01-17 LAB
ANION GAP SERPL CALC-SCNC: 10 MMOL/L (ref 8–16)
BUN SERPL-MCNC: 21 MG/DL (ref 8–23)
CALCIUM SERPL-MCNC: 9.2 MG/DL (ref 8.7–10.5)
CHLORIDE SERPL-SCNC: 104 MMOL/L (ref 95–110)
CO2 SERPL-SCNC: 30 MMOL/L (ref 23–29)
CREAT SERPL-MCNC: 1 MG/DL (ref 0.5–1.4)
EST. GFR  (AFRICAN AMERICAN): >60 ML/MIN/1.73 M^2
EST. GFR  (NON AFRICAN AMERICAN): 54.5 ML/MIN/1.73 M^2
GLUCOSE SERPL-MCNC: 88 MG/DL (ref 70–110)
POTASSIUM SERPL-SCNC: 3.3 MMOL/L (ref 3.5–5.1)
SODIUM SERPL-SCNC: 144 MMOL/L (ref 136–145)

## 2020-01-17 PROCEDURE — 99214 OFFICE O/P EST MOD 30 MIN: CPT | Mod: S$GLB,,, | Performed by: INTERNAL MEDICINE

## 2020-01-17 PROCEDURE — 99999 PR PBB SHADOW E&M-EST. PATIENT-LVL IV: ICD-10-PCS | Mod: PBBFAC,,, | Performed by: INTERNAL MEDICINE

## 2020-01-17 PROCEDURE — 1159F MED LIST DOCD IN RCRD: CPT | Mod: S$GLB,,, | Performed by: INTERNAL MEDICINE

## 2020-01-17 PROCEDURE — 1159F PR MEDICATION LIST DOCUMENTED IN MEDICAL RECORD: ICD-10-PCS | Mod: S$GLB,,, | Performed by: INTERNAL MEDICINE

## 2020-01-17 PROCEDURE — 3075F PR MOST RECENT SYSTOLIC BLOOD PRESS GE 130-139MM HG: ICD-10-PCS | Mod: CPTII,S$GLB,, | Performed by: INTERNAL MEDICINE

## 2020-01-17 PROCEDURE — 1101F PR PT FALLS ASSESS DOC 0-1 FALLS W/OUT INJ PAST YR: ICD-10-PCS | Mod: CPTII,S$GLB,, | Performed by: INTERNAL MEDICINE

## 2020-01-17 PROCEDURE — 99214 PR OFFICE/OUTPT VISIT, EST, LEVL IV, 30-39 MIN: ICD-10-PCS | Mod: S$GLB,,, | Performed by: INTERNAL MEDICINE

## 2020-01-17 PROCEDURE — 36415 COLL VENOUS BLD VENIPUNCTURE: CPT | Mod: PN

## 2020-01-17 PROCEDURE — 99999 PR PBB SHADOW E&M-EST. PATIENT-LVL IV: CPT | Mod: PBBFAC,,, | Performed by: INTERNAL MEDICINE

## 2020-01-17 PROCEDURE — 1126F PR PAIN SEVERITY QUANTIFIED, NO PAIN PRESENT: ICD-10-PCS | Mod: S$GLB,,, | Performed by: INTERNAL MEDICINE

## 2020-01-17 PROCEDURE — 80048 BASIC METABOLIC PNL TOTAL CA: CPT

## 2020-01-17 PROCEDURE — 1101F PT FALLS ASSESS-DOCD LE1/YR: CPT | Mod: CPTII,S$GLB,, | Performed by: INTERNAL MEDICINE

## 2020-01-17 PROCEDURE — 3078F DIAST BP <80 MM HG: CPT | Mod: CPTII,S$GLB,, | Performed by: INTERNAL MEDICINE

## 2020-01-17 PROCEDURE — 3078F PR MOST RECENT DIASTOLIC BLOOD PRESSURE < 80 MM HG: ICD-10-PCS | Mod: CPTII,S$GLB,, | Performed by: INTERNAL MEDICINE

## 2020-01-17 PROCEDURE — 3075F SYST BP GE 130 - 139MM HG: CPT | Mod: CPTII,S$GLB,, | Performed by: INTERNAL MEDICINE

## 2020-01-17 PROCEDURE — 1126F AMNT PAIN NOTED NONE PRSNT: CPT | Mod: S$GLB,,, | Performed by: INTERNAL MEDICINE

## 2020-01-17 RX ORDER — POTASSIUM CHLORIDE 750 MG/1
TABLET, EXTENDED RELEASE ORAL
Qty: 3 TABLET | Refills: 0 | Status: SHIPPED | OUTPATIENT
Start: 2020-01-17 | End: 2020-01-17

## 2020-01-17 RX ORDER — POTASSIUM CHLORIDE 750 MG/1
TABLET, EXTENDED RELEASE ORAL
Qty: 3 TABLET | Refills: 0 | Status: SHIPPED | OUTPATIENT
Start: 2020-01-17 | End: 2021-05-14

## 2020-01-17 NOTE — LETTER
AUTHORIZATION FOR RELEASE OF   CONFIDENTIAL INFORMATION    TO: [facility] Records     We are seeing Jodi Cole, date of birth 1940, in the clinic at [facility]. Margarita Funes MD is the patient's PCP. Jodi Cole has an outstanding lab/procedure at the time we reviewed her chart. In order to help keep her health information updated, she has authorized us to request the following medical record(s):        (  )  MAMMOGRAM                                      (  )  COLONOSCOPY      (  )  PAP SMEAR                                          (  )  OUTSIDE LAB RESULTS     ( X )  DEXA SCAN                                          (  )  EYE EXAM            (  )  FOOT EXAM                                          (  )  ENTIRE RECORD     (  )  OUTSIDE IMMUNIZATIONS                 (  )  _______________         Please fax records to [facility], Nicole Giambrone, MD, [phone]     If you have any questions, please contact Jayla at [phone].           Patient Name: Jodi Cole  : 1940  Patient Phone #: [phone]

## 2020-01-17 NOTE — TELEPHONE ENCOUNTER
----- Message from Margarita Funes MD sent at 1/17/2020  8:54 AM CST -----  Pt due for DEXA. I put in order.  Please inform pt    Dr. CLARK

## 2020-01-17 NOTE — PROGRESS NOTES
Please call the patient regarding her abnormal result. Her Kidney function has improved back to baseline which is great.  Her potassium is just below normal.  I rec a high potassium diet.  Have her take Kcl 10 mEq once daily x 3 days. I sent to pharm for her.    Dr. CLARK

## 2020-01-17 NOTE — PROGRESS NOTES
Call placed to pharmacy regarding medication Atorvastatin. Spoke with pharmacist Dr. Muhammad who stated pt was not given medication on 01/082019 due pt having duplicate statin orders on file. Insurance company had rejected atorvastatin, due pt already being on pravastatin. Dr. Muhammad stated it was not written in notes from staff that the pt was no longer taking pravastatin. Dr. Muhammad stated pt medication Atorvastatin is ready to be picked up.  Trav Reynaga LPN

## 2020-01-17 NOTE — TELEPHONE ENCOUNTER
Call placed to pt regarding abnormal lab results and recommendations. No answer. Left message for pt to call office to discuss concerns.  Trav Reynaga LPN

## 2020-01-17 NOTE — TELEPHONE ENCOUNTER
----- Message from Margarita Funes MD sent at 1/17/2020  8:04 AM CST -----  Please find out when pt last got her DEXA at     Dr. CLARK

## 2020-01-17 NOTE — LETTER
AUTHORIZATION FOR RELEASE OF   CONFIDENTIAL INFORMATION    ,    We are seeing Jodi Cole, date of birth 1940, in the clinic at [facility]. Margarita Funes MD is the patient's PCP. Jodi Cole has an outstanding lab/procedure at the time we reviewed her chart. In order to help keep her health information updated, she has authorized us to request the following medical record(s):        (  )  MAMMOGRAM                                      (  )  COLONOSCOPY      (  )  PAP SMEAR                                          (  )  OUTSIDE LAB RESULTS     (  )  DEXA SCAN                                          (  )  EYE EXAM            (  )  FOOT EXAM                                          (  )  ENTIRE RECORD     (  )  OUTSIDE IMMUNIZATIONS                 (  )  _______________         Please fax records to [facility], Nicole Giambrone, MD, ***     If you have any questions, please contact *** at [phone]           Patient Name: Jodi Cole  : 1940  Patient Phone #: [phone]

## 2020-01-17 NOTE — LETTER
AUTHORIZATION FOR RELEASE OF   CONFIDENTIAL INFORMATION    TO: [facility] Records    We are seeing Jodi Cole, date of birth 1940, in the clinic at [facility]. Margarita Funes MD is the patient's PCP. Jodi Cole has an outstanding lab/procedure at the time we reviewed her chart. In order to help keep her health information updated, she has authorized us to request the following medical record(s):        (  )  MAMMOGRAM                                      (  )  COLONOSCOPY      (  )  PAP SMEAR                                          (  )  OUTSIDE LAB RESULTS     ( X )  DEXA SCAN                                          (  )  EYE EXAM            (  )  FOOT EXAM                                          (  )  ENTIRE RECORD     (  )  OUTSIDE IMMUNIZATIONS                 (  )  _______________         Please fax records to [facility], Nicole Giambrone, MD, [phone]     If you have any questions, please contact Jayla at [phone].           Patient Name: Jodi Cole  : 1940  Patient Phone #: [phone]

## 2020-01-17 NOTE — TELEPHONE ENCOUNTER
Call placed to pt to inform , Dr. Funes has an order in foe DEXA scan. Offered to schedule test , pt stated she will need to check with her transportation before scheduling. The patient verbalized understanding and had no further questions or concerns.  Trav Reynaga LPN

## 2020-01-17 NOTE — TELEPHONE ENCOUNTER
----- Message from Margarita Funes MD sent at 1/17/2020  2:37 PM CST -----  Please call the patient regarding her abnormal result. Her Kidney function has improved back to baseline which is great.  Her potassium is just below normal.  I rec a high potassium diet.  Have her take Kcl 10 mEq once daily x 3 days. I sent to pharm for her.    Dr. CLRAK

## 2020-01-17 NOTE — PROGRESS NOTES
"[facility] Note    Chief Complaint    No chief complaint on file.      History of Present Illness      Jodi Cole is a 79 y.o.  AAF with Endometrial Ca, HTN, Insomnia, OA presents to fu chronic issues. Last visit - 12/20/19.     HTN - Pt prev noncompliant with Amlodipine 10mg/d as Rx May 2018 by Dr. Felix. Pt had not been taking it ever as an out patient. She prev ran out of her Maxide 37.5/25 and had not picked up Maxide since 5/3/19.  In questioning pt if she has been out of maxide for longer than a week she reports "sometimes I didn't take it every day because my pressure would be too low".  Her kidney function went down slightly since we restarted her HCTZ last visit so I rec we stop her HCTZ and had her start Amlodipine 2.5 mg/d.   BUN/Cr - 34/1.4 GFR - 41.  Will repeat BMP since stopping HCTZ. Controlled on amlodipine. Minimal edema.      HLD - Pt was on Pravastatin 40 mg/d.    HDL 57 .  Her Cholesterol is not controlled on Pravastatin 40 mg QHS. We changed her to Atorvastatin 40 mg po QHS but there was confusion with her Pharmacy despite us talking to them at last visit and she never got the Rx.  We d/w Pharmacy manager today. Pt will  Atorvastatin today. Rec low chol diet and exercise for lifestyle modification.  Rec pt start a statin.  Warned pt about potential S.E. including elev LFT's, myalgias, muscle weakness, memory issues, and hyperglycemia.  Pt should call with any concerns.  Will cont to monitor. She will try to cut back to red meat 1/wk.      Oa - Pt is s/p RT TKA 10/1/19. Fu by Dr. Gina Lunsford.  Pt is on Tylenol prn - not helping.  She takes ASA 81 mg/d. Affects hands, shoulder.  Pt on Voltaren gel - no help. Rec fu Ortho.     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 She was tx with ChemoTx - Carbo and taxol x 6 cycles. Has fu in Jan.      Obesity - BMI - 36.8 - Likely due to recent surgery and dec activity. She will cut back on sweets.  Exercise is limited due to knees. Rec she joining gym with [employer] and use pool/Stationary bike.      HCM - Flu - 11/19/19;  Tdap - ?;  PCV 13 - 1/17/20;  PVX 23 -none;  Shingrix - none - refuses; MGM - 3/20/19 - repeat 1 yr;  DEXA - ? At [facility]; C-scope - 5/22/19 + polyps - repeat not rec due to age; Ortho - Dr. Fuentes;  Gyn/Onc - Dr. Jordan; Prev PCP - Dr. Umana        Past Medical History:  Past Medical History:   Diagnosis Date    Anemia associated with chemotherapy 9/1/2015    Anemia due to chemotherapy 9/22/2015    Chemotherapy induced nausea and vomiting 6/9/2015    Chemotherapy-induced neutropenia 7/28/2015    Constipation 7/7/2015    Endometrial ca 5/26/2015    Endometrial ca 8/10/2015    Essential hypertension     Hypertension     Osteoarthritis     Ovarian cancer     Pain in both hands 2/19/2016    Right-sided low back pain without sciatica 6/23/2016    Uterine cancer     Well woman exam with routine gynecological exam 2/19/2016       Past Surgical History:   has a past surgical history that includes D&C Hysteroscopy (March 18, 2015); Dilation and curettage of uterus (1969 ); Lymphadenectomy; Hysterectomy (5/11/15); Pelvic and para-aortic lymph node dissection; pr removal of ovary/tube(s); and Colonoscopy (N/A, 5/22/2019).    Family History:  family history includes Cancer (age of onset: 65) in her brother; Heart disease in her father.     Social History:  Social History     Tobacco Use    Smoking status: Never Smoker    Smokeless tobacco: Never Used   Substance Use Topics    Alcohol use: No    Drug use: Never       Review of Systems   Constitutional: Negative for chills and fever.   HENT: Negative for tinnitus.    Eyes: Negative for blurred vision and double vision.        Due for Ophtho fu    Respiratory: Positive for cough. Negative for shortness of breath.         Improving - getting over a cold   Cardiovascular: Negative for palpitations and leg swelling.   Gastrointestinal: Negative for abdominal pain, heartburn, nausea and vomiting.   Genitourinary: Negative for frequency.   Musculoskeletal: Positive for joint pain. Negative for myalgias.        Left knee   Skin: Negative for itching and rash.   Neurological: Positive for headaches. Negative for dizziness.        Rare HA   Endo/Heme/Allergies: Does not bruise/bleed easily.   Psychiatric/Behavioral: Negative for memory loss.        Medications:  Outpatient Encounter Medications as of 1/17/2020   Medication Sig Note Dispense Refill    acetaminophen (TYLENOL) 650 MG TbSR Take 1 tablet (650 mg total) by mouth every 8 (eight) hours as needed.  120 tablet 0    amLODIPine (NORVASC) 2.5 MG tablet TAKE 1 TABLET(2.5 MG) BY MOUTH EVERY DAY  30 tablet 0    atorvastatin (LIPITOR) 40 MG tablet Take 1 tablet (40 mg total) by mouth every evening.  90 tablet 1    multivitamin capsule Take 1 capsule by mouth once daily. 9/26/2019: HOLD 14 DAYS BEFORE SURGERY        vitamins B1 B6 B12 Tab Take by mouth once daily. 9/26/2019: CONTINUE TO HOLD       aspirin (ECOTRIN) 81 MG EC tablet Take 1 tablet (81 mg total) by mouth 2 (two) times daily. (Patient not taking: Reported on 1/17/2020)  60 tablet 0    diclofenac sodium (VOLTAREN) 1 % Gel Apply 2 g topically 4 (four) times daily. (Patient not taking: Reported on 1/17/2020)  100 g 0     Facility-Administered Encounter Medications as of 1/17/2020   Medication Dose Route Frequency Provider Last Rate Last Dose    midazolam (VERSED) 1 mg/mL injection 0.5 mg  0.5 mg Intravenous PRN Henry Reynoso MD           Current Outpatient Medications:     acetaminophen (TYLENOL) 650 MG TbSR, Take 1 tablet (650 mg total) by mouth every 8 (eight) hours as needed., Disp: 120 tablet, Rfl: 0    amLODIPine (NORVASC) 2.5 MG  "tablet, TAKE 1 TABLET(2.5 MG) BY MOUTH EVERY DAY, Disp: 30 tablet, Rfl: 0    atorvastatin (LIPITOR) 40 MG tablet, Take 1 tablet (40 mg total) by mouth every evening., Disp: 90 tablet, Rfl: 1    multivitamin capsule, Take 1 capsule by mouth once daily., Disp: , Rfl:     vitamins B1 B6 B12 Tab, Take by mouth once daily., Disp: , Rfl:     aspirin (ECOTRIN) 81 MG EC tablet, Take 1 tablet (81 mg total) by mouth 2 (two) times daily. (Patient not taking: Reported on 1/17/2020), Disp: 60 tablet, Rfl: 0    diclofenac sodium (VOLTAREN) 1 % Gel, Apply 2 g topically 4 (four) times daily. (Patient not taking: Reported on 1/17/2020), Disp: 100 g, Rfl: 0    pneumoc 13-sean conj-dip cr,PF, (PREVNAR 13, PF,) 0.5 mL Syrg, Inject 0.5 mLs into the muscle once. For one dose. for 1 dose, Disp: 0.5 mL, Rfl: 0  No current facility-administered medications for this visit.     Facility-Administered Medications Ordered in Other Visits:     midazolam (VERSED) 1 mg/mL injection 0.5 mg, 0.5 mg, Intravenous, PRN, Henry Reynoso MD    Allergies:  Review of patient's allergies indicates:  No Known Allergies    Health Maintenance:  Immunization History   Administered Date(s) Administered    Influenza - High Dose - PF (65 years and older) 11/19/2019    Pneumococcal Conjugate - 13 Valent 01/17/2020      Health Maintenance   Topic Date Due    TETANUS VACCINE  10/23/1960    DEXA SCAN  10/23/1982    Pneumococcal Vaccine (65+ High/Highest Risk) (1 of 2 - PCV13) 10/23/2007    Lipid Panel  11/19/2024      Objective:      Vital Signs  Temp: 98.7 °F (37.1 °C)  Temp src: Oral  Pulse: 80  SpO2: (!) 94 %  BP: 130/70  BP Location: Left arm  Patient Position: Sitting  Pain Score: 0-No pain  Height and Weight  Height: 5' 5.16" (165.5 cm)  Weight: 101 kg (222 lb 10.6 oz)  BSA (Calculated - sq m): 2.15 sq meters  BMI (Calculated): 36.9  Weight in (lb) to have BMI = 25: 150.6]    Laboratory:  CBC:  Recent Labs   Lab 05/06/18 1952 09/26/19  1150 "   WBC 7.59 5.27   RBC 4.27 4.67   Hemoglobin 11.1 L 12.1   Hematocrit 35.4 L 39.0   Platelets 230 230   Mean Corpuscular Volume 83 84   Mean Corpuscular Hemoglobin 26.0 L 25.9 L   Mean Corpuscular Hemoglobin Conc 31.4 L 31.0 L       CMP:  Recent Labs   Lab 05/06/18 1952 11/19/19  0922 12/20/19  0948   Glucose 94   < > 85 88   Calcium 9.3   < > 9.6 9.8   Albumin 3.5  --  3.7  --    Total Protein 7.2  --  7.3  --    Sodium 144   < > 142 145   Potassium 3.7   < > 3.5 3.7   CO2 27   < > 32 H 37 H   Chloride 106   < > 102 100   BUN, Bld 23   < > 16 34 H   Creatinine 0.9   < > 1.0 1.4   eGFR if African American >60.0   < > >60.0 41.8 A   eGFR if non  >60.0   < > 54.5 A 36.3 A   Alkaline Phosphatase 76  --  89  --    ALT 7 L  --  6 L  --    AST 12  --  10  --    Total Bilirubin 0.3  --  0.4  --     < > = values in this interval not displayed.       URINALYSIS:              LIPIDS:  Recent Labs   Lab 11/19/19  0922   HDL 57   Cholesterol 259 H   Triglycerides 149   LDL Cholesterol 172.2 H   Hdl/Cholesterol Ratio 22.0   Non-HDL Cholesterol 202   Total Cholesterol/HDL Ratio 4.5     Physical Exam   Constitutional: She appears well-developed and well-nourished. No distress.   HENT:   Head: Normocephalic and atraumatic.   Eyes:   tuyet cataracts   Cardiovascular: Normal rate, regular rhythm and intact distal pulses.   Murmur heard.  Trace BLE edema and pedal edema in Left foot > RT; I-II/VI Sys Murmur at LUSB radiates to Rt carotid    Pulmonary/Chest: Effort normal and breath sounds normal. No respiratory distress.   Abdominal: Soft. Bowel sounds are normal. There is no tenderness.   Musculoskeletal:   Arthritic changes to hands and tuyet kness; Crepitus to Left knee   Skin: Skin is warm and dry. She is not diaphoretic.   Psychiatric: She has a normal mood and affect.   Nursing note and vitals reviewed.          Assessment:       1. KEVIN (acute kidney injury)    2. Essential hypertension    3. Hyperlipidemia,  unspecified hyperlipidemia type    4. Postmenopausal        Jodi Cole is a 79 y.o.female with:    1. KEVIN (acute kidney injury)  - Basic metabolic panel; Future  -Repeat BMP since stopping HCTZ.     2. Essential hypertension  - Basic metabolic panel; Future  -Controlled on Amlodipine. Cont current.  Cont to monitor for worsening edema.     3. Hyperlipidemia, unspecified hyperlipidemia type  -Will start Atorvastatin.     4. Postmenopausal  - DXA Bone Density Spine And Hip; Future  -Ordered DEXA.      5. Endometrial ca  -Has upcoming appt with Dr. Jordan.     6. Class 2 severe obesity due to excess calories with serious comorbidity and body mass index (BMI) of 36.0 to 36.9 in adult  -Cont to work on diet and exercise for wt loss.    Chronic conditions status updated as per HPI.  Other than changes above, cont current medications and maintain follow up with specialists.  Return to clinic in 3 months or sooner if needed.    Margarita Funes MD  [facility]

## 2020-01-17 NOTE — PROGRESS NOTES
Patient, Jodi Cole (MRN #[id number]), presented with a recent Estimated Glumerular Filtration Rate (EGFR) between 30 and 45 consistent with the definition of chronic kidney disease stage 3 - moderate (ICD10 - N18.3).    eGFR if    Date Value Ref Range Status   12/20/2019 41.8 (A) >60 mL/min/1.73 m^2 Final       The patient's chronic kidney disease stage 3 was monitored, evaluated, addressed and/or treated. This addendum to the medical record is made on 01/17/2020.

## 2020-01-23 ENCOUNTER — TELEPHONE (OUTPATIENT)
Dept: ADMINISTRATIVE | Facility: OTHER | Age: 80
End: 2020-01-23

## 2020-01-23 NOTE — PROGRESS NOTES
"Subjective:       Patient ID: Jodi Cole is a 79 y.o. female.    Chief Complaint: Endometrial Cancer    HPI     Patient comes in today for follow up for endometrial cancer.   Denies vaginal bleeding.      Jan 2020:  is 10.       Mammogram: March 20, 2019: normal   CBE: July 24, 2019: normal.   Colonoscopy: May 2019: normal. No need to repeat       Her oncologic history is:    May 2015: She underwent a robotic-assisted laparoscopic hysterectomy with bilateral salpingo-oophorectomy and bilateral pelvic and para-aortic lymphadenectomy on 5/11/2015. 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 The depth of invasion was 2 mm out of 27 mm. Nodes were negative as was the cytologic washing.    Finished vaginal cuff brachytherapy on 7/20/2015.    Oct 2015: Completed 6 cycles of taxol and carboplatin in October 2015.    CT scan after completion was normal.  was 8.      Review of Systems   Constitutional: Negative for chills, fatigue and fever.   Respiratory: Negative for cough, shortness of breath and wheezing.    Cardiovascular: Negative for chest pain, palpitations and leg swelling.   Gastrointestinal: Negative for abdominal pain, constipation, diarrhea, nausea and vomiting.   Genitourinary: Negative for difficulty urinating, dysuria, frequency, genital sores, hematuria, urgency, vaginal bleeding, vaginal discharge and vaginal pain.   Neurological: Negative for weakness.   Hematological: Negative for adenopathy. Does not bruise/bleed easily.   Psychiatric/Behavioral: The patient is not nervous/anxious.        Objective:   BP (!) 144/68   Pulse 81   Ht 5' 5" (1.651 m)   Wt 100.6 kg (221 lb 12.5 oz)   BMI 36.91 kg/m²      Physical Exam   Constitutional: She is oriented to person, place, and time. She appears well-developed and well-nourished.   HENT:   Head: Normocephalic and atraumatic.   Eyes: No scleral icterus.   Neck: Neck supple. No tracheal deviation " present. No thyroid mass and no thyromegaly present.   Cardiovascular: Normal rate and regular rhythm.   Pulmonary/Chest: Effort normal and breath sounds normal. She has no wheezes.   Abdominal: Soft. She exhibits no distension and no mass. There is no hepatosplenomegaly. There is no tenderness. There is no rebound and no guarding.   Genitourinary:   Genitourinary Comments: Bimanual exam:  Vulva: no lesions. Normal appearance  Urethra: Normal size and location. No lesions  Bladder: No masses or tenderness.  Vagina: normal mucosa. No lesion  Cervix: absent.   Uterus: absent.  Adnexa: no masses.  Rectovaginal: Not performed     Musculoskeletal: She exhibits no edema or tenderness.   Lymphadenopathy:     She has no cervical adenopathy.     She has no axillary adenopathy.        Right: No inguinal and no supraclavicular adenopathy present.        Left: No inguinal and no supraclavicular adenopathy present.   Neurological: She is alert and oriented to person, place, and time.   Skin: Skin is warm and dry.   Psychiatric: She has a normal mood and affect. Her behavior is normal. Judgment and thought content normal.       Assessment:       1. Endometrial ca    2. Screening mammogram, encounter for        Plan:   Endometrial ca   PEG   RTC 6 months  -     ; Future; Expected date: 07/24/2020    Screening mammogram, encounter for  -     Mammo Digital Screening Bilat; Future; Expected date: 03/23/2020

## 2020-01-24 ENCOUNTER — OFFICE VISIT (OUTPATIENT)
Dept: GYNECOLOGIC ONCOLOGY | Facility: CLINIC | Age: 80
End: 2020-01-24
Payer: MEDICARE

## 2020-01-24 ENCOUNTER — LAB VISIT (OUTPATIENT)
Dept: LAB | Facility: HOSPITAL | Age: 80
End: 2020-01-24
Attending: OBSTETRICS & GYNECOLOGY
Payer: MEDICARE

## 2020-01-24 VITALS
SYSTOLIC BLOOD PRESSURE: 144 MMHG | BODY MASS INDEX: 36.96 KG/M2 | HEART RATE: 81 BPM | WEIGHT: 221.81 LBS | HEIGHT: 65 IN | DIASTOLIC BLOOD PRESSURE: 68 MMHG

## 2020-01-24 DIAGNOSIS — C54.1 ENDOMETRIAL CA: Primary | ICD-10-CM

## 2020-01-24 DIAGNOSIS — Z12.31 SCREENING MAMMOGRAM, ENCOUNTER FOR: ICD-10-CM

## 2020-01-24 DIAGNOSIS — C54.1 ENDOMETRIAL CA: ICD-10-CM

## 2020-01-24 LAB — CANCER AG125 SERPL-ACNC: 10 U/ML (ref 0–30)

## 2020-01-24 PROCEDURE — 1159F PR MEDICATION LIST DOCUMENTED IN MEDICAL RECORD: ICD-10-PCS | Mod: S$GLB,,, | Performed by: OBSTETRICS & GYNECOLOGY

## 2020-01-24 PROCEDURE — 1101F PR PT FALLS ASSESS DOC 0-1 FALLS W/OUT INJ PAST YR: ICD-10-PCS | Mod: CPTII,S$GLB,, | Performed by: OBSTETRICS & GYNECOLOGY

## 2020-01-24 PROCEDURE — 99999 PR PBB SHADOW E&M-EST. PATIENT-LVL III: CPT | Mod: PBBFAC,,, | Performed by: OBSTETRICS & GYNECOLOGY

## 2020-01-24 PROCEDURE — 3077F SYST BP >= 140 MM HG: CPT | Mod: CPTII,S$GLB,, | Performed by: OBSTETRICS & GYNECOLOGY

## 2020-01-24 PROCEDURE — 99214 PR OFFICE/OUTPT VISIT, EST, LEVL IV, 30-39 MIN: ICD-10-PCS | Mod: S$GLB,,, | Performed by: OBSTETRICS & GYNECOLOGY

## 2020-01-24 PROCEDURE — 3077F PR MOST RECENT SYSTOLIC BLOOD PRESSURE >= 140 MM HG: ICD-10-PCS | Mod: CPTII,S$GLB,, | Performed by: OBSTETRICS & GYNECOLOGY

## 2020-01-24 PROCEDURE — 3078F PR MOST RECENT DIASTOLIC BLOOD PRESSURE < 80 MM HG: ICD-10-PCS | Mod: CPTII,S$GLB,, | Performed by: OBSTETRICS & GYNECOLOGY

## 2020-01-24 PROCEDURE — 36415 COLL VENOUS BLD VENIPUNCTURE: CPT

## 2020-01-24 PROCEDURE — 99999 PR PBB SHADOW E&M-EST. PATIENT-LVL III: ICD-10-PCS | Mod: PBBFAC,,, | Performed by: OBSTETRICS & GYNECOLOGY

## 2020-01-24 PROCEDURE — 1125F PR PAIN SEVERITY QUANTIFIED, PAIN PRESENT: ICD-10-PCS | Mod: S$GLB,,, | Performed by: OBSTETRICS & GYNECOLOGY

## 2020-01-24 PROCEDURE — 1101F PT FALLS ASSESS-DOCD LE1/YR: CPT | Mod: CPTII,S$GLB,, | Performed by: OBSTETRICS & GYNECOLOGY

## 2020-01-24 PROCEDURE — 1159F MED LIST DOCD IN RCRD: CPT | Mod: S$GLB,,, | Performed by: OBSTETRICS & GYNECOLOGY

## 2020-01-24 PROCEDURE — 3078F DIAST BP <80 MM HG: CPT | Mod: CPTII,S$GLB,, | Performed by: OBSTETRICS & GYNECOLOGY

## 2020-01-24 PROCEDURE — 86304 IMMUNOASSAY TUMOR CA 125: CPT

## 2020-01-24 PROCEDURE — 1125F AMNT PAIN NOTED PAIN PRSNT: CPT | Mod: S$GLB,,, | Performed by: OBSTETRICS & GYNECOLOGY

## 2020-01-24 PROCEDURE — 99214 OFFICE O/P EST MOD 30 MIN: CPT | Mod: S$GLB,,, | Performed by: OBSTETRICS & GYNECOLOGY

## 2020-02-02 NOTE — PLAN OF CARE
POC reviewed with pt. ID applied and verified. Plan of care initiated with patient. Understanding verbalized. No further questions or concerns      Subjective   Patient is a 50-year-old male that presents to the ER today with complaint of staple removal.  The patient had an abdominal plasty performed by Dr. Cox in [place] on November 25, 2019.  The patient states that this evening he noticed that he had a staple that was still in place from the surgery.  He has presented here today to have this removed.  He states he did show tried to remove it at home however was unable to.  He denies any other complaints.       History provided by:  Patient   used: No    Suture / Staple Removal   Location:  Left lateral abdomen  Severity:  Mild  Onset quality:  Sudden  Duration:  2 months  Timing:  Constant  Progression:  Unchanged  Chronicity:  New  Associated symptoms: no abdominal pain, no chest pain, no congestion, no cough, no diarrhea, no ear pain, no fatigue, no fever, no headaches, no loss of consciousness, no myalgias, no nausea, no rash, no rhinorrhea, no shortness of breath, no sore throat, no vomiting and no wheezing        Review of Systems   Constitutional: Negative for fatigue and fever.   HENT: Negative for congestion, ear pain, rhinorrhea and sore throat.    Respiratory: Negative for cough, shortness of breath and wheezing.    Cardiovascular: Negative for chest pain.   Gastrointestinal: Negative for abdominal pain, diarrhea, nausea and vomiting.   Musculoskeletal: Negative for myalgias.   Skin: Negative for rash.   Neurological: Negative for loss of consciousness and headaches.   All other systems reviewed and are negative.      Past Medical History:   Diagnosis Date   • Arthritis    • Diabetes mellitus (CMS/HCC)    • Disease of thyroid gland    • GERD (gastroesophageal reflux disease)    • Hernia    • Hyperlipidemia    • Hypertension        No Known Allergies    Past Surgical History:   Procedure Laterality Date   • GASTRIC SLEEVE LAPAROSCOPIC     • NISSEN FUNDOPLICATION         Family History   Problem Relation Age of Onset   • No Known  Problems Mother    • Diabetes Father    • Hyperlipidemia Father    • Hypertension Father    • No Known Problems Sister    • Cancer Brother        Social History     Socioeconomic History   • Marital status:      Spouse name: Not on file   • Number of children: Not on file   • Years of education: Not on file   • Highest education level: Not on file   Tobacco Use   • Smoking status: Never Smoker   Substance and Sexual Activity   • Alcohol use: No   • Drug use: No   • Sexual activity: Yes     Partners: Female           Objective   Physical Exam   Constitutional: He is oriented to person, place, and time. He appears well-developed and well-nourished.   Cardiovascular: Normal rate and regular rhythm.   Pulmonary/Chest: Effort normal.   Neurological: He is alert and oriented to person, place, and time.   Skin: Skin is warm and dry. Capillary refill takes less than 2 seconds.        Psychiatric: He has a normal mood and affect.   Nursing note and vitals reviewed.      Suture Removal  Date/Time: 2/2/2020 2:06 AM  Performed by: Edith Arreguin APRN  Authorized by: Edith Arreguin APRN     Consent:     Consent obtained:  Verbal    Consent given by:  Patient    Risks discussed:  Bleeding, pain and wound separation    Alternatives discussed:  No treatment, delayed treatment, alternative treatment, observation and referral  Location:     Location:  Trunk    Trunk location:  Abdomen  Procedure details:     Wound appearance:  No signs of infection, good wound healing, clean, pink and tender    Number of staples removed:  1  Post-procedure details:     Post-removal:  Antibiotic ointment applied and Band-Aid applied    Patient tolerance of procedure:  Tolerated well, no immediate complications  Comments:      Area was staple was embedded was numbed using 1% lidocaine injection prior to staple removal per pt request               ED Course  ED Course as of Feb 02 0209   Sun Feb 02, 2020 0207 F/u with Dr. Cox if  further problems. R/t to ER as needed.     [LF]      ED Course User Index  [LF] Edith Arreguin, JOB                 No orders to display     Labs Reviewed - No data to display                                   MDM  Number of Diagnoses or Management Options  Visit for suture removal: minor  Patient Progress  Patient progress: stable      Final diagnoses:   Visit for suture removal            Edith Arreguin, JOB  02/02/20 0208       Edith Arreguin, JOB  02/02/20 0209

## 2020-02-12 DIAGNOSIS — I10 ESSENTIAL HYPERTENSION: ICD-10-CM

## 2020-02-12 RX ORDER — AMLODIPINE BESYLATE 2.5 MG/1
TABLET ORAL
Qty: 90 TABLET | Refills: 0 | Status: SHIPPED | OUTPATIENT
Start: 2020-02-12 | End: 2020-06-09

## 2020-03-30 ENCOUNTER — DOCUMENTATION ONLY (OUTPATIENT)
Dept: REHABILITATION | Facility: HOSPITAL | Age: 80
End: 2020-03-30

## 2020-03-30 NOTE — PROGRESS NOTES
Outpatient Therapy Discharge Summary     Name: Jodi Cole  [facility] Number: [id number]    Therapy Diagnosis: Primary osteoarthritis of right knee  Physician: Joey Fuentes MD    Physician Orders: PT Eval and Treat 3 x/ wk for 8 weeks  Medical Diagnosis from Referral: Primary osteoarthritis of right knee  Evaluation Date: 10/3/2019    Assessment    Goals:   Short Term Goals: 4 weeks   Pt will be independent in a HEP to address R knee ROM and R LE strength   Pt will be independent in bed mobility  Pt will be an independent ambulator with a RW x 300 ft  R knee flexion ROM to >/= 100 degrees  R knee extension ROM to </= 5 degrees     Long Term Goals: 12 weeks   R knee flexion ROM to >/= 110 degrees  R knee extension ROM to 0 degrees  Pt will be an independent ambulator without an assistive device to improve her functional ability  Pt will be able to stand/walk for 30 min without increased R knee pain  Pt will report improved functional ability through an improved score on the FOTO Knee survey.    Discharge reason: Patient has not attended therapy since 11/13/19    Plan   This patient is discharged from Physical Therapy      Pepe Covarrubias, PT

## 2020-04-03 ENCOUNTER — PATIENT OUTREACH (OUTPATIENT)
Dept: ADMINISTRATIVE | Facility: HOSPITAL | Age: 80
End: 2020-04-03

## 2020-04-03 NOTE — PROGRESS NOTES
Pre-visit chart review completed.  Immunizations reviewed and updated.    Patient due for the following    TETANUS VACCINE     DEXA SCAN     Shingles Vaccine (1 of 2)    Pneumococcal Vaccine (65+ High/Highest Risk) (2 of 2 - PPSV23)

## 2020-04-06 ENCOUNTER — PATIENT OUTREACH (OUTPATIENT)
Dept: ADMINISTRATIVE | Facility: HOSPITAL | Age: 80
End: 2020-04-06

## 2020-04-09 ENCOUNTER — TELEPHONE (OUTPATIENT)
Dept: PRIMARY CARE CLINIC | Facility: CLINIC | Age: 80
End: 2020-04-09

## 2020-04-09 NOTE — TELEPHONE ENCOUNTER
----- Message from Calista Roger sent at 4/9/2020 10:05 AM CDT -----  Contact: [phone]  Patient is returning a phone call.  Who left a message for the patient: katrina  Does patient know what this is regarding:  katrina  Comments:

## 2020-07-26 NOTE — PROGRESS NOTES
Subjective:       Patient ID: Jodi Cole is a 79 y.o. female.    Chief Complaint: Endometrial Cancer (6mth f/u)    HPI     Patient comes in today for her WWE and  follow up for endometrial cancer.   Denies vaginal bleeding.      Jul 2020:  is 8.       Mammogram: March 20, 2019: normal   CBE: July 28, 2020: normal.   Colonoscopy: May 2019: normal. No need to repeat       Her oncologic history is:    May 2015: She underwent a robotic-assisted laparoscopic hysterectomy with bilateral salpingo-oophorectomy and bilateral pelvic and para-aortic lymphadenectomy on 5/11/2015. 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 The depth of invasion was 2 mm out of 27 mm. Nodes were negative as was the cytologic washing.    Finished vaginal cuff brachytherapy on 7/20/2015.    Oct 2015: Completed 6 cycles of taxol and carboplatin in October 2015.    CT scan after completion was normal.  was 8.     Review of Systems   Constitutional: Negative for chills, fatigue and fever.   Eyes: Negative for visual disturbance.   Respiratory: Negative for cough, shortness of breath and wheezing.    Cardiovascular: Negative for chest pain, palpitations and leg swelling.   Gastrointestinal: Negative for abdominal distention, abdominal pain, constipation, diarrhea, nausea and vomiting.   Genitourinary: Negative for difficulty urinating, dysuria, frequency, genital sores, hematuria, pelvic pain, urgency, vaginal bleeding, vaginal discharge and vaginal pain.   Musculoskeletal: Negative for gait problem and neck stiffness.   Skin: Negative for rash.   Neurological: Negative for seizures and weakness.   Hematological: Negative for adenopathy. Does not bruise/bleed easily.   Psychiatric/Behavioral: The patient is not nervous/anxious.        Objective:   BP (!) 150/67   Pulse 68   Wt 101.6 kg (224 lb)   BMI 37.28 kg/m²      Physical Exam  Constitutional:       Appearance: She is  well-developed.   HENT:      Head: Normocephalic and atraumatic.   Eyes:      General: No scleral icterus.  Neck:      Thyroid: No thyroid mass or thyromegaly.      Trachea: No tracheal deviation.   Cardiovascular:      Rate and Rhythm: Normal rate and regular rhythm.   Pulmonary:      Effort: Pulmonary effort is normal.      Breath sounds: Normal breath sounds. No wheezing.   Chest:      Breasts:         Right: No mass, nipple discharge, skin change or tenderness.         Left: No mass, nipple discharge, skin change or tenderness.   Abdominal:      General: There is no distension.      Palpations: There is no mass.      Tenderness: There is no abdominal tenderness. There is no guarding or rebound.   Genitourinary:     Comments: Bimanual exam:  Vulva: no lesions. Normal appearance  Urethra: Normal size and location. No lesions  Bladder: No masses or tenderness.  Vagina: normal mucosa. No lesion  Cervix: absent.   Uterus: absent.  Adnexa: no masses.  Rectovaginal: No posterior cul de sac thickening or nodularity.  Rectal: no masses. Nontender. Normal tone.     Musculoskeletal:         General: No tenderness.   Lymphadenopathy:      Cervical: No cervical adenopathy.      Upper Body:      Right upper body: No supraclavicular adenopathy.      Left upper body: No supraclavicular adenopathy.   Skin:     General: Skin is warm and dry.      Findings: No rash.   Neurological:      Mental Status: She is alert and oriented to person, place, and time.   Psychiatric:         Behavior: Behavior normal.         Thought Content: Thought content normal.         Judgment: Judgment normal.         Assessment:       1. Well woman exam with routine gynecological exam    2. Endometrial ca    3. Screening mammogram, encounter for        Plan:   Well woman exam with routine gynecological exam  Counseling time of 10 minutes discussing calcium, vitamin D and exercise. Questions answered.     Endometrial ca  -     ; Future; Expected  date: 07/28/2020  -     ; Future; Expected date: 07/29/2021    Screening mammogram, encounter for  -     Mammo Digital Screening Bilat; Future; Expected date: 07/28/2020      PEG on today's exam.  Disease free interval 5 years. RTC in 12 months with .

## 2020-07-27 ENCOUNTER — TELEPHONE (OUTPATIENT)
Dept: ADMINISTRATIVE | Facility: OTHER | Age: 80
End: 2020-07-27

## 2020-07-28 ENCOUNTER — LAB VISIT (OUTPATIENT)
Dept: LAB | Facility: HOSPITAL | Age: 80
End: 2020-07-28
Attending: OBSTETRICS & GYNECOLOGY
Payer: MEDICARE

## 2020-07-28 ENCOUNTER — OFFICE VISIT (OUTPATIENT)
Dept: GYNECOLOGIC ONCOLOGY | Facility: CLINIC | Age: 80
End: 2020-07-28
Payer: MEDICARE

## 2020-07-28 VITALS
BODY MASS INDEX: 37.28 KG/M2 | WEIGHT: 224 LBS | SYSTOLIC BLOOD PRESSURE: 150 MMHG | DIASTOLIC BLOOD PRESSURE: 67 MMHG | HEART RATE: 68 BPM

## 2020-07-28 DIAGNOSIS — Z01.419 WELL WOMAN EXAM WITH ROUTINE GYNECOLOGICAL EXAM: Primary | ICD-10-CM

## 2020-07-28 DIAGNOSIS — C54.1 ENDOMETRIAL CA: ICD-10-CM

## 2020-07-28 DIAGNOSIS — Z12.31 SCREENING MAMMOGRAM, ENCOUNTER FOR: ICD-10-CM

## 2020-07-28 LAB — CANCER AG125 SERPL-ACNC: 8 U/ML (ref 0–30)

## 2020-07-28 PROCEDURE — 99999 PR PBB SHADOW E&M-EST. PATIENT-LVL III: ICD-10-PCS | Mod: PBBFAC,HCNC,, | Performed by: OBSTETRICS & GYNECOLOGY

## 2020-07-28 PROCEDURE — 86304 IMMUNOASSAY TUMOR CA 125: CPT | Mod: HCNC

## 2020-07-28 PROCEDURE — G0101 PR CA SCREEN;PELVIC/BREAST EXAM: ICD-10-PCS | Mod: HCNC,S$GLB,, | Performed by: OBSTETRICS & GYNECOLOGY

## 2020-07-28 PROCEDURE — 99999 PR PBB SHADOW E&M-EST. PATIENT-LVL III: CPT | Mod: PBBFAC,HCNC,, | Performed by: OBSTETRICS & GYNECOLOGY

## 2020-07-28 PROCEDURE — 36415 COLL VENOUS BLD VENIPUNCTURE: CPT | Mod: HCNC

## 2020-07-28 PROCEDURE — G0101 CA SCREEN;PELVIC/BREAST EXAM: HCPCS | Mod: HCNC,S$GLB,, | Performed by: OBSTETRICS & GYNECOLOGY

## 2020-08-12 ENCOUNTER — HOSPITAL ENCOUNTER (OUTPATIENT)
Dept: RADIOLOGY | Facility: OTHER | Age: 80
Discharge: HOME | End: 2020-08-12
Attending: OBSTETRICS & GYNECOLOGY
Payer: MEDICARE

## 2020-08-12 DIAGNOSIS — Z12.31 SCREENING MAMMOGRAM, ENCOUNTER FOR: ICD-10-CM

## 2020-08-12 PROCEDURE — 77063 BREAST TOMOSYNTHESIS BI: CPT | Mod: 26,HCNC,, | Performed by: RADIOLOGY

## 2020-08-12 PROCEDURE — 77067 SCR MAMMO BI INCL CAD: CPT | Mod: TC,HCNC

## 2020-08-12 PROCEDURE — 77067 SCR MAMMO BI INCL CAD: CPT | Mod: 26,HCNC,, | Performed by: RADIOLOGY

## 2020-08-12 PROCEDURE — 77067 MAMMO DIGITAL SCREENING BILAT WITH TOMOSYNTHESIS_CAD: ICD-10-PCS | Mod: 26,HCNC,, | Performed by: RADIOLOGY

## 2020-08-12 PROCEDURE — 77063 MAMMO DIGITAL SCREENING BILAT WITH TOMOSYNTHESIS_CAD: ICD-10-PCS | Mod: 26,HCNC,, | Performed by: RADIOLOGY

## 2020-09-14 DIAGNOSIS — I10 ESSENTIAL HYPERTENSION: ICD-10-CM

## 2020-09-14 RX ORDER — AMLODIPINE BESYLATE 2.5 MG/1
TABLET ORAL
Qty: 90 TABLET | OUTPATIENT
Start: 2020-09-14

## 2020-09-29 ENCOUNTER — PATIENT MESSAGE (OUTPATIENT)
Dept: OTHER | Facility: OTHER | Age: 80
End: 2020-09-29

## 2020-10-30 ENCOUNTER — PATIENT OUTREACH (OUTPATIENT)
Dept: ADMINISTRATIVE | Facility: HOSPITAL | Age: 80
End: 2020-10-30

## 2020-10-30 NOTE — PROGRESS NOTES
Pre-visit chart review completed.  Immunizations reviewed and updated.    Patient due for the following    Hepatitis C Screening     TETANUS VACCINE     DEXA SCAN     Shingles Vaccine (1 of 2)    Pneumococcal Vaccine (65+ High/Highest Risk) (2 of 2 - PPSV23)    Influenza Vaccine (1)        Message left for patient to return my call to discuss upcoming appointment.    Patient doesn't check my [facility].

## 2020-11-13 ENCOUNTER — OFFICE VISIT (OUTPATIENT)
Dept: PRIMARY CARE CLINIC | Facility: CLINIC | Age: 80
End: 2020-11-13
Payer: MEDICARE

## 2020-11-13 ENCOUNTER — IMMUNIZATION (OUTPATIENT)
Dept: PHARMACY | Facility: CLINIC | Age: 80
End: 2020-11-13
Payer: MEDICARE

## 2020-11-13 VITALS
WEIGHT: 224.63 LBS | BODY MASS INDEX: 37.43 KG/M2 | HEIGHT: 65 IN | TEMPERATURE: 98 F | HEART RATE: 86 BPM | DIASTOLIC BLOOD PRESSURE: 66 MMHG | RESPIRATION RATE: 19 BRPM | SYSTOLIC BLOOD PRESSURE: 127 MMHG | OXYGEN SATURATION: 99 %

## 2020-11-13 DIAGNOSIS — Z78.0 POSTMENOPAUSAL: ICD-10-CM

## 2020-11-13 DIAGNOSIS — Z01.00 ENCOUNTER FOR VISION SCREENING: ICD-10-CM

## 2020-11-13 DIAGNOSIS — C54.1 ENDOMETRIAL CA: ICD-10-CM

## 2020-11-13 DIAGNOSIS — I10 ESSENTIAL HYPERTENSION: Primary | ICD-10-CM

## 2020-11-13 DIAGNOSIS — E87.6 HYPOKALEMIA: ICD-10-CM

## 2020-11-13 DIAGNOSIS — E78.5 HYPERLIPIDEMIA, UNSPECIFIED HYPERLIPIDEMIA TYPE: ICD-10-CM

## 2020-11-13 PROCEDURE — 3288F FALL RISK ASSESSMENT DOCD: CPT | Mod: HCNC,CPTII,S$GLB, | Performed by: INTERNAL MEDICINE

## 2020-11-13 PROCEDURE — 3074F SYST BP LT 130 MM HG: CPT | Mod: HCNC,CPTII,S$GLB, | Performed by: INTERNAL MEDICINE

## 2020-11-13 PROCEDURE — 1159F MED LIST DOCD IN RCRD: CPT | Mod: HCNC,S$GLB,, | Performed by: INTERNAL MEDICINE

## 2020-11-13 PROCEDURE — 3288F PR FALLS RISK ASSESSMENT DOCUMENTED: ICD-10-PCS | Mod: HCNC,CPTII,S$GLB, | Performed by: INTERNAL MEDICINE

## 2020-11-13 PROCEDURE — 1126F PR PAIN SEVERITY QUANTIFIED, NO PAIN PRESENT: ICD-10-PCS | Mod: HCNC,S$GLB,, | Performed by: INTERNAL MEDICINE

## 2020-11-13 PROCEDURE — 99214 OFFICE O/P EST MOD 30 MIN: CPT | Mod: HCNC,S$GLB,, | Performed by: INTERNAL MEDICINE

## 2020-11-13 PROCEDURE — 99499 RISK ADDL DX/OHS AUDIT: ICD-10-PCS | Mod: S$GLB,,, | Performed by: INTERNAL MEDICINE

## 2020-11-13 PROCEDURE — 99499 UNLISTED E&M SERVICE: CPT | Mod: S$GLB,,, | Performed by: INTERNAL MEDICINE

## 2020-11-13 PROCEDURE — 1126F AMNT PAIN NOTED NONE PRSNT: CPT | Mod: HCNC,S$GLB,, | Performed by: INTERNAL MEDICINE

## 2020-11-13 PROCEDURE — 3078F PR MOST RECENT DIASTOLIC BLOOD PRESSURE < 80 MM HG: ICD-10-PCS | Mod: HCNC,CPTII,S$GLB, | Performed by: INTERNAL MEDICINE

## 2020-11-13 PROCEDURE — 99214 PR OFFICE/OUTPT VISIT, EST, LEVL IV, 30-39 MIN: ICD-10-PCS | Mod: HCNC,S$GLB,, | Performed by: INTERNAL MEDICINE

## 2020-11-13 PROCEDURE — 99999 PR PBB SHADOW E&M-EST. PATIENT-LVL IV: CPT | Mod: PBBFAC,HCNC,, | Performed by: INTERNAL MEDICINE

## 2020-11-13 PROCEDURE — 1101F PT FALLS ASSESS-DOCD LE1/YR: CPT | Mod: HCNC,CPTII,S$GLB, | Performed by: INTERNAL MEDICINE

## 2020-11-13 PROCEDURE — 1101F PR PT FALLS ASSESS DOC 0-1 FALLS W/OUT INJ PAST YR: ICD-10-PCS | Mod: HCNC,CPTII,S$GLB, | Performed by: INTERNAL MEDICINE

## 2020-11-13 PROCEDURE — 3078F DIAST BP <80 MM HG: CPT | Mod: HCNC,CPTII,S$GLB, | Performed by: INTERNAL MEDICINE

## 2020-11-13 PROCEDURE — 99999 PR PBB SHADOW E&M-EST. PATIENT-LVL IV: ICD-10-PCS | Mod: PBBFAC,HCNC,, | Performed by: INTERNAL MEDICINE

## 2020-11-13 PROCEDURE — 1159F PR MEDICATION LIST DOCUMENTED IN MEDICAL RECORD: ICD-10-PCS | Mod: HCNC,S$GLB,, | Performed by: INTERNAL MEDICINE

## 2020-11-13 PROCEDURE — 3074F PR MOST RECENT SYSTOLIC BLOOD PRESSURE < 130 MM HG: ICD-10-PCS | Mod: HCNC,CPTII,S$GLB, | Performed by: INTERNAL MEDICINE

## 2020-11-13 RX ORDER — AMLODIPINE BESYLATE 2.5 MG/1
2.5 TABLET ORAL DAILY
Qty: 90 TABLET | Refills: 1 | Status: SHIPPED | OUTPATIENT
Start: 2020-11-13 | End: 2020-11-18

## 2020-11-13 RX ORDER — ATORVASTATIN CALCIUM 40 MG/1
40 TABLET, FILM COATED ORAL NIGHTLY
Qty: 90 TABLET | Refills: 1 | Status: SHIPPED | OUTPATIENT
Start: 2020-11-13 | End: 2021-05-14 | Stop reason: SDUPTHER

## 2020-11-13 NOTE — PROGRESS NOTES
[facility] Note    Chief Complaint      Chief Complaint   Patient presents with    Follow-up       History of Present Illness      Jodi Cole is a 80 y.o. AAF with Endometrial Ca, HTN, Insomnia, OA presents to fu chronic issues. Last visit - 1/17/20.    Hypokalemia - Pt ue for repeat K+.  I had prev rec a high potassium diet and had Rx her Kcl 10 mEq once daily x 3 days. I am unsure if she ever went to pharm and took these.     HTN - Pt prev noncompliant with BP meds.  Her kidney function went down slightly when we restarted her HCTZ prev so I stopped her HCTZ and had her start Amlodipine 2.5 mg/d.   BUN/Cr - 21/1.0 GFR > 60 in Jan.  Improved from prev BUN/Cr 34/1.4 GFR - 41 in Dec.   Controlled on amlodipine. Minimal edema.      HLD - Pt was on Pravastatin 40 mg/d.    HDL 57 .  Her Cholesterol was not controlled on Pravastatin 40 mg QHS so we changed her to Atorvastatin 40 mg po QHS.  She reports compliance with this.  Will check with pharm because I don't see where she had refills as she was overdue for her fu. Rec low chol diet and exercise for lifestyle modification.  Will cont to monitor. She will try to cut back to red meat 1/wk.      Oa - Pt is s/p RT TKA 10/1/19. Fu by Ortho, Dr. Fuentes.  Pt is on Tylenol prn - not helping.  She takes ASA 81 mg/d. Affects Rt shoulder.  Pt on Voltaren gel - no help. Rec fu Ortho. She completed PTx for her knee.      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 She was tx with ChemoTx - Carbo and taxol x 6 cycles. Has fu in July and will fu annually with CA -125.  - 10 1/24/20.      Obesity - BMI - 37.3 - up 2 lb since Jan.Likely due to recent surgery and dec activity. She will cut back on sweets.  Exercise is limited due to knees. Rec she  joining gym with [employer] and use pool/Stationary bike.     Noncompliance - Pt overdue for her fu.      HCM - Flu - 11/13/20;  Tdap - ?;  PCV 13 - 1/17/20;  PVX 23 -none; Shingrix - none - refuses; MGM - 8/12/20 - repeat 1 yr;  DEXA - ? At [facility]; C-scope - 5/22/19 + polyps - repeat not rec due to age; Ortho - Dr. Fuentes;  Gyn/Onc - Dr. Jordan; Prev PCP - Dr. Umana      Past Medical History:  Past Medical History:   Diagnosis Date    Anemia associated with chemotherapy 9/1/2015    Anemia due to chemotherapy 9/22/2015    Chemotherapy induced nausea and vomiting 6/9/2015    Chemotherapy-induced neutropenia 7/28/2015    Constipation 7/7/2015    Endometrial ca 5/26/2015    Endometrial ca 8/10/2015    Essential hypertension     Hypertension     Hypokalemia 9/26/2019    NSAID long-term use 9/26/2019    Osteoarthritis     Ovarian cancer     Pain in both hands 2/19/2016    Right-sided low back pain without sciatica 6/23/2016    Uterine cancer     Well woman exam with routine gynecological exam 2/19/2016       Past Surgical History:   has a past surgical history that includes D&C Hysteroscopy (March 18, 2015); Dilation and curettage of uterus (1969 ); Lymphadenectomy; Hysterectomy (5/11/15); Pelvic and para-aortic lymph node dissection; pr removal of ovary/tube(s); Colonoscopy (N/A, 5/22/2019); and Total knee replacement using computer navigation (10/01/2019).    Family History:  family history includes Cancer (age of onset: 65) in her brother; Heart disease in her father.     Social History:  Social History     Tobacco Use    Smoking status: Never Smoker    Smokeless tobacco: Never Used   Substance Use Topics    Alcohol use: No    Drug use: Never       Review of Systems   Constitutional: Negative for chills, diaphoresis and fever.   HENT: Negative for congestion and sore throat.    Eyes: Negative for blurred vision and double vision.   Respiratory: Negative for cough and shortness of breath.     Cardiovascular: Negative for chest pain and palpitations.   Gastrointestinal: Negative for abdominal pain, blood in stool, constipation, diarrhea, heartburn, melena, nausea and vomiting.   Genitourinary: Negative for dysuria and frequency.   Musculoskeletal: Positive for joint pain.        RT shoulder   Skin: Negative for itching and rash.   Neurological: Negative for dizziness and headaches.   Endo/Heme/Allergies: Does not bruise/bleed easily.   Psychiatric/Behavioral: Negative for depression. The patient is not nervous/anxious.         Medications:  Outpatient Encounter Medications as of 11/13/2020   Medication Sig Note Dispense Refill    acetaminophen (TYLENOL) 650 MG TbSR Take 1 tablet (650 mg total) by mouth every 8 (eight) hours as needed.  120 tablet 0    amLODIPine (NORVASC) 2.5 MG tablet Take 1 tablet (2.5 mg total) by mouth once daily.  90 tablet 1    atorvastatin (LIPITOR) 40 MG tablet Take 1 tablet (40 mg total) by mouth every evening.  90 tablet 1    diclofenac sodium (VOLTAREN) 1 % Gel Apply 2 g topically 4 (four) times daily.  100 g 0    multivitamin capsule Take 1 capsule by mouth once daily. 9/26/2019: HOLD 14 DAYS BEFORE SURGERY        potassium chloride (KLOR-CON) 10 MEQ TbSR TAKE 1 TABLET BY MOUTH EVERY DAY FOR 3 DAYS  3 tablet 0    vitamins B1 B6 B12 Tab Take by mouth once daily. 9/26/2019: CONTINUE TO HOLD       [DISCONTINUED] amLODIPine (NORVASC) 2.5 MG tablet TAKE 1 TABLET(2.5 MG) BY MOUTH EVERY DAY  30 tablet 0    [DISCONTINUED] atorvastatin (LIPITOR) 40 MG tablet Take 1 tablet (40 mg total) by mouth every evening.  90 tablet 1    aspirin (ECOTRIN) 81 MG EC tablet Take 1 tablet (81 mg total) by mouth 2 (two) times daily. (Patient not taking: Reported on 1/17/2020)  60 tablet 0     Facility-Administered Encounter Medications as of 11/13/2020   Medication Dose Route Frequency Provider Last Rate Last Dose    midazolam (VERSED) 1 mg/mL injection 0.5 mg  0.5 mg Intravenous PRN  Henry Reynoso MD           Current Outpatient Medications:     acetaminophen (TYLENOL) 650 MG TbSR, Take 1 tablet (650 mg total) by mouth every 8 (eight) hours as needed., Disp: 120 tablet, Rfl: 0    amLODIPine (NORVASC) 2.5 MG tablet, Take 1 tablet (2.5 mg total) by mouth once daily., Disp: 90 tablet, Rfl: 1    atorvastatin (LIPITOR) 40 MG tablet, Take 1 tablet (40 mg total) by mouth every evening., Disp: 90 tablet, Rfl: 1    diclofenac sodium (VOLTAREN) 1 % Gel, Apply 2 g topically 4 (four) times daily., Disp: 100 g, Rfl: 0    multivitamin capsule, Take 1 capsule by mouth once daily., Disp: , Rfl:     potassium chloride (KLOR-CON) 10 MEQ TbSR, TAKE 1 TABLET BY MOUTH EVERY DAY FOR 3 DAYS, Disp: 3 tablet, Rfl: 0    vitamins B1 B6 B12 Tab, Take by mouth once daily., Disp: , Rfl:     aspirin (ECOTRIN) 81 MG EC tablet, Take 1 tablet (81 mg total) by mouth 2 (two) times daily. (Patient not taking: Reported on 1/17/2020), Disp: 60 tablet, Rfl: 0    flu vac 2020 [id number],PF, (FLUAD QUAD 2020-21,65Y UP,,PF,) 60 mcg (15 mcg x 4)/0.5 mL Syrg, Inject 0.5 mLs into the muscle once. for 1 dose, Disp: 0.5 mL, Rfl: 0  No current facility-administered medications for this visit.     Facility-Administered Medications Ordered in Other Visits:     midazolam (VERSED) 1 mg/mL injection 0.5 mg, 0.5 mg, Intravenous, PRN, Henry Reynoso MD    Allergies:  Review of patient's allergies indicates:  No Known Allergies    Health Maintenance:  Immunization History   Administered Date(s) Administered    Influenza (FLUAD) - Quadrivalent - Adjuvanted - PF *Preferred* (65+) 11/13/2020    Influenza - High Dose - PF (65 years and older) 11/19/2019    Pneumococcal Conjugate - 13 Valent 01/17/2020      Health Maintenance   Topic Date Due    Hepatitis C Screening  1940    TETANUS VACCINE  10/23/1960    DEXA SCAN  10/23/1982    Pneumococcal Vaccine (65+ High/Highest Risk) (2 of 2 - PPSV23) 03/13/2020    Lipid  "Panel  11/19/2024      Objective:      Vital Signs  Temp: 98 °F (36.7 °C)  Pulse: 86  Resp: 19  SpO2: 99 %  BP: 127/66  BP Location: Right arm  Patient Position: Sitting  Pain Score: 0-No pain  Height and Weight  Height: 5' 5" (165.1 cm)  Weight: 101.9 kg (224 lb 10.4 oz)  BSA (Calculated - sq m): 2.16 sq meters  BMI (Calculated): 37.4  Weight in (lb) to have BMI = 25: 149.9]    Laboratory:  CBC:  Recent Labs   Lab 05/06/18 1952 09/26/19  1150   WBC 7.59 5.27   RBC 4.27 4.67   Hemoglobin 11.1 L 12.1   Hematocrit 35.4 L 39.0   Platelets 230 230   MCV 83 84   MCH 26.0 L 25.9 L   MCHC 31.4 L 31.0 L       CMP:  Recent Labs   Lab 05/06/18 1952 11/19/19 0922 01/17/20  0841   Glucose 94   < > 85   < > 88   Calcium 9.3   < > 9.6   < > 9.2   Albumin 3.5  --  3.7  --   --    Total Protein 7.2  --  7.3  --   --    Sodium 144   < > 142   < > 144   Potassium 3.7   < > 3.5   < > 3.3 L   CO2 27   < > 32 H   < > 30 H   Chloride 106   < > 102   < > 104   BUN 23   < > 16   < > 21   Creatinine 0.9   < > 1.0   < > 1.0   eGFR if African American >60.0   < > >60.0   < > >60.0   eGFR if non  >60.0   < > 54.5 A   < > 54.5 A   Alkaline Phosphatase 76  --  89  --   --    ALT 7 L  --  6 L  --   --    AST 12  --  10  --   --    Total Bilirubin 0.3  --  0.4  --   --     < > = values in this interval not displayed.       LIPIDS:  Recent Labs   Lab 11/19/19 0922   HDL 57   Cholesterol 259 H   Triglycerides 149   LDL Cholesterol 172.2 H   HDL/Cholesterol Ratio 22.0   Non-HDL Cholesterol 202   Total Cholesterol/HDL Ratio 4.5       Physical Exam  Vitals signs reviewed.   Constitutional:       General: She is not in acute distress.     Appearance: She is obese. She is not ill-appearing, toxic-appearing or diaphoretic.   HENT:      Head: Normocephalic and atraumatic.      Right Ear: Tympanic membrane normal.      Left Ear: Tympanic membrane normal.   Eyes:      Extraocular Movements: Extraocular movements intact.      Pupils: " Pupils are equal, round, and reactive to light.   Neck:      Musculoskeletal: Neck supple. No muscular tenderness.      Vascular: No carotid bruit.   Cardiovascular:      Rate and Rhythm: Normal rate and regular rhythm.      Pulses: Normal pulses.      Heart sounds: Normal heart sounds.      Comments: Nonpitting edema to LLE  Pulmonary:      Effort: No respiratory distress.      Breath sounds: Normal breath sounds. No wheezing or rhonchi.   Abdominal:      General: Bowel sounds are normal. There is no distension.      Palpations: Abdomen is soft.      Tenderness: There is no abdominal tenderness. There is no guarding or rebound.   Musculoskeletal:      Comments: Crepitus to tuyet knees   Skin:     General: Skin is warm and dry.   Neurological:      General: No focal deficit present.      Mental Status: She is alert and oriented to person, place, and time.   Psychiatric:         Mood and Affect: Mood normal.         Behavior: Behavior normal.             Assessment:       1. Essential hypertension    2. Hyperlipidemia, unspecified hyperlipidemia type    3. Endometrial ca    4. Hypokalemia    5. Postmenopausal    6. Encounter for vision screening        Jodi Cole is a 80 y.o.female with:    1. Essential hypertension  - Comprehensive Metabolic Panel; Future  - amLODIPine (NORVASC) 2.5 MG tablet; Take 1 tablet (2.5 mg total) by mouth once daily.  Dispense: 90 tablet; Refill: 1   - Controlled.  Cont current.     2. Hyperlipidemia, unspecified hyperlipidemia type  - Comprehensive Metabolic Panel; Future  - Lipid Panel; Future  - atorvastatin (LIPITOR) 40 MG tablet; Take 1 tablet (40 mg total) by mouth every evening.  Dispense: 90 tablet; Refill: 1  -Repeat FLP.  Cont current.     3. Endometrial ca  - CBC Auto Differential; Future  - Stable.  Cont current regimen. Fu by Dr. Jordan annually.      4. Hypokalemia  - Comprehensive Metabolic Panel; Future  -Repeat Potassium.     5. Postmenopausal  - DXA Bone Density Spine  And Hip; Future    6. Encounter for vision screening  - Ambulatory referral/consult to Ophthalmology; Future       Chronic conditions status updated as per HPI.  Other than changes above, cont current medications and maintain follow up with specialists.  Return to clinic in 6 months or sooner if needed.    Margarita Funes MD  [facility]

## 2020-11-18 DIAGNOSIS — I10 ESSENTIAL HYPERTENSION: ICD-10-CM

## 2020-11-18 RX ORDER — AMLODIPINE BESYLATE 2.5 MG/1
TABLET ORAL
Qty: 90 TABLET | Refills: 1 | Status: SHIPPED | OUTPATIENT
Start: 2020-11-18 | End: 2021-05-14 | Stop reason: SDUPTHER

## 2020-11-19 ENCOUNTER — HOSPITAL ENCOUNTER (OUTPATIENT)
Dept: RADIOLOGY | Facility: OTHER | Age: 80
Discharge: HOME | End: 2020-11-19
Attending: INTERNAL MEDICINE
Payer: MEDICARE

## 2020-11-19 DIAGNOSIS — Z78.0 POSTMENOPAUSAL: ICD-10-CM

## 2020-11-19 PROCEDURE — 77080 DXA BONE DENSITY AXIAL: CPT | Mod: TC,HCNC

## 2020-11-19 PROCEDURE — 77080 DXA BONE DENSITY AXIAL: CPT | Mod: 26,HCNC,, | Performed by: RADIOLOGY

## 2020-11-19 PROCEDURE — 77080 DEXA BONE DENSITY SPINE HIP: ICD-10-PCS | Mod: 26,HCNC,, | Performed by: RADIOLOGY

## 2020-11-19 NOTE — PROGRESS NOTES
I sent pt a my chart message -  I reviewed your DEXA scan which showed osteopenia. I recommend you take OTC Calcium max 1000 mg/d and Vit D3 1000 units/d OTC.  In addition, I rec weight-bearing exercises 5 times/wk x 30 minutes as tolerated. We can repeat the DEXA in 2-3 yrs.  Have a nice evening and happy holidays,     Dr. CLARK

## 2020-11-20 NOTE — TELEPHONE ENCOUNTER
----- Message from Margarita Funes MD sent at 11/19/2020  4:42 PM CST -----  I sent pt a my chart message -  I reviewed your DEXA scan which showed osteopenia. I recommend you take OTC Calcium max 1000 mg/d and Vit D3 1000 units/d OTC.  In addition, I rec weight-bearing exercises 5 times/wk x 30 minutes as tolerated. We can repeat the DEXA in 2-3 yrs.  Have a nice evening and happy holidays,     Dr. CLARK

## 2020-12-11 ENCOUNTER — PATIENT MESSAGE (OUTPATIENT)
Dept: OTHER | Facility: OTHER | Age: 80
End: 2020-12-11

## 2020-12-31 ENCOUNTER — PATIENT OUTREACH (OUTPATIENT)
Dept: ADMINISTRATIVE | Facility: OTHER | Age: 80
End: 2020-12-31

## 2020-12-31 NOTE — PROGRESS NOTES
Health Maintenance Due   Topic Date Due    Hepatitis C Screening  1940    TETANUS VACCINE  10/23/1960    Shingles Vaccine (1 of 2) 10/23/1992    Pneumococcal Vaccine (65+ High/Highest Risk) (2 of 2 - PPSV23) 03/13/2020     Updates were requested from care everywhere.  Chart was reviewed for overdue Proactive [facility] Encounters (FRANKIE) topics (CRS, Breast Cancer Screening, Eye exam)  Health Maintenance has been updated.  LINKS immunization registry triggered.  Immunizations were reconciled.

## 2021-01-26 NOTE — TELEPHONE ENCOUNTER
Please have pt make an appt. I sent her a 30 day supply on her Amlodipine.  Please inform pharmacy I purposely sent #[phone]    Dr. CLARK   Yes

## 2021-03-25 ENCOUNTER — TELEPHONE (OUTPATIENT)
Dept: ORTHOPEDICS | Facility: CLINIC | Age: 81
End: 2021-03-25

## 2021-04-28 ENCOUNTER — PATIENT MESSAGE (OUTPATIENT)
Dept: RESEARCH | Facility: HOSPITAL | Age: 81
End: 2021-04-28

## 2021-05-11 ENCOUNTER — PATIENT OUTREACH (OUTPATIENT)
Dept: ADMINISTRATIVE | Facility: OTHER | Age: 81
End: 2021-05-11

## 2021-05-11 DIAGNOSIS — M25.561 CHRONIC PAIN OF RIGHT KNEE: Primary | ICD-10-CM

## 2021-05-11 DIAGNOSIS — G89.29 CHRONIC PAIN OF RIGHT KNEE: Primary | ICD-10-CM

## 2021-05-12 ENCOUNTER — HOSPITAL ENCOUNTER (OUTPATIENT)
Dept: RADIOLOGY | Facility: HOSPITAL | Age: 81
Discharge: HOME | End: 2021-05-12
Attending: ORTHOPAEDIC SURGERY
Payer: MEDICARE

## 2021-05-12 ENCOUNTER — OFFICE VISIT (OUTPATIENT)
Dept: ORTHOPEDICS | Facility: CLINIC | Age: 81
End: 2021-05-12
Payer: MEDICARE

## 2021-05-12 VITALS — BODY MASS INDEX: 37.43 KG/M2 | WEIGHT: 224.63 LBS | HEIGHT: 65 IN

## 2021-05-12 DIAGNOSIS — M25.561 CHRONIC PAIN OF RIGHT KNEE: ICD-10-CM

## 2021-05-12 DIAGNOSIS — G89.29 CHRONIC PAIN OF RIGHT KNEE: ICD-10-CM

## 2021-05-12 DIAGNOSIS — M17.12 PRIMARY OSTEOARTHRITIS OF LEFT KNEE: Primary | ICD-10-CM

## 2021-05-12 DIAGNOSIS — M25.562 LEFT KNEE PAIN, UNSPECIFIED CHRONICITY: Primary | ICD-10-CM

## 2021-05-12 DIAGNOSIS — M25.562 LEFT KNEE PAIN, UNSPECIFIED CHRONICITY: ICD-10-CM

## 2021-05-12 PROCEDURE — 73562 X-RAY EXAM OF KNEE 3: CPT | Mod: 26,50,, | Performed by: RADIOLOGY

## 2021-05-12 PROCEDURE — 99214 OFFICE O/P EST MOD 30 MIN: CPT | Mod: S$GLB,,, | Performed by: ORTHOPAEDIC SURGERY

## 2021-05-12 PROCEDURE — 1125F AMNT PAIN NOTED PAIN PRSNT: CPT | Mod: S$GLB,,, | Performed by: ORTHOPAEDIC SURGERY

## 2021-05-12 PROCEDURE — 99214 PR OFFICE/OUTPT VISIT, EST, LEVL IV, 30-39 MIN: ICD-10-PCS | Mod: S$GLB,,, | Performed by: ORTHOPAEDIC SURGERY

## 2021-05-12 PROCEDURE — 99999 PR PBB SHADOW E&M-EST. PATIENT-LVL III: CPT | Mod: PBBFAC,,, | Performed by: ORTHOPAEDIC SURGERY

## 2021-05-12 PROCEDURE — 1125F PR PAIN SEVERITY QUANTIFIED, PAIN PRESENT: ICD-10-PCS | Mod: S$GLB,,, | Performed by: ORTHOPAEDIC SURGERY

## 2021-05-12 PROCEDURE — 1159F PR MEDICATION LIST DOCUMENTED IN MEDICAL RECORD: ICD-10-PCS | Mod: S$GLB,,, | Performed by: ORTHOPAEDIC SURGERY

## 2021-05-12 PROCEDURE — 1159F MED LIST DOCD IN RCRD: CPT | Mod: S$GLB,,, | Performed by: ORTHOPAEDIC SURGERY

## 2021-05-12 PROCEDURE — 99999 PR PBB SHADOW E&M-EST. PATIENT-LVL III: ICD-10-PCS | Mod: PBBFAC,,, | Performed by: ORTHOPAEDIC SURGERY

## 2021-05-12 PROCEDURE — 73562 X-RAY EXAM OF KNEE 3: CPT | Mod: TC,50

## 2021-05-12 PROCEDURE — 73562 XR KNEE ORTHO BILAT: ICD-10-PCS | Mod: 26,50,, | Performed by: RADIOLOGY

## 2021-05-13 ENCOUNTER — TELEPHONE (OUTPATIENT)
Dept: ORTHOPEDICS | Facility: CLINIC | Age: 81
End: 2021-05-13

## 2021-05-13 ENCOUNTER — TELEPHONE (OUTPATIENT)
Dept: PRIMARY CARE CLINIC | Facility: CLINIC | Age: 81
End: 2021-05-13

## 2021-05-14 ENCOUNTER — HOSPITAL ENCOUNTER (OUTPATIENT)
Dept: RADIOLOGY | Facility: HOSPITAL | Age: 81
Discharge: HOME | End: 2021-05-14
Attending: INTERNAL MEDICINE
Payer: MEDICARE

## 2021-05-14 ENCOUNTER — OFFICE VISIT (OUTPATIENT)
Dept: PRIMARY CARE CLINIC | Facility: CLINIC | Age: 81
End: 2021-05-14
Payer: MEDICARE

## 2021-05-14 VITALS
RESPIRATION RATE: 20 BRPM | HEART RATE: 84 BPM | DIASTOLIC BLOOD PRESSURE: 78 MMHG | HEIGHT: 65 IN | BODY MASS INDEX: 37.1 KG/M2 | SYSTOLIC BLOOD PRESSURE: 142 MMHG | WEIGHT: 222.69 LBS | OXYGEN SATURATION: 96 % | TEMPERATURE: 99 F

## 2021-05-14 DIAGNOSIS — J01.90 ACUTE NON-RECURRENT SINUSITIS, UNSPECIFIED LOCATION: ICD-10-CM

## 2021-05-14 DIAGNOSIS — E66.01 CLASS 2 SEVERE OBESITY DUE TO EXCESS CALORIES WITH SERIOUS COMORBIDITY AND BODY MASS INDEX (BMI) OF 36.0 TO 36.9 IN ADULT: ICD-10-CM

## 2021-05-14 DIAGNOSIS — R05.9 COUGH: ICD-10-CM

## 2021-05-14 DIAGNOSIS — Z01.818 PREOP EXAMINATION: Primary | ICD-10-CM

## 2021-05-14 DIAGNOSIS — C54.1 ENDOMETRIAL CA: ICD-10-CM

## 2021-05-14 DIAGNOSIS — M17.11 PRIMARY OSTEOARTHRITIS OF RIGHT KNEE: ICD-10-CM

## 2021-05-14 DIAGNOSIS — Z01.818 PREOP EXAMINATION: ICD-10-CM

## 2021-05-14 DIAGNOSIS — E78.5 HYPERLIPIDEMIA, UNSPECIFIED HYPERLIPIDEMIA TYPE: ICD-10-CM

## 2021-05-14 DIAGNOSIS — I10 ESSENTIAL HYPERTENSION: ICD-10-CM

## 2021-05-14 PROCEDURE — 71046 X-RAY EXAM CHEST 2 VIEWS: CPT | Mod: 26,,, | Performed by: RADIOLOGY

## 2021-05-14 PROCEDURE — 1125F AMNT PAIN NOTED PAIN PRSNT: CPT | Mod: S$GLB,,, | Performed by: INTERNAL MEDICINE

## 2021-05-14 PROCEDURE — 93005 ELECTROCARDIOGRAM TRACING: CPT | Mod: S$GLB,,, | Performed by: INTERNAL MEDICINE

## 2021-05-14 PROCEDURE — 99499 RISK ADDL DX/OHS AUDIT: ICD-10-PCS | Mod: HCNC,S$GLB,, | Performed by: INTERNAL MEDICINE

## 2021-05-14 PROCEDURE — 93010 EKG 12-LEAD: ICD-10-PCS | Mod: S$GLB,,, | Performed by: INTERNAL MEDICINE

## 2021-05-14 PROCEDURE — 1159F MED LIST DOCD IN RCRD: CPT | Mod: S$GLB,,, | Performed by: INTERNAL MEDICINE

## 2021-05-14 PROCEDURE — 99999 PR PBB SHADOW E&M-EST. PATIENT-LVL IV: CPT | Mod: PBBFAC,,, | Performed by: INTERNAL MEDICINE

## 2021-05-14 PROCEDURE — 1101F PR PT FALLS ASSESS DOC 0-1 FALLS W/OUT INJ PAST YR: ICD-10-PCS | Mod: CPTII,S$GLB,, | Performed by: INTERNAL MEDICINE

## 2021-05-14 PROCEDURE — 1159F PR MEDICATION LIST DOCUMENTED IN MEDICAL RECORD: ICD-10-PCS | Mod: S$GLB,,, | Performed by: INTERNAL MEDICINE

## 2021-05-14 PROCEDURE — 93005 EKG 12-LEAD: ICD-10-PCS | Mod: S$GLB,,, | Performed by: INTERNAL MEDICINE

## 2021-05-14 PROCEDURE — 99999 PR PBB SHADOW E&M-EST. PATIENT-LVL IV: ICD-10-PCS | Mod: PBBFAC,,, | Performed by: INTERNAL MEDICINE

## 2021-05-14 PROCEDURE — 71046 X-RAY EXAM CHEST 2 VIEWS: CPT | Mod: TC,PN

## 2021-05-14 PROCEDURE — 99214 OFFICE O/P EST MOD 30 MIN: CPT | Mod: S$GLB,,, | Performed by: INTERNAL MEDICINE

## 2021-05-14 PROCEDURE — 1101F PT FALLS ASSESS-DOCD LE1/YR: CPT | Mod: CPTII,S$GLB,, | Performed by: INTERNAL MEDICINE

## 2021-05-14 PROCEDURE — 1125F PR PAIN SEVERITY QUANTIFIED, PAIN PRESENT: ICD-10-PCS | Mod: S$GLB,,, | Performed by: INTERNAL MEDICINE

## 2021-05-14 PROCEDURE — 99499 UNLISTED E&M SERVICE: CPT | Mod: HCNC,S$GLB,, | Performed by: INTERNAL MEDICINE

## 2021-05-14 PROCEDURE — 3288F FALL RISK ASSESSMENT DOCD: CPT | Mod: CPTII,S$GLB,, | Performed by: INTERNAL MEDICINE

## 2021-05-14 PROCEDURE — 3288F PR FALLS RISK ASSESSMENT DOCUMENTED: ICD-10-PCS | Mod: CPTII,S$GLB,, | Performed by: INTERNAL MEDICINE

## 2021-05-14 PROCEDURE — 99214 PR OFFICE/OUTPT VISIT, EST, LEVL IV, 30-39 MIN: ICD-10-PCS | Mod: S$GLB,,, | Performed by: INTERNAL MEDICINE

## 2021-05-14 PROCEDURE — 71046 XR CHEST PA AND LATERAL: ICD-10-PCS | Mod: 26,,, | Performed by: RADIOLOGY

## 2021-05-14 PROCEDURE — 93010 ELECTROCARDIOGRAM REPORT: CPT | Mod: S$GLB,,, | Performed by: INTERNAL MEDICINE

## 2021-05-14 RX ORDER — AMOXICILLIN AND CLAVULANATE POTASSIUM 875; 125 MG/1; MG/1
1 TABLET, FILM COATED ORAL 2 TIMES DAILY
Qty: 14 TABLET | Refills: 0 | Status: SHIPPED | OUTPATIENT
Start: 2021-05-14 | End: 2021-05-21

## 2021-05-14 RX ORDER — ATORVASTATIN CALCIUM 40 MG/1
40 TABLET, FILM COATED ORAL NIGHTLY
Qty: 90 TABLET | Refills: 1 | Status: SHIPPED | OUTPATIENT
Start: 2021-05-14 | End: 2022-06-21

## 2021-05-14 RX ORDER — AMLODIPINE BESYLATE 2.5 MG/1
2.5 TABLET ORAL DAILY
Qty: 90 TABLET | Refills: 1 | Status: SHIPPED | OUTPATIENT
Start: 2021-05-14 | End: 2022-03-11

## 2021-05-14 RX ORDER — PROMETHAZINE HYDROCHLORIDE AND DEXTROMETHORPHAN HYDROBROMIDE 6.25; 15 MG/5ML; MG/5ML
5 SYRUP ORAL EVERY 8 HOURS PRN
Qty: 118 ML | Refills: 0 | Status: SHIPPED | OUTPATIENT
Start: 2021-05-14 | End: 2021-05-24

## 2021-05-18 DIAGNOSIS — M17.12 PRIMARY OSTEOARTHRITIS OF LEFT KNEE: Primary | ICD-10-CM

## 2021-05-19 ENCOUNTER — TELEPHONE (OUTPATIENT)
Dept: ADMINISTRATIVE | Facility: OTHER | Age: 81
End: 2021-05-19

## 2021-05-21 ENCOUNTER — TELEPHONE (OUTPATIENT)
Dept: ADMINISTRATIVE | Facility: HOSPITAL | Age: 81
End: 2021-05-21

## 2021-06-04 ENCOUNTER — CLINICAL SUPPORT (OUTPATIENT)
Dept: REHABILITATION | Facility: HOSPITAL | Age: 81
End: 2021-06-04
Attending: ORTHOPAEDIC SURGERY
Payer: MEDICARE

## 2021-06-04 DIAGNOSIS — M17.12 PRIMARY OSTEOARTHRITIS OF LEFT KNEE: ICD-10-CM

## 2021-06-04 PROBLEM — M25.662 DECREASED RANGE OF MOTION (ROM) OF LEFT KNEE: Status: ACTIVE | Noted: 2019-09-11

## 2021-06-04 PROCEDURE — 97161 PT EVAL LOW COMPLEX 20 MIN: CPT

## 2021-06-04 PROCEDURE — 97110 THERAPEUTIC EXERCISES: CPT

## 2021-06-14 ENCOUNTER — HOSPITAL ENCOUNTER (OUTPATIENT)
Facility: HOSPITAL | Age: 81
Discharge: HOME | End: 2021-06-14
Attending: ORTHOPAEDIC SURGERY | Admitting: NURSE PRACTITIONER
Payer: MEDICARE

## 2021-06-14 VITALS
DIASTOLIC BLOOD PRESSURE: 90 MMHG | WEIGHT: 205 LBS | OXYGEN SATURATION: 98 % | BODY MASS INDEX: 32.95 KG/M2 | TEMPERATURE: 98 F | HEIGHT: 66 IN | SYSTOLIC BLOOD PRESSURE: 190 MMHG | RESPIRATION RATE: 18 BRPM | HEART RATE: 69 BPM

## 2021-06-14 DIAGNOSIS — M17.12 OSTEOARTHRITIS OF LEFT KNEE: ICD-10-CM

## 2021-06-14 DIAGNOSIS — M17.12 PRIMARY OSTEOARTHRITIS OF LEFT KNEE: Primary | ICD-10-CM

## 2021-06-14 PROCEDURE — 25000003 PHARM REV CODE 250: Performed by: NURSE PRACTITIONER

## 2021-06-14 PROCEDURE — 64640 INJECTION TREATMENT OF NERVE: CPT | Mod: LT,,, | Performed by: NURSE PRACTITIONER

## 2021-06-14 PROCEDURE — 27201689 HC IOVERA SMART TIP/CARTRIDGE: Performed by: NURSE PRACTITIONER

## 2021-06-14 PROCEDURE — 64640 INJECTION TREATMENT OF NERVE: CPT | Performed by: NURSE PRACTITIONER

## 2021-06-14 PROCEDURE — 64640 PR DESTRUCT BY NEURO AGENT; OTHER PERIPH NERVE: ICD-10-PCS | Mod: LT,,, | Performed by: NURSE PRACTITIONER

## 2021-06-14 RX ORDER — LIDOCAINE HYDROCHLORIDE 20 MG/ML
10 INJECTION, SOLUTION EPIDURAL; INFILTRATION; INTRACAUDAL; PERINEURAL ONCE
Status: COMPLETED | OUTPATIENT
Start: 2021-06-14 | End: 2021-06-14

## 2021-06-16 ENCOUNTER — CLINICAL SUPPORT (OUTPATIENT)
Dept: REHABILITATION | Facility: HOSPITAL | Age: 81
End: 2021-06-16
Payer: MEDICARE

## 2021-06-16 DIAGNOSIS — M25.562 CHRONIC PAIN OF LEFT KNEE: ICD-10-CM

## 2021-06-16 DIAGNOSIS — G89.29 CHRONIC PAIN OF LEFT KNEE: ICD-10-CM

## 2021-06-16 PROCEDURE — 97110 THERAPEUTIC EXERCISES: CPT

## 2021-06-17 ENCOUNTER — PATIENT OUTREACH (OUTPATIENT)
Dept: ADMINISTRATIVE | Facility: OTHER | Age: 81
End: 2021-06-17

## 2021-06-21 ENCOUNTER — HOSPITAL ENCOUNTER (OUTPATIENT)
Dept: RADIOLOGY | Facility: HOSPITAL | Age: 81
Discharge: HOME | End: 2021-06-21
Attending: PHYSICIAN ASSISTANT
Payer: MEDICARE

## 2021-06-21 ENCOUNTER — PATIENT MESSAGE (OUTPATIENT)
Dept: ADMINISTRATIVE | Facility: OTHER | Age: 81
End: 2021-06-21

## 2021-06-21 ENCOUNTER — OFFICE VISIT (OUTPATIENT)
Dept: ORTHOPEDICS | Facility: CLINIC | Age: 81
End: 2021-06-21
Payer: MEDICARE

## 2021-06-21 VITALS — HEIGHT: 66 IN | WEIGHT: 224.88 LBS | BODY MASS INDEX: 36.14 KG/M2

## 2021-06-21 DIAGNOSIS — M17.12 PRIMARY OSTEOARTHRITIS OF LEFT KNEE: Primary | ICD-10-CM

## 2021-06-21 DIAGNOSIS — M17.12 PRIMARY OSTEOARTHRITIS OF LEFT KNEE: ICD-10-CM

## 2021-06-21 PROCEDURE — 1125F AMNT PAIN NOTED PAIN PRSNT: CPT | Mod: S$GLB,,, | Performed by: PHYSICIAN ASSISTANT

## 2021-06-21 PROCEDURE — 99999 PR PBB SHADOW E&M-EST. PATIENT-LVL III: ICD-10-PCS | Mod: PBBFAC,,, | Performed by: PHYSICIAN ASSISTANT

## 2021-06-21 PROCEDURE — 1101F PT FALLS ASSESS-DOCD LE1/YR: CPT | Mod: CPTII,S$GLB,, | Performed by: PHYSICIAN ASSISTANT

## 2021-06-21 PROCEDURE — 99999 PR PBB SHADOW E&M-EST. PATIENT-LVL III: CPT | Mod: PBBFAC,,, | Performed by: PHYSICIAN ASSISTANT

## 2021-06-21 PROCEDURE — 99499 UNLISTED E&M SERVICE: CPT | Mod: S$GLB,,, | Performed by: PHYSICIAN ASSISTANT

## 2021-06-21 PROCEDURE — 3288F FALL RISK ASSESSMENT DOCD: CPT | Mod: CPTII,S$GLB,, | Performed by: PHYSICIAN ASSISTANT

## 2021-06-21 PROCEDURE — 1101F PR PT FALLS ASSESS DOC 0-1 FALLS W/OUT INJ PAST YR: ICD-10-PCS | Mod: CPTII,S$GLB,, | Performed by: PHYSICIAN ASSISTANT

## 2021-06-21 PROCEDURE — 73560 XR KNEE 1 OR 2 VIEW LEFT: ICD-10-PCS | Mod: 26,LT,, | Performed by: RADIOLOGY

## 2021-06-21 PROCEDURE — 99499 NO LOS: ICD-10-PCS | Mod: S$GLB,,, | Performed by: PHYSICIAN ASSISTANT

## 2021-06-21 PROCEDURE — 1125F PR PAIN SEVERITY QUANTIFIED, PAIN PRESENT: ICD-10-PCS | Mod: S$GLB,,, | Performed by: PHYSICIAN ASSISTANT

## 2021-06-21 PROCEDURE — 73560 X-RAY EXAM OF KNEE 1 OR 2: CPT | Mod: TC,LT

## 2021-06-21 PROCEDURE — 73560 X-RAY EXAM OF KNEE 1 OR 2: CPT | Mod: 26,LT,, | Performed by: RADIOLOGY

## 2021-06-21 PROCEDURE — 3288F PR FALLS RISK ASSESSMENT DOCUMENTED: ICD-10-PCS | Mod: CPTII,S$GLB,, | Performed by: PHYSICIAN ASSISTANT

## 2021-06-21 RX ORDER — SODIUM CHLORIDE 9 MG/ML
INJECTION, SOLUTION INTRAVENOUS CONTINUOUS
Status: CANCELLED | OUTPATIENT
Start: 2021-06-21 | End: 2021-06-22

## 2021-06-21 RX ORDER — ACETAMINOPHEN 500 MG
1000 TABLET ORAL
Status: CANCELLED | OUTPATIENT
Start: 2021-06-21

## 2021-06-21 RX ORDER — LIDOCAINE HYDROCHLORIDE 10 MG/ML
1 INJECTION, SOLUTION EPIDURAL; INFILTRATION; INTRACAUDAL; PERINEURAL
Status: CANCELLED | OUTPATIENT
Start: 2021-06-21

## 2021-06-21 RX ORDER — CEFAZOLIN SODIUM 2 G/50ML
2 SOLUTION INTRAVENOUS
Status: CANCELLED | OUTPATIENT
Start: 2021-06-21

## 2021-06-21 RX ORDER — FENTANYL CITRATE 50 UG/ML
25 INJECTION, SOLUTION INTRAMUSCULAR; INTRAVENOUS EVERY 5 MIN PRN
Status: CANCELLED | OUTPATIENT
Start: 2021-06-21

## 2021-06-21 RX ORDER — SODIUM CHLORIDE 0.9 % (FLUSH) 0.9 %
10 SYRINGE (ML) INJECTION
Status: CANCELLED | OUTPATIENT
Start: 2021-06-21

## 2021-06-21 RX ORDER — ACETAMINOPHEN 500 MG
1000 TABLET ORAL EVERY 6 HOURS
Status: CANCELLED | OUTPATIENT
Start: 2021-06-21 | End: 2021-06-23

## 2021-06-21 RX ORDER — TALC
6 POWDER (GRAM) TOPICAL NIGHTLY PRN
Status: CANCELLED | OUTPATIENT
Start: 2021-06-21

## 2021-06-21 RX ORDER — PREGABALIN 75 MG/1
75 CAPSULE ORAL
Status: CANCELLED | OUTPATIENT
Start: 2021-06-21

## 2021-06-21 RX ORDER — ASPIRIN 81 MG/1
81 TABLET ORAL 2 TIMES DAILY
Status: CANCELLED | OUTPATIENT
Start: 2021-06-21

## 2021-06-21 RX ORDER — CELECOXIB 200 MG/1
200 CAPSULE ORAL DAILY
Status: CANCELLED | OUTPATIENT
Start: 2021-06-22

## 2021-06-21 RX ORDER — CELECOXIB 200 MG/1
400 CAPSULE ORAL
Status: CANCELLED | OUTPATIENT
Start: 2021-06-21

## 2021-06-21 RX ORDER — BISACODYL 10 MG
10 SUPPOSITORY, RECTAL RECTAL EVERY 12 HOURS PRN
Status: CANCELLED | OUTPATIENT
Start: 2021-06-21

## 2021-06-21 RX ORDER — PREGABALIN 75 MG/1
75 CAPSULE ORAL NIGHTLY
Status: CANCELLED | OUTPATIENT
Start: 2021-06-21

## 2021-06-21 RX ORDER — ONDANSETRON 2 MG/ML
4 INJECTION INTRAMUSCULAR; INTRAVENOUS EVERY 8 HOURS PRN
Status: CANCELLED | OUTPATIENT
Start: 2021-06-21

## 2021-06-21 RX ORDER — OXYCODONE HYDROCHLORIDE 5 MG/1
5 TABLET ORAL
Status: CANCELLED | OUTPATIENT
Start: 2021-06-21

## 2021-06-21 RX ORDER — SODIUM CHLORIDE 9 MG/ML
INJECTION, SOLUTION INTRAVENOUS
Status: CANCELLED | OUTPATIENT
Start: 2021-06-21

## 2021-06-21 RX ORDER — FAMOTIDINE 20 MG/1
20 TABLET, FILM COATED ORAL 2 TIMES DAILY
Status: CANCELLED | OUTPATIENT
Start: 2021-06-21

## 2021-06-21 RX ORDER — CEFAZOLIN SODIUM 2 G/50ML
2 SOLUTION INTRAVENOUS
Status: CANCELLED | OUTPATIENT
Start: 2021-06-21 | End: 2021-06-22

## 2021-06-21 RX ORDER — MORPHINE SULFATE 2 MG/ML
2 INJECTION, SOLUTION INTRAMUSCULAR; INTRAVENOUS
Status: CANCELLED | OUTPATIENT
Start: 2021-06-21

## 2021-06-21 RX ORDER — OXYCODONE HYDROCHLORIDE 5 MG/1
10 TABLET ORAL
Status: CANCELLED | OUTPATIENT
Start: 2021-06-21

## 2021-06-21 RX ORDER — PROCHLORPERAZINE EDISYLATE 5 MG/ML
5 INJECTION INTRAMUSCULAR; INTRAVENOUS EVERY 6 HOURS PRN
Status: CANCELLED | OUTPATIENT
Start: 2021-06-21

## 2021-06-21 RX ORDER — AMOXICILLIN 250 MG
1 CAPSULE ORAL 2 TIMES DAILY
Status: CANCELLED | OUTPATIENT
Start: 2021-06-21

## 2021-06-21 RX ORDER — NALOXONE HCL 0.4 MG/ML
0.02 VIAL (ML) INJECTION
Status: CANCELLED | OUTPATIENT
Start: 2021-06-21

## 2021-06-21 RX ORDER — MUPIROCIN 20 MG/G
1 OINTMENT TOPICAL 2 TIMES DAILY
Status: CANCELLED | OUTPATIENT
Start: 2021-06-21 | End: 2021-06-26

## 2021-06-21 RX ORDER — MIDAZOLAM HYDROCHLORIDE 1 MG/ML
1 INJECTION INTRAMUSCULAR; INTRAVENOUS EVERY 5 MIN PRN
Status: CANCELLED | OUTPATIENT
Start: 2021-06-21

## 2021-06-21 RX ORDER — MUPIROCIN 20 MG/G
1 OINTMENT TOPICAL
Status: CANCELLED | OUTPATIENT
Start: 2021-06-21

## 2021-06-21 RX ORDER — POLYETHYLENE GLYCOL 3350 17 G/17G
17 POWDER, FOR SOLUTION ORAL DAILY
Status: CANCELLED | OUTPATIENT
Start: 2021-06-22

## 2021-06-21 RX ORDER — ROPIVACAINE HYDROCHLORIDE 2 MG/ML
8 INJECTION, SOLUTION EPIDURAL; INFILTRATION; PERINEURAL CONTINUOUS
Status: CANCELLED | OUTPATIENT
Start: 2021-06-21

## 2021-06-23 ENCOUNTER — LAB VISIT (OUTPATIENT)
Dept: LAB | Facility: HOSPITAL | Age: 81
End: 2021-06-23
Attending: INTERNAL MEDICINE
Payer: MEDICARE

## 2021-06-23 DIAGNOSIS — Z01.818 PREOP EXAMINATION: ICD-10-CM

## 2021-06-23 DIAGNOSIS — E87.0 HYPERNATREMIA: Primary | ICD-10-CM

## 2021-06-23 LAB
ALBUMIN SERPL BCP-MCNC: 3.6 G/DL (ref 3.5–5.2)
ALP SERPL-CCNC: 98 U/L (ref 55–135)
ALT SERPL W/O P-5'-P-CCNC: 9 U/L (ref 10–44)
ANION GAP SERPL CALC-SCNC: 11 MMOL/L (ref 8–16)
AST SERPL-CCNC: 13 U/L (ref 10–40)
BASOPHILS # BLD AUTO: 0.04 K/UL (ref 0–0.2)
BASOPHILS NFR BLD: 0.8 % (ref 0–1.9)
BILIRUB SERPL-MCNC: 0.3 MG/DL (ref 0.1–1)
BUN SERPL-MCNC: 19 MG/DL (ref 8–23)
CALCIUM SERPL-MCNC: 9.6 MG/DL (ref 8.7–10.5)
CHLORIDE SERPL-SCNC: 105 MMOL/L (ref 95–110)
CO2 SERPL-SCNC: 30 MMOL/L (ref 23–29)
CREAT SERPL-MCNC: 1 MG/DL (ref 0.5–1.4)
DIFFERENTIAL METHOD: ABNORMAL
EOSINOPHIL # BLD AUTO: 0.3 K/UL (ref 0–0.5)
EOSINOPHIL NFR BLD: 6.1 % (ref 0–8)
ERYTHROCYTE [DISTWIDTH] IN BLOOD BY AUTOMATED COUNT: 15.2 % (ref 11.5–14.5)
EST. GFR  (AFRICAN AMERICAN): >60 ML/MIN/1.73 M^2
EST. GFR  (NON AFRICAN AMERICAN): 54.1 ML/MIN/1.73 M^2
GLUCOSE SERPL-MCNC: 82 MG/DL (ref 70–110)
HCT VFR BLD AUTO: 37.1 % (ref 37–48.5)
HGB BLD-MCNC: 11.5 G/DL (ref 12–16)
IMM GRANULOCYTES # BLD AUTO: 0 K/UL (ref 0–0.04)
IMM GRANULOCYTES NFR BLD AUTO: 0 % (ref 0–0.5)
LYMPHOCYTES # BLD AUTO: 1.9 K/UL (ref 1–4.8)
LYMPHOCYTES NFR BLD: 38.6 % (ref 18–48)
MCH RBC QN AUTO: 25.3 PG (ref 27–31)
MCHC RBC AUTO-ENTMCNC: 31 G/DL (ref 32–36)
MCV RBC AUTO: 82 FL (ref 82–98)
MONOCYTES # BLD AUTO: 0.4 K/UL (ref 0.3–1)
MONOCYTES NFR BLD: 8.8 % (ref 4–15)
NEUTROPHILS # BLD AUTO: 2.2 K/UL (ref 1.8–7.7)
NEUTROPHILS NFR BLD: 45.7 % (ref 38–73)
NRBC BLD-RTO: 0 /100 WBC
PLATELET # BLD AUTO: 223 K/UL (ref 150–450)
PMV BLD AUTO: 10.8 FL (ref 9.2–12.9)
POTASSIUM SERPL-SCNC: 4 MMOL/L (ref 3.5–5.1)
PROT SERPL-MCNC: 7.1 G/DL (ref 6–8.4)
RBC # BLD AUTO: 4.55 M/UL (ref 4–5.4)
SODIUM SERPL-SCNC: 146 MMOL/L (ref 136–145)
WBC # BLD AUTO: 4.79 K/UL (ref 3.9–12.7)

## 2021-06-23 PROCEDURE — 36415 COLL VENOUS BLD VENIPUNCTURE: CPT | Mod: PN | Performed by: INTERNAL MEDICINE

## 2021-06-23 PROCEDURE — 80053 COMPREHEN METABOLIC PANEL: CPT | Performed by: INTERNAL MEDICINE

## 2021-06-23 PROCEDURE — 85025 COMPLETE CBC W/AUTO DIFF WBC: CPT | Performed by: INTERNAL MEDICINE

## 2021-06-25 ENCOUNTER — TELEPHONE (OUTPATIENT)
Dept: PREADMISSION TESTING | Facility: HOSPITAL | Age: 81
End: 2021-06-25

## 2021-06-25 DIAGNOSIS — M79.609 PAIN IN EXTREMITY, UNSPECIFIED EXTREMITY: Primary | ICD-10-CM

## 2021-06-28 ENCOUNTER — CLINICAL SUPPORT (OUTPATIENT)
Dept: REHABILITATION | Facility: HOSPITAL | Age: 81
End: 2021-06-28
Payer: MEDICARE

## 2021-06-28 DIAGNOSIS — G89.29 CHRONIC PAIN OF LEFT KNEE: Primary | ICD-10-CM

## 2021-06-28 DIAGNOSIS — M25.562 CHRONIC PAIN OF LEFT KNEE: Primary | ICD-10-CM

## 2021-06-28 PROCEDURE — 97110 THERAPEUTIC EXERCISES: CPT

## 2021-07-01 ENCOUNTER — PATIENT MESSAGE (OUTPATIENT)
Dept: ADMINISTRATIVE | Facility: OTHER | Age: 81
End: 2021-07-01

## 2021-07-06 ENCOUNTER — OFFICE VISIT (OUTPATIENT)
Dept: INTERNAL MEDICINE | Facility: CLINIC | Age: 81
End: 2021-07-06
Payer: MEDICARE

## 2021-07-06 ENCOUNTER — HOSPITAL ENCOUNTER (OUTPATIENT)
Dept: PREADMISSION TESTING | Facility: HOSPITAL | Age: 81
Discharge: HOME | End: 2021-07-06
Attending: ORTHOPAEDIC SURGERY
Payer: MEDICARE

## 2021-07-06 ENCOUNTER — ANESTHESIA EVENT (OUTPATIENT)
Dept: SURGERY | Facility: HOSPITAL | Age: 81
End: 2021-07-06
Payer: MEDICARE

## 2021-07-06 VITALS
OXYGEN SATURATION: 97 % | SYSTOLIC BLOOD PRESSURE: 165 MMHG | WEIGHT: 224 LBS | BODY MASS INDEX: 36 KG/M2 | HEART RATE: 92 BPM | TEMPERATURE: 99 F | HEIGHT: 66 IN | DIASTOLIC BLOOD PRESSURE: 77 MMHG

## 2021-07-06 DIAGNOSIS — C54.1 ENDOMETRIAL CA: ICD-10-CM

## 2021-07-06 DIAGNOSIS — E66.9 OBESITY (BMI 30-39.9): ICD-10-CM

## 2021-07-06 DIAGNOSIS — R60.9 EDEMA, UNSPECIFIED TYPE: ICD-10-CM

## 2021-07-06 DIAGNOSIS — D64.9 ANEMIA, UNSPECIFIED TYPE: ICD-10-CM

## 2021-07-06 DIAGNOSIS — E87.0 HYPERNATREMIA: ICD-10-CM

## 2021-07-06 DIAGNOSIS — I10 ESSENTIAL HYPERTENSION: ICD-10-CM

## 2021-07-06 DIAGNOSIS — Z01.818 PREOP EXAMINATION: Primary | ICD-10-CM

## 2021-07-06 DIAGNOSIS — E78.5 HYPERLIPIDEMIA, UNSPECIFIED HYPERLIPIDEMIA TYPE: ICD-10-CM

## 2021-07-06 DIAGNOSIS — M17.12 OSTEOARTHRITIS OF LEFT KNEE, UNSPECIFIED OSTEOARTHRITIS TYPE: ICD-10-CM

## 2021-07-06 DIAGNOSIS — M79.642 LEFT HAND PAIN: ICD-10-CM

## 2021-07-06 DIAGNOSIS — N28.9 RENAL IMPAIRMENT: ICD-10-CM

## 2021-07-06 PROBLEM — N17.9 AKI (ACUTE KIDNEY INJURY): Status: RESOLVED | Noted: 2020-01-17 | Resolved: 2021-07-06

## 2021-07-06 PROBLEM — M25.661 KNEE STIFFNESS, RIGHT: Status: RESOLVED | Noted: 2019-10-03 | Resolved: 2021-07-06

## 2021-07-06 PROBLEM — J01.90 ACUTE NON-RECURRENT SINUSITIS: Status: RESOLVED | Noted: 2021-05-14 | Resolved: 2021-07-06

## 2021-07-06 PROBLEM — R05.9 COUGH: Status: RESOLVED | Noted: 2021-05-14 | Resolved: 2021-07-06

## 2021-07-06 PROBLEM — M17.11 PRIMARY OSTEOARTHRITIS OF RIGHT KNEE: Status: RESOLVED | Noted: 2019-10-01 | Resolved: 2021-07-06

## 2021-07-06 PROBLEM — R29.898 WEAKNESS OF RIGHT LEG: Status: RESOLVED | Noted: 2019-09-11 | Resolved: 2021-07-06

## 2021-07-06 PROBLEM — M17.11 TRICOMPARTMENT OSTEOARTHRITIS OF RIGHT KNEE: Status: RESOLVED | Noted: 2018-05-06 | Resolved: 2021-07-06

## 2021-07-06 PROCEDURE — 99214 OFFICE O/P EST MOD 30 MIN: CPT | Mod: S$GLB,,, | Performed by: HOSPITALIST

## 2021-07-06 PROCEDURE — 99499 RISK ADDL DX/OHS AUDIT: ICD-10-PCS | Mod: HCNC,S$GLB,, | Performed by: HOSPITALIST

## 2021-07-06 PROCEDURE — 99999 PR PBB SHADOW E&M-EST. PATIENT-LVL II: ICD-10-PCS | Mod: PBBFAC,,, | Performed by: HOSPITALIST

## 2021-07-06 PROCEDURE — 99214 PR OFFICE/OUTPT VISIT, EST, LEVL IV, 30-39 MIN: ICD-10-PCS | Mod: S$GLB,,, | Performed by: HOSPITALIST

## 2021-07-06 PROCEDURE — 99999 PR PBB SHADOW E&M-EST. PATIENT-LVL II: CPT | Mod: PBBFAC,,, | Performed by: HOSPITALIST

## 2021-07-06 PROCEDURE — 1159F MED LIST DOCD IN RCRD: CPT | Mod: S$GLB,,, | Performed by: HOSPITALIST

## 2021-07-06 PROCEDURE — 99499 UNLISTED E&M SERVICE: CPT | Mod: HCNC,S$GLB,, | Performed by: HOSPITALIST

## 2021-07-06 PROCEDURE — 1159F PR MEDICATION LIST DOCUMENTED IN MEDICAL RECORD: ICD-10-PCS | Mod: S$GLB,,, | Performed by: HOSPITALIST

## 2021-07-07 ENCOUNTER — TELEPHONE (OUTPATIENT)
Dept: ORTHOPEDICS | Facility: CLINIC | Age: 81
End: 2021-07-07

## 2021-07-07 RX ORDER — OXYCODONE HYDROCHLORIDE 5 MG/1
TABLET ORAL
Qty: 50 TABLET | Refills: 0 | Status: SHIPPED | OUTPATIENT
Start: 2021-07-07 | End: 2021-07-26 | Stop reason: SDUPTHER

## 2021-07-07 RX ORDER — DOCUSATE SODIUM 100 MG/1
100 CAPSULE, LIQUID FILLED ORAL 2 TIMES DAILY PRN
Qty: 60 CAPSULE | Refills: 0 | Status: SHIPPED | OUTPATIENT
Start: 2021-07-07 | End: 2022-10-10

## 2021-07-07 RX ORDER — ASPIRIN 81 MG/1
81 TABLET ORAL 2 TIMES DAILY
Qty: 60 TABLET | Refills: 0 | Status: SHIPPED | OUTPATIENT
Start: 2021-07-07 | End: 2022-10-10

## 2021-07-07 RX ORDER — DEXTROMETHORPHAN HYDROBROMIDE, GUAIFENESIN 5; 100 MG/5ML; MG/5ML
650 LIQUID ORAL EVERY 8 HOURS PRN
Qty: 120 TABLET | Refills: 0 | Status: SHIPPED | OUTPATIENT
Start: 2021-07-07 | End: 2022-10-10

## 2021-07-07 RX ORDER — CELECOXIB 200 MG/1
200 CAPSULE ORAL DAILY
Qty: 30 CAPSULE | Refills: 0 | Status: SHIPPED | OUTPATIENT
Start: 2021-07-07 | End: 2021-08-08

## 2021-07-08 ENCOUNTER — ANESTHESIA (OUTPATIENT)
Dept: SURGERY | Facility: HOSPITAL | Age: 81
End: 2021-07-08
Payer: MEDICARE

## 2021-07-08 ENCOUNTER — HOSPITAL ENCOUNTER (OUTPATIENT)
Facility: HOSPITAL | Age: 81
Discharge: HOME | End: 2021-07-09
Attending: ORTHOPAEDIC SURGERY | Admitting: ORTHOPAEDIC SURGERY
Payer: MEDICARE

## 2021-07-08 DIAGNOSIS — M17.12 PRIMARY OSTEOARTHRITIS OF LEFT KNEE: ICD-10-CM

## 2021-07-08 PROBLEM — Z96.652 S/P TOTAL KNEE ARTHROPLASTY, LEFT: Status: ACTIVE | Noted: 2021-07-08

## 2021-07-08 PROCEDURE — 25000003 PHARM REV CODE 250: Performed by: STUDENT IN AN ORGANIZED HEALTH CARE EDUCATION/TRAINING PROGRAM

## 2021-07-08 PROCEDURE — 27447 PR TOTAL KNEE ARTHROPLASTY: ICD-10-PCS | Mod: LT,,, | Performed by: ORTHOPAEDIC SURGERY

## 2021-07-08 PROCEDURE — C1713 ANCHOR/SCREW BN/BN,TIS/BN: HCPCS | Performed by: ORTHOPAEDIC SURGERY

## 2021-07-08 PROCEDURE — 25000003 PHARM REV CODE 250: Performed by: PHYSICIAN ASSISTANT

## 2021-07-08 PROCEDURE — D9220A PRA ANESTHESIA: Mod: CRNA,,, | Performed by: NURSE ANESTHETIST, CERTIFIED REGISTERED

## 2021-07-08 PROCEDURE — 94761 N-INVAS EAR/PLS OXIMETRY MLT: CPT

## 2021-07-08 PROCEDURE — 97161 PT EVAL LOW COMPLEX 20 MIN: CPT

## 2021-07-08 PROCEDURE — D9220A PRA ANESTHESIA: ICD-10-PCS | Mod: CRNA,,, | Performed by: NURSE ANESTHETIST, CERTIFIED REGISTERED

## 2021-07-08 PROCEDURE — D9220A PRA ANESTHESIA: Mod: ANES,,, | Performed by: ANESTHESIOLOGY

## 2021-07-08 PROCEDURE — 27447 TOTAL KNEE ARTHROPLASTY: CPT | Mod: LT,,, | Performed by: ORTHOPAEDIC SURGERY

## 2021-07-08 PROCEDURE — 36000710: Performed by: ORTHOPAEDIC SURGERY

## 2021-07-08 PROCEDURE — C1751 CATH, INF, PER/CENT/MIDLINE: HCPCS | Performed by: ANESTHESIOLOGY

## 2021-07-08 PROCEDURE — 76942 PR U/S GUIDANCE FOR NEEDLE GUIDANCE: ICD-10-PCS | Mod: 26,,, | Performed by: ANESTHESIOLOGY

## 2021-07-08 PROCEDURE — 76942 ECHO GUIDE FOR BIOPSY: CPT | Mod: 26,,, | Performed by: ANESTHESIOLOGY

## 2021-07-08 PROCEDURE — 97116 GAIT TRAINING THERAPY: CPT

## 2021-07-08 PROCEDURE — 63600175 PHARM REV CODE 636 W HCPCS: Performed by: NURSE ANESTHETIST, CERTIFIED REGISTERED

## 2021-07-08 PROCEDURE — 64448 NJX AA&/STRD FEM NRV NFS IMG: CPT | Mod: 59,LT,, | Performed by: ANESTHESIOLOGY

## 2021-07-08 PROCEDURE — 63600175 PHARM REV CODE 636 W HCPCS: Performed by: STUDENT IN AN ORGANIZED HEALTH CARE EDUCATION/TRAINING PROGRAM

## 2021-07-08 PROCEDURE — 25000003 PHARM REV CODE 250: Performed by: NURSE ANESTHETIST, CERTIFIED REGISTERED

## 2021-07-08 PROCEDURE — 63600175 PHARM REV CODE 636 W HCPCS: Performed by: PHYSICIAN ASSISTANT

## 2021-07-08 PROCEDURE — 36000711: Performed by: ORTHOPAEDIC SURGERY

## 2021-07-08 PROCEDURE — 63600175 PHARM REV CODE 636 W HCPCS: Performed by: ANESTHESIOLOGY

## 2021-07-08 PROCEDURE — 99900035 HC TECH TIME PER 15 MIN (STAT)

## 2021-07-08 PROCEDURE — 71000039 HC RECOVERY, EACH ADD'L HOUR: Performed by: ORTHOPAEDIC SURGERY

## 2021-07-08 PROCEDURE — C1776 JOINT DEVICE (IMPLANTABLE): HCPCS | Performed by: ORTHOPAEDIC SURGERY

## 2021-07-08 PROCEDURE — 71000033 HC RECOVERY, INTIAL HOUR: Performed by: ORTHOPAEDIC SURGERY

## 2021-07-08 PROCEDURE — 37000009 HC ANESTHESIA EA ADD 15 MINS: Performed by: ORTHOPAEDIC SURGERY

## 2021-07-08 PROCEDURE — 37000008 HC ANESTHESIA 1ST 15 MINUTES: Performed by: ORTHOPAEDIC SURGERY

## 2021-07-08 PROCEDURE — 27201423 OPTIME MED/SURG SUP & DEVICES STERILE SUPPLY: Performed by: ORTHOPAEDIC SURGERY

## 2021-07-08 PROCEDURE — D9220A PRA ANESTHESIA: ICD-10-PCS | Mod: ANES,,, | Performed by: ANESTHESIOLOGY

## 2021-07-08 PROCEDURE — 97165 OT EVAL LOW COMPLEX 30 MIN: CPT

## 2021-07-08 PROCEDURE — 64448 PR NERVE BLOCK INJ, ANES/STEROID, FEMORAL, CONT INFUSION, INCL IMAG GUIDANCE: ICD-10-PCS | Mod: 59,LT,, | Performed by: ANESTHESIOLOGY

## 2021-07-08 PROCEDURE — 64448 NJX AA&/STRD FEM NRV NFS IMG: CPT | Performed by: ANESTHESIOLOGY

## 2021-07-08 DEVICE — IMPLANTABLE DEVICE: Type: IMPLANTABLE DEVICE | Site: KNEE | Status: FUNCTIONAL

## 2021-07-08 DEVICE — CEMENT BONE SURG SMPLX P RADPQ: Type: IMPLANTABLE DEVICE | Site: KNEE | Status: FUNCTIONAL

## 2021-07-08 DEVICE — TIBIA NEXGEN OPTION STEM: Type: IMPLANTABLE DEVICE | Site: KNEE | Status: FUNCTIONAL

## 2021-07-08 DEVICE — FEMORAL POST LPS SZ F LEFT: Type: IMPLANTABLE DEVICE | Site: KNEE | Status: FUNCTIONAL

## 2021-07-08 DEVICE — PATELLA NEXGEN ALL-POLY: Type: IMPLANTABLE DEVICE | Site: KNEE | Status: FUNCTIONAL

## 2021-07-08 RX ORDER — ONDANSETRON 2 MG/ML
INJECTION INTRAMUSCULAR; INTRAVENOUS
Status: DISCONTINUED | OUTPATIENT
Start: 2021-07-08 | End: 2021-07-08

## 2021-07-08 RX ORDER — DEXAMETHASONE SODIUM PHOSPHATE 4 MG/ML
INJECTION, SOLUTION INTRA-ARTICULAR; INTRALESIONAL; INTRAMUSCULAR; INTRAVENOUS; SOFT TISSUE
Status: DISCONTINUED | OUTPATIENT
Start: 2021-07-08 | End: 2021-07-08

## 2021-07-08 RX ORDER — CEFAZOLIN SODIUM 1 G/3ML
2 INJECTION, POWDER, FOR SOLUTION INTRAMUSCULAR; INTRAVENOUS
Status: DISCONTINUED | OUTPATIENT
Start: 2021-07-08 | End: 2021-07-08 | Stop reason: HOSPADM

## 2021-07-08 RX ORDER — CARBOXYMETHYLCELLULOSE SODIUM 5 MG/ML
SOLUTION/ DROPS OPHTHALMIC
Status: DISCONTINUED | OUTPATIENT
Start: 2021-07-08 | End: 2021-07-08

## 2021-07-08 RX ORDER — MIDAZOLAM HYDROCHLORIDE 1 MG/ML
1 INJECTION INTRAMUSCULAR; INTRAVENOUS EVERY 5 MIN PRN
Status: DISCONTINUED | OUTPATIENT
Start: 2021-07-08 | End: 2021-07-08 | Stop reason: HOSPADM

## 2021-07-08 RX ORDER — OXYCODONE HYDROCHLORIDE 5 MG/1
5 TABLET ORAL
Status: DISCONTINUED | OUTPATIENT
Start: 2021-07-08 | End: 2021-07-08

## 2021-07-08 RX ORDER — PREGABALIN 75 MG/1
75 CAPSULE ORAL
Status: COMPLETED | OUTPATIENT
Start: 2021-07-08 | End: 2021-07-08

## 2021-07-08 RX ORDER — FAMOTIDINE 20 MG/1
20 TABLET, FILM COATED ORAL 2 TIMES DAILY
Status: DISCONTINUED | OUTPATIENT
Start: 2021-07-08 | End: 2021-07-09 | Stop reason: HOSPADM

## 2021-07-08 RX ORDER — MIDAZOLAM HYDROCHLORIDE 1 MG/ML
0.5 INJECTION INTRAMUSCULAR; INTRAVENOUS
Status: DISCONTINUED | OUTPATIENT
Start: 2021-07-08 | End: 2021-07-09 | Stop reason: HOSPADM

## 2021-07-08 RX ORDER — ROPIVACAINE HYDROCHLORIDE 2 MG/ML
INJECTION, SOLUTION EPIDURAL; INFILTRATION; PERINEURAL CONTINUOUS
Status: DISCONTINUED | OUTPATIENT
Start: 2021-07-08 | End: 2021-07-09 | Stop reason: HOSPADM

## 2021-07-08 RX ORDER — BISACODYL 10 MG
10 SUPPOSITORY, RECTAL RECTAL EVERY 12 HOURS PRN
Status: DISCONTINUED | OUTPATIENT
Start: 2021-07-08 | End: 2021-07-09 | Stop reason: HOSPADM

## 2021-07-08 RX ORDER — NALOXONE HCL 0.4 MG/ML
0.02 VIAL (ML) INJECTION
Status: DISCONTINUED | OUTPATIENT
Start: 2021-07-08 | End: 2021-07-09 | Stop reason: HOSPADM

## 2021-07-08 RX ORDER — SODIUM CHLORIDE 9 MG/ML
INJECTION, SOLUTION INTRAVENOUS CONTINUOUS
Status: DISCONTINUED | OUTPATIENT
Start: 2021-07-08 | End: 2021-07-09 | Stop reason: HOSPADM

## 2021-07-08 RX ORDER — SODIUM CHLORIDE 0.9 % (FLUSH) 0.9 %
10 SYRINGE (ML) INJECTION
Status: DISCONTINUED | OUTPATIENT
Start: 2021-07-08 | End: 2021-07-08 | Stop reason: HOSPADM

## 2021-07-08 RX ORDER — AMLODIPINE BESYLATE 2.5 MG/1
2.5 TABLET ORAL DAILY
Status: DISCONTINUED | OUTPATIENT
Start: 2021-07-08 | End: 2021-07-09 | Stop reason: HOSPADM

## 2021-07-08 RX ORDER — ATORVASTATIN CALCIUM 20 MG/1
40 TABLET, FILM COATED ORAL NIGHTLY
Status: DISCONTINUED | OUTPATIENT
Start: 2021-07-08 | End: 2021-07-09 | Stop reason: HOSPADM

## 2021-07-08 RX ORDER — FENTANYL CITRATE 50 UG/ML
25 INJECTION, SOLUTION INTRAMUSCULAR; INTRAVENOUS EVERY 5 MIN PRN
Status: DISCONTINUED | OUTPATIENT
Start: 2021-07-08 | End: 2021-07-08 | Stop reason: HOSPADM

## 2021-07-08 RX ORDER — TRANEXAMIC ACID 100 MG/ML
1000 INJECTION, SOLUTION INTRAVENOUS
Status: DISCONTINUED | OUTPATIENT
Start: 2021-07-08 | End: 2021-07-08 | Stop reason: HOSPADM

## 2021-07-08 RX ORDER — TALC
6 POWDER (GRAM) TOPICAL NIGHTLY PRN
Status: DISCONTINUED | OUTPATIENT
Start: 2021-07-08 | End: 2021-07-08 | Stop reason: HOSPADM

## 2021-07-08 RX ORDER — PROCHLORPERAZINE EDISYLATE 5 MG/ML
5 INJECTION INTRAMUSCULAR; INTRAVENOUS EVERY 6 HOURS PRN
Status: DISCONTINUED | OUTPATIENT
Start: 2021-07-08 | End: 2021-07-09 | Stop reason: HOSPADM

## 2021-07-08 RX ORDER — MUPIROCIN 20 MG/G
1 OINTMENT TOPICAL 2 TIMES DAILY
Status: DISCONTINUED | OUTPATIENT
Start: 2021-07-08 | End: 2021-07-09 | Stop reason: HOSPADM

## 2021-07-08 RX ORDER — LIDOCAINE HYDROCHLORIDE 10 MG/ML
1 INJECTION, SOLUTION EPIDURAL; INFILTRATION; INTRACAUDAL; PERINEURAL
Status: DISCONTINUED | OUTPATIENT
Start: 2021-07-08 | End: 2021-07-08 | Stop reason: HOSPADM

## 2021-07-08 RX ORDER — ACETAMINOPHEN 500 MG
1000 TABLET ORAL
Status: COMPLETED | OUTPATIENT
Start: 2021-07-08 | End: 2021-07-08

## 2021-07-08 RX ORDER — PREGABALIN 75 MG/1
75 CAPSULE ORAL NIGHTLY
Status: DISCONTINUED | OUTPATIENT
Start: 2021-07-08 | End: 2021-07-09 | Stop reason: HOSPADM

## 2021-07-08 RX ORDER — ACETAMINOPHEN 500 MG
1000 TABLET ORAL EVERY 6 HOURS
Status: DISCONTINUED | OUTPATIENT
Start: 2021-07-08 | End: 2021-07-09 | Stop reason: HOSPADM

## 2021-07-08 RX ORDER — CELECOXIB 200 MG/1
400 CAPSULE ORAL
Status: COMPLETED | OUTPATIENT
Start: 2021-07-08 | End: 2021-07-08

## 2021-07-08 RX ORDER — CEFAZOLIN SODIUM 1 G/3ML
2 INJECTION, POWDER, FOR SOLUTION INTRAMUSCULAR; INTRAVENOUS
Status: COMPLETED | OUTPATIENT
Start: 2021-07-08 | End: 2021-07-09

## 2021-07-08 RX ORDER — ROPIVACAINE HYDROCHLORIDE 5 MG/ML
INJECTION, SOLUTION EPIDURAL; INFILTRATION; PERINEURAL
Status: COMPLETED | OUTPATIENT
Start: 2021-07-08 | End: 2021-07-08

## 2021-07-08 RX ORDER — AMOXICILLIN 250 MG
1 CAPSULE ORAL 2 TIMES DAILY
Status: DISCONTINUED | OUTPATIENT
Start: 2021-07-08 | End: 2021-07-09 | Stop reason: HOSPADM

## 2021-07-08 RX ORDER — FENTANYL CITRATE 50 UG/ML
25 INJECTION, SOLUTION INTRAMUSCULAR; INTRAVENOUS EVERY 5 MIN PRN
Status: DISCONTINUED | OUTPATIENT
Start: 2021-07-08 | End: 2021-07-09 | Stop reason: HOSPADM

## 2021-07-08 RX ORDER — ASPIRIN 81 MG/1
81 TABLET ORAL 2 TIMES DAILY
Status: DISCONTINUED | OUTPATIENT
Start: 2021-07-08 | End: 2021-07-09 | Stop reason: HOSPADM

## 2021-07-08 RX ORDER — SODIUM CHLORIDE 9 MG/ML
INJECTION, SOLUTION INTRAVENOUS
Status: COMPLETED | OUTPATIENT
Start: 2021-07-08 | End: 2021-07-08

## 2021-07-08 RX ORDER — POLYETHYLENE GLYCOL 3350 17 G/17G
17 POWDER, FOR SOLUTION ORAL DAILY
Status: DISCONTINUED | OUTPATIENT
Start: 2021-07-09 | End: 2021-07-09 | Stop reason: HOSPADM

## 2021-07-08 RX ORDER — MORPHINE SULFATE 2 MG/ML
2 INJECTION, SOLUTION INTRAMUSCULAR; INTRAVENOUS
Status: DISCONTINUED | OUTPATIENT
Start: 2021-07-08 | End: 2021-07-09 | Stop reason: HOSPADM

## 2021-07-08 RX ORDER — MUPIROCIN 20 MG/G
1 OINTMENT TOPICAL
Status: COMPLETED | OUTPATIENT
Start: 2021-07-08 | End: 2021-07-08

## 2021-07-08 RX ORDER — LABETALOL HYDROCHLORIDE 5 MG/ML
INJECTION, SOLUTION INTRAVENOUS
Status: DISCONTINUED | OUTPATIENT
Start: 2021-07-08 | End: 2021-07-08

## 2021-07-08 RX ORDER — OXYCODONE HYDROCHLORIDE 10 MG/1
10 TABLET ORAL
Status: DISCONTINUED | OUTPATIENT
Start: 2021-07-08 | End: 2021-07-08

## 2021-07-08 RX ORDER — OXYCODONE HYDROCHLORIDE 5 MG/1
5 TABLET ORAL
Status: DISCONTINUED | OUTPATIENT
Start: 2021-07-08 | End: 2021-07-09 | Stop reason: HOSPADM

## 2021-07-08 RX ORDER — TRANEXAMIC ACID 100 MG/ML
INJECTION, SOLUTION INTRAVENOUS
Status: DISCONTINUED | OUTPATIENT
Start: 2021-07-08 | End: 2021-07-08

## 2021-07-08 RX ORDER — KETAMINE HCL IN 0.9 % NACL 50 MG/5 ML
SYRINGE (ML) INTRAVENOUS
Status: DISCONTINUED | OUTPATIENT
Start: 2021-07-08 | End: 2021-07-08

## 2021-07-08 RX ORDER — CEFAZOLIN SODIUM 1 G/3ML
INJECTION, POWDER, FOR SOLUTION INTRAMUSCULAR; INTRAVENOUS
Status: DISCONTINUED | OUTPATIENT
Start: 2021-07-08 | End: 2021-07-08

## 2021-07-08 RX ORDER — ONDANSETRON 2 MG/ML
4 INJECTION INTRAMUSCULAR; INTRAVENOUS EVERY 8 HOURS PRN
Status: DISCONTINUED | OUTPATIENT
Start: 2021-07-08 | End: 2021-07-09 | Stop reason: HOSPADM

## 2021-07-08 RX ORDER — PROPOFOL 10 MG/ML
VIAL (ML) INTRAVENOUS CONTINUOUS PRN
Status: DISCONTINUED | OUTPATIENT
Start: 2021-07-08 | End: 2021-07-08

## 2021-07-08 RX ORDER — ROPIVACAINE HYDROCHLORIDE 2 MG/ML
8 INJECTION, SOLUTION EPIDURAL; INFILTRATION; PERINEURAL CONTINUOUS
Status: DISCONTINUED | OUTPATIENT
Start: 2021-07-08 | End: 2021-07-09 | Stop reason: HOSPADM

## 2021-07-08 RX ORDER — FAMOTIDINE 10 MG/ML
INJECTION INTRAVENOUS
Status: DISCONTINUED | OUTPATIENT
Start: 2021-07-08 | End: 2021-07-08

## 2021-07-08 RX ORDER — METHOCARBAMOL 500 MG/1
500 TABLET, FILM COATED ORAL ONCE
Status: DISCONTINUED | OUTPATIENT
Start: 2021-07-08 | End: 2021-07-08 | Stop reason: HOSPADM

## 2021-07-08 RX ADMIN — FAMOTIDINE 20 MG: 20 TABLET, FILM COATED ORAL at 08:07

## 2021-07-08 RX ADMIN — Medication: at 02:07

## 2021-07-08 RX ADMIN — SODIUM CHLORIDE: 0.9 INJECTION, SOLUTION INTRAVENOUS at 03:07

## 2021-07-08 RX ADMIN — CARBOXYMETHYLCELLULOSE SODIUM 2 DROP: 5 SOLUTION/ DROPS OPHTHALMIC at 12:07

## 2021-07-08 RX ADMIN — MUPIROCIN 1 G: 20 OINTMENT TOPICAL at 09:07

## 2021-07-08 RX ADMIN — FENTANYL CITRATE 25 MCG: 50 INJECTION, SOLUTION INTRAMUSCULAR; INTRAVENOUS at 02:07

## 2021-07-08 RX ADMIN — ASPIRIN 81 MG: 81 TABLET, COATED ORAL at 08:07

## 2021-07-08 RX ADMIN — FAMOTIDINE 20 MG: 10 INJECTION, SOLUTION INTRAVENOUS at 12:07

## 2021-07-08 RX ADMIN — MUPIROCIN 1 G: 20 OINTMENT TOPICAL at 08:07

## 2021-07-08 RX ADMIN — ATORVASTATIN CALCIUM 40 MG: 20 TABLET, FILM COATED ORAL at 08:07

## 2021-07-08 RX ADMIN — CEFAZOLIN 2 G: 330 INJECTION, POWDER, FOR SOLUTION INTRAMUSCULAR; INTRAVENOUS at 08:07

## 2021-07-08 RX ADMIN — CELECOXIB 400 MG: 200 CAPSULE ORAL at 09:07

## 2021-07-08 RX ADMIN — AMLODIPINE BESYLATE 2.5 MG: 2.5 TABLET ORAL at 02:07

## 2021-07-08 RX ADMIN — MEPIVACAINE HYDROCHLORIDE 3.2 ML: 15 INJECTION, SOLUTION EPIDURAL; INFILTRATION at 11:07

## 2021-07-08 RX ADMIN — PREGABALIN 75 MG: 75 CAPSULE ORAL at 09:07

## 2021-07-08 RX ADMIN — VANCOMYCIN HYDROCHLORIDE 1500 MG: 1.5 INJECTION, POWDER, LYOPHILIZED, FOR SOLUTION INTRAVENOUS at 09:07

## 2021-07-08 RX ADMIN — SODIUM CHLORIDE: 0.9 INJECTION, SOLUTION INTRAVENOUS at 09:07

## 2021-07-08 RX ADMIN — TRANEXAMIC ACID 1000 MG: 100 INJECTION, SOLUTION INTRAVENOUS at 12:07

## 2021-07-08 RX ADMIN — LABETALOL HYDROCHLORIDE 10 MG: 5 INJECTION, SOLUTION INTRAVENOUS at 12:07

## 2021-07-08 RX ADMIN — TRANEXAMIC ACID 1000 MG: 100 INJECTION, SOLUTION INTRAVENOUS at 01:07

## 2021-07-08 RX ADMIN — CEFAZOLIN 2 G: 330 INJECTION, POWDER, FOR SOLUTION INTRAMUSCULAR; INTRAVENOUS at 12:07

## 2021-07-08 RX ADMIN — PROPOFOL 75 MCG/KG/MIN: 10 INJECTION, EMULSION INTRAVENOUS at 12:07

## 2021-07-08 RX ADMIN — ROPIVACAINE HYDROCHLORIDE 9 ML: 5 INJECTION, SOLUTION EPIDURAL; INFILTRATION; PERINEURAL at 10:07

## 2021-07-08 RX ADMIN — LABETALOL HYDROCHLORIDE 10 MG: 5 INJECTION, SOLUTION INTRAVENOUS at 01:07

## 2021-07-08 RX ADMIN — OXYCODONE 10 MG: 5 TABLET ORAL at 02:07

## 2021-07-08 RX ADMIN — ONDANSETRON 4 MG: 2 INJECTION, SOLUTION INTRAMUSCULAR; INTRAVENOUS at 12:07

## 2021-07-08 RX ADMIN — MIDAZOLAM 2 MG: 1 INJECTION INTRAMUSCULAR; INTRAVENOUS at 10:07

## 2021-07-08 RX ADMIN — Medication 30 MG: at 12:07

## 2021-07-08 RX ADMIN — ACETAMINOPHEN 1000 MG: 500 TABLET ORAL at 09:07

## 2021-07-08 RX ADMIN — DOCUSATE SODIUM 50MG AND SENNOSIDES 8.6MG 1 TABLET: 8.6; 5 TABLET, FILM COATED ORAL at 08:07

## 2021-07-08 RX ADMIN — ACETAMINOPHEN 1000 MG: 500 TABLET ORAL at 11:07

## 2021-07-08 RX ADMIN — SODIUM CHLORIDE: 9 INJECTION, SOLUTION INTRAVENOUS at 11:07

## 2021-07-08 RX ADMIN — ACETAMINOPHEN 1000 MG: 500 TABLET ORAL at 06:07

## 2021-07-08 RX ADMIN — DEXAMETHASONE SODIUM PHOSPHATE 8 MG: 4 INJECTION, SOLUTION INTRAMUSCULAR; INTRAVENOUS at 12:07

## 2021-07-08 RX ADMIN — PREGABALIN 75 MG: 75 CAPSULE ORAL at 08:07

## 2021-07-09 ENCOUNTER — CLINICAL SUPPORT (OUTPATIENT)
Dept: ORTHOPEDICS | Facility: CLINIC | Age: 81
End: 2021-07-09
Payer: MEDICARE

## 2021-07-09 VITALS
TEMPERATURE: 98 F | WEIGHT: 224 LBS | SYSTOLIC BLOOD PRESSURE: 154 MMHG | DIASTOLIC BLOOD PRESSURE: 70 MMHG | HEART RATE: 80 BPM | HEIGHT: 66 IN | RESPIRATION RATE: 16 BRPM | OXYGEN SATURATION: 97 % | BODY MASS INDEX: 36 KG/M2

## 2021-07-09 DIAGNOSIS — Z96.659 STATUS POST KNEE REPLACEMENT, UNSPECIFIED LATERALITY: Primary | ICD-10-CM

## 2021-07-09 PROCEDURE — 25000003 PHARM REV CODE 250: Performed by: STUDENT IN AN ORGANIZED HEALTH CARE EDUCATION/TRAINING PROGRAM

## 2021-07-09 PROCEDURE — 25000003 PHARM REV CODE 250: Performed by: PHYSICIAN ASSISTANT

## 2021-07-09 PROCEDURE — 97535 SELF CARE MNGMENT TRAINING: CPT | Mod: 59

## 2021-07-09 PROCEDURE — 97530 THERAPEUTIC ACTIVITIES: CPT | Mod: CQ

## 2021-07-09 PROCEDURE — 97110 THERAPEUTIC EXERCISES: CPT | Mod: CQ

## 2021-07-09 PROCEDURE — 99499 UNLISTED E&M SERVICE: CPT | Mod: 95,,, | Performed by: ORTHOPAEDIC SURGERY

## 2021-07-09 PROCEDURE — 99499 NO LOS: ICD-10-PCS | Mod: 95,,, | Performed by: ORTHOPAEDIC SURGERY

## 2021-07-09 PROCEDURE — 99900035 HC TECH TIME PER 15 MIN (STAT)

## 2021-07-09 PROCEDURE — 63600175 PHARM REV CODE 636 W HCPCS: Performed by: PHYSICIAN ASSISTANT

## 2021-07-09 PROCEDURE — 94761 N-INVAS EAR/PLS OXIMETRY MLT: CPT

## 2021-07-09 PROCEDURE — 97116 GAIT TRAINING THERAPY: CPT | Mod: CQ

## 2021-07-09 RX ORDER — CELECOXIB 200 MG/1
200 CAPSULE ORAL DAILY
Status: DISCONTINUED | OUTPATIENT
Start: 2021-07-09 | End: 2021-07-09 | Stop reason: HOSPADM

## 2021-07-09 RX ADMIN — AMLODIPINE BESYLATE 2.5 MG: 2.5 TABLET ORAL at 08:07

## 2021-07-09 RX ADMIN — FAMOTIDINE 20 MG: 20 TABLET, FILM COATED ORAL at 08:07

## 2021-07-09 RX ADMIN — CEFAZOLIN 2 G: 330 INJECTION, POWDER, FOR SOLUTION INTRAMUSCULAR; INTRAVENOUS at 04:07

## 2021-07-09 RX ADMIN — MUPIROCIN 1 G: 20 OINTMENT TOPICAL at 08:07

## 2021-07-09 RX ADMIN — POLYETHYLENE GLYCOL 3350 17 G: 17 POWDER, FOR SOLUTION ORAL at 08:07

## 2021-07-09 RX ADMIN — ASPIRIN 81 MG: 81 TABLET, COATED ORAL at 08:07

## 2021-07-09 RX ADMIN — CELECOXIB 200 MG: 200 CAPSULE ORAL at 10:07

## 2021-07-09 RX ADMIN — DOCUSATE SODIUM 50MG AND SENNOSIDES 8.6MG 1 TABLET: 8.6; 5 TABLET, FILM COATED ORAL at 08:07

## 2021-07-09 RX ADMIN — ACETAMINOPHEN 1000 MG: 500 TABLET ORAL at 06:07

## 2021-07-12 ENCOUNTER — CLINICAL SUPPORT (OUTPATIENT)
Dept: REHABILITATION | Facility: HOSPITAL | Age: 81
End: 2021-07-12
Payer: MEDICARE

## 2021-07-12 DIAGNOSIS — M17.12 PRIMARY OSTEOARTHRITIS OF LEFT KNEE: ICD-10-CM

## 2021-07-12 PROCEDURE — 97110 THERAPEUTIC EXERCISES: CPT

## 2021-07-12 PROCEDURE — 97161 PT EVAL LOW COMPLEX 20 MIN: CPT

## 2021-07-14 ENCOUNTER — CLINICAL SUPPORT (OUTPATIENT)
Dept: REHABILITATION | Facility: HOSPITAL | Age: 81
End: 2021-07-14
Payer: MEDICARE

## 2021-07-14 DIAGNOSIS — M17.12 PRIMARY OSTEOARTHRITIS OF LEFT KNEE: Primary | ICD-10-CM

## 2021-07-14 PROCEDURE — 97110 THERAPEUTIC EXERCISES: CPT

## 2021-07-15 ENCOUNTER — TELEPHONE (OUTPATIENT)
Dept: PRIMARY CARE CLINIC | Facility: CLINIC | Age: 81
End: 2021-07-15

## 2021-07-15 VITALS — DIASTOLIC BLOOD PRESSURE: 70 MMHG | SYSTOLIC BLOOD PRESSURE: 131 MMHG

## 2021-07-19 ENCOUNTER — CLINICAL SUPPORT (OUTPATIENT)
Dept: REHABILITATION | Facility: HOSPITAL | Age: 81
End: 2021-07-19
Payer: MEDICARE

## 2021-07-19 DIAGNOSIS — M17.12 PRIMARY OSTEOARTHRITIS OF LEFT KNEE: Primary | ICD-10-CM

## 2021-07-19 PROCEDURE — 97110 THERAPEUTIC EXERCISES: CPT | Mod: CQ

## 2021-07-21 ENCOUNTER — CLINICAL SUPPORT (OUTPATIENT)
Dept: REHABILITATION | Facility: HOSPITAL | Age: 81
End: 2021-07-21
Payer: MEDICARE

## 2021-07-21 DIAGNOSIS — M17.12 PRIMARY OSTEOARTHRITIS OF LEFT KNEE: Primary | ICD-10-CM

## 2021-07-21 PROCEDURE — 97110 THERAPEUTIC EXERCISES: CPT | Mod: CQ

## 2021-07-23 ENCOUNTER — CLINICAL SUPPORT (OUTPATIENT)
Dept: REHABILITATION | Facility: HOSPITAL | Age: 81
End: 2021-07-23
Payer: MEDICARE

## 2021-07-23 DIAGNOSIS — M17.12 PRIMARY OSTEOARTHRITIS OF LEFT KNEE: Primary | ICD-10-CM

## 2021-07-23 PROCEDURE — 97110 THERAPEUTIC EXERCISES: CPT | Mod: CQ

## 2021-07-23 PROCEDURE — 97140 MANUAL THERAPY 1/> REGIONS: CPT | Mod: CQ

## 2021-07-26 ENCOUNTER — CLINICAL SUPPORT (OUTPATIENT)
Dept: REHABILITATION | Facility: HOSPITAL | Age: 81
End: 2021-07-26
Payer: MEDICARE

## 2021-07-26 ENCOUNTER — OFFICE VISIT (OUTPATIENT)
Dept: ORTHOPEDICS | Facility: CLINIC | Age: 81
End: 2021-07-26
Payer: MEDICARE

## 2021-07-26 VITALS — BODY MASS INDEX: 36.85 KG/M2 | HEIGHT: 66 IN | WEIGHT: 229.31 LBS

## 2021-07-26 DIAGNOSIS — M17.12 PRIMARY OSTEOARTHRITIS OF LEFT KNEE: Primary | ICD-10-CM

## 2021-07-26 DIAGNOSIS — Z96.659 STATUS POST KNEE REPLACEMENT, UNSPECIFIED LATERALITY: Primary | ICD-10-CM

## 2021-07-26 PROCEDURE — 1159F MED LIST DOCD IN RCRD: CPT | Mod: CPTII,S$GLB,, | Performed by: PHYSICIAN ASSISTANT

## 2021-07-26 PROCEDURE — 1101F PT FALLS ASSESS-DOCD LE1/YR: CPT | Mod: CPTII,S$GLB,, | Performed by: PHYSICIAN ASSISTANT

## 2021-07-26 PROCEDURE — 99024 PR POST-OP FOLLOW-UP VISIT: ICD-10-PCS | Mod: S$GLB,,, | Performed by: PHYSICIAN ASSISTANT

## 2021-07-26 PROCEDURE — 1101F PR PT FALLS ASSESS DOC 0-1 FALLS W/OUT INJ PAST YR: ICD-10-PCS | Mod: CPTII,S$GLB,, | Performed by: PHYSICIAN ASSISTANT

## 2021-07-26 PROCEDURE — 1159F PR MEDICATION LIST DOCUMENTED IN MEDICAL RECORD: ICD-10-PCS | Mod: CPTII,S$GLB,, | Performed by: PHYSICIAN ASSISTANT

## 2021-07-26 PROCEDURE — 3288F PR FALLS RISK ASSESSMENT DOCUMENTED: ICD-10-PCS | Mod: CPTII,S$GLB,, | Performed by: PHYSICIAN ASSISTANT

## 2021-07-26 PROCEDURE — 99999 PR PBB SHADOW E&M-EST. PATIENT-LVL III: ICD-10-PCS | Mod: PBBFAC,,, | Performed by: PHYSICIAN ASSISTANT

## 2021-07-26 PROCEDURE — 99024 POSTOP FOLLOW-UP VISIT: CPT | Mod: S$GLB,,, | Performed by: PHYSICIAN ASSISTANT

## 2021-07-26 PROCEDURE — 1160F RVW MEDS BY RX/DR IN RCRD: CPT | Mod: CPTII,S$GLB,, | Performed by: PHYSICIAN ASSISTANT

## 2021-07-26 PROCEDURE — 99999 PR PBB SHADOW E&M-EST. PATIENT-LVL III: CPT | Mod: PBBFAC,,, | Performed by: PHYSICIAN ASSISTANT

## 2021-07-26 PROCEDURE — 3288F FALL RISK ASSESSMENT DOCD: CPT | Mod: CPTII,S$GLB,, | Performed by: PHYSICIAN ASSISTANT

## 2021-07-26 PROCEDURE — 1126F PR PAIN SEVERITY QUANTIFIED, NO PAIN PRESENT: ICD-10-PCS | Mod: CPTII,S$GLB,, | Performed by: PHYSICIAN ASSISTANT

## 2021-07-26 PROCEDURE — 1126F AMNT PAIN NOTED NONE PRSNT: CPT | Mod: CPTII,S$GLB,, | Performed by: PHYSICIAN ASSISTANT

## 2021-07-26 PROCEDURE — 1160F PR REVIEW ALL MEDS BY PRESCRIBER/CLIN PHARMACIST DOCUMENTED: ICD-10-PCS | Mod: CPTII,S$GLB,, | Performed by: PHYSICIAN ASSISTANT

## 2021-07-26 PROCEDURE — 97110 THERAPEUTIC EXERCISES: CPT | Mod: CQ

## 2021-07-26 RX ORDER — OXYCODONE HYDROCHLORIDE 5 MG/1
TABLET ORAL
Qty: 28 TABLET | Refills: 0 | Status: SHIPPED | OUTPATIENT
Start: 2021-07-26 | End: 2022-10-10

## 2021-07-28 ENCOUNTER — CLINICAL SUPPORT (OUTPATIENT)
Dept: REHABILITATION | Facility: HOSPITAL | Age: 81
End: 2021-07-28
Payer: MEDICARE

## 2021-07-28 DIAGNOSIS — M17.12 PRIMARY OSTEOARTHRITIS OF LEFT KNEE: Primary | ICD-10-CM

## 2021-07-28 PROCEDURE — 97140 MANUAL THERAPY 1/> REGIONS: CPT | Mod: CQ

## 2021-07-28 PROCEDURE — 97110 THERAPEUTIC EXERCISES: CPT | Mod: CQ

## 2021-07-29 ENCOUNTER — LAB VISIT (OUTPATIENT)
Dept: LAB | Facility: HOSPITAL | Age: 81
End: 2021-07-29
Payer: MEDICARE

## 2021-07-29 ENCOUNTER — OFFICE VISIT (OUTPATIENT)
Dept: GYNECOLOGIC ONCOLOGY | Facility: CLINIC | Age: 81
End: 2021-07-29
Payer: MEDICARE

## 2021-07-29 VITALS
DIASTOLIC BLOOD PRESSURE: 75 MMHG | WEIGHT: 231.5 LBS | BODY MASS INDEX: 37.36 KG/M2 | SYSTOLIC BLOOD PRESSURE: 162 MMHG | HEART RATE: 91 BPM

## 2021-07-29 DIAGNOSIS — C54.1 ENDOMETRIAL CA: Primary | ICD-10-CM

## 2021-07-29 DIAGNOSIS — Z01.419 WELL WOMAN EXAM WITH ROUTINE GYNECOLOGICAL EXAM: ICD-10-CM

## 2021-07-29 DIAGNOSIS — Z12.31 SCREENING MAMMOGRAM, ENCOUNTER FOR: ICD-10-CM

## 2021-07-29 DIAGNOSIS — C54.1 ENDOMETRIAL CA: ICD-10-CM

## 2021-07-29 LAB — CANCER AG125 SERPL-ACNC: 8 U/ML (ref 0–30)

## 2021-07-29 PROCEDURE — G0101 CA SCREEN;PELVIC/BREAST EXAM: HCPCS | Mod: S$GLB,,, | Performed by: OBSTETRICS & GYNECOLOGY

## 2021-07-29 PROCEDURE — 1159F MED LIST DOCD IN RCRD: CPT | Mod: CPTII,S$GLB,, | Performed by: OBSTETRICS & GYNECOLOGY

## 2021-07-29 PROCEDURE — 3077F SYST BP >= 140 MM HG: CPT | Mod: CPTII,S$GLB,, | Performed by: OBSTETRICS & GYNECOLOGY

## 2021-07-29 PROCEDURE — 36415 COLL VENOUS BLD VENIPUNCTURE: CPT | Performed by: OBSTETRICS & GYNECOLOGY

## 2021-07-29 PROCEDURE — 3288F FALL RISK ASSESSMENT DOCD: CPT | Mod: CPTII,S$GLB,, | Performed by: OBSTETRICS & GYNECOLOGY

## 2021-07-29 PROCEDURE — 3078F DIAST BP <80 MM HG: CPT | Mod: CPTII,S$GLB,, | Performed by: OBSTETRICS & GYNECOLOGY

## 2021-07-29 PROCEDURE — 1160F PR REVIEW ALL MEDS BY PRESCRIBER/CLIN PHARMACIST DOCUMENTED: ICD-10-PCS | Mod: CPTII,S$GLB,, | Performed by: OBSTETRICS & GYNECOLOGY

## 2021-07-29 PROCEDURE — 1159F PR MEDICATION LIST DOCUMENTED IN MEDICAL RECORD: ICD-10-PCS | Mod: CPTII,S$GLB,, | Performed by: OBSTETRICS & GYNECOLOGY

## 2021-07-29 PROCEDURE — 3077F PR MOST RECENT SYSTOLIC BLOOD PRESSURE >= 140 MM HG: ICD-10-PCS | Mod: CPTII,S$GLB,, | Performed by: OBSTETRICS & GYNECOLOGY

## 2021-07-29 PROCEDURE — 99999 PR PBB SHADOW E&M-EST. PATIENT-LVL III: ICD-10-PCS | Mod: PBBFAC,,, | Performed by: OBSTETRICS & GYNECOLOGY

## 2021-07-29 PROCEDURE — 99999 PR PBB SHADOW E&M-EST. PATIENT-LVL III: CPT | Mod: PBBFAC,,, | Performed by: OBSTETRICS & GYNECOLOGY

## 2021-07-29 PROCEDURE — 1101F PT FALLS ASSESS-DOCD LE1/YR: CPT | Mod: CPTII,S$GLB,, | Performed by: OBSTETRICS & GYNECOLOGY

## 2021-07-29 PROCEDURE — G0101 PR CA SCREEN;PELVIC/BREAST EXAM: ICD-10-PCS | Mod: S$GLB,,, | Performed by: OBSTETRICS & GYNECOLOGY

## 2021-07-29 PROCEDURE — 1126F AMNT PAIN NOTED NONE PRSNT: CPT | Mod: CPTII,S$GLB,, | Performed by: OBSTETRICS & GYNECOLOGY

## 2021-07-29 PROCEDURE — 3288F PR FALLS RISK ASSESSMENT DOCUMENTED: ICD-10-PCS | Mod: CPTII,S$GLB,, | Performed by: OBSTETRICS & GYNECOLOGY

## 2021-07-29 PROCEDURE — 3078F PR MOST RECENT DIASTOLIC BLOOD PRESSURE < 80 MM HG: ICD-10-PCS | Mod: CPTII,S$GLB,, | Performed by: OBSTETRICS & GYNECOLOGY

## 2021-07-29 PROCEDURE — 1101F PR PT FALLS ASSESS DOC 0-1 FALLS W/OUT INJ PAST YR: ICD-10-PCS | Mod: CPTII,S$GLB,, | Performed by: OBSTETRICS & GYNECOLOGY

## 2021-07-29 PROCEDURE — 86304 IMMUNOASSAY TUMOR CA 125: CPT | Performed by: OBSTETRICS & GYNECOLOGY

## 2021-07-29 PROCEDURE — 1126F PR PAIN SEVERITY QUANTIFIED, NO PAIN PRESENT: ICD-10-PCS | Mod: CPTII,S$GLB,, | Performed by: OBSTETRICS & GYNECOLOGY

## 2021-07-29 PROCEDURE — 1160F RVW MEDS BY RX/DR IN RCRD: CPT | Mod: CPTII,S$GLB,, | Performed by: OBSTETRICS & GYNECOLOGY

## 2021-08-02 ENCOUNTER — CLINICAL SUPPORT (OUTPATIENT)
Dept: REHABILITATION | Facility: HOSPITAL | Age: 81
End: 2021-08-02
Payer: MEDICARE

## 2021-08-02 DIAGNOSIS — M17.12 PRIMARY OSTEOARTHRITIS OF LEFT KNEE: Primary | ICD-10-CM

## 2021-08-02 PROCEDURE — 97140 MANUAL THERAPY 1/> REGIONS: CPT

## 2021-08-04 ENCOUNTER — CLINICAL SUPPORT (OUTPATIENT)
Dept: REHABILITATION | Facility: HOSPITAL | Age: 81
End: 2021-08-04
Payer: MEDICARE

## 2021-08-04 DIAGNOSIS — M17.12 PRIMARY OSTEOARTHRITIS OF LEFT KNEE: Primary | ICD-10-CM

## 2021-08-04 PROCEDURE — 97110 THERAPEUTIC EXERCISES: CPT | Mod: CQ

## 2021-08-06 ENCOUNTER — CLINICAL SUPPORT (OUTPATIENT)
Dept: REHABILITATION | Facility: HOSPITAL | Age: 81
End: 2021-08-06
Payer: MEDICARE

## 2021-08-06 DIAGNOSIS — M17.12 PRIMARY OSTEOARTHRITIS OF LEFT KNEE: Primary | ICD-10-CM

## 2021-08-06 PROCEDURE — 97110 THERAPEUTIC EXERCISES: CPT | Mod: CQ

## 2021-08-09 ENCOUNTER — CLINICAL SUPPORT (OUTPATIENT)
Dept: REHABILITATION | Facility: HOSPITAL | Age: 81
End: 2021-08-09
Payer: MEDICARE

## 2021-08-09 DIAGNOSIS — M17.12 PRIMARY OSTEOARTHRITIS OF LEFT KNEE: Primary | ICD-10-CM

## 2021-08-09 PROCEDURE — 97110 THERAPEUTIC EXERCISES: CPT | Mod: CQ

## 2021-08-11 ENCOUNTER — CLINICAL SUPPORT (OUTPATIENT)
Dept: REHABILITATION | Facility: HOSPITAL | Age: 81
End: 2021-08-11
Payer: MEDICARE

## 2021-08-11 DIAGNOSIS — M17.12 PRIMARY OSTEOARTHRITIS OF LEFT KNEE: Primary | ICD-10-CM

## 2021-08-11 PROCEDURE — 97140 MANUAL THERAPY 1/> REGIONS: CPT

## 2021-08-11 PROCEDURE — 97110 THERAPEUTIC EXERCISES: CPT

## 2021-08-17 ENCOUNTER — HOSPITAL ENCOUNTER (OUTPATIENT)
Dept: RADIOLOGY | Facility: OTHER | Age: 81
Discharge: HOME | End: 2021-08-17
Attending: OBSTETRICS & GYNECOLOGY
Payer: MEDICARE

## 2021-08-17 DIAGNOSIS — Z12.31 SCREENING MAMMOGRAM, ENCOUNTER FOR: ICD-10-CM

## 2021-08-17 PROCEDURE — 77067 MAMMO DIGITAL SCREENING BILAT WITH TOMO: ICD-10-PCS | Mod: 26,,, | Performed by: RADIOLOGY

## 2021-08-17 PROCEDURE — 77067 SCR MAMMO BI INCL CAD: CPT | Mod: TC

## 2021-08-17 PROCEDURE — 77067 SCR MAMMO BI INCL CAD: CPT | Mod: 26,,, | Performed by: RADIOLOGY

## 2021-08-17 PROCEDURE — 77063 BREAST TOMOSYNTHESIS BI: CPT | Mod: 26,,, | Performed by: RADIOLOGY

## 2021-08-17 PROCEDURE — 77063 MAMMO DIGITAL SCREENING BILAT WITH TOMO: ICD-10-PCS | Mod: 26,,, | Performed by: RADIOLOGY

## 2021-08-19 ENCOUNTER — TELEPHONE (OUTPATIENT)
Dept: ORTHOPEDICS | Facility: CLINIC | Age: 81
End: 2021-08-19

## 2021-08-19 DIAGNOSIS — M17.12 PRIMARY OSTEOARTHRITIS OF LEFT KNEE: Primary | ICD-10-CM

## 2021-08-23 ENCOUNTER — OFFICE VISIT (OUTPATIENT)
Dept: ORTHOPEDICS | Facility: CLINIC | Age: 81
End: 2021-08-23
Payer: MEDICARE

## 2021-08-23 ENCOUNTER — HOSPITAL ENCOUNTER (OUTPATIENT)
Dept: RADIOLOGY | Facility: HOSPITAL | Age: 81
Discharge: HOME | End: 2021-08-23
Attending: PHYSICIAN ASSISTANT
Payer: MEDICARE

## 2021-08-23 DIAGNOSIS — M17.12 PRIMARY OSTEOARTHRITIS OF LEFT KNEE: ICD-10-CM

## 2021-08-23 DIAGNOSIS — Z96.652 STATUS POST TOTAL LEFT KNEE REPLACEMENT: Primary | ICD-10-CM

## 2021-08-23 PROCEDURE — 1159F PR MEDICATION LIST DOCUMENTED IN MEDICAL RECORD: ICD-10-PCS | Mod: CPTII,S$GLB,, | Performed by: PHYSICIAN ASSISTANT

## 2021-08-23 PROCEDURE — 99024 PR POST-OP FOLLOW-UP VISIT: ICD-10-PCS | Mod: S$GLB,,, | Performed by: PHYSICIAN ASSISTANT

## 2021-08-23 PROCEDURE — 1126F AMNT PAIN NOTED NONE PRSNT: CPT | Mod: CPTII,S$GLB,, | Performed by: PHYSICIAN ASSISTANT

## 2021-08-23 PROCEDURE — 1101F PT FALLS ASSESS-DOCD LE1/YR: CPT | Mod: CPTII,S$GLB,, | Performed by: PHYSICIAN ASSISTANT

## 2021-08-23 PROCEDURE — 73562 XR KNEE ORTHO LEFT: ICD-10-PCS | Mod: 26,LT,, | Performed by: RADIOLOGY

## 2021-08-23 PROCEDURE — 99024 POSTOP FOLLOW-UP VISIT: CPT | Mod: S$GLB,,, | Performed by: PHYSICIAN ASSISTANT

## 2021-08-23 PROCEDURE — 73560 X-RAY EXAM OF KNEE 1 OR 2: CPT | Mod: 26,RT,, | Performed by: RADIOLOGY

## 2021-08-23 PROCEDURE — 3288F FALL RISK ASSESSMENT DOCD: CPT | Mod: CPTII,S$GLB,, | Performed by: PHYSICIAN ASSISTANT

## 2021-08-23 PROCEDURE — 1160F RVW MEDS BY RX/DR IN RCRD: CPT | Mod: CPTII,S$GLB,, | Performed by: PHYSICIAN ASSISTANT

## 2021-08-23 PROCEDURE — 73562 X-RAY EXAM OF KNEE 3: CPT | Mod: 26,LT,, | Performed by: RADIOLOGY

## 2021-08-23 PROCEDURE — 73560 XR KNEE ORTHO LEFT: ICD-10-PCS | Mod: 26,RT,, | Performed by: RADIOLOGY

## 2021-08-23 PROCEDURE — 3288F PR FALLS RISK ASSESSMENT DOCUMENTED: ICD-10-PCS | Mod: CPTII,S$GLB,, | Performed by: PHYSICIAN ASSISTANT

## 2021-08-23 PROCEDURE — 1160F PR REVIEW ALL MEDS BY PRESCRIBER/CLIN PHARMACIST DOCUMENTED: ICD-10-PCS | Mod: CPTII,S$GLB,, | Performed by: PHYSICIAN ASSISTANT

## 2021-08-23 PROCEDURE — 1101F PR PT FALLS ASSESS DOC 0-1 FALLS W/OUT INJ PAST YR: ICD-10-PCS | Mod: CPTII,S$GLB,, | Performed by: PHYSICIAN ASSISTANT

## 2021-08-23 PROCEDURE — 1159F MED LIST DOCD IN RCRD: CPT | Mod: CPTII,S$GLB,, | Performed by: PHYSICIAN ASSISTANT

## 2021-08-23 PROCEDURE — 73560 X-RAY EXAM OF KNEE 1 OR 2: CPT | Mod: 59,TC,RT

## 2021-08-23 PROCEDURE — 1126F PR PAIN SEVERITY QUANTIFIED, NO PAIN PRESENT: ICD-10-PCS | Mod: CPTII,S$GLB,, | Performed by: PHYSICIAN ASSISTANT

## 2021-08-23 PROCEDURE — 99999 PR PBB SHADOW E&M-EST. PATIENT-LVL III: CPT | Mod: PBBFAC,,, | Performed by: PHYSICIAN ASSISTANT

## 2021-08-23 PROCEDURE — 99999 PR PBB SHADOW E&M-EST. PATIENT-LVL III: ICD-10-PCS | Mod: PBBFAC,,, | Performed by: PHYSICIAN ASSISTANT

## 2021-09-09 ENCOUNTER — DOCUMENTATION ONLY (OUTPATIENT)
Dept: REHABILITATION | Facility: OTHER | Age: 81
End: 2021-09-09

## 2021-10-04 ENCOUNTER — PATIENT MESSAGE (OUTPATIENT)
Dept: ADMINISTRATIVE | Facility: OTHER | Age: 81
End: 2021-10-04

## 2021-10-04 ENCOUNTER — OFFICE VISIT (OUTPATIENT)
Dept: ORTHOPEDICS | Facility: CLINIC | Age: 81
End: 2021-10-04
Payer: MEDICARE

## 2021-10-04 VITALS — HEIGHT: 66 IN | WEIGHT: 222.56 LBS | BODY MASS INDEX: 35.77 KG/M2

## 2021-10-04 DIAGNOSIS — Z96.652 STATUS POST TOTAL LEFT KNEE REPLACEMENT: Primary | ICD-10-CM

## 2021-10-04 PROCEDURE — 1160F PR REVIEW ALL MEDS BY PRESCRIBER/CLIN PHARMACIST DOCUMENTED: ICD-10-PCS | Mod: CPTII,S$GLB,, | Performed by: ORTHOPAEDIC SURGERY

## 2021-10-04 PROCEDURE — 99999 PR PBB SHADOW E&M-EST. PATIENT-LVL III: CPT | Mod: PBBFAC,,, | Performed by: ORTHOPAEDIC SURGERY

## 2021-10-04 PROCEDURE — 1159F MED LIST DOCD IN RCRD: CPT | Mod: CPTII,S$GLB,, | Performed by: ORTHOPAEDIC SURGERY

## 2021-10-04 PROCEDURE — 1126F PR PAIN SEVERITY QUANTIFIED, NO PAIN PRESENT: ICD-10-PCS | Mod: CPTII,S$GLB,, | Performed by: ORTHOPAEDIC SURGERY

## 2021-10-04 PROCEDURE — 1159F PR MEDICATION LIST DOCUMENTED IN MEDICAL RECORD: ICD-10-PCS | Mod: CPTII,S$GLB,, | Performed by: ORTHOPAEDIC SURGERY

## 2021-10-04 PROCEDURE — 1160F RVW MEDS BY RX/DR IN RCRD: CPT | Mod: CPTII,S$GLB,, | Performed by: ORTHOPAEDIC SURGERY

## 2021-10-04 PROCEDURE — 99024 POSTOP FOLLOW-UP VISIT: CPT | Mod: S$GLB,,, | Performed by: ORTHOPAEDIC SURGERY

## 2021-10-04 PROCEDURE — 99999 PR PBB SHADOW E&M-EST. PATIENT-LVL III: ICD-10-PCS | Mod: PBBFAC,,, | Performed by: ORTHOPAEDIC SURGERY

## 2021-10-04 PROCEDURE — 1126F AMNT PAIN NOTED NONE PRSNT: CPT | Mod: CPTII,S$GLB,, | Performed by: ORTHOPAEDIC SURGERY

## 2021-10-04 PROCEDURE — 99024 PR POST-OP FOLLOW-UP VISIT: ICD-10-PCS | Mod: S$GLB,,, | Performed by: ORTHOPAEDIC SURGERY

## 2021-11-18 ENCOUNTER — HOSPITAL ENCOUNTER (EMERGENCY)
Facility: HOSPITAL | Age: 81
Discharge: HOME | End: 2021-11-18
Attending: EMERGENCY MEDICINE
Payer: MEDICARE

## 2021-11-18 VITALS
RESPIRATION RATE: 16 BRPM | HEART RATE: 76 BPM | DIASTOLIC BLOOD PRESSURE: 95 MMHG | TEMPERATURE: 99 F | OXYGEN SATURATION: 100 % | SYSTOLIC BLOOD PRESSURE: 192 MMHG

## 2021-11-18 DIAGNOSIS — M25.442 SWELLING OF FINGER JOINT OF LEFT HAND: ICD-10-CM

## 2021-11-18 DIAGNOSIS — M79.642 LEFT HAND PAIN: Primary | ICD-10-CM

## 2021-11-18 PROCEDURE — 99284 PR EMERGENCY DEPT VISIT,LEVEL IV: ICD-10-PCS | Mod: ,,, | Performed by: PHYSICIAN ASSISTANT

## 2021-11-18 PROCEDURE — 99284 EMERGENCY DEPT VISIT MOD MDM: CPT | Mod: ,,, | Performed by: PHYSICIAN ASSISTANT

## 2021-11-18 PROCEDURE — 96372 THER/PROPH/DIAG INJ SC/IM: CPT | Mod: HCNC

## 2021-11-18 PROCEDURE — 25000003 PHARM REV CODE 250: Mod: HCNC | Performed by: PHYSICIAN ASSISTANT

## 2021-11-18 PROCEDURE — 63600175 PHARM REV CODE 636 W HCPCS: Mod: HCNC | Performed by: PHYSICIAN ASSISTANT

## 2021-11-18 PROCEDURE — 99284 EMERGENCY DEPT VISIT MOD MDM: CPT | Mod: 25,HCNC

## 2021-11-18 RX ORDER — ACETAMINOPHEN 500 MG
1000 TABLET ORAL
Status: COMPLETED | OUTPATIENT
Start: 2021-11-18 | End: 2021-11-18

## 2021-11-18 RX ORDER — NAPROXEN 500 MG/1
500 TABLET ORAL 2 TIMES DAILY WITH MEALS
Qty: 20 TABLET | Refills: 0 | OUTPATIENT
Start: 2021-11-18 | End: 2022-06-15

## 2021-11-18 RX ORDER — IBUPROFEN 600 MG/1
600 TABLET ORAL
Status: COMPLETED | OUTPATIENT
Start: 2021-11-18 | End: 2021-11-18

## 2021-11-18 RX ORDER — TRIAMCINOLONE ACETONIDE 40 MG/ML
80 INJECTION, SUSPENSION INTRA-ARTICULAR; INTRAMUSCULAR
Status: COMPLETED | OUTPATIENT
Start: 2021-11-18 | End: 2021-11-18

## 2021-11-18 RX ADMIN — IBUPROFEN 600 MG: 600 TABLET ORAL at 05:11

## 2021-11-18 RX ADMIN — ACETAMINOPHEN 1000 MG: 500 TABLET ORAL at 05:11

## 2021-11-18 RX ADMIN — TRIAMCINOLONE ACETONIDE 80 MG: 40 INJECTION, SUSPENSION INTRA-ARTICULAR; INTRAMUSCULAR at 05:11

## 2021-12-03 ENCOUNTER — PATIENT OUTREACH (OUTPATIENT)
Dept: ADMINISTRATIVE | Facility: HOSPITAL | Age: 81
End: 2021-12-03
Payer: MEDICARE

## 2021-12-21 ENCOUNTER — TELEPHONE (OUTPATIENT)
Dept: ORTHOPEDICS | Facility: CLINIC | Age: 81
End: 2021-12-21
Payer: MEDICARE

## 2022-01-20 DIAGNOSIS — Z96.652 STATUS POST TOTAL LEFT KNEE REPLACEMENT: Primary | ICD-10-CM

## 2022-03-11 DIAGNOSIS — I10 ESSENTIAL HYPERTENSION: ICD-10-CM

## 2022-03-11 RX ORDER — AMLODIPINE BESYLATE 2.5 MG/1
TABLET ORAL
Qty: 90 TABLET | Refills: 0 | Status: SHIPPED | OUTPATIENT
Start: 2022-03-11 | End: 2022-06-07

## 2022-03-11 NOTE — TELEPHONE ENCOUNTER

## 2022-03-11 NOTE — TELEPHONE ENCOUNTER
Care Due:                  Date            Visit Type   Department     Provider  --------------------------------------------------------------------------------                                EP -                              PRIMARY      [facility]  Last Visit: 05-      CARE ([facility])   CARE           Margarita Funes  Next Visit: None Scheduled  None         None Found                                                            Last  Test          Frequency    Reason                     Performed    Due Date  --------------------------------------------------------------------------------    Office Visit  12 months..  amLODIPine, atorvastatin.  05- 05-    Lipid Panel.  12 months..  atorvastatin.............  11-   11-    Powered by [employer]. Reference number: [id number].   3/11/2022 4:11:20 AM CST

## 2022-03-28 ENCOUNTER — OFFICE VISIT (OUTPATIENT)
Dept: ORTHOPEDICS | Facility: CLINIC | Age: 82
End: 2022-03-28
Payer: MEDICARE

## 2022-03-28 ENCOUNTER — PATIENT OUTREACH (OUTPATIENT)
Dept: ADMINISTRATIVE | Facility: OTHER | Age: 82
End: 2022-03-28
Payer: MEDICARE

## 2022-03-28 ENCOUNTER — HOSPITAL ENCOUNTER (OUTPATIENT)
Dept: RADIOLOGY | Facility: HOSPITAL | Age: 82
Discharge: HOME | End: 2022-03-28
Attending: ORTHOPAEDIC SURGERY
Payer: MEDICARE

## 2022-03-28 VITALS — BODY MASS INDEX: 35.79 KG/M2 | WEIGHT: 222.69 LBS | HEIGHT: 66 IN

## 2022-03-28 DIAGNOSIS — Z96.652 STATUS POST TOTAL LEFT KNEE REPLACEMENT: ICD-10-CM

## 2022-03-28 DIAGNOSIS — M25.562 LEFT KNEE PAIN, UNSPECIFIED CHRONICITY: Primary | ICD-10-CM

## 2022-03-28 PROCEDURE — 1126F PR PAIN SEVERITY QUANTIFIED, NO PAIN PRESENT: ICD-10-PCS | Mod: CPTII,S$GLB,, | Performed by: ORTHOPAEDIC SURGERY

## 2022-03-28 PROCEDURE — 99212 PR OFFICE/OUTPT VISIT, EST, LEVL II, 10-19 MIN: ICD-10-PCS | Mod: S$GLB,,, | Performed by: ORTHOPAEDIC SURGERY

## 2022-03-28 PROCEDURE — 73562 XR KNEE ORTHO LEFT: ICD-10-PCS | Mod: 26,LT,, | Performed by: RADIOLOGY

## 2022-03-28 PROCEDURE — 99999 PR PBB SHADOW E&M-EST. PATIENT-LVL III: CPT | Mod: PBBFAC,,, | Performed by: ORTHOPAEDIC SURGERY

## 2022-03-28 PROCEDURE — 1126F AMNT PAIN NOTED NONE PRSNT: CPT | Mod: CPTII,S$GLB,, | Performed by: ORTHOPAEDIC SURGERY

## 2022-03-28 PROCEDURE — 1101F PR PT FALLS ASSESS DOC 0-1 FALLS W/OUT INJ PAST YR: ICD-10-PCS | Mod: CPTII,S$GLB,, | Performed by: ORTHOPAEDIC SURGERY

## 2022-03-28 PROCEDURE — 99212 OFFICE O/P EST SF 10 MIN: CPT | Mod: S$GLB,,, | Performed by: ORTHOPAEDIC SURGERY

## 2022-03-28 PROCEDURE — 73560 X-RAY EXAM OF KNEE 1 OR 2: CPT | Mod: TC,RT

## 2022-03-28 PROCEDURE — 99999 PR PBB SHADOW E&M-EST. PATIENT-LVL III: ICD-10-PCS | Mod: PBBFAC,,, | Performed by: ORTHOPAEDIC SURGERY

## 2022-03-28 PROCEDURE — 1159F PR MEDICATION LIST DOCUMENTED IN MEDICAL RECORD: ICD-10-PCS | Mod: CPTII,S$GLB,, | Performed by: ORTHOPAEDIC SURGERY

## 2022-03-28 PROCEDURE — 1101F PT FALLS ASSESS-DOCD LE1/YR: CPT | Mod: CPTII,S$GLB,, | Performed by: ORTHOPAEDIC SURGERY

## 2022-03-28 PROCEDURE — 1159F MED LIST DOCD IN RCRD: CPT | Mod: CPTII,S$GLB,, | Performed by: ORTHOPAEDIC SURGERY

## 2022-03-28 PROCEDURE — 73562 X-RAY EXAM OF KNEE 3: CPT | Mod: 26,LT,, | Performed by: RADIOLOGY

## 2022-03-28 PROCEDURE — 3288F FALL RISK ASSESSMENT DOCD: CPT | Mod: CPTII,S$GLB,, | Performed by: ORTHOPAEDIC SURGERY

## 2022-03-28 PROCEDURE — 73560 X-RAY EXAM OF KNEE 1 OR 2: CPT | Mod: 26,RT,, | Performed by: RADIOLOGY

## 2022-03-28 PROCEDURE — 3288F PR FALLS RISK ASSESSMENT DOCUMENTED: ICD-10-PCS | Mod: CPTII,S$GLB,, | Performed by: ORTHOPAEDIC SURGERY

## 2022-03-28 PROCEDURE — 73560 XR KNEE ORTHO LEFT: ICD-10-PCS | Mod: 26,RT,, | Performed by: RADIOLOGY

## 2022-03-28 NOTE — PROGRESS NOTES
Jodi Cole is in for 8 month follow up for a  Left TKA.  She is doing very well.  No pain in the knee.  She has resumed activities of daily living. She has bilateral hand pain.  Exam demonstrates  A well developed female in no distress.  Alert and oriented.  Mood and affect are appropriate.    Knee incision is well healed.  ROM is 0-115.  The patella tracks well and there is no instability. The extremity is neurovascularly intact.    Xrays demonstrate a well fixed and positioned prosthesis.      Imp:Doing well    F/u in 3 months with PROMS.  No need to re-xray.    Hand surgery eval

## 2022-03-28 NOTE — PROGRESS NOTES
Requested updates within Care Everywhere.  Patient's chart was reviewed for overdue FRANKIE topics.  Health maintenance:updated  Immunizations:reconciled   Legacy:   Media:  Orders placed:  Tasked appts:  Labs Linked:  Upcoming appt:

## 2022-03-29 ENCOUNTER — HOSPITAL ENCOUNTER (OUTPATIENT)
Dept: RADIOLOGY | Facility: HOSPITAL | Age: 82
Discharge: HOME | End: 2022-03-29
Attending: PHYSICIAN ASSISTANT
Payer: MEDICARE

## 2022-03-29 ENCOUNTER — OFFICE VISIT (OUTPATIENT)
Dept: ORTHOPEDICS | Facility: CLINIC | Age: 82
End: 2022-03-29
Payer: MEDICARE

## 2022-03-29 VITALS — WEIGHT: 223.31 LBS | HEIGHT: 66 IN | BODY MASS INDEX: 35.89 KG/M2

## 2022-03-29 DIAGNOSIS — M65.341 TRIGGER FINGER, RIGHT RING FINGER: ICD-10-CM

## 2022-03-29 DIAGNOSIS — M65.4 DE QUERVAIN'S TENOSYNOVITIS, LEFT: ICD-10-CM

## 2022-03-29 DIAGNOSIS — M25.532 PAIN IN BOTH WRISTS: ICD-10-CM

## 2022-03-29 DIAGNOSIS — M25.531 PAIN IN BOTH WRISTS: Primary | ICD-10-CM

## 2022-03-29 DIAGNOSIS — M25.532 PAIN IN BOTH WRISTS: Primary | ICD-10-CM

## 2022-03-29 DIAGNOSIS — M25.531 PAIN IN BOTH WRISTS: ICD-10-CM

## 2022-03-29 PROCEDURE — 1101F PR PT FALLS ASSESS DOC 0-1 FALLS W/OUT INJ PAST YR: ICD-10-PCS | Mod: CPTII,S$GLB,, | Performed by: PHYSICIAN ASSISTANT

## 2022-03-29 PROCEDURE — 1125F PR PAIN SEVERITY QUANTIFIED, PAIN PRESENT: ICD-10-PCS | Mod: CPTII,S$GLB,, | Performed by: PHYSICIAN ASSISTANT

## 2022-03-29 PROCEDURE — 3288F PR FALLS RISK ASSESSMENT DOCUMENTED: ICD-10-PCS | Mod: CPTII,S$GLB,, | Performed by: PHYSICIAN ASSISTANT

## 2022-03-29 PROCEDURE — 1159F PR MEDICATION LIST DOCUMENTED IN MEDICAL RECORD: ICD-10-PCS | Mod: CPTII,S$GLB,, | Performed by: PHYSICIAN ASSISTANT

## 2022-03-29 PROCEDURE — 3288F FALL RISK ASSESSMENT DOCD: CPT | Mod: CPTII,S$GLB,, | Performed by: PHYSICIAN ASSISTANT

## 2022-03-29 PROCEDURE — 1101F PT FALLS ASSESS-DOCD LE1/YR: CPT | Mod: CPTII,S$GLB,, | Performed by: PHYSICIAN ASSISTANT

## 2022-03-29 PROCEDURE — 99213 OFFICE O/P EST LOW 20 MIN: CPT | Mod: S$GLB,,, | Performed by: PHYSICIAN ASSISTANT

## 2022-03-29 PROCEDURE — 73110 X-RAY EXAM OF WRIST: CPT | Mod: TC,50

## 2022-03-29 PROCEDURE — 99999 PR PBB SHADOW E&M-EST. PATIENT-LVL III: CPT | Mod: PBBFAC,,, | Performed by: PHYSICIAN ASSISTANT

## 2022-03-29 PROCEDURE — 99999 PR PBB SHADOW E&M-EST. PATIENT-LVL III: ICD-10-PCS | Mod: PBBFAC,,, | Performed by: PHYSICIAN ASSISTANT

## 2022-03-29 PROCEDURE — 99213 PR OFFICE/OUTPT VISIT, EST, LEVL III, 20-29 MIN: ICD-10-PCS | Mod: S$GLB,,, | Performed by: PHYSICIAN ASSISTANT

## 2022-03-29 PROCEDURE — 1125F AMNT PAIN NOTED PAIN PRSNT: CPT | Mod: CPTII,S$GLB,, | Performed by: PHYSICIAN ASSISTANT

## 2022-03-29 PROCEDURE — 73110 X-RAY EXAM OF WRIST: CPT | Mod: 26,50,, | Performed by: RADIOLOGY

## 2022-03-29 PROCEDURE — 73110 XR WRIST COMPLETE 3 VIEWS BILATERAL: ICD-10-PCS | Mod: 26,50,, | Performed by: RADIOLOGY

## 2022-03-29 PROCEDURE — 1159F MED LIST DOCD IN RCRD: CPT | Mod: CPTII,S$GLB,, | Performed by: PHYSICIAN ASSISTANT

## 2022-03-29 RX ORDER — ACETAMINOPHEN 500 MG
500 TABLET ORAL EVERY 6 HOURS PRN
Qty: 60 TABLET | Refills: 0 | Status: SHIPPED | OUTPATIENT
Start: 2022-03-29

## 2022-03-29 RX ORDER — NAPROXEN 500 MG/1
500 TABLET ORAL 2 TIMES DAILY WITH MEALS
Qty: 60 TABLET | Refills: 0 | Status: SHIPPED | OUTPATIENT
Start: 2022-03-29 | End: 2022-04-25

## 2022-03-29 NOTE — PROGRESS NOTES
[facility]  History & Physical  Orthopedics    SUBJECTIVE:      COVID-19 attestation:  This patient was treated during the COVID-19 pandemic.  This was discussed with the patient, they are aware of our current policies and procedures, were given the option of delaying their visit and or switching to a virtual visit, delaying their surgery when applicable, and they elect to proceed.    Chief Complaint: Left wrist and Right hand pain    Referring Provider: Joey Fuentes MD     History of Present Illness:  Patient is a 81 y.o. right hand dominant female who presents today with complaints of Left wrist pain and right hand pain for a few months. No injury/trauma. Left wrist, pain at the radial wrist, worse with /twisting, pain will radiate into the forearm. Right hand pain in the fingers, stiff in the morning, locking of the right finger. She denies any numbness/tingling, denies dropping anything. She has not tried any self treatments for this.     Onset of symptoms/DOI was 2 months.    Symptoms are aggravated by activity, movement and worse in the morning.    Symptoms are alleviated by rest.    Symptoms consist of pain.    The patient rates their pain as a 4/10.    Attempted treatment(s) and/or interventions include activity modifications, rest.     The patient denies any fevers, chills, N/V, D/C and presents for evaluation.       Past Medical History:   Diagnosis Date    Anemia     Anemia associated with chemotherapy 9/1/2015    Anemia due to chemotherapy 9/22/2015    Chemotherapy induced nausea and vomiting 6/9/2015    Chemotherapy-induced neutropenia 7/28/2015    Constipation 7/7/2015    Endometrial ca 5/26/2015    Endometrial ca 8/10/2015    Essential hypertension     Hyperlipidemia     Hypertension     Hypokalemia 9/26/2019    NSAID long-term use 9/26/2019    Osteoarthritis     Pain in both hands 2/19/2016    Right-sided low back pain without sciatica 6/23/2016     Uterine cancer     Well woman exam with routine gynecological exam 2016     Past Surgical History:   Procedure Laterality Date    COLONOSCOPY N/A 2019    Procedure: COLONOSCOPY;  Surgeon: Kong Rodarte MD;  Location: [address]);  Service: Endoscopy;  Laterality: N/A;    D&C Hysteroscopy  2015    DILATION AND CURETTAGE OF UTERUS  1969     D&C and uterine suspension.     HYSTERECTOMY  5/11/15    robotic for endometrial cancer    LYMPHADENECTOMY      PELVIC AND PARA-AORTIC LYMPH NODE DISSECTION      PERCUTANEOUS CRYOTHERAPY OF PERIPHERAL NERVE USING LIQUID NITROUS OXIDE IN CLOSED NEEDLE DEVICE Left 2021    Procedure: CRYOTHERAPY, NERVE, PERIPHERAL, PERCUTANEOUS, USING LIQUID NITROUS OXIDE IN CLOSED NEEDLE DEVICE, LEFT KNEE;  Surgeon: BOB Doll;  Location: [facility];  Service: Pain Management;  Laterality: Left;    [place] REMOVAL OF OVARY/TUBE(S)      robotic TLH/BSO and bilatyeral pelvic and para-aortic LND    TOTAL KNEE REPLACEMENT USING COMPUTER NAVIGATION  10/01/2019     Review of patient's allergies indicates:  No Known Allergies  Social History     Social History Narrative    Not on file     Family History   Problem Relation Age of Onset    Cancer Brother 65        pancreatic    Heart disease Father         in his 50's-  at 57     Ovarian cancer Neg Hx     Uterine cancer Neg Hx     Breast cancer Neg Hx     Colon cancer Neg Hx          Current Outpatient Medications:     acetaminophen (TYLENOL) 650 MG TbSR, Take 1 tablet (650 mg total) by mouth every 8 (eight) hours as needed (pain)., Disp: 120 tablet, Rfl: 0    amLODIPine (NORVASC) 2.5 MG tablet, TAKE 1 TABLET(2.5 MG) BY MOUTH EVERY DAY, Disp: 90 tablet, Rfl: 0    multivitamin capsule, Take 1 capsule by mouth once daily., Disp: , Rfl:     naproxen (NAPROSYN) 500 MG tablet, Take 1 tablet (500 mg total) by mouth 2 (two) times daily with meals., Disp: 20 tablet, Rfl: 0    vitamins B1 B6 B12 Tab,  "Take by mouth once daily., Disp: , Rfl:     acetaminophen (TYLENOL) 500 MG tablet, Take 1 tablet (500 mg total) by mouth every 6 (six) hours as needed for Pain., Disp: 60 tablet, Rfl: 0    aspirin (ECOTRIN) 81 MG EC tablet, Take 1 tablet (81 mg total) by mouth 2 (two) times daily., Disp: 60 tablet, Rfl: 0    atorvastatin (LIPITOR) 40 MG tablet, Take 1 tablet (40 mg total) by mouth every evening. (Patient not taking: Reported on 3/29/2022), Disp: 90 tablet, Rfl: 1    diclofenac sodium (VOLTAREN) 1 % Gel, Apply 2 g topically 4 (four) times daily. (Patient not taking: Reported on 3/29/2022), Disp: 100 g, Rfl: 0    docusate sodium (COLACE) 100 MG capsule, Take 1 capsule (100 mg total) by mouth 2 (two) times daily as needed for Constipation. (Patient not taking: Reported on 3/29/2022), Disp: 60 capsule, Rfl: 0    naproxen (NAPROSYN) 500 MG tablet, Take 1 tablet (500 mg total) by mouth 2 (two) times daily with meals., Disp: 60 tablet, Rfl: 0    oxyCODONE (ROXICODONE) 5 MG immediate release tablet, Take 1 tab by mouth every 6-8 hours as needed for pain (Patient not taking: Reported on 3/29/2022), Disp: 28 tablet, Rfl: 0  No current facility-administered medications for this visit.    Facility-Administered Medications Ordered in Other Visits:     midazolam (VERSED) 1 mg/mL injection 0.5 mg, 0.5 mg, Intravenous, PRN, Henry Reynoso MD      Review of Systems:  Constitutional: no fever or chills  Eyes: no visual changes  ENT: no nasal congestion or sore throat  Respiratory: no cough or shortness of breath  Cardiovascular: no chest pain  Gastrointestinal: no nausea or vomiting, tolerating diet  Musculoskeletal: pain and soreness    OBJECTIVE:      Vital Signs (Most Recent):  Vitals:    03/29/22 0804   Weight: 101.3 kg (223 lb 5.2 oz)   Height: 5' 6" (1.676 m)     Body mass index is 36.05 kg/m².      Physical Exam:  Constitutional: The patient appears well-developed and well-nourished. No distress.   Skin: No " lesions appreciated  Head: Normocephalic and atraumatic.   Nose: Nose normal.   Ears: No deformities seen  Eyes: Conjunctivae and EOM are normal.   Neck: No tracheal deviation present.   Cardiovascular: Normal rate and intact distal pulses.    Pulmonary/Chest: Effort normal. No respiratory distress.   Abdominal: There is no guarding.   Neurological: The patient is alert.   Psychiatric: The patient has a normal mood and affect.     Bilateral Hand/Wrist Examination:    Observation/Inspection:  Swelling  none    Deformity  none  Discoloration  none     Scars   none    Atrophy  none    HAND/WRIST EXAMINATION:  Finkelstein's Test   Pos on the Left  WHAT Test    Pos on the Left  Snuff box tenderness   Neg  Schneider's Test    Neg  Hook of Hamate Tenderness  Neg  CMC grind    Neg  Circumduction test   Neg  Left wrist: TTP to radial styloid. Right hand: TTP to ring A1 pulley    Neurovascular Exam:  Digits WWP, brisk CR < 3s throughout  NVI motor/LTS to M/R/U nerves, radial pulse 2+  Tinel's Test - Carpal Tunnel  Neg  Tinel's Test - Cubital Tunnel  Neg  Phalen's Test    Neg  Median Nerve Compression Test Neg    ROM hand full, painless. Wing triggering on the right ring.     ROM wrist full, painless    ROM elbow full, painless    Abdomen not guarded  Respirations nonlabored  Perfusion intact    Diagnostic Results:     Imaging - I independently viewed the patient's imaging as well as the radiology report.      FINDINGS:  DJD bilaterally.  No fracture or dislocation.  No bone destruction identified     Impression:     See above      ASSESSMENT/PLAN:      81 y.o. yo female with Left wrist DeQuervains Tendonitis. Right ring trigger finger.    Plan: The patient and I had a thorough discussion today.  We discussed the working diagnosis as well as several other potential alternative diagnoses.  Treatment options were discussed, both conservative and surgical.  Conservative treatment options would include things such as activity  modifications, workplace modifications, a period of rest, oral vs topical OTC and prescription anti-inflammatory medications, occupational therapy, splinting/bracing, immobilization, corticosteroid injections, and others.  Surgical options were discussed as well.     At this time, the patient would like to proceed with a trial of Naproxen BID and icing. Left thumb spica neoprine given today, we discuss trigger finger splint at night. Should this fail, she is encouraged to return to clinic for steroid injection.    Should the patient's symptoms worsen, persist, or fail to improve they should return for reevaluation and I would be happy to see them back anytime.           Please do not hesitate to reach out to us via email, phone, or MyChart with any questions, concerns, or feedback.

## 2022-05-20 ENCOUNTER — PATIENT MESSAGE (OUTPATIENT)
Dept: ORTHOPEDICS | Facility: CLINIC | Age: 82
End: 2022-05-20
Payer: MEDICARE

## 2022-06-07 DIAGNOSIS — I10 ESSENTIAL HYPERTENSION: ICD-10-CM

## 2022-06-07 RX ORDER — AMLODIPINE BESYLATE 2.5 MG/1
TABLET ORAL
Qty: 90 TABLET | Refills: 0 | Status: SHIPPED | OUTPATIENT
Start: 2022-06-07 | End: 2022-10-03

## 2022-06-07 NOTE — TELEPHONE ENCOUNTER
Refill Routing Note   Medication(s) are not appropriate for processing by [facility] for the following reason(s):      - Required vitals are abnormal  - Patient has been seen in the ED/Hospital since the last PCP visit    [facility] action(s):  Defer          Medication reconciliation completed: No     Appointments  past 12m or future 3m with PCP    Date Provider   Last Visit   5/14/2021 Margarita Funes MD   Next Visit   Visit date not found Margarita Funes MD   ED visits in past 90 days: 0        Note composed:12:16 PM 06/07/2022

## 2022-06-07 NOTE — TELEPHONE ENCOUNTER
No new care gaps identified.  [facility] Embedded Care Gaps. Reference number: [id number]. 6/07/2022   4:12:36 AM CDT

## 2022-06-08 ENCOUNTER — TELEPHONE (OUTPATIENT)
Dept: PRIMARY CARE CLINIC | Facility: CLINIC | Age: 82
End: 2022-06-08
Payer: MEDICARE

## 2022-06-08 NOTE — TELEPHONE ENCOUNTER
----- Message from Ira Santana sent at 6/8/2022 11:00 AM CDT -----  Contact: pt [phone]  Patient is returning a phone call.  Who left a message for the patient: Romina  Does patient know what this is regarding: missed call   Would you like a call back, or a response through your [facility] portal?:   phone  Comments:

## 2022-06-15 ENCOUNTER — HOSPITAL ENCOUNTER (EMERGENCY)
Facility: HOSPITAL | Age: 82
Discharge: HOME | End: 2022-06-15
Attending: STUDENT IN AN ORGANIZED HEALTH CARE EDUCATION/TRAINING PROGRAM
Payer: MEDICARE

## 2022-06-15 VITALS
WEIGHT: 223.31 LBS | SYSTOLIC BLOOD PRESSURE: 172 MMHG | TEMPERATURE: 100 F | RESPIRATION RATE: 19 BRPM | BODY MASS INDEX: 35.89 KG/M2 | HEART RATE: 91 BPM | DIASTOLIC BLOOD PRESSURE: 84 MMHG | OXYGEN SATURATION: 95 % | HEIGHT: 66 IN

## 2022-06-15 DIAGNOSIS — M10.9 ACUTE GOUT OF LEFT ANKLE, UNSPECIFIED CAUSE: Primary | ICD-10-CM

## 2022-06-15 DIAGNOSIS — M25.473 ANKLE SWELLING: ICD-10-CM

## 2022-06-15 DIAGNOSIS — M25.579 ANKLE PAIN: ICD-10-CM

## 2022-06-15 DIAGNOSIS — M79.89 LEG SWELLING: ICD-10-CM

## 2022-06-15 PROBLEM — M25.572 ACUTE LEFT ANKLE PAIN: Status: ACTIVE | Noted: 2022-06-15

## 2022-06-15 LAB
APPEARANCE FLD: NORMAL
BASOPHILS # BLD AUTO: 0.01 K/UL (ref 0–0.2)
BASOPHILS NFR BLD: 0.2 % (ref 0–1.9)
BODY FLD TYPE: NORMAL
BODY FLD TYPE: NORMAL
BUN SERPL-MCNC: 25 MG/DL (ref 6–30)
CHLORIDE SERPL-SCNC: 99 MMOL/L (ref 95–110)
COLOR FLD: NORMAL
CREAT SERPL-MCNC: 1.2 MG/DL (ref 0.5–1.4)
CRP SERPL-MCNC: 74.3 MG/L (ref 0–8.2)
CRYSTALS FLD MICRO: NEGATIVE
DIFFERENTIAL METHOD: ABNORMAL
EOSINOPHIL # BLD AUTO: 0.1 K/UL (ref 0–0.5)
EOSINOPHIL NFR BLD: 1.4 % (ref 0–8)
ERYTHROCYTE [DISTWIDTH] IN BLOOD BY AUTOMATED COUNT: 15 % (ref 11.5–14.5)
ERYTHROCYTE [SEDIMENTATION RATE] IN BLOOD BY WESTERGREN METHOD: 70 MM/HR (ref 0–36)
GLUCOSE SERPL-MCNC: 90 MG/DL (ref 70–110)
GRAM STN SPEC: NORMAL
GRAM STN SPEC: NORMAL
HCT VFR BLD AUTO: 36 % (ref 37–48.5)
HCT VFR BLD CALC: 35 %PCV (ref 36–54)
HGB BLD-MCNC: 11.4 G/DL (ref 12–16)
IMM GRANULOCYTES # BLD AUTO: 0.01 K/UL (ref 0–0.04)
IMM GRANULOCYTES NFR BLD AUTO: 0.2 % (ref 0–0.5)
INR PPP: 1 (ref 0.8–1.2)
LYMPHOCYTES # BLD AUTO: 1.7 K/UL (ref 1–4.8)
LYMPHOCYTES NFR BLD: 33.8 % (ref 18–48)
LYMPHOCYTES NFR FLD MANUAL: 5 %
MCH RBC QN AUTO: 26.3 PG (ref 27–31)
MCHC RBC AUTO-ENTMCNC: 31.7 G/DL (ref 32–36)
MCV RBC AUTO: 83 FL (ref 82–98)
MONOCYTES # BLD AUTO: 0.7 K/UL (ref 0.3–1)
MONOCYTES NFR BLD: 13.6 % (ref 4–15)
MONOS+MACROS NFR FLD MANUAL: 13 %
NEUTROPHILS # BLD AUTO: 2.5 K/UL (ref 1.8–7.7)
NEUTROPHILS NFR BLD: 50.8 % (ref 38–73)
NEUTROPHILS NFR FLD MANUAL: 82 %
NRBC BLD-RTO: 0 /100 WBC
PATH INTERP FLD-IMP: NORMAL
PLATELET # BLD AUTO: 181 K/UL (ref 150–450)
PMV BLD AUTO: 10.6 FL (ref 9.2–12.9)
POC IONIZED CALCIUM: 1.04 MMOL/L (ref 1.06–1.42)
POC TCO2 (MEASURED): 34 MMOL/L (ref 23–29)
POTASSIUM BLD-SCNC: 3.6 MMOL/L (ref 3.5–5.1)
PROTHROMBIN TIME: 10.1 SEC (ref 9–12.5)
RBC # BLD AUTO: 4.33 M/UL (ref 4–5.4)
SAMPLE: ABNORMAL
SODIUM BLD-SCNC: 139 MMOL/L (ref 136–145)
URATE SERPL-MCNC: 8.7 MG/DL (ref 2.4–5.7)
WBC # BLD AUTO: 5 K/UL (ref 3.9–12.7)
WBC # FLD: 6800 /CU MM

## 2022-06-15 PROCEDURE — 89051 BODY FLUID CELL COUNT: CPT | Performed by: STUDENT IN AN ORGANIZED HEALTH CARE EDUCATION/TRAINING PROGRAM

## 2022-06-15 PROCEDURE — 85652 RBC SED RATE AUTOMATED: CPT | Performed by: STUDENT IN AN ORGANIZED HEALTH CARE EDUCATION/TRAINING PROGRAM

## 2022-06-15 PROCEDURE — 99285 EMERGENCY DEPT VISIT HI MDM: CPT | Mod: 25

## 2022-06-15 PROCEDURE — 89060 EXAM SYNOVIAL FLUID CRYSTALS: CPT | Performed by: STUDENT IN AN ORGANIZED HEALTH CARE EDUCATION/TRAINING PROGRAM

## 2022-06-15 PROCEDURE — 99285 PR EMERGENCY DEPT VISIT,LEVEL V: ICD-10-PCS | Mod: ,,, | Performed by: STUDENT IN AN ORGANIZED HEALTH CARE EDUCATION/TRAINING PROGRAM

## 2022-06-15 PROCEDURE — 99285 EMERGENCY DEPT VISIT HI MDM: CPT | Mod: ,,, | Performed by: STUDENT IN AN ORGANIZED HEALTH CARE EDUCATION/TRAINING PROGRAM

## 2022-06-15 PROCEDURE — 63600175 PHARM REV CODE 636 W HCPCS: Performed by: STUDENT IN AN ORGANIZED HEALTH CARE EDUCATION/TRAINING PROGRAM

## 2022-06-15 PROCEDURE — 87116 MYCOBACTERIA CULTURE: CPT | Performed by: STUDENT IN AN ORGANIZED HEALTH CARE EDUCATION/TRAINING PROGRAM

## 2022-06-15 PROCEDURE — 96374 THER/PROPH/DIAG INJ IV PUSH: CPT

## 2022-06-15 PROCEDURE — 87206 SMEAR FLUORESCENT/ACID STAI: CPT | Performed by: STUDENT IN AN ORGANIZED HEALTH CARE EDUCATION/TRAINING PROGRAM

## 2022-06-15 PROCEDURE — 87102 FUNGUS ISOLATION CULTURE: CPT | Performed by: STUDENT IN AN ORGANIZED HEALTH CARE EDUCATION/TRAINING PROGRAM

## 2022-06-15 PROCEDURE — 80047 BASIC METABLC PNL IONIZED CA: CPT

## 2022-06-15 PROCEDURE — 87205 SMEAR GRAM STAIN: CPT | Performed by: STUDENT IN AN ORGANIZED HEALTH CARE EDUCATION/TRAINING PROGRAM

## 2022-06-15 PROCEDURE — 85025 COMPLETE CBC W/AUTO DIFF WBC: CPT | Performed by: STUDENT IN AN ORGANIZED HEALTH CARE EDUCATION/TRAINING PROGRAM

## 2022-06-15 PROCEDURE — 87070 CULTURE OTHR SPECIMN AEROBIC: CPT | Performed by: STUDENT IN AN ORGANIZED HEALTH CARE EDUCATION/TRAINING PROGRAM

## 2022-06-15 PROCEDURE — 87075 CULTR BACTERIA EXCEPT BLOOD: CPT | Performed by: STUDENT IN AN ORGANIZED HEALTH CARE EDUCATION/TRAINING PROGRAM

## 2022-06-15 PROCEDURE — 25000003 PHARM REV CODE 250: Performed by: STUDENT IN AN ORGANIZED HEALTH CARE EDUCATION/TRAINING PROGRAM

## 2022-06-15 PROCEDURE — 84550 ASSAY OF BLOOD/URIC ACID: CPT | Performed by: STUDENT IN AN ORGANIZED HEALTH CARE EDUCATION/TRAINING PROGRAM

## 2022-06-15 PROCEDURE — 85610 PROTHROMBIN TIME: CPT | Performed by: STUDENT IN AN ORGANIZED HEALTH CARE EDUCATION/TRAINING PROGRAM

## 2022-06-15 PROCEDURE — 86140 C-REACTIVE PROTEIN: CPT | Performed by: STUDENT IN AN ORGANIZED HEALTH CARE EDUCATION/TRAINING PROGRAM

## 2022-06-15 RX ORDER — PREDNISONE 20 MG/1
40 TABLET ORAL
Status: COMPLETED | OUTPATIENT
Start: 2022-06-15 | End: 2022-06-15

## 2022-06-15 RX ORDER — PREDNISONE 20 MG/1
40 TABLET ORAL DAILY
Qty: 14 TABLET | Refills: 0 | Status: SHIPPED | OUTPATIENT
Start: 2022-06-15 | End: 2022-06-22

## 2022-06-15 RX ORDER — LIDOCAINE HYDROCHLORIDE 10 MG/ML
10 INJECTION INFILTRATION; PERINEURAL ONCE
Status: COMPLETED | OUTPATIENT
Start: 2022-06-15 | End: 2022-06-15

## 2022-06-15 RX ORDER — NAPROXEN 500 MG/1
500 TABLET ORAL 2 TIMES DAILY WITH MEALS
Qty: 28 TABLET | Refills: 0 | Status: SHIPPED | OUTPATIENT
Start: 2022-06-15 | End: 2022-06-29

## 2022-06-15 RX ORDER — LORAZEPAM 2 MG/ML
2 INJECTION INTRAMUSCULAR
Status: COMPLETED | OUTPATIENT
Start: 2022-06-15 | End: 2022-06-15

## 2022-06-15 RX ORDER — NAPROXEN 500 MG/1
500 TABLET ORAL
Status: COMPLETED | OUTPATIENT
Start: 2022-06-15 | End: 2022-06-15

## 2022-06-15 RX ADMIN — LORAZEPAM 2 MG: 2 INJECTION INTRAMUSCULAR; INTRAVENOUS at 07:06

## 2022-06-15 RX ADMIN — PREDNISONE 40 MG: 20 TABLET ORAL at 09:06

## 2022-06-15 RX ADMIN — NAPROXEN 500 MG: 500 TABLET ORAL at 09:06

## 2022-06-15 RX ADMIN — LIDOCAINE HYDROCHLORIDE 10 ML: 10 INJECTION, SOLUTION INFILTRATION; PERINEURAL at 06:06

## 2022-06-15 NOTE — ED NOTES
"Patient identifiers verified and correct for Jodi Cole   C/C: Pt states "My foot just swole up in the middle of the night"  APPEARANCE: awake and alert in no acute distress.  SKIN: warm, dry and intact. No breakdown or bruising.  MUSCULOSKELETAL: Patient moving all extremities spontaneously, obvious swelling to L ankle. Ambulates independently.  RESPIRATORY: Denies shortness of breath. Respirations unlabored.   CARDIAC: Denies CP, 2+ distal pulses; no peripheral edema  ABDOMEN: soft, non-tender, and non-distended, Denies N/V  : voids spontaneously, denies difficulty  Neurologic: AAO x 4; follows commands equal strength in all extremities; denies numbness/tingling. Denies dizziness   "

## 2022-06-16 NOTE — ED PROVIDER NOTES
Encounter Date: 6/15/2022       History     Chief Complaint   Patient presents with    Foot Pain     Report L foot pain in the middle of the night. Denies injury.      HPI   41-year-old female with history of hypertension, osteoarthritis status post left knee replacement in 2021, history of endometrial cancer presenting to the ED with left sided ankle swelling and pain.  Patient has difficulty with bending her ankle.  Denies any trauma to the area.  Patient also notes some left leg swelling compared to the right leg.  Denies fevers or chills, nausea vomiting, chest pain, cough, shortness of breath, abdominal pain.  Patient has no history of gout or septic arthritis in the past.  No history of DVTs or PEs.  Patient does not take any blood thinners at home.  Patient has been ambulating regularly aside from today.  Review of patient's allergies indicates:  No Known Allergies  Past Medical History:   Diagnosis Date    Anemia     Anemia associated with chemotherapy 9/1/2015    Anemia due to chemotherapy 9/22/2015    Chemotherapy induced nausea and vomiting 6/9/2015    Chemotherapy-induced neutropenia 7/28/2015    Constipation 7/7/2015    Endometrial ca 5/26/2015    Endometrial ca 8/10/2015    Essential hypertension     Hyperlipidemia     Hypertension     Hypokalemia 9/26/2019    NSAID long-term use 9/26/2019    Osteoarthritis     Pain in both hands 2/19/2016    Right-sided low back pain without sciatica 6/23/2016    Uterine cancer     Well woman exam with routine gynecological exam 2/19/2016     Past Surgical History:   Procedure Laterality Date    COLONOSCOPY N/A 5/22/2019    Procedure: COLONOSCOPY;  Surgeon: Kong Rodarte MD;  Location: [address]);  Service: Endoscopy;  Laterality: N/A;    D&C Hysteroscopy  March 18, 2015    DILATION AND CURETTAGE OF UTERUS  1969     D&C and uterine suspension.     HYSTERECTOMY  5/11/15    robotic for endometrial cancer    LYMPHADENECTOMY      PELVIC AND  PARA-AORTIC LYMPH NODE DISSECTION      PERCUTANEOUS CRYOTHERAPY OF PERIPHERAL NERVE USING LIQUID NITROUS OXIDE IN CLOSED NEEDLE DEVICE Left 2021    Procedure: CRYOTHERAPY, NERVE, PERIPHERAL, PERCUTANEOUS, USING LIQUID NITROUS OXIDE IN CLOSED NEEDLE DEVICE, LEFT KNEE;  Surgeon: BOB Doll;  Location: [facility];  Service: Pain Management;  Laterality: Left;    [place] REMOVAL OF OVARY/TUBE(S)      robotic TLH/BSO and bilatyeral pelvic and para-aortic LND    TOTAL KNEE REPLACEMENT USING COMPUTER NAVIGATION  10/01/2019     Family History   Problem Relation Age of Onset    Cancer Brother 65        pancreatic    Heart disease Father         in his 50's-  at 57     Ovarian cancer Neg Hx     Uterine cancer Neg Hx     Breast cancer Neg Hx     Colon cancer Neg Hx      Social History     Tobacco Use    Smoking status: Never Smoker    Smokeless tobacco: Never Used   Substance Use Topics    Alcohol use: No    Drug use: Never     Review of Systems   Constitutional: Negative for chills, fatigue and fever.   HENT: Negative for sore throat and trouble swallowing.    Respiratory: Negative for cough, shortness of breath and wheezing.    Cardiovascular: Negative for chest pain.   Gastrointestinal: Negative for abdominal pain, diarrhea, nausea and vomiting.   Genitourinary: Negative for dysuria.   Musculoskeletal: Positive for arthralgias, joint swelling and myalgias. Negative for back pain.   Skin: Negative for rash.   Neurological: Negative for weakness.   Hematological: Does not bruise/bleed easily.       Physical Exam     Initial Vitals [06/15/22 1341]   BP Pulse Resp Temp SpO2   135/70 60 16 99 °F (37.2 °C) 98 %      MAP       --         Physical Exam    Nursing note and vitals reviewed.  Constitutional: She appears well-developed and well-nourished.   HENT:   Head: Normocephalic and atraumatic.   Mouth/Throat: Oropharynx is clear and moist.   Eyes: Conjunctivae and EOM are normal. Pupils are equal,  round, and reactive to light.   Neck: Neck supple.   Normal range of motion.  Cardiovascular: Normal rate, regular rhythm, normal heart sounds and intact distal pulses. Exam reveals no friction rub.    No murmur heard.  Pulmonary/Chest: Breath sounds normal. No respiratory distress. She has no wheezes. She has no rales.   Abdominal: Abdomen is soft. Bowel sounds are normal. She exhibits no distension. There is no abdominal tenderness. There is no rebound and no guarding.   Musculoskeletal:      Cervical back: Normal range of motion and neck supple.      Comments: Left ankle is significantly swollen and tender to palpation over the medial and lateral malleolar regions.  There is some erythema posterior to the malleolus.  No induration.  No clear abscess.  Patient has 2+ DP pulses bilaterally.  Patient able to move her toes but has reduced range of motion of the left ankle.  There is no pitting edema but there is swelling of the left compared to right lower extremity.     Neurological: She is alert and oriented to person, place, and time. She has normal strength. No cranial nerve deficit.   Skin: Skin is warm and dry. Capillary refill takes less than 2 seconds. No abscess noted. No pallor.   Psychiatric: She has a normal mood and affect. Thought content normal.         ED Course   Procedures  Labs Reviewed   ISTAT PROCEDURE - Abnormal; Notable for the following components:       Result Value    POC TCO2 (MEASURED) 34 (*)     POC Ionized Calcium 1.04 (*)     POC Hematocrit 35 (*)     All other components within normal limits   PROTIME-INR   CBC W/ AUTO DIFFERENTIAL   URIC ACID   SEDIMENTATION RATE   C-REACTIVE PROTEIN   ISTAT CHEM8          Imaging Results          US Lower Extremity Veins Left (Final result)  Result time 06/15/22 18:05:48    Final result by Tanner Rai MD (06/15/22 18:05:48)                 Impression:      No evidence of deep venous thrombosis in the left lower extremity.      Electronically  signed by: Tanner Rai MD  Date:    06/15/2022  Time:    18:05             Narrative:    EXAMINATION:  US LOWER EXTREMITY VEINS LEFT    CLINICAL HISTORY:  Effusion, unspecified ankle    TECHNIQUE:  Duplex and color flow Doppler evaluation and graded compression of the left lower extremity veins was performed.    COMPARISON:  None    FINDINGS:  Left thigh veins: The common femoral, femoral, popliteal, upper greater saphenous, and deep femoral veins are patent and free of thrombus. The veins are normally compressible and have normal phasic flow and augmentation response.    Left calf veins: The visualized calf veins are patent.    Contralateral CFV: The contralateral (right) common femoral vein is patent and free of thrombus.    Miscellaneous: None                               X-Ray Ankle Complete Left (Final result)  Result time 06/15/22 16:08:11    Final result by Noam Stephenson III, MD (06/15/22 16:08:11)                 Narrative:    EXAMINATION:  XR ANKLE COMPLETE 3 VIEW LEFT    CLINICAL HISTORY:  Pain in unspecified ankle and joints of unspecified foot    FINDINGS:  Ankle complete three views left: There is soft tissue swelling.  There is DJD.  No fracture dislocation bone destruction or OCD seen.  There are spurs on the calcaneus.      Electronically signed by: Noam Stephenson MD  Date:    06/15/2022  Time:    16:08                               Medications   LIDOcaine HCL 10 mg/ml (1%) injection 10 mL (has no administration in time range)                        MDM attending  81-year-old female presenting to the ED with new onset left ankle and foot swelling with some calf tightness.  Vital signs are within normal limits aside from mildly elevated blood pressure.  Temp of 99.6°.  On exam patient has swelling to the left foot and ankle with some calf tightness on the left greater than right.  There is some mild erythema around the ankle.  No induration.  Patient has reduced range of motion.  X-rays are  negative for fractures or acute injury.  Ultrasound is negative for DVT.  Labs are in process.  Patient's exam is concerning for acute gout flare versus septic arthritis.  Discussed with orthopedic surgery who room will evaluate for arthrocentesis.    Елена Barraza MD  Emergency Medicine Staff   Critical Care Fellow  7:23 PM    Update   Wbc elevated <10k. No crystals seen. Uric acid elevated consistent with gout flare. Discussed with ortho who agree gout is most likely. Will treat with prednisone and naproxen. Pt told about gout diet. Discussed risk of elevated BP with medications. Pt told to follow up with PCP in 2-3 days to have her BP rechecked and to evaluate for improvement in symptoms.     Stable for discharge.     Елена Barraza MD  Emergency Medicine Staff   Critical Care Fellow  11:16 PM      Clinical Impression:   Final diagnoses:  [M25.579] Ankle pain  [M25.473] Ankle swelling  [M79.89] Leg swelling                 Елена Barraza MD  06/15/22 7548

## 2022-06-16 NOTE — ASSESSMENT & PLAN NOTE
Jodi Cole is a 81 y.o. female presenting to the ED with acute left ankle pain. Ortho consulted for rule out septic arthritis of the left ankle. Pertinent physical exam findings include swelling, palpable effusion and pain with passive ROM at the ankle. The left ankle was aspirated at the bedside - see procedure note for further details. Workup discussed with patient/family along with necessary treatment pending aspiration results and all questions were answered.     Aspiration results not consistent with septic joint. Recommend medical management of acute gout flare to left ankle in setting of elevated uric acid.     Joint Fluid Analysis:  WBC: 6800  Segs: 82%  Crystals: negative, pending pathologist read  Gram Stain: no organisms  Cultures pending, will continue to follow     Recent Labs   Lab 06/15/22  1855 06/15/22  1901   WBC 5.00  --    RBC 4.33  --    HGB 11.4*  --    HCT 36.0* 35*     --    MCV 83  --    MCH 26.3*  --    MCHC 31.7*  --        Recent Labs   Lab 06/15/22  1855   CRP 74.3*

## 2022-06-16 NOTE — CONSULTS
[facility] - Emergency Dept  Orthopedics  Consult Note    Patient Name: Jodi Cole  MRN: [id number]  Admission Date: 6/15/2022  Hospital Length of Stay: 0 days  Attending Provider: Елена Barraza MD  Primary Care Provider: Margarita Funes MD      Inpatient consult to Orthopedic Surgery  Consult performed by: Kaila Rodgers MD  Consult ordered by: Елена Barraza MD        Subjective:     Principal Problem: Left ankle pain    Chief Complaint:   Chief Complaint   Patient presents with    Foot Pain     Report L foot pain in the middle of the night. Denies injury.         HPI: Jodi Cole is a 81 y.o. female with PMH HTN and BL TKA (2019, 2021 by Dr. Fuentes) presents with 1 day of acute left ankle pain and swelling of atraumatic onset. She reports waking from sleep with acute left ankle pain. No recent falls or trauma. No recent illnesses. She denies any prior episodes of joint swelling. Her pain has progressed over the past 24 hours to affect her ambulation which prompted presentation to the ED. She denies a past history of gout flares. Denies febrile episodes at home or N/V. She ambulates independently at baseline.       Past Medical History:   Diagnosis Date    Anemia     Anemia associated with chemotherapy 9/1/2015    Anemia due to chemotherapy 9/22/2015    Chemotherapy induced nausea and vomiting 6/9/2015    Chemotherapy-induced neutropenia 7/28/2015    Constipation 7/7/2015    Endometrial ca 5/26/2015    Endometrial ca 8/10/2015    Essential hypertension     Hyperlipidemia     Hypertension     Hypokalemia 9/26/2019    NSAID long-term use 9/26/2019    Osteoarthritis     Pain in both hands 2/19/2016    Right-sided low back pain without sciatica 6/23/2016    Uterine cancer     Well woman exam with routine gynecological exam 2/19/2016       Past Surgical History:   Procedure Laterality Date    COLONOSCOPY N/A 5/22/2019    Procedure: COLONOSCOPY;  Surgeon: Kong Rodarte MD;  Location:  [facility] (4TH FLR);  Service: Endoscopy;  Laterality: N/A;    D&C Hysteroscopy  March 18, 2015    DILATION AND CURETTAGE OF UTERUS  1969     D&C and uterine suspension.     HYSTERECTOMY  5/11/15    robotic for endometrial cancer    LYMPHADENECTOMY      PELVIC AND PARA-AORTIC LYMPH NODE DISSECTION      PERCUTANEOUS CRYOTHERAPY OF PERIPHERAL NERVE USING LIQUID NITROUS OXIDE IN CLOSED NEEDLE DEVICE Left 6/14/2021    Procedure: CRYOTHERAPY, NERVE, PERIPHERAL, PERCUTANEOUS, USING LIQUID NITROUS OXIDE IN CLOSED NEEDLE DEVICE, LEFT KNEE;  Surgeon: BOB Doll;  Location: [facility];  Service: Pain Management;  Laterality: Left;    [place] REMOVAL OF OVARY/TUBE(S)      robotic TLH/BSO and bilatyeral pelvic and para-aortic LND    TOTAL KNEE REPLACEMENT USING COMPUTER NAVIGATION  10/01/2019       Review of patient's allergies indicates:  No Known Allergies    No current facility-administered medications for this encounter.     Current Outpatient Medications   Medication Sig    acetaminophen (TYLENOL) 500 MG tablet Take 1 tablet (500 mg total) by mouth every 6 (six) hours as needed for Pain.    acetaminophen (TYLENOL) 650 MG TbSR Take 1 tablet (650 mg total) by mouth every 8 (eight) hours as needed (pain).    amLODIPine (NORVASC) 2.5 MG tablet TAKE 1 TABLET(2.5 MG) BY MOUTH EVERY DAY    aspirin (ECOTRIN) 81 MG EC tablet Take 1 tablet (81 mg total) by mouth 2 (two) times daily.    atorvastatin (LIPITOR) 40 MG tablet Take 1 tablet (40 mg total) by mouth every evening. (Patient not taking: Reported on 3/29/2022)    diclofenac sodium (VOLTAREN) 1 % Gel Apply 2 g topically 4 (four) times daily. (Patient not taking: Reported on 3/29/2022)    docusate sodium (COLACE) 100 MG capsule Take 1 capsule (100 mg total) by mouth 2 (two) times daily as needed for Constipation. (Patient not taking: Reported on 3/29/2022)    multivitamin capsule Take 1 capsule by mouth once daily.    naproxen (NAPROSYN) 500 MG tablet  "Take 1 tablet (500 mg total) by mouth 2 (two) times daily with meals.    naproxen (NAPROSYN) 500 MG tablet TAKE 1 TABLET(500 MG) BY MOUTH TWICE DAILY WITH MEALS    oxyCODONE (ROXICODONE) 5 MG immediate release tablet Take 1 tab by mouth every 6-8 hours as needed for pain (Patient not taking: Reported on 3/29/2022)    vitamins B1 B6 B12 Tab Take by mouth once daily.     Facility-Administered Medications Ordered in Other Encounters   Medication    midazolam (VERSED) 1 mg/mL injection 0.5 mg     Family History       Problem Relation (Age of Onset)    Cancer Brother (65)    Heart disease Father          Tobacco Use    Smoking status: Never Smoker    Smokeless tobacco: Never Used   Substance and Sexual Activity    Alcohol use: No    Drug use: Never    Sexual activity: Yes     Partners: Male     ROS  Constitutional: Denies fever/chills  Neurological: Denies numbness/tingling (any exceptions noted in orthopaedic exam)   Psychiatric/Behavioral: Denies change in normal mood  Eyes: Denies change in vision  Cardiovascular: Denies chest pain  Respiratory: Denies shortness of breath  Hematologic/Lymphatic: Denies easy bleeding/bruising   Skin: Denies new rash or skin lesions   Gastrointestinal: Denies nausea/vomitting/diarrhea, change in bowel habits, abdominal pain   Allergic/Immunologic: Denies adverse reactions to current medications  Musculoskeletal: see HPI      Objective:     Vital Signs (Most Recent):  Temp: 99.6 °F (37.6 °C) (06/15/22 1350)  Pulse: 91 (06/15/22 2004)  Resp: 19 (06/15/22 2004)  BP: (!) 172/84 (06/15/22 1500)  SpO2: 95 % (06/15/22 2004)   Vital Signs (24h Range):  Temp:  [99 °F (37.2 °C)-99.6 °F (37.6 °C)] 99.6 °F (37.6 °C)  Pulse:  [] 91  Resp:  [16-19] 19  SpO2:  [95 %-98 %] 95 %  BP: (135-172)/(70-84) 172/84     Weight: 101.3 kg (223 lb 5.2 oz)  Height: 5' 6" (167.6 cm)  Body mass index is 36.05 kg/m².    Ortho/SPM Exam    LLE:   Skin closed  Obvious deformity  No ecchymoses  Moderate " swelling  TTP over medial ankle at joint line  Compartments soft and compressible  Pain with active ROM at ankle   SILT Sa/Pedroza/SP/DP  Motor intact EHL/FHL/Gastroc/TA  Brisk cap refill  Warm well perfused extremities  DP pulse 2+ palpable    RLE:  Skin intact, no deformity  No TTP  Compartments soft  Full painless ROM throughout lower extremity  SILT Sa/Pedroza/DP/SP/T  Motor intact TA/SP/DP  2+ DP/PT     BUE:  Skin intact, no deformity noted  No open wounds/abrasions/crepitus  No bony TTP  FROM shoulder, elbow and wrist  SILT M/U/R  Motor intact AIN/PIN/M/U/R   Cap refill < 2s  2+ RP    Significant Labs: CBC:   Recent Labs   Lab 06/15/22  1855 06/15/22  1901   WBC 5.00  --    HGB 11.4*  --    HCT 36.0* 35*     --      CRP:   Recent Labs   Lab 06/15/22  1855   CRP 74.3*     All pertinent labs within the past 24 hours have been reviewed.    Significant Imaging: I have reviewed and interpreted all pertinent imaging results/findings.    Assessment/Plan:     Acute left ankle pain  Jodi Cole is a 81 y.o. female presenting to the ED with acute left ankle pain. Ortho consulted for rule out septic arthritis of the left ankle. Pertinent physical exam findings include swelling, palpable effusion and pain with passive ROM at the ankle. The left ankle was aspirated at the bedside - see procedure note for further details. Workup discussed with patient/family along with necessary treatment pending aspiration results and all questions were answered.     Aspiration results not consistent with septic joint. Recommend medical management of acute gout flare to left ankle in setting of elevated uric acid.     Joint Fluid Analysis:  WBC: 6800  Segs: 82%  Crystals: negative, pending pathologist read  Gram Stain: no organisms  Cultures pending, will continue to follow     Recent Labs   Lab 06/15/22  1855 06/15/22  1901   WBC 5.00  --    RBC 4.33  --    HGB 11.4*  --    HCT 36.0* 35*     --    MCV 83  --    MCH 26.3*  --     [facility] 31.7*  --        Recent Labs   Lab 06/15/22  4805   CRP 74.3*                   Kaila Rodgers MD  Orthopedics  [facility] - Emergency Dept

## 2022-06-16 NOTE — HPI
Jodi Cole is a 81 y.o. female with PMH HTN and BL TKA (2019, 2021 by Dr. Fuentes) presents with 1 day of acute left ankle pain and swelling of atraumatic onset. She reports waking from sleep with acute left ankle pain. No recent falls or trauma. No recent illnesses. She denies any prior episodes of joint swelling. Her pain has progressed over the past 24 hours to affect her ambulation which prompted presentation to the ED. She denies a past history of gout flares. Denies febrile episodes at home or N/V. She ambulates independently at baseline.

## 2022-06-16 NOTE — ED NOTES
I-STAT Chem-8+ Results:   Value Reference Range   Sodium 139 136-145 mmol/L   Potassium  3.6 3.5-5.1 mmol/L   Chloride 99  mmol/L   Ionized Calcium 1.04 1.06-1.42 mmol/L   CO2 (measured) 34 23-29 mmol/L   Glucose 90  mg/dL   BUN 25 6-30 mg/dL   Creatinine 1.2 0.5-1.4 mg/dL   Hematocrit 35 36-54%

## 2022-06-16 NOTE — SUBJECTIVE & OBJECTIVE
Past Medical History:   Diagnosis Date    Anemia     Anemia associated with chemotherapy 9/1/2015    Anemia due to chemotherapy 9/22/2015    Chemotherapy induced nausea and vomiting 6/9/2015    Chemotherapy-induced neutropenia 7/28/2015    Constipation 7/7/2015    Endometrial ca 5/26/2015    Endometrial ca 8/10/2015    Essential hypertension     Hyperlipidemia     Hypertension     Hypokalemia 9/26/2019    NSAID long-term use 9/26/2019    Osteoarthritis     Pain in both hands 2/19/2016    Right-sided low back pain without sciatica 6/23/2016    Uterine cancer     Well woman exam with routine gynecological exam 2/19/2016       Past Surgical History:   Procedure Laterality Date    COLONOSCOPY N/A 5/22/2019    Procedure: COLONOSCOPY;  Surgeon: Kong Rodarte MD;  Location: [address]);  Service: Endoscopy;  Laterality: N/A;    D&C Hysteroscopy  March 18, 2015    DILATION AND CURETTAGE OF UTERUS  1969     D&C and uterine suspension.     HYSTERECTOMY  5/11/15    robotic for endometrial cancer    LYMPHADENECTOMY      PELVIC AND PARA-AORTIC LYMPH NODE DISSECTION      PERCUTANEOUS CRYOTHERAPY OF PERIPHERAL NERVE USING LIQUID NITROUS OXIDE IN CLOSED NEEDLE DEVICE Left 6/14/2021    Procedure: CRYOTHERAPY, NERVE, PERIPHERAL, PERCUTANEOUS, USING LIQUID NITROUS OXIDE IN CLOSED NEEDLE DEVICE, LEFT KNEE;  Surgeon: BOB Doll;  Location: [facility];  Service: Pain Management;  Laterality: Left;    ME REMOVAL OF OVARY/TUBE(S)      robotic TLH/BSO and bilatyeral pelvic and para-aortic LND    TOTAL KNEE REPLACEMENT USING COMPUTER NAVIGATION  10/01/2019       Review of patient's allergies indicates:  No Known Allergies    No current facility-administered medications for this encounter.     Current Outpatient Medications   Medication Sig    acetaminophen (TYLENOL) 500 MG tablet Take 1 tablet (500 mg total) by mouth every 6 (six) hours as needed for Pain.    acetaminophen (TYLENOL) 650 MG TbSR Take 1 tablet (650 mg  total) by mouth every 8 (eight) hours as needed (pain).    amLODIPine (NORVASC) 2.5 MG tablet TAKE 1 TABLET(2.5 MG) BY MOUTH EVERY DAY    aspirin (ECOTRIN) 81 MG EC tablet Take 1 tablet (81 mg total) by mouth 2 (two) times daily.    atorvastatin (LIPITOR) 40 MG tablet Take 1 tablet (40 mg total) by mouth every evening. (Patient not taking: Reported on 3/29/2022)    diclofenac sodium (VOLTAREN) 1 % Gel Apply 2 g topically 4 (four) times daily. (Patient not taking: Reported on 3/29/2022)    docusate sodium (COLACE) 100 MG capsule Take 1 capsule (100 mg total) by mouth 2 (two) times daily as needed for Constipation. (Patient not taking: Reported on 3/29/2022)    multivitamin capsule Take 1 capsule by mouth once daily.    naproxen (NAPROSYN) 500 MG tablet Take 1 tablet (500 mg total) by mouth 2 (two) times daily with meals.    naproxen (NAPROSYN) 500 MG tablet TAKE 1 TABLET(500 MG) BY MOUTH TWICE DAILY WITH MEALS    oxyCODONE (ROXICODONE) 5 MG immediate release tablet Take 1 tab by mouth every 6-8 hours as needed for pain (Patient not taking: Reported on 3/29/2022)    vitamins B1 B6 B12 Tab Take by mouth once daily.     Facility-Administered Medications Ordered in Other Encounters   Medication    midazolam (VERSED) 1 mg/mL injection 0.5 mg     Family History       Problem Relation (Age of Onset)    Cancer Brother (65)    Heart disease Father          Tobacco Use    Smoking status: Never Smoker    Smokeless tobacco: Never Used   Substance and Sexual Activity    Alcohol use: No    Drug use: Never    Sexual activity: Yes     Partners: Male     ROS  Constitutional: Denies fever/chills  Neurological: Denies numbness/tingling (any exceptions noted in orthopaedic exam)   Psychiatric/Behavioral: Denies change in normal mood  Eyes: Denies change in vision  Cardiovascular: Denies chest pain  Respiratory: Denies shortness of breath  Hematologic/Lymphatic: Denies easy bleeding/bruising   Skin: Denies new rash or skin lesions  "  Gastrointestinal: Denies nausea/vomitting/diarrhea, change in bowel habits, abdominal pain   Allergic/Immunologic: Denies adverse reactions to current medications  Musculoskeletal: see HPI      Objective:     Vital Signs (Most Recent):  Temp: 99.6 °F (37.6 °C) (06/15/22 1350)  Pulse: 91 (06/15/22 2004)  Resp: 19 (06/15/22 2004)  BP: (!) 172/84 (06/15/22 1500)  SpO2: 95 % (06/15/22 2004)   Vital Signs (24h Range):  Temp:  [99 °F (37.2 °C)-99.6 °F (37.6 °C)] 99.6 °F (37.6 °C)  Pulse:  [] 91  Resp:  [16-19] 19  SpO2:  [95 %-98 %] 95 %  BP: (135-172)/(70-84) 172/84     Weight: 101.3 kg (223 lb 5.2 oz)  Height: 5' 6" (167.6 cm)  Body mass index is 36.05 kg/m².    Ortho/SPM Exam    LLE:   Skin closed  Obvious deformity  No ecchymoses  Moderate swelling  TTP over medial ankle at joint line  Compartments soft and compressible  Pain with active ROM at ankle   SILT Sa/Pedroza/SP/DP  Motor intact EHL/FHL/Gastroc/TA  Brisk cap refill  Warm well perfused extremities  DP pulse 2+ palpable    RLE:  Skin intact, no deformity  No TTP  Compartments soft  Full painless ROM throughout lower extremity  SILT Sa/Pedroza/DP/SP/T  Motor intact TA/SP/DP  2+ DP/PT     BUE:  Skin intact, no deformity noted  No open wounds/abrasions/crepitus  No bony TTP  FROM shoulder, elbow and wrist  SILT M/U/R  Motor intact AIN/PIN/M/U/R   Cap refill < 2s  2+ RP    Significant Labs: CBC:   Recent Labs   Lab 06/15/22  1855 06/15/22  1901   WBC 5.00  --    HGB 11.4*  --    HCT 36.0* 35*     --      CRP:   Recent Labs   Lab 06/15/22  1855   CRP 74.3*     All pertinent labs within the past 24 hours have been reviewed.    Significant Imaging: I have reviewed and interpreted all pertinent imaging results/findings.  "

## 2022-06-18 LAB — BACTERIA SPEC AEROBE CULT: NORMAL

## 2022-06-21 ENCOUNTER — TELEPHONE (OUTPATIENT)
Dept: GYNECOLOGIC ONCOLOGY | Facility: CLINIC | Age: 82
End: 2022-06-21
Payer: MEDICARE

## 2022-06-21 DIAGNOSIS — E78.5 HYPERLIPIDEMIA, UNSPECIFIED HYPERLIPIDEMIA TYPE: ICD-10-CM

## 2022-06-21 RX ORDER — ATORVASTATIN CALCIUM 40 MG/1
TABLET, FILM COATED ORAL
Qty: 90 TABLET | Refills: 0 | Status: SHIPPED | OUTPATIENT
Start: 2022-06-21 | End: 2023-01-09 | Stop reason: SDUPTHER

## 2022-06-21 NOTE — TELEPHONE ENCOUNTER
Refill Routing Note   Medication(s) are not appropriate for processing by [facility] for the following reason(s):      - Required laboratory values are outdated  - Patient has been seen in the ED/Hospital since the last PCP visit    [facility] action(s):  Defer          Medication reconciliation completed: No     Appointments  past 12m or future 3m with PCP    Date Provider   Last Visit   5/14/2021 Margarita Funes MD   Next Visit   Visit date not found Margarita Funes MD   ED visits in past 90 days: 1        Note composed:10:02 AM 06/21/2022

## 2022-06-21 NOTE — TELEPHONE ENCOUNTER
Pt is overdue for fu. Last seen May 2021 and supposed to fu in Nov. Help her sched a fu with me    Dr. CLARK

## 2022-06-21 NOTE — TELEPHONE ENCOUNTER
Spoke with our patient about her insurance, reschedule appointment she voiced understanding of the date, time and location. All questions answered appointment mail. Provider Scheduling Coord.  Gynecologic Oncology MA/PAR /Preceptor Serg Casarez

## 2022-06-21 NOTE — TELEPHONE ENCOUNTER
No new care gaps identified.  [facility] Embedded Care Gaps. Reference number: [id number]. 6/21/2022   4:14:41 AM BERONICAT

## 2022-06-23 LAB — BACTERIA SPEC ANAEROBE CULT: NORMAL

## 2022-06-27 ENCOUNTER — OFFICE VISIT (OUTPATIENT)
Dept: ORTHOPEDICS | Facility: CLINIC | Age: 82
End: 2022-06-27
Payer: MEDICARE

## 2022-06-27 VITALS — HEIGHT: 66 IN | WEIGHT: 227.5 LBS | BODY MASS INDEX: 36.56 KG/M2

## 2022-06-27 DIAGNOSIS — M17.12 PRIMARY OSTEOARTHRITIS OF LEFT KNEE: Primary | ICD-10-CM

## 2022-06-27 PROCEDURE — 1159F PR MEDICATION LIST DOCUMENTED IN MEDICAL RECORD: ICD-10-PCS | Mod: CPTII,S$GLB,, | Performed by: ORTHOPAEDIC SURGERY

## 2022-06-27 PROCEDURE — 99212 OFFICE O/P EST SF 10 MIN: CPT | Mod: S$GLB,,, | Performed by: ORTHOPAEDIC SURGERY

## 2022-06-27 PROCEDURE — 99999 PR PBB SHADOW E&M-EST. PATIENT-LVL III: CPT | Mod: PBBFAC,,, | Performed by: ORTHOPAEDIC SURGERY

## 2022-06-27 PROCEDURE — 3288F FALL RISK ASSESSMENT DOCD: CPT | Mod: CPTII,S$GLB,, | Performed by: ORTHOPAEDIC SURGERY

## 2022-06-27 PROCEDURE — 1160F RVW MEDS BY RX/DR IN RCRD: CPT | Mod: CPTII,S$GLB,, | Performed by: ORTHOPAEDIC SURGERY

## 2022-06-27 PROCEDURE — 1126F AMNT PAIN NOTED NONE PRSNT: CPT | Mod: CPTII,S$GLB,, | Performed by: ORTHOPAEDIC SURGERY

## 2022-06-27 PROCEDURE — 3288F PR FALLS RISK ASSESSMENT DOCUMENTED: ICD-10-PCS | Mod: CPTII,S$GLB,, | Performed by: ORTHOPAEDIC SURGERY

## 2022-06-27 PROCEDURE — 99999 PR PBB SHADOW E&M-EST. PATIENT-LVL III: ICD-10-PCS | Mod: PBBFAC,,, | Performed by: ORTHOPAEDIC SURGERY

## 2022-06-27 PROCEDURE — 1101F PR PT FALLS ASSESS DOC 0-1 FALLS W/OUT INJ PAST YR: ICD-10-PCS | Mod: CPTII,S$GLB,, | Performed by: ORTHOPAEDIC SURGERY

## 2022-06-27 PROCEDURE — 1160F PR REVIEW ALL MEDS BY PRESCRIBER/CLIN PHARMACIST DOCUMENTED: ICD-10-PCS | Mod: CPTII,S$GLB,, | Performed by: ORTHOPAEDIC SURGERY

## 2022-06-27 PROCEDURE — 1101F PT FALLS ASSESS-DOCD LE1/YR: CPT | Mod: CPTII,S$GLB,, | Performed by: ORTHOPAEDIC SURGERY

## 2022-06-27 PROCEDURE — 1126F PR PAIN SEVERITY QUANTIFIED, NO PAIN PRESENT: ICD-10-PCS | Mod: CPTII,S$GLB,, | Performed by: ORTHOPAEDIC SURGERY

## 2022-06-27 PROCEDURE — 99212 PR OFFICE/OUTPT VISIT, EST, LEVL II, 10-19 MIN: ICD-10-PCS | Mod: S$GLB,,, | Performed by: ORTHOPAEDIC SURGERY

## 2022-06-27 PROCEDURE — 1159F MED LIST DOCD IN RCRD: CPT | Mod: CPTII,S$GLB,, | Performed by: ORTHOPAEDIC SURGERY

## 2022-06-27 NOTE — PROGRESS NOTES
Jodi Cole is in for one year follow up for a  leftTKA.  She is doing very well.  No pain in the knee.  She has resumed activities of daily living. Exam demonstrates  A well developed female in no distress.  Alert and oriented.  Mood and affect are appropriate.    Knee incision is well healed.  ROM is 0-120.  The patella tracks well and there is no instability. The extremity is neurovascularly intact.    Xrays demonstrate a well fixed and positioned prosthesis.    PROMIS-10 Questionnaire Scores 6/27/2022 10/4/2021 6/21/2021 9/23/2019   Global Physical Health 6 10 12 10   Global Mental health Score 13 10 10 13       KOOS Jr. Questionnaire Score 6/27/2022 10/4/2021 6/21/2021 9/23/2019   KOOS Jr Score 3 4 13 16       Oxford Knee Questionnaire Score 6/27/2022 10/4/2021 6/21/2021   Oxford Knee Score 46 36 29       Knee Society and Function Score 6/27/2022 3/28/2022 10/4/2021 6/21/2021 9/23/2019   FINDINGS - KNEE SOCIETY SCORE 98 98 92 45 40   FINDINGS - KNEE SOCIETY FUNCTION SCORE 90 80 80 50 50         Imp:Doing well    F/u in one year with xrays and PROMS>

## 2022-07-14 LAB — FUNGUS SPEC CULT: NORMAL

## 2022-07-24 NOTE — PROGRESS NOTES
Subjective:      Patient ID: Jodi Cole is a 81 y.o. female.    Chief Complaint: Follow-up (1yr FU Endodometrial CA)      HPI  Patient comes in today for her annual follow up for endometrial cancer.   Denies vaginal bleeding.  Disease free interval 7 years     pending       Mammogram: 8/2021  Colonoscopy: May 2019: normal. No need to repeat       Her oncologic history is:    May 2015: She underwent a robotic-assisted laparoscopic hysterectomy with bilateral salpingo-oophorectomy and bilateral pelvic and para-aortic lymphadenectomy on 5/11/2015. 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 The depth of invasion was 2 mm out of 27 mm. Nodes were negative as was the cytologic washing.    Finished vaginal cuff brachytherapy on 7/20/2015.    Oct 2015: Completed 6 cycles of taxol and carboplatin in October 2015.    CT scan after completion was normal.  was 8.      Review of Systems   Constitutional: Negative for appetite change, chills, fatigue and fever.   HENT: Negative for mouth sores.    Respiratory: Negative for cough and shortness of breath.    Cardiovascular: Negative for leg swelling.   Gastrointestinal: Negative for abdominal pain, blood in stool, constipation and diarrhea.   Endocrine: Negative for cold intolerance.   Genitourinary: Negative for dysuria and vaginal bleeding.   Musculoskeletal: Negative for myalgias.   Skin: Negative for rash.   Allergic/Immunologic: Negative.    Neurological: Negative for weakness and numbness.   Hematological: Negative for adenopathy. Does not bruise/bleed easily.   Psychiatric/Behavioral: Negative for confusion.       Objective:   Physical Exam:   Constitutional: She is oriented to person, place, and time. She appears well-developed and well-nourished.    HENT:   Head: Normocephalic and atraumatic.    Eyes: Pupils are equal, round, and reactive to light. EOM are normal.    Neck: No thyromegaly present.     Cardiovascular: Normal rate, regular rhythm and intact distal pulses.     Pulmonary/Chest: Effort normal and breath sounds normal. No respiratory distress. She has no wheezes.        Abdominal: Soft. Bowel sounds are normal. She exhibits no distension and no mass. There is no abdominal tenderness.     Genitourinary:    Vagina and rectum normal.      Pelvic exam was performed with patient supine.   There is no lesion on the right labia. There is no lesion on the left labia. Right adnexum displays no mass. Left adnexum displays no mass. Vaginal cuff normal.  Cervix is absent.Uterus is absent.           Musculoskeletal: Normal range of motion and moves all extremeties.      Lymphadenopathy:     She has no cervical adenopathy.        Right: No supraclavicular adenopathy present.        Left: No supraclavicular adenopathy present.    Neurological: She is alert and oriented to person, place, and time.    Skin: Skin is warm and dry. No rash noted.    Psychiatric: She has a normal mood and affect.       Assessment:     1. Endometrial cancer    2. Screening mammogram, encounter for        Plan:     Orders Placed This Encounter   Procedures    Mammo Digital Screening Bilat w/ Seun             No evidence of disease on today's exam  Feels well, no new symptoms  Disease free interval 7 years  Tumor markers  pending  Schedule MMG    Recommend continued surveillance. RTC annually with  or sooner if needed.     I spent approximately 30 minutes reviewing the available records and evaluating the patient, out of which over 50% of the time was spent face to face with the patient in counseling and coordinating this patient's care.

## 2022-07-26 ENCOUNTER — OFFICE VISIT (OUTPATIENT)
Dept: GYNECOLOGIC ONCOLOGY | Facility: CLINIC | Age: 82
End: 2022-07-26
Payer: MEDICARE

## 2022-07-26 ENCOUNTER — LAB VISIT (OUTPATIENT)
Dept: LAB | Facility: HOSPITAL | Age: 82
End: 2022-07-26
Attending: OBSTETRICS & GYNECOLOGY
Payer: MEDICARE

## 2022-07-26 VITALS — BODY MASS INDEX: 37.45 KG/M2 | WEIGHT: 233 LBS | HEIGHT: 66 IN

## 2022-07-26 DIAGNOSIS — C54.1 ENDOMETRIAL CANCER: ICD-10-CM

## 2022-07-26 DIAGNOSIS — Z12.31 SCREENING MAMMOGRAM, ENCOUNTER FOR: ICD-10-CM

## 2022-07-26 DIAGNOSIS — C54.1 ENDOMETRIAL CANCER: Primary | ICD-10-CM

## 2022-07-26 LAB — CANCER AG125 SERPL-ACNC: 12 U/ML (ref 0–30)

## 2022-07-26 PROCEDURE — 36415 COLL VENOUS BLD VENIPUNCTURE: CPT | Performed by: OBSTETRICS & GYNECOLOGY

## 2022-07-26 PROCEDURE — 1126F PR PAIN SEVERITY QUANTIFIED, NO PAIN PRESENT: ICD-10-PCS | Mod: CPTII,S$GLB,, | Performed by: OBSTETRICS & GYNECOLOGY

## 2022-07-26 PROCEDURE — 99214 PR OFFICE/OUTPT VISIT, EST, LEVL IV, 30-39 MIN: ICD-10-PCS | Mod: S$GLB,,, | Performed by: OBSTETRICS & GYNECOLOGY

## 2022-07-26 PROCEDURE — 86304 IMMUNOASSAY TUMOR CA 125: CPT | Performed by: OBSTETRICS & GYNECOLOGY

## 2022-07-26 PROCEDURE — 1101F PR PT FALLS ASSESS DOC 0-1 FALLS W/OUT INJ PAST YR: ICD-10-PCS | Mod: CPTII,S$GLB,, | Performed by: OBSTETRICS & GYNECOLOGY

## 2022-07-26 PROCEDURE — 1159F MED LIST DOCD IN RCRD: CPT | Mod: CPTII,S$GLB,, | Performed by: OBSTETRICS & GYNECOLOGY

## 2022-07-26 PROCEDURE — 99999 PR PBB SHADOW E&M-EST. PATIENT-LVL III: ICD-10-PCS | Mod: PBBFAC,,, | Performed by: OBSTETRICS & GYNECOLOGY

## 2022-07-26 PROCEDURE — 1101F PT FALLS ASSESS-DOCD LE1/YR: CPT | Mod: CPTII,S$GLB,, | Performed by: OBSTETRICS & GYNECOLOGY

## 2022-07-26 PROCEDURE — 3288F FALL RISK ASSESSMENT DOCD: CPT | Mod: CPTII,S$GLB,, | Performed by: OBSTETRICS & GYNECOLOGY

## 2022-07-26 PROCEDURE — 99214 OFFICE O/P EST MOD 30 MIN: CPT | Mod: S$GLB,,, | Performed by: OBSTETRICS & GYNECOLOGY

## 2022-07-26 PROCEDURE — 1126F AMNT PAIN NOTED NONE PRSNT: CPT | Mod: CPTII,S$GLB,, | Performed by: OBSTETRICS & GYNECOLOGY

## 2022-07-26 PROCEDURE — 99999 PR PBB SHADOW E&M-EST. PATIENT-LVL III: CPT | Mod: PBBFAC,,, | Performed by: OBSTETRICS & GYNECOLOGY

## 2022-07-26 PROCEDURE — 3288F PR FALLS RISK ASSESSMENT DOCUMENTED: ICD-10-PCS | Mod: CPTII,S$GLB,, | Performed by: OBSTETRICS & GYNECOLOGY

## 2022-07-26 PROCEDURE — 1159F PR MEDICATION LIST DOCUMENTED IN MEDICAL RECORD: ICD-10-PCS | Mod: CPTII,S$GLB,, | Performed by: OBSTETRICS & GYNECOLOGY

## 2022-08-03 LAB
ACID FAST MOD KINY STN SPEC: NORMAL
MYCOBACTERIUM SPEC QL CULT: NORMAL

## 2022-10-09 NOTE — PROGRESS NOTES
[facility] Note    Chief Complaint      Chief Complaint   Patient presents with    Follow-up       History of Present Illness      Jodi Cole is a 81 y.o.  AAF with Endometrial Ca, HTN, Insomnia, OA presents to fu chronic issues. Last visit - 5/14/21.     Gout - ED visit - 6/15/22 - Gout. Rec low purine diet.     Left wrist DeQuervains Tendonitis. Right ring trigger finger - Fu by Hand Sx.      Hypokalemia -Resolved.  I had prev rec a high potassium diet and had Rx her Kcl 10 mEq once daily x 3 days.      HTN - Pt prev noncompliant with BP meds.  Her kidney function went down slightly when we restarted her HCTZ prev so I stopped her HCTZ and had her start Amlodipine 2.5 mg/d.  Avoid HCTZ due to recent gout.  BUN/Cr - 19/1.0 GFR > 60 in June 2021.  Uncontrolled on amlodipine. Minimal edema. BP up today and repors elevated t home.  Will add lasix 40 mg/d to regimen. . Rec avoid oral decongestants.      HLD - Pt controlled on Atorvastatin.   Her Cholesterol was not controlled on Pravastatin 40 mg QHS so we changed her to Atorvastatin 40 mg po QHS.  She reports compliance with this. Rec low chol diet and exercise for lifestyle modification.  Will cont to monitor. She will try to cut back to red meat 1/wk. Repeat FLP.      Oa - Pt is s/p RT TKA 10/1/19. S/p Left TKA - 7/8/21.  Fu by Ortho, Dr. Fuentes.  Pt is on Tylenol prn - not helping.  Affects Rt shoulder.  Pt on Voltaren gel - no help. She completed PTx for her knee.     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 She was tx with ChemoTx - Carbo and taxol x 6 cycles. Has fu in July and will fu annually with CA -125.  - 12- 7/26/22.      Obesity - BMI - 37.5- up 10  lb.  Likely due to recent surgery and dec activity. She will cut back on sweets.   Exercise is limited due to knees. I again rec she join gym with [employer] and use pool/Stationary bike.      Osteopenia - Calcium max 1000 mg/d and Vit D3 1000 units/d OTC.  In addition, I rec weight-bearing exercises 5 times/wk x 30 minutes as tolerated. We can repeat the DEXA in 2-3 yrs.     Noncompliance -Rec compliance with all meds, labs, and fu.       HCM - Flu - admin 10/10/22;  Tdap - ?  defers;  PCV 13 - 1/17/20;  PVX 23 -none- defers; Shingrix - none - refuses; COVID 19 vaccine (Pfizer) #1 - 3/3/21; #2 3/24/21; #3 plamns to set up; MGM -8/7/21- repeat 1 yr- sched for 10/28/22;  DEXA - 11/19/20 - + Osteopenia C-scope - 5/22/19 + polyps - repeat not rec due to age; Ortho - Dr. Fuentes;  Gyn/Onc - Dr. Jordan; Prev PCP - Dr. Umana    Patient Care Team:  Margarita Fnues MD as PCP - General (Internal Medicine)  Jayla Easley MA as Care Coordinator     Health Maintenance:  Immunization History   Administered Date(s) Administered    COVID-19, MRNA, LN-S, PF (Pfizer) (Purple Cap) 03/03/2021, 03/24/2021    Influenza (FLUAD) - Quadrivalent - Adjuvanted - PF *Preferred* (65+) 11/13/2020, 10/10/2022    Influenza - High Dose - PF (65 years and older) 11/19/2019    Pneumococcal Conjugate - 13 Valent 01/17/2020      Health Maintenance   Topic Date Due    Hepatitis C Screening  Never done    TETANUS VACCINE  Never done    DEXA Scan  11/19/2023    Lipid Panel  11/19/2025        Past Medical History:  Past Medical History:   Diagnosis Date    Anemia     Anemia associated with chemotherapy 9/1/2015    Anemia due to chemotherapy 9/22/2015    Chemotherapy induced nausea and vomiting 6/9/2015    Chemotherapy-induced neutropenia 7/28/2015    Constipation 7/7/2015    Endometrial ca 5/26/2015    Endometrial ca 8/10/2015    Essential hypertension     Hyperlipidemia     Hypertension     Hypokalemia 9/26/2019    NSAID long-term use 9/26/2019    Osteoarthritis     Pain in both hands 2/19/2016    Right-sided low back pain  without sciatica 6/23/2016    Uterine cancer     Well woman exam with routine gynecological exam 2/19/2016       Past Surgical History:   has a past surgical history that includes D&C Hysteroscopy (March 18, 2015); Dilation and curettage of uterus (1969 ); Lymphadenectomy; Hysterectomy (5/11/15); Pelvic and para-aortic lymph node dissection; pr removal of ovary/tube(s); Colonoscopy (N/A, 5/22/2019); Total knee replacement using computer navigation (10/01/2019); and Percutaneous cryotherapy of peripheral nerve using liquid nitrous oxide in closed needle device (Left, 6/14/2021).    Family History:  family history includes Cancer (age of onset: 65) in her brother; Heart disease in her father.     Social History:  Social History     Tobacco Use    Smoking status: Never    Smokeless tobacco: Never   Substance Use Topics    Alcohol use: No    Drug use: Never       Review of Systems   Constitutional:  Negative for chills and fever.   HENT:  Negative for hearing loss.    Eyes:  Positive for visual disturbance.   Respiratory:  Negative for chest tightness and shortness of breath.    Cardiovascular:  Positive for leg swelling. Negative for chest pain.        Rec elev BLE, low sodium diet, and compression stockings/socks prn.        Gastrointestinal:  Negative for abdominal pain, constipation, diarrhea, nausea and vomiting.   Endocrine: Positive for heat intolerance. Negative for cold intolerance.   Genitourinary:  Negative for dysuria and frequency.   Musculoskeletal:  Positive for arthralgias. Negative for myalgias.        Takes Tylenol and voltaren gel prn - doesn't help much.    Neurological:  Negative for dizziness and headaches.   Psychiatric/Behavioral:  Negative for dysphoric mood. The patient is not nervous/anxious.       Medications:    Current Outpatient Medications:     acetaminophen (TYLENOL) 500 MG tablet, Take 1 tablet (500 mg total) by mouth every 6 (six) hours as needed for Pain., Disp: 60 tablet, Rfl: 0     "amLODIPine (NORVASC) 2.5 MG tablet, TAKE 1 TABLET(2.5 MG) BY MOUTH EVERY DAY, Disp: 90 tablet, Rfl: 0    atorvastatin (LIPITOR) 40 MG tablet, TAKE 1 TABLET(40 MG) BY MOUTH EVERY EVENING, Disp: 90 tablet, Rfl: 0    multivitamin capsule, Take 1 capsule by mouth once daily., Disp: , Rfl:     vitamins B1 B6 B12 Tab, Take by mouth once daily., Disp: , Rfl:     diclofenac sodium (VOLTAREN) 1 % Gel, Apply 2 g topically 4 (four) times daily. (Patient not taking: No sig reported), Disp: 100 g, Rfl: 0    flu vac 2022 [id number],PF, 60 mcg (15 mcg x 4)/0.5 mL Syrg, Inject 0.5 mLs into the muscle once. for 1 dose, Disp: 0.5 mL, Rfl: 0    furosemide (LASIX) 40 MG tablet, Take 1 tablet (40 mg total) by mouth once daily., Disp: 90 tablet, Rfl: 0  No current facility-administered medications for this visit.    Facility-Administered Medications Ordered in Other Visits:     midazolam (VERSED) 1 mg/mL injection 0.5 mg, 0.5 mg, Intravenous, PRN, Henry Reynoso MD     Allergies:  Review of patient's allergies indicates:  No Known Allergies    Physical Exam      Vital Signs  Temp: 98.7 °F (37.1 °C)  Temp src: Oral  Pulse: 77  Resp: 18  SpO2: (!) 15 %  BP: (!) 160/96  BP Location: Left arm  Patient Position: Sitting  Pain Score: 0-No pain  Height and Weight  Height: 5' 6" (167.6 cm)  Weight: 105.4 kg (232 lb 5.8 oz)  BSA (Calculated - sq m): 2.22 sq meters  BMI (Calculated): 37.5  Weight in (lb) to have BMI = 25: 154.6      Patient Position: Sitting      Physical Exam  Vitals reviewed.   Constitutional:       General: She is not in acute distress.     Appearance: Normal appearance. She is obese. She is not ill-appearing, toxic-appearing or diaphoretic.   HENT:      Head: Normocephalic and atraumatic.      Right Ear: Tympanic membrane normal.      Left Ear: Tympanic membrane normal.      Mouth/Throat:      Comments: Maxillary dentures  Eyes:      Extraocular Movements: Extraocular movements intact.      Conjunctiva/sclera: " Conjunctivae normal.      Pupils: Pupils are equal, round, and reactive to light.      Comments: Tee cataracts   Neck:      Vascular: No carotid bruit.   Cardiovascular:      Rate and Rhythm: Normal rate and regular rhythm.      Pulses: Normal pulses.      Heart sounds: Normal heart sounds. No murmur heard.     Comments: Pedal edema  Pulmonary:      Effort: No respiratory distress.      Breath sounds: Normal breath sounds. No wheezing.   Abdominal:      General: Bowel sounds are normal. There is no distension.      Palpations: Abdomen is soft.      Tenderness: There is no abdominal tenderness. There is no guarding or rebound.   Musculoskeletal:         General: Normal range of motion.   Skin:     General: Skin is warm and dry.      Comments: 5x6 cm subcutaneous - NTTP rubbery mass likely c/w lipoma to Left upper back   Neurological:      General: No focal deficit present.      Mental Status: She is alert and oriented to person, place, and time.   Psychiatric:         Mood and Affect: Mood normal.         Behavior: Behavior normal.        Laboratory:  CBC:  Recent Labs   Lab 11/19/20  0845 06/23/21  0847 06/15/22  1855 06/15/22  1901   WBC 4.49 4.79 5.00  --    RBC 4.35 4.55 4.33  --    Hemoglobin 11.3 L 11.5 L 11.4 L  --    POC Hematocrit  --   --   --  35 L   Hematocrit 37.1 37.1 36.0 L  --    Platelets 232 223 181  --    MCV 85 82 83  --    MCH 26.0 L 25.3 L 26.3 L  --    MCHC 30.5 L 31.0 L 31.7 L  --        CMP:  Recent Labs   Lab 11/19/19  0922 12/20/19  0948 11/19/20  0845 06/23/21  0847   Glucose 85   < > 77 82   Calcium 9.6   < > 9.2 9.6   Albumin 3.7  --  3.7 3.6   Total Protein 7.3  --  7.3 7.1   Sodium 142   < > 142 146 H   Potassium 3.5   < > 3.5 4.0   CO2 32 H   < > 32 H 30 H   Chloride 102   < > 101 105   BUN 16   < > 19 19   Creatinine 1.0   < > 1.0 1.0   Alkaline Phosphatase 89  --  98 98   ALT 6 L  --  8 L 9 L   AST 10  --  14 13   Total Bilirubin 0.4  --  0.4 0.3    < > = values in this interval not  displayed.           LIPIDS:  Recent Labs   Lab 11/19/19  0922 11/19/20  0845   HDL 57 67   Cholesterol 259 H 216 H   Triglycerides 149 96   LDL Cholesterol 172.2 H 129.8   HDL/Cholesterol Ratio 22.0 31.0   Non-HDL Cholesterol 202 149   Total Cholesterol/HDL Ratio 4.5 3.2               Assessment/Plan     Jodi Cole is a 81 y.o.female with:    Essential hypertension  -     Comprehensive Metabolic Panel; Future; Expected date: 10/10/2022  -     CBC Auto Differential; Future; Expected date: 10/10/2022  -     furosemide (LASIX) 40 MG tablet; Take 1 tablet (40 mg total) by mouth once daily.  Dispense: 90 tablet; Refill: 0  - Uncontrolled.  Cont current. Will add Lasix 40 mg/d to regimen. Pt will call with any concerns.     Hyperlipidemia, unspecified hyperlipidemia type  -     Lipid Panel; Future; Expected date: 10/10/2022  - Controlled.  Cont current.     Endometrial ca  - Stable.  Cont current regimen.    Class 2 severe obesity due to excess calories with serious comorbidity and body mass index (BMI) of 36.0 to 36.9 in adult  - Rec diet and exercise as discussed for wt loss.      Chronic anemia (with asymptomatic acute blood loss)  - Stable.  Cont current regimen.    Lipoma of back   - Reassured pt.     Encounter for vision screening  -     Ambulatory referral/consult to Optometry; Future; Expected date: 10/17/2022    Chronic conditions status updated as per HPI.  Other than changes above, cont current medications and maintain follow up with specialists.  Follow up in about 3 months (around 1/10/2023) for fu chronic issues or sooner if needed.      Margarita Funes MD  [facility]

## 2022-10-10 ENCOUNTER — OFFICE VISIT (OUTPATIENT)
Dept: PRIMARY CARE CLINIC | Facility: CLINIC | Age: 82
End: 2022-10-10
Payer: MEDICARE

## 2022-10-10 VITALS
HEIGHT: 66 IN | TEMPERATURE: 99 F | SYSTOLIC BLOOD PRESSURE: 160 MMHG | RESPIRATION RATE: 18 BRPM | DIASTOLIC BLOOD PRESSURE: 96 MMHG | BODY MASS INDEX: 37.35 KG/M2 | HEART RATE: 77 BPM | OXYGEN SATURATION: 15 % | WEIGHT: 232.38 LBS

## 2022-10-10 DIAGNOSIS — I10 ESSENTIAL HYPERTENSION: Primary | ICD-10-CM

## 2022-10-10 DIAGNOSIS — Z01.00 ENCOUNTER FOR VISION SCREENING: ICD-10-CM

## 2022-10-10 DIAGNOSIS — D17.1 LIPOMA OF BACK: ICD-10-CM

## 2022-10-10 DIAGNOSIS — E66.01 CLASS 2 SEVERE OBESITY DUE TO EXCESS CALORIES WITH SERIOUS COMORBIDITY AND BODY MASS INDEX (BMI) OF 36.0 TO 36.9 IN ADULT: ICD-10-CM

## 2022-10-10 DIAGNOSIS — C54.1 ENDOMETRIAL CA: ICD-10-CM

## 2022-10-10 DIAGNOSIS — D64.9 CHRONIC ANEMIA: ICD-10-CM

## 2022-10-10 DIAGNOSIS — E78.5 HYPERLIPIDEMIA, UNSPECIFIED HYPERLIPIDEMIA TYPE: ICD-10-CM

## 2022-10-10 PROCEDURE — 3080F DIAST BP >= 90 MM HG: CPT | Mod: CPTII,S$GLB,, | Performed by: INTERNAL MEDICINE

## 2022-10-10 PROCEDURE — 99214 PR OFFICE/OUTPT VISIT, EST, LEVL IV, 30-39 MIN: ICD-10-PCS | Mod: S$GLB,,, | Performed by: INTERNAL MEDICINE

## 2022-10-10 PROCEDURE — 3080F PR MOST RECENT DIASTOLIC BLOOD PRESSURE >= 90 MM HG: ICD-10-PCS | Mod: CPTII,S$GLB,, | Performed by: INTERNAL MEDICINE

## 2022-10-10 PROCEDURE — 99214 OFFICE O/P EST MOD 30 MIN: CPT | Mod: S$GLB,,, | Performed by: INTERNAL MEDICINE

## 2022-10-10 PROCEDURE — 3077F SYST BP >= 140 MM HG: CPT | Mod: CPTII,S$GLB,, | Performed by: INTERNAL MEDICINE

## 2022-10-10 PROCEDURE — 1126F PR PAIN SEVERITY QUANTIFIED, NO PAIN PRESENT: ICD-10-PCS | Mod: CPTII,S$GLB,, | Performed by: INTERNAL MEDICINE

## 2022-10-10 PROCEDURE — 1159F PR MEDICATION LIST DOCUMENTED IN MEDICAL RECORD: ICD-10-PCS | Mod: CPTII,S$GLB,, | Performed by: INTERNAL MEDICINE

## 2022-10-10 PROCEDURE — 99499 UNLISTED E&M SERVICE: CPT | Mod: HCNC,S$GLB,, | Performed by: INTERNAL MEDICINE

## 2022-10-10 PROCEDURE — 1126F AMNT PAIN NOTED NONE PRSNT: CPT | Mod: CPTII,S$GLB,, | Performed by: INTERNAL MEDICINE

## 2022-10-10 PROCEDURE — 3288F PR FALLS RISK ASSESSMENT DOCUMENTED: ICD-10-PCS | Mod: CPTII,S$GLB,, | Performed by: INTERNAL MEDICINE

## 2022-10-10 PROCEDURE — 99999 PR PBB SHADOW E&M-EST. PATIENT-LVL IV: CPT | Mod: PBBFAC,,, | Performed by: INTERNAL MEDICINE

## 2022-10-10 PROCEDURE — 3288F FALL RISK ASSESSMENT DOCD: CPT | Mod: CPTII,S$GLB,, | Performed by: INTERNAL MEDICINE

## 2022-10-10 PROCEDURE — 1101F PR PT FALLS ASSESS DOC 0-1 FALLS W/OUT INJ PAST YR: ICD-10-PCS | Mod: CPTII,S$GLB,, | Performed by: INTERNAL MEDICINE

## 2022-10-10 PROCEDURE — 1159F MED LIST DOCD IN RCRD: CPT | Mod: CPTII,S$GLB,, | Performed by: INTERNAL MEDICINE

## 2022-10-10 PROCEDURE — 1101F PT FALLS ASSESS-DOCD LE1/YR: CPT | Mod: CPTII,S$GLB,, | Performed by: INTERNAL MEDICINE

## 2022-10-10 PROCEDURE — 99999 PR PBB SHADOW E&M-EST. PATIENT-LVL IV: ICD-10-PCS | Mod: PBBFAC,,, | Performed by: INTERNAL MEDICINE

## 2022-10-10 PROCEDURE — 3077F PR MOST RECENT SYSTOLIC BLOOD PRESSURE >= 140 MM HG: ICD-10-PCS | Mod: CPTII,S$GLB,, | Performed by: INTERNAL MEDICINE

## 2022-10-10 RX ORDER — FUROSEMIDE 40 MG/1
40 TABLET ORAL DAILY
Qty: 90 TABLET | Refills: 0 | Status: SHIPPED | OUTPATIENT
Start: 2022-10-10 | End: 2023-01-05

## 2022-10-11 ENCOUNTER — LAB VISIT (OUTPATIENT)
Dept: LAB | Facility: HOSPITAL | Age: 82
End: 2022-10-11
Attending: INTERNAL MEDICINE
Payer: MEDICARE

## 2022-10-11 DIAGNOSIS — E78.5 HYPERLIPIDEMIA, UNSPECIFIED HYPERLIPIDEMIA TYPE: ICD-10-CM

## 2022-10-11 DIAGNOSIS — I10 ESSENTIAL HYPERTENSION: ICD-10-CM

## 2022-10-11 LAB
ALBUMIN SERPL BCP-MCNC: 3.5 G/DL (ref 3.5–5.2)
ALP SERPL-CCNC: 107 U/L (ref 55–135)
ALT SERPL W/O P-5'-P-CCNC: 9 U/L (ref 10–44)
ANION GAP SERPL CALC-SCNC: 8 MMOL/L (ref 8–16)
AST SERPL-CCNC: 11 U/L (ref 10–40)
BASOPHILS # BLD AUTO: 0.03 K/UL (ref 0–0.2)
BASOPHILS NFR BLD: 0.6 % (ref 0–1.9)
BILIRUB SERPL-MCNC: 0.6 MG/DL (ref 0.1–1)
BUN SERPL-MCNC: 22 MG/DL (ref 8–23)
CALCIUM SERPL-MCNC: 9.7 MG/DL (ref 8.7–10.5)
CHLORIDE SERPL-SCNC: 102 MMOL/L (ref 95–110)
CHOLEST SERPL-MCNC: 210 MG/DL (ref 120–199)
CHOLEST/HDLC SERPL: 3.1 {RATIO} (ref 2–5)
CO2 SERPL-SCNC: 32 MMOL/L (ref 23–29)
CREAT SERPL-MCNC: 1 MG/DL (ref 0.5–1.4)
DIFFERENTIAL METHOD: ABNORMAL
EOSINOPHIL # BLD AUTO: 0.3 K/UL (ref 0–0.5)
EOSINOPHIL NFR BLD: 6.3 % (ref 0–8)
ERYTHROCYTE [DISTWIDTH] IN BLOOD BY AUTOMATED COUNT: 14.9 % (ref 11.5–14.5)
EST. GFR  (NO RACE VARIABLE): 57.3 ML/MIN/1.73 M^2
GLUCOSE SERPL-MCNC: 87 MG/DL (ref 70–110)
HCT VFR BLD AUTO: 39 % (ref 37–48.5)
HDLC SERPL-MCNC: 68 MG/DL (ref 40–75)
HDLC SERPL: 32.4 % (ref 20–50)
HGB BLD-MCNC: 11.9 G/DL (ref 12–16)
IMM GRANULOCYTES # BLD AUTO: 0.01 K/UL (ref 0–0.04)
IMM GRANULOCYTES NFR BLD AUTO: 0.2 % (ref 0–0.5)
LDLC SERPL CALC-MCNC: 126.6 MG/DL (ref 63–159)
LYMPHOCYTES # BLD AUTO: 1.4 K/UL (ref 1–4.8)
LYMPHOCYTES NFR BLD: 26.1 % (ref 18–48)
MCH RBC QN AUTO: 25.4 PG (ref 27–31)
MCHC RBC AUTO-ENTMCNC: 30.5 G/DL (ref 32–36)
MCV RBC AUTO: 83 FL (ref 82–98)
MONOCYTES # BLD AUTO: 0.5 K/UL (ref 0.3–1)
MONOCYTES NFR BLD: 10.1 % (ref 4–15)
NEUTROPHILS # BLD AUTO: 3 K/UL (ref 1.8–7.7)
NEUTROPHILS NFR BLD: 56.7 % (ref 38–73)
NONHDLC SERPL-MCNC: 142 MG/DL
NRBC BLD-RTO: 0 /100 WBC
PLATELET # BLD AUTO: 250 K/UL (ref 150–450)
PMV BLD AUTO: 11.1 FL (ref 9.2–12.9)
POTASSIUM SERPL-SCNC: 3.3 MMOL/L (ref 3.5–5.1)
PROT SERPL-MCNC: 7.1 G/DL (ref 6–8.4)
RBC # BLD AUTO: 4.69 M/UL (ref 4–5.4)
SODIUM SERPL-SCNC: 142 MMOL/L (ref 136–145)
TRIGL SERPL-MCNC: 77 MG/DL (ref 30–150)
WBC # BLD AUTO: 5.36 K/UL (ref 3.9–12.7)

## 2022-10-11 PROCEDURE — 85025 COMPLETE CBC W/AUTO DIFF WBC: CPT | Performed by: INTERNAL MEDICINE

## 2022-10-11 PROCEDURE — 80061 LIPID PANEL: CPT | Performed by: INTERNAL MEDICINE

## 2022-10-11 PROCEDURE — 36415 COLL VENOUS BLD VENIPUNCTURE: CPT | Mod: PN | Performed by: INTERNAL MEDICINE

## 2022-10-11 PROCEDURE — 80053 COMPREHEN METABOLIC PANEL: CPT | Performed by: INTERNAL MEDICINE

## 2022-10-13 ENCOUNTER — TELEPHONE (OUTPATIENT)
Dept: PRIMARY CARE CLINIC | Facility: CLINIC | Age: 82
End: 2022-10-13
Payer: MEDICARE

## 2022-10-13 DIAGNOSIS — E87.6 HYPOKALEMIA WITH NORMAL ACID-BASE BALANCE: Primary | ICD-10-CM

## 2022-10-13 NOTE — PROGRESS NOTES
Please inform pt -  I reviewed your labs.  Your potassium was low at 3.3. Likely due to the addition of Furosemide. Rec a high potassium diet (bananas, oranges, spinach, potato, mushrooms, peas, sweet potatoes). Also I would like to have you start Kcl 10 meQ daily.  I would like you to take 2 of these per day x 3 days and then change to once daily. We can repeat labs in 2 wks. Your kidney function was stable and liver functions looked good. Your cholesterol looked good.  Your anemia was stable.     Dr. CLARK

## 2022-10-13 NOTE — TELEPHONE ENCOUNTER
----- Message from Margarita Funes MD sent at 10/13/2022  6:06 AM CDT -----  Please inform pt -  I reviewed your labs.  Your potassium was low at 3.3. Likely due to the addition of Furosemide. Rec a high potassium diet (bananas, oranges, spinach, potato, mushrooms, peas, sweet potatoes). Also I would like to have you start Kcl 10 meQ daily.  I would like you to take 2 of these per day x 3 days and then change to once daily. We can repeat labs in 2 wks. Your kidney function was stable and liver functions looked good. Your cholesterol looked good.  Your anemia was stable.     Dr. CLARK

## 2022-10-14 NOTE — LETTER
October 19, 2022    Jodi Cole  [address]  [place] [address]             [facility]  [address]  [facility] [address]  Phone: [phone]  Fax: [phone] Dear Mrs. Jodi Cole:    Below are the results from your recent visit:    Resulted Orders   Comprehensive Metabolic Panel   Result Value Ref Range    Sodium 142 136 - 145 mmol/L    Potassium 3.3 (L) 3.5 - 5.1 mmol/L    Chloride 102 95 - 110 mmol/L    CO2 32 (H) 23 - 29 mmol/L    Glucose 87 70 - 110 mg/dL    BUN 22 8 - 23 mg/dL    Creatinine 1.0 0.5 - 1.4 mg/dL    Calcium 9.7 8.7 - 10.5 mg/dL    Total Protein 7.1 6.0 - 8.4 g/dL    Albumin 3.5 3.5 - 5.2 g/dL    Total Bilirubin 0.6 0.1 - 1.0 mg/dL      Comment:      For infants and newborns, interpretation of results should be based  on gestational age, weight and in agreement with clinical  observations.    Premature Infant recommended reference ranges:  Up to 24 hours.............<8.0 mg/dL  Up to 48 hours............<12.0 mg/dL  3-5 days..................<15.0 mg/dL  6-29 days.................<15.0 mg/dL      Alkaline Phosphatase 107 55 - 135 U/L    AST 11 10 - 40 U/L    ALT 9 (L) 10 - 44 U/L    Anion Gap 8 8 - 16 mmol/L    eGFR 57.3 (A) >60 mL/min/1.73 m^2   CBC Auto Differential   Result Value Ref Range    WBC 5.36 3.90 - 12.70 K/uL    RBC 4.69 4.00 - 5.40 M/uL    Hemoglobin 11.9 (L) 12.0 - 16.0 g/dL    Hematocrit 39.0 37.0 - 48.5 %    MCV 83 82 - 98 fL    MCH 25.4 (L) 27.0 - 31.0 pg    MCHC 30.5 (L) 32.0 - 36.0 g/dL    RDW 14.9 (H) 11.5 - 14.5 %    Platelets 250 150 - 450 K/uL    MPV 11.1 9.2 - 12.9 fL    Immature Granulocytes 0.2 0.0 - 0.5 %    Gran # (ANC) 3.0 1.8 - 7.7 K/uL    Immature Grans (Abs) 0.01 0.00 - 0.04 K/uL      Comment:      Mild elevation in immature granulocytes is non specific and   can be seen in a variety of conditions including stress response,   acute inflammation, trauma and pregnancy. Correlation with other   laboratory and  clinical findings is essential.      Lymph # 1.4 1.0 - 4.8 K/uL    Mono # 0.5 0.3 - 1.0 K/uL    Eos # 0.3 0.0 - 0.5 K/uL    Baso # 0.03 0.00 - 0.20 K/uL    nRBC 0 0 /100 WBC    Gran % 56.7 38.0 - 73.0 %    Lymph % 26.1 18.0 - 48.0 %    Mono % 10.1 4.0 - 15.0 %    Eosinophil % 6.3 0.0 - 8.0 %    Basophil % 0.6 0.0 - 1.9 %    Differential Method Automated    Lipid Panel   Result Value Ref Range    Cholesterol 210 (H) 120 - 199 mg/dL      Comment:      The National Cholesterol Education Program (NCEP) has set the  following guidelines (reference ranges) for Cholesterol:  Optimal.....................<200 mg/dL  Borderline High.............200-239 mg/dL  High........................> or = 240 mg/dL      Triglycerides 77 30 - 150 mg/dL      Comment:      The National Cholesterol Education Program (NCEP) has set the  following guidelines (reference values) for triglycerides:  Normal......................<150 mg/dL  Borderline High.............150-199 mg/dL  High........................200-499 mg/dL      HDL 68 40 - 75 mg/dL      Comment:      The National Cholesterol Education Program (NCEP) has set the  following guidelines (reference values) for HDL Cholesterol:  Low...............<40 mg/dL  Optimal...........>60 mg/dL      LDL Cholesterol 126.6 63.0 - 159.0 mg/dL      Comment:      The National Cholesterol Education Program (NCEP) has set the  following guidelines (reference values) for LDL Cholesterol:  Optimal.......................<130 mg/dL  Borderline High...............130-159 mg/dL  High..........................160-189 mg/dL  Very High.....................>190 mg/dL      HDL/Cholesterol Ratio 32.4 20.0 - 50.0 %    Total Cholesterol/HDL Ratio 3.1 2.0 - 5.0    Non-HDL Cholesterol 142 mg/dL      Comment:      Risk category and Non-HDL cholesterol goals:  Coronary heart disease (CHD)or equivalent (10-year risk of CHD >20%):  Non-HDL cholesterol goal     <130 mg/dL  Two or more CHD risk factors and 10-year risk of  CHD <= 20%:  Non-HDL cholesterol goal     <160 mg/dL  0 to 1 CHD risk factor:  Non-HDL cholesterol goal     <190 mg/dL         We tried to reach you via phone several times and also sent you a [facility] message. I reviewed your labs.  Your potassium was low at 3.3. Likely due to the addition of Furosemide. I recommnd a high potassium diet (bananas, oranges, spinach, potato, mushrooms, peas, sweet potatoes). Also I would like to have you start Kcl 10 meQ daily(A prescription was sent to your pharmacy for this).  I would like you to take 2 of these per day for 3 days and then change to once daily. We can repeat labs in 2 weeks(non fasting). Your kidney function was stable and liver functions looked good. Your cholesterol looked good.  Your anemia was stable. Please contact our office at [phone] to schedule your repeat potassium labs 2 weeks after starting the Kcl 10 du.    Sincerely,        Margarita Funes MD

## 2022-10-19 NOTE — TELEPHONE ENCOUNTER
Ryanm. I will also mail letter.NOTe: pt was called via phone 5 times and a mychart msg was also sent to her

## 2022-10-20 ENCOUNTER — TELEPHONE (OUTPATIENT)
Dept: PRIMARY CARE CLINIC | Facility: CLINIC | Age: 82
End: 2022-10-20
Payer: MEDICARE

## 2022-10-20 NOTE — TELEPHONE ENCOUNTER
----- Message from Alma Acosta sent at 10/20/2022 12:26 PM CDT -----  Regarding: returned call  Contact: kayleen [phone]  Patient is returning a phone call.  Who left a message for the patient: Romina KIRBY  Does patient know what this is regarding:    Would you like a call back, or a response through your [facility] portal?:   Please call  Comments:

## 2022-10-20 NOTE — TELEPHONE ENCOUNTER
----- Message from Flor Stark sent at 10/20/2022  1:19 PM CDT -----  Contact: [phone]  Patient is returning a phone call.  Who left a message for the patient: Romina Greco MA  Does patient know what this is regarding:    Would you like a call back, or a response through your [facility] portal?:   call back  Comments:

## 2022-10-20 NOTE — TELEPHONE ENCOUNTER
"Pt was informed and expressed understanding. Please sign pended rx. I do not see that the pot chl was ever sent to pharm.    Your last note states "HTN - Pt prev noncompliant with BP meds.  Her kidney function went down slightly when we restarted her HCTZ prev so I stopped her HCTZ and had her start Amlodipine 2.5 mg/d.  Avoid HCTZ due to recent gout.  BUN/Cr - 19/1.0 GFR > 60 in June 2021.  Uncontrolled on amlodipine. Minimal edema. BP up today and repors elevated t home.  Will add lasix 40 mg/d to regimen. . Rec avoid oral decongestants. " pt reports her bp is still evelvated after adding lasix to her Amlodipine 2.5. Readings included :   10/17 Am 155/82, Pm 140/96  10/18 Am 156/82,Pm 180/62  10/19 Am 140/70, Pm126/76  10/20 Am 175/93  "

## 2022-10-20 NOTE — TELEPHONE ENCOUNTER
No new care gaps identified.  [facility] Embedded Care Gaps. Reference number: [id number]. 10/20/2022   2:41:09 PM CDT

## 2022-10-21 RX ORDER — POTASSIUM CHLORIDE 750 MG/1
TABLET, EXTENDED RELEASE ORAL
Qty: 30 TABLET | Refills: 0 | Status: SHIPPED | OUTPATIENT
Start: 2022-10-21 | End: 2023-01-09 | Stop reason: SDUPTHER

## 2022-11-07 ENCOUNTER — LAB VISIT (OUTPATIENT)
Dept: LAB | Facility: HOSPITAL | Age: 82
End: 2022-11-07
Attending: INTERNAL MEDICINE
Payer: MEDICARE

## 2022-11-07 DIAGNOSIS — E87.6 HYPOKALEMIA WITH NORMAL ACID-BASE BALANCE: ICD-10-CM

## 2022-11-07 LAB
ANION GAP SERPL CALC-SCNC: 11 MMOL/L (ref 8–16)
BUN SERPL-MCNC: 19 MG/DL (ref 8–23)
CALCIUM SERPL-MCNC: 9.3 MG/DL (ref 8.7–10.5)
CHLORIDE SERPL-SCNC: 103 MMOL/L (ref 95–110)
CO2 SERPL-SCNC: 31 MMOL/L (ref 23–29)
CREAT SERPL-MCNC: 1.1 MG/DL (ref 0.5–1.4)
EST. GFR  (NO RACE VARIABLE): 50.8 ML/MIN/1.73 M^2
GLUCOSE SERPL-MCNC: 73 MG/DL (ref 70–110)
MAGNESIUM SERPL-MCNC: 1.8 MG/DL (ref 1.6–2.6)
POTASSIUM SERPL-SCNC: 3.9 MMOL/L (ref 3.5–5.1)
SODIUM SERPL-SCNC: 145 MMOL/L (ref 136–145)

## 2022-11-07 PROCEDURE — 83735 ASSAY OF MAGNESIUM: CPT | Performed by: INTERNAL MEDICINE

## 2022-11-07 PROCEDURE — 80048 BASIC METABOLIC PNL TOTAL CA: CPT | Performed by: INTERNAL MEDICINE

## 2022-11-07 PROCEDURE — 36415 COLL VENOUS BLD VENIPUNCTURE: CPT | Mod: PN | Performed by: INTERNAL MEDICINE

## 2022-11-08 ENCOUNTER — HOSPITAL ENCOUNTER (OUTPATIENT)
Dept: RADIOLOGY | Facility: OTHER | Age: 82
Discharge: HOME | End: 2022-11-08
Attending: OBSTETRICS & GYNECOLOGY
Payer: MEDICARE

## 2022-11-08 DIAGNOSIS — Z12.31 SCREENING MAMMOGRAM, ENCOUNTER FOR: ICD-10-CM

## 2022-11-08 PROCEDURE — 77063 BREAST TOMOSYNTHESIS BI: CPT | Mod: TC

## 2022-11-08 PROCEDURE — 77067 SCR MAMMO BI INCL CAD: CPT | Mod: 26,,, | Performed by: RADIOLOGY

## 2022-11-08 PROCEDURE — 77063 MAMMO DIGITAL SCREENING BILAT WITH TOMO: ICD-10-PCS | Mod: 26,,, | Performed by: RADIOLOGY

## 2022-11-08 PROCEDURE — 77067 MAMMO DIGITAL SCREENING BILAT WITH TOMO: ICD-10-PCS | Mod: 26,,, | Performed by: RADIOLOGY

## 2022-11-08 PROCEDURE — 77063 BREAST TOMOSYNTHESIS BI: CPT | Mod: 26,,, | Performed by: RADIOLOGY

## 2022-11-08 NOTE — PROGRESS NOTES
I sent pt a my chart message -  I reviewed your labs.  Your kidney function was a little low but stable and appropriate for your age. It does appear you could increase your hydration. Your magnesium and potassium were normal. No further interventions are necessary at this time.     Dr. CLARK

## 2022-11-14 ENCOUNTER — TELEPHONE (OUTPATIENT)
Dept: PRIMARY CARE CLINIC | Facility: CLINIC | Age: 82
End: 2022-11-14
Payer: MEDICARE

## 2022-11-14 NOTE — TELEPHONE ENCOUNTER
----- Message from Maya Booker sent at 11/14/2022 10:13 AM CST -----  Contact: [phone] Patient  Patient is returning a phone call.  Who left a message for the patient: Romina  Does patient know what this is regarding:  lab work  Would you like a call back, or a response through your [facility] portal?:   call back  Comments:

## 2022-11-16 DIAGNOSIS — I10 ESSENTIAL HYPERTENSION: Primary | ICD-10-CM

## 2022-11-16 NOTE — TELEPHONE ENCOUNTER
Spoke with the patient, she reports her BP running high in the AM when first awaking prior to taking medication. Most recent morning reading 181/100. Pt advised that she has some dizziness but denies other symptoms. Once she takes morning medication, pressure goes down & dizziness resolves. She reports BP is down to high normal range by mid morning to mid day. Please advise.

## 2022-11-16 NOTE — TELEPHONE ENCOUNTER
Have her add losartan 25 mg daily to regimen. Repeat BMP in 2 wks with nurse bp check.     Dr. CLARK

## 2022-11-17 RX ORDER — LOSARTAN POTASSIUM 25 MG/1
25 TABLET ORAL DAILY
Qty: 90 TABLET | Refills: 0 | Status: SHIPPED | OUTPATIENT
Start: 2022-11-17 | End: 2023-01-09 | Stop reason: SDUPTHER

## 2022-11-17 NOTE — TELEPHONE ENCOUNTER
----- Message from Madai Santana sent at 11/17/2022  1:17 PM CST -----  Contact: [phone]  Pt is calling for Romina please give return call  She states this is a new prescription and it has not been called in      [address] [facility]  Department of Emergency Medicine   ED  Encounter Note  Admit Date/RoomTime: 10/23/2021  7:39 AM  ED Room:     NAME: Jorge Mitchell  : 1946  MRN: [id number]     Chief Complaint:  Urinary Tract Infection (onset yesterday) and Diarrhea (onset Thursday)    History of Present Illness       Jorge Mitchell is a 70 y.o. old female who presents to the emergency department by private vehicle, for dysuria, urinary frequency, urinary urgency, which occured 1 day(s) prior to arrival.  Since onset the symptoms have been persistent and moderate in severity. Symptoms are associated with diarrhea prior to urinary sx. States diarrhea improved but return watery after eating. No known exposure to spoiled or tainted food denies abdominal pain chest or back pain No fever or chills nausea no vomiting. Ramirez Trejo has a history of recurrent UTI's. ROS   Pertinent positives and negatives are stated within HPI, all other systems reviewed and are negative. Past Medical History:  has a past medical history of Hypertension. Surgical History:  has a past surgical history that includes Hysterectomy and Cholecystectomy. Social History:  reports that she has quit smoking. She has never used smokeless tobacco. She reports that she does not drink alcohol and does not use drugs. Family History: family history is not on file. Allergies: Soma [carisoprodol] and Thiazide-type diuretics    Physical Exam   Oxygen Saturation Interpretation: Normal.        ED Triage Vitals   BP Temp Temp src Pulse Resp SpO2 Height Weight   10/23/21 0740 10/23/21 0741 -- 10/23/21 0740 10/23/21 0740 10/23/21 0740 -- 10/23/21 0740   (!) 172/70 98.3 °F (36.8 °C)  73 14 99 %  141 lb (64 kg)         Constitutional:  Alert, development consistent with age. HEENT:  NC/NT. Airway patent. Neck:  Normal ROM. Supple.   Respiratory:  Lungs Clear to auscultation and breath sounds equal.  CV:  Regular rate and rhythm, normal heart sounds, without pathological murmurs, ectopy, gallops, or rubs. GI:  normal appearing, non-distended with no visible hernias. Bowel sounds: hyperactive bowel sounds. Tenderness: No abdominal tenderness, guarding, rebound, rigidity or pulsatile mass. .        Liver: non-tender. Spleen:  non-tender. : /Pelvic examination deferred / declined. Back: CVA Tenderness: No CVA tenderness. Integument:  Normal turgor. Warm, dry, without visible rash, unless noted elsewhere. Lymphatics: No lymphangitis or adenopathy noted. Neurological:  Oriented. Motor functions intact. Lab / Imaging Results   (All laboratory and radiology results have been personally reviewed by myself)  Labs:  Results for orders placed or performed during the hospital encounter of 10/23/21   Urinalysis with Microscopic   Result Value Ref Range    Color, UA Yellow Straw/Yellow    Clarity, UA Clear Clear    Glucose, Ur Negative Negative mg/dL    Bilirubin Urine Negative Negative    Ketones, Urine Negative Negative mg/dL    Specific Gravity, UA 1.010 1.005 - 1.030    Blood, Urine MODERATE (A) Negative    pH, UA 5.0 5.0 - 9.0    Protein, UA Negative Negative mg/dL    Urobilinogen, Urine 0.2 <2.0 E.U./dL    Nitrite, Urine Negative Negative    Leukocyte Esterase, Urine SMALL (A) Negative    WBC, UA 1-3 0 - 5 /HPF    RBC, UA 10-20 (A) 0 - 2 /HPF    Bacteria, UA FEW (A) None Seen /HPF       Imaging: All Radiology results interpreted by Radiologist unless otherwise noted. No orders to display     ED Course / Medical Decision Making   Medications - No data to display     Re-examination:  10/23/21       Time: 8:19    Patients symptoms stable. Consults:   None    Procedures:   none    Medical Decision Making:    Patient is well appearing, non toxic and appropriate for outpatient management. Plan is for symptom management and PCP follow up.   Urine is consistent with a UTI patient is afebrile not

## 2022-11-17 NOTE — TELEPHONE ENCOUNTER
No new care gaps identified.  [facility] Embedded Care Gaps. Reference number: [id number]. 11/17/2022   1:53:39 PM CST

## 2022-11-30 ENCOUNTER — CLINICAL SUPPORT (OUTPATIENT)
Dept: PRIMARY CARE CLINIC | Facility: CLINIC | Age: 82
End: 2022-11-30
Payer: MEDICARE

## 2022-11-30 ENCOUNTER — LAB VISIT (OUTPATIENT)
Dept: LAB | Facility: HOSPITAL | Age: 82
End: 2022-11-30
Attending: INTERNAL MEDICINE
Payer: MEDICARE

## 2022-11-30 VITALS — SYSTOLIC BLOOD PRESSURE: 136 MMHG | DIASTOLIC BLOOD PRESSURE: 78 MMHG | HEART RATE: 73 BPM | OXYGEN SATURATION: 97 %

## 2022-11-30 DIAGNOSIS — I10 ESSENTIAL HYPERTENSION: ICD-10-CM

## 2022-11-30 DIAGNOSIS — Z01.30 BP CHECK: Primary | ICD-10-CM

## 2022-11-30 LAB
ANION GAP SERPL CALC-SCNC: 8 MMOL/L (ref 8–16)
BUN SERPL-MCNC: 23 MG/DL (ref 8–23)
CALCIUM SERPL-MCNC: 9.1 MG/DL (ref 8.7–10.5)
CHLORIDE SERPL-SCNC: 102 MMOL/L (ref 95–110)
CO2 SERPL-SCNC: 34 MMOL/L (ref 23–29)
CREAT SERPL-MCNC: 1.1 MG/DL (ref 0.5–1.4)
EST. GFR  (NO RACE VARIABLE): 50.8 ML/MIN/1.73 M^2
GLUCOSE SERPL-MCNC: 80 MG/DL (ref 70–110)
POTASSIUM SERPL-SCNC: 3.6 MMOL/L (ref 3.5–5.1)
SODIUM SERPL-SCNC: 144 MMOL/L (ref 136–145)

## 2022-11-30 PROCEDURE — 99999 PR PBB SHADOW E&M-EST. PATIENT-LVL II: ICD-10-PCS | Mod: PBBFAC,,,

## 2022-11-30 PROCEDURE — 80048 BASIC METABOLIC PNL TOTAL CA: CPT | Performed by: INTERNAL MEDICINE

## 2022-11-30 PROCEDURE — 99999 PR PBB SHADOW E&M-EST. PATIENT-LVL II: CPT | Mod: PBBFAC,,,

## 2022-11-30 PROCEDURE — 36415 COLL VENOUS BLD VENIPUNCTURE: CPT | Mod: PN | Performed by: INTERNAL MEDICINE

## 2022-11-30 NOTE — PROGRESS NOTES
Patient present in clinic today for a Nurse BP Check.   Medications & Allergy List reviewed. Patient advised that she has only been taking the Losartan & Lasix. She was unsure if she was still to take the Amlodipine so she has not been taking.    BP Left Arm - 136/78  Pulse - 73  SpO2 - 97%    Encounter being routed to Dr. Funes for review.  Advised the patient that they will be contacted regarding any medication changes or if further appointments are required.

## 2022-11-30 NOTE — PROGRESS NOTES
Spoke with Dr. Funes regarding below note. Dr. Funes advised okay to continue Losartan & Furosemide and okay to not resume the Amlodipine, as she has stopped taking the medication. Will await BMP for any further changes.     Patient advised per Dr. Funes, and VU. She will contact the office with any significant change in home BP readings.

## 2022-12-01 NOTE — PROGRESS NOTES
Please inform pt - I reviewed your labs.  Your kidney function appears stable but it does look like you could increase your hydration.   Dr. CLARK

## 2022-12-05 ENCOUNTER — TELEPHONE (OUTPATIENT)
Dept: PRIMARY CARE CLINIC | Facility: CLINIC | Age: 82
End: 2022-12-05
Payer: MEDICARE

## 2022-12-05 NOTE — TELEPHONE ENCOUNTER
----- Message from Amaury Rocha sent at 12/5/2022  3:38 PM CST -----  Patient is returning a phone call.  Who left a message for the patient: Romina  Does patient know what this is regarding:  No

## 2023-01-09 ENCOUNTER — OFFICE VISIT (OUTPATIENT)
Dept: PRIMARY CARE CLINIC | Facility: CLINIC | Age: 83
End: 2023-01-09
Payer: MEDICARE

## 2023-01-09 ENCOUNTER — HOSPITAL ENCOUNTER (OUTPATIENT)
Dept: RADIOLOGY | Facility: HOSPITAL | Age: 83
Discharge: HOME | End: 2023-01-09
Attending: INTERNAL MEDICINE
Payer: MEDICARE

## 2023-01-09 VITALS
WEIGHT: 235.25 LBS | SYSTOLIC BLOOD PRESSURE: 134 MMHG | TEMPERATURE: 99 F | RESPIRATION RATE: 18 BRPM | DIASTOLIC BLOOD PRESSURE: 74 MMHG | HEART RATE: 77 BPM | HEIGHT: 66 IN | OXYGEN SATURATION: 97 % | BODY MASS INDEX: 37.81 KG/M2

## 2023-01-09 DIAGNOSIS — Z91.199 NONCOMPLIANCE: ICD-10-CM

## 2023-01-09 DIAGNOSIS — I10 ESSENTIAL HYPERTENSION: Primary | ICD-10-CM

## 2023-01-09 DIAGNOSIS — R06.09 DOE (DYSPNEA ON EXERTION): ICD-10-CM

## 2023-01-09 DIAGNOSIS — M85.80 OSTEOPENIA, UNSPECIFIED LOCATION: ICD-10-CM

## 2023-01-09 DIAGNOSIS — R01.1 MURMUR: ICD-10-CM

## 2023-01-09 DIAGNOSIS — Z01.00 ENCOUNTER FOR VISION SCREENING: ICD-10-CM

## 2023-01-09 DIAGNOSIS — E78.5 HYPERLIPIDEMIA, UNSPECIFIED HYPERLIPIDEMIA TYPE: ICD-10-CM

## 2023-01-09 DIAGNOSIS — E66.01 CLASS 2 SEVERE OBESITY DUE TO EXCESS CALORIES WITH SERIOUS COMORBIDITY AND BODY MASS INDEX (BMI) OF 36.0 TO 36.9 IN ADULT: ICD-10-CM

## 2023-01-09 DIAGNOSIS — C54.1 ENDOMETRIAL CA: ICD-10-CM

## 2023-01-09 PROBLEM — E66.812 CLASS 2 SEVERE OBESITY DUE TO EXCESS CALORIES WITH SERIOUS COMORBIDITY AND BODY MASS INDEX (BMI) OF 36.0 TO 36.9 IN ADULT: Status: ACTIVE | Noted: 2023-01-09

## 2023-01-09 PROCEDURE — 3078F PR MOST RECENT DIASTOLIC BLOOD PRESSURE < 80 MM HG: ICD-10-PCS | Mod: HCNC,CPTII,S$GLB, | Performed by: INTERNAL MEDICINE

## 2023-01-09 PROCEDURE — 99999 PR PBB SHADOW E&M-EST. PATIENT-LVL V: ICD-10-PCS | Mod: PBBFAC,HCNC,, | Performed by: INTERNAL MEDICINE

## 2023-01-09 PROCEDURE — 1159F PR MEDICATION LIST DOCUMENTED IN MEDICAL RECORD: ICD-10-PCS | Mod: HCNC,CPTII,S$GLB, | Performed by: INTERNAL MEDICINE

## 2023-01-09 PROCEDURE — 1160F RVW MEDS BY RX/DR IN RCRD: CPT | Mod: HCNC,CPTII,S$GLB, | Performed by: INTERNAL MEDICINE

## 2023-01-09 PROCEDURE — 93005 ELECTROCARDIOGRAM TRACING: CPT | Mod: HCNC,S$GLB,, | Performed by: INTERNAL MEDICINE

## 2023-01-09 PROCEDURE — 1159F MED LIST DOCD IN RCRD: CPT | Mod: HCNC,CPTII,S$GLB, | Performed by: INTERNAL MEDICINE

## 2023-01-09 PROCEDURE — 3288F FALL RISK ASSESSMENT DOCD: CPT | Mod: HCNC,CPTII,S$GLB, | Performed by: INTERNAL MEDICINE

## 2023-01-09 PROCEDURE — 1101F PT FALLS ASSESS-DOCD LE1/YR: CPT | Mod: HCNC,CPTII,S$GLB, | Performed by: INTERNAL MEDICINE

## 2023-01-09 PROCEDURE — 99214 PR OFFICE/OUTPT VISIT, EST, LEVL IV, 30-39 MIN: ICD-10-PCS | Mod: HCNC,S$GLB,, | Performed by: INTERNAL MEDICINE

## 2023-01-09 PROCEDURE — 93010 EKG 12-LEAD: ICD-10-PCS | Mod: HCNC,S$GLB,, | Performed by: INTERNAL MEDICINE

## 2023-01-09 PROCEDURE — 1101F PR PT FALLS ASSESS DOC 0-1 FALLS W/OUT INJ PAST YR: ICD-10-PCS | Mod: HCNC,CPTII,S$GLB, | Performed by: INTERNAL MEDICINE

## 2023-01-09 PROCEDURE — 99214 OFFICE O/P EST MOD 30 MIN: CPT | Mod: HCNC,S$GLB,, | Performed by: INTERNAL MEDICINE

## 2023-01-09 PROCEDURE — 3075F SYST BP GE 130 - 139MM HG: CPT | Mod: HCNC,CPTII,S$GLB, | Performed by: INTERNAL MEDICINE

## 2023-01-09 PROCEDURE — 1126F PR PAIN SEVERITY QUANTIFIED, NO PAIN PRESENT: ICD-10-PCS | Mod: HCNC,CPTII,S$GLB, | Performed by: INTERNAL MEDICINE

## 2023-01-09 PROCEDURE — 3288F PR FALLS RISK ASSESSMENT DOCUMENTED: ICD-10-PCS | Mod: HCNC,CPTII,S$GLB, | Performed by: INTERNAL MEDICINE

## 2023-01-09 PROCEDURE — 93010 ELECTROCARDIOGRAM REPORT: CPT | Mod: HCNC,S$GLB,, | Performed by: INTERNAL MEDICINE

## 2023-01-09 PROCEDURE — 71046 X-RAY EXAM CHEST 2 VIEWS: CPT | Mod: TC,HCNC,PN

## 2023-01-09 PROCEDURE — 1126F AMNT PAIN NOTED NONE PRSNT: CPT | Mod: HCNC,CPTII,S$GLB, | Performed by: INTERNAL MEDICINE

## 2023-01-09 PROCEDURE — 71046 X-RAY EXAM CHEST 2 VIEWS: CPT | Mod: 26,HCNC,, | Performed by: RADIOLOGY

## 2023-01-09 PROCEDURE — 93005 EKG 12-LEAD: ICD-10-PCS | Mod: HCNC,S$GLB,, | Performed by: INTERNAL MEDICINE

## 2023-01-09 PROCEDURE — 3075F PR MOST RECENT SYSTOLIC BLOOD PRESS GE 130-139MM HG: ICD-10-PCS | Mod: HCNC,CPTII,S$GLB, | Performed by: INTERNAL MEDICINE

## 2023-01-09 PROCEDURE — 1160F PR REVIEW ALL MEDS BY PRESCRIBER/CLIN PHARMACIST DOCUMENTED: ICD-10-PCS | Mod: HCNC,CPTII,S$GLB, | Performed by: INTERNAL MEDICINE

## 2023-01-09 PROCEDURE — 71046 XR CHEST PA AND LATERAL: ICD-10-PCS | Mod: 26,HCNC,, | Performed by: RADIOLOGY

## 2023-01-09 PROCEDURE — 99999 PR PBB SHADOW E&M-EST. PATIENT-LVL V: CPT | Mod: PBBFAC,HCNC,, | Performed by: INTERNAL MEDICINE

## 2023-01-09 PROCEDURE — 3078F DIAST BP <80 MM HG: CPT | Mod: HCNC,CPTII,S$GLB, | Performed by: INTERNAL MEDICINE

## 2023-01-09 RX ORDER — POTASSIUM CHLORIDE 750 MG/1
10 TABLET, EXTENDED RELEASE ORAL DAILY
Qty: 90 TABLET | Refills: 1 | Status: SHIPPED | OUTPATIENT
Start: 2023-01-09 | End: 2023-09-05

## 2023-01-09 RX ORDER — ATORVASTATIN CALCIUM 40 MG/1
40 TABLET, FILM COATED ORAL NIGHTLY
Qty: 90 TABLET | Refills: 1 | Status: SHIPPED | OUTPATIENT
Start: 2023-01-09 | End: 2023-09-21 | Stop reason: SDUPTHER

## 2023-01-09 RX ORDER — LOSARTAN POTASSIUM 25 MG/1
25 TABLET ORAL DAILY
Qty: 90 TABLET | Refills: 1 | Status: SHIPPED | OUTPATIENT
Start: 2023-01-09 | End: 2023-02-27

## 2023-01-09 NOTE — PROGRESS NOTES
Please inform pt that given her new Twave inversions and shortness of breath I would like to refer her to Cardiology.  I still want to get the Echocardiogram I ordered earlier. She had freq PVC's which is what I was hearing on exam. There was no evidence of a fib.   Dr. CLARK

## 2023-01-09 NOTE — PROGRESS NOTES
[facility] Note    Chief Complaint      Chief Complaint   Patient presents with    Follow-up       History of Present Illness      Jodi Cole is a 82 y.o.   AAF with Endometrial Ca, HTN, Insomnia, OA presents to fu chronic issues. Last visit - 10/10/22.    MAY x 1 month - s/p 1/2 block.  6 mos ago she could walk 2 blocks. Will check CXR and Echo     Gout - ED visit - 6/15/22 - Gout. Rec low purine diet.      Left wrist DeQuervain's Tendonitis Right ring trigger finger - Fu by Hand Sx.      Hypokalemia -3.6 - Resolved.  I had prev rec a high potassium diet.. Pt mistakenly stopped her Rx her Kcl 10 mEq once daily in mid Dec. and is on lasix.  Will resume thie Kcl 10 mEq/d.     HTN - Pt prev noncompliant with BP meds.  Her kidney function went down slightly when we restarted her HCTZ prev so I stopped her HCTZ and had her start Amlodipine 2.5 mg/d.  Avoid HCTZ due to recent gout.  She mistakenly stopped her amlodipine and Bp is controlled so will not resume. Pt on Losartan 25 mg/d, and lasix 40 mg/d and accidently stopped her Kcl10 mEQ/d.  Will resume this.  Rec avoid oral decongestants.      HLD - Pt controlled on Atorvastatin.   Her Cholesterol was not controlled on Pravastatin 40 mg QHS so we changed her to Atorvastatin 40 mg po QHS.  She reports compliance with this. Rec low chol diet and exercise for lifestyle modification.  Will cont to monitor. She will try to cut back to red meat 1/wk.       Oa - Pt is s/p RT TKA 10/1/19. S/p Left TKA - 7/8/21.  Fu by Dr. Gina Lunsford.  Pt is on Tylenol prn - not helping.  Affects Rt shoulder.  Pt on Voltaren gel - no help. She completed PTx for her knee.      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 She was tx with ChemoTx - Carbo and taxol x 6 cycles. Has fu in  July and will fu annually with CA -125.  - 12- 7/26/22.      Obesity - BMI - 37.97- up 3  lb.  Likely due to recent surgery and dec activity. She will cut back on sweets.  Exercise is limited due to knees. I again rec she join gym with [employer] and use pool/Stationary bike.      Osteopenia - Calcium max 1000 mg/d and Vit D3 1000 units/d OTC.  In addition, I rec weight-bearing exercises 5 times/wk x 30 minutes as tolerated. We can repeat the DEXA in 2-3 yrs.     Noncompliance -Rec compliance with all meds, labs, and fu.       HCM - Flu - admin 10/10/22;  Tdap - ?  defers;  PCV 13 - 1/17/20;  PVX 23 -none- defers; Shingrix - none - refuses; COVID 19 vaccine (Pfizer) #1 - 3/3/21; #2 3/24/21; #3 plans to set up; MGM - 11/8/22- repeat 1 yr;  DEXA - 11/19/20 - + Osteopenia C-scope - 5/22/19 + polyps - repeat not rec due to age; Ortho - Dr. Fuentes;  Gyn/Onc - Dr. Back; Prev PCP - Dr. Umana    Patient Care Team:  Margarita Funes MD as PCP - General (Internal Medicine)  Jayla Easley MA as Care Coordinator     Health Maintenance:  Immunization History   Administered Date(s) Administered    COVID-19, MRNA, LN-S, PF (Pfizer) (Purple Cap) 03/03/2021, 03/24/2021    Influenza (FLUAD) - Quadrivalent - Adjuvanted - PF *Preferred* (65+) 11/13/2020, 10/10/2022    Influenza - High Dose - PF (65 years and older) 11/19/2019    Pneumococcal Conjugate - 13 Valent 01/17/2020      Health Maintenance   Topic Date Due    TETANUS VACCINE  Never done    DEXA Scan  11/19/2023    Lipid Panel  10/11/2027        Past Medical History:  Past Medical History:   Diagnosis Date    Anemia     Anemia associated with chemotherapy 9/1/2015    Anemia due to chemotherapy 9/22/2015    Chemotherapy induced nausea and vomiting 6/9/2015    Chemotherapy-induced neutropenia 7/28/2015    Constipation 7/7/2015    Endometrial ca 5/26/2015    Endometrial ca 8/10/2015    Essential hypertension     Hyperlipidemia     Hypertension     Hypokalemia  9/26/2019    NSAID long-term use 9/26/2019    Osteoarthritis     Pain in both hands 2/19/2016    Right-sided low back pain without sciatica 6/23/2016    Uterine cancer     Well woman exam with routine gynecological exam 2/19/2016       Past Surgical History:   has a past surgical history that includes D&C Hysteroscopy (March 18, 2015); Dilation and curettage of uterus (1969 ); Lymphadenectomy; Hysterectomy (5/11/15); Pelvic and para-aortic lymph node dissection; pr removal of ovary/tube(s); Colonoscopy (N/A, 5/22/2019); Total knee replacement using computer navigation (10/01/2019); and Percutaneous cryotherapy of peripheral nerve using liquid nitrous oxide in closed needle device (Left, 6/14/2021).    Family History:  family history includes Cancer (age of onset: 65) in her brother; Heart disease in her father.     Social History:  Social History     Tobacco Use    Smoking status: Never    Smokeless tobacco: Never   Substance Use Topics    Alcohol use: No    Drug use: Never       Review of Systems   Constitutional:  Negative for chills, diaphoresis and fever.   HENT:  Negative for nasal congestion and sore throat.    Eyes:  Positive for visual disturbance.        Plans to see ophtho - has appt 2/9/23   Respiratory:  Positive for shortness of breath. Negative for cough and wheezing.    Cardiovascular:  Positive for leg swelling. Negative for chest pain and palpitations.   Gastrointestinal:  Negative for abdominal pain, constipation, diarrhea, nausea and vomiting.   Genitourinary:  Negative for dysuria and frequency.   Musculoskeletal:  Negative for arthralgias and myalgias.   Neurological:  Negative for dizziness, headaches and memory loss.   Psychiatric/Behavioral:  Negative for dysphoric mood. The patient is not nervous/anxious.       Medications:    Current Outpatient Medications:     acetaminophen (TYLENOL) 500 MG tablet, Take 1 tablet (500 mg total) by mouth every 6 (six) hours as needed for Pain., Disp: 60  "tablet, Rfl: 0    atorvastatin (LIPITOR) 40 MG tablet, Take 1 tablet (40 mg total) by mouth every evening., Disp: 90 tablet, Rfl: 1    diclofenac sodium (VOLTAREN) 1 % Gel, Apply 2 g topically 4 (four) times daily., Disp: 100 g, Rfl: 0    furosemide (LASIX) 40 MG tablet, Take 1 tablet (40 mg total) by mouth every morning., Disp: 90 tablet, Rfl: 0    losartan (COZAAR) 25 MG tablet, Take 1 tablet (25 mg total) by mouth once daily., Disp: 90 tablet, Rfl: 1    multivitamin capsule, Take 1 capsule by mouth once daily., Disp: , Rfl:     vitamins B1 B6 B12 Tab, Take by mouth once daily., Disp: , Rfl:     potassium chloride (KLOR-CON) 10 MEQ TbSR, Take 1 tablet (10 mEq total) by mouth once daily. Take one by mouth daily, Disp: 90 tablet, Rfl: 1  No current facility-administered medications for this visit.    Facility-Administered Medications Ordered in Other Visits:     midazolam (VERSED) 1 mg/mL injection 0.5 mg, 0.5 mg, Intravenous, PRN, Henry Reynoso MD     Allergies:  Review of patient's allergies indicates:  No Known Allergies    Physical Exam      Vital Signs  Temp: 99 °F (37.2 °C)  Temp src: Oral  Pulse: 77  Resp: 18  SpO2: 97 %  BP: 134/74  BP Location: Left arm  Patient Position: Sitting  Pain Score: 0-No pain  Height and Weight  Height: 5' 6" (167.6 cm)  Weight: 106.7 kg (235 lb 3.7 oz)  BSA (Calculated - sq m): 2.23 sq meters  BMI (Calculated): 38  Weight in (lb) to have BMI = 25: 154.6      Patient Position: Sitting      Physical Exam  Vitals reviewed.   Constitutional:       General: She is not in acute distress.     Appearance: Normal appearance. She is not ill-appearing, toxic-appearing or diaphoretic.   HENT:      Head: Normocephalic and atraumatic.      Right Ear: Tympanic membrane normal.      Left Ear: Tympanic membrane normal.      Mouth/Throat:      Mouth: Mucous membranes are moist.      Comments: maxillary dentures  Neck:      Vascular: No carotid bruit.   Cardiovascular:      Rate and Rhythm: " Rhythm irregular.      Heart sounds: Murmur heard.      Comments: III/VI radiates to sternal notch and carotids; trace BLE edema  Abdominal:      General: Bowel sounds are normal. There is no distension.      Palpations: Abdomen is soft.      Tenderness: There is no abdominal tenderness.   Skin:     General: Skin is warm.   Neurological:      Mental Status: She is alert.   Psychiatric:         Mood and Affect: Mood normal.         Behavior: Behavior normal.        Laboratory:  CBC:  Recent Labs   Lab 06/23/21  0847 06/15/22  1855 06/15/22  1901 10/11/22  0750   WBC 4.79 5.00  --  5.36   RBC 4.55 4.33  --  4.69   Hemoglobin 11.5 L 11.4 L  --  11.9 L   POC Hematocrit  --   --    < >  --    Hematocrit 37.1 36.0 L  --  39.0   Platelets 223 181  --  250   MCV 82 83  --  83   MCH 25.3 L 26.3 L  --  25.4 L   MCHC 31.0 L 31.7 L  --  30.5 L    < > = values in this interval not displayed.       CMP:  Recent Labs   Lab 11/19/20  0845 06/23/21  0847 10/11/22  0750 11/07/22  0720 11/30/22  0915   Glucose 77 82 87   < > 80   Calcium 9.2 9.6 9.7   < > 9.1   Albumin 3.7 3.6 3.5  --   --    Total Protein 7.3 7.1 7.1  --   --    Sodium 142 146 H 142   < > 144   Potassium 3.5 4.0 3.3 L   < > 3.6   CO2 32 H 30 H 32 H   < > 34 H   Chloride 101 105 102   < > 102   BUN 19 19 22   < > 23   Creatinine 1.0 1.0 1.0   < > 1.1   Alkaline Phosphatase 98 98 107  --   --    ALT 8 L 9 L 9 L  --   --    AST 14 13 11  --   --    Total Bilirubin 0.4 0.3 0.6  --   --     < > = values in this interval not displayed.       LIPIDS:  Recent Labs   Lab 11/19/20  0845 10/11/22  0750   HDL 67 68   Cholesterol 216 H 210 H   Triglycerides 96 77   LDL Cholesterol 129.8 126.6   HDL/Cholesterol Ratio 31.0 32.4   Non-HDL Cholesterol 149 142   Total Cholesterol/HDL Ratio 3.2 3.1       Assessment/Plan     Jodicarrie Cole is a 82 y.o.female with:    Essential hypertension  -     losartan (COZAAR) 25 MG tablet; Take 1 tablet (25 mg total) by mouth once daily.   Dispense: 90 tablet; Refill: 1  -     potassium chloride (KLOR-CON) 10 MEQ TbSR; Take 1 tablet (10 mEq total) by mouth once daily. Take one by mouth daily  Dispense: 90 tablet; Refill: 1  -     Basic Metabolic Panel; Future; Expected date: 01/09/2023  - . Avoid HCTZ due to recent gout.  She mistakenly stopped her amlodipine and Bp is controlled so will not resume. Pt on Losartan 25 mg/d, and lasix 40 mg/d and accidently stopped her Kcl10 mEQ/d.  Will resume this.  Rec avoid oral decongestants.     Hyperlipidemia, unspecified hyperlipidemia type  -     atorvastatin (LIPITOR) 40 MG tablet; Take 1 tablet (40 mg total) by mouth every evening.  Dispense: 90 tablet; Refill: 1  - Controlled.  Cont current.     Endometrial ca  - Stable.  Cont current regimen.    Class 2 severe obesity due to excess calories with serious comorbidity and body mass index (BMI) of 36.0 to 36.9 in adult  - Rec diet for wt loss.      MAY (dyspnea on exertion)  -     X-Ray Chest PA And Lateral; Future; Expected date: 01/09/2023  -     Echo; Future  -     Cancel: Ambulatory referral/consult to Cardiology; Future; Expected date: 01/16/2023  -     Ambulatory referral/consult to Cardiology; Future; Expected date: 01/16/2023  - Check CXR - no acute CP process.  Check Echo.  Refer to Cards.  EKG - PVC's, and Twi in lateral leads.     Murmur  -     IN OFFICE EKG 12-LEAD (to [place])  -     Cancel: Ambulatory referral/consult to Cardiology; Future; Expected date: 01/16/2023  -     Ambulatory referral/consult to Cardiology; Future; Expected date: 01/16/2023  - Check Echo.     Osteopenia, unspecified location  - Stable.  Cont current regimen.    Noncompliance  - Rec compliance with all meds, labs, and fu.      Chronic conditions status updated as per HPI.  Other than changes above, cont current medications and maintain follow up with specialists.  Follow up in about 3 months (around 4/9/2023) for fu chronic issues or sooner if needed.      Margarita Funes,  MD [facility]

## 2023-01-09 NOTE — PROGRESS NOTES
Please inform pt I reviewed her Chest xray which was stable from previous. No evidence of pneumonia.   Persistent elevation of the left hemidiaphragm.   Dr. CLARK

## 2023-01-12 ENCOUNTER — HOSPITAL ENCOUNTER (OUTPATIENT)
Dept: CARDIOLOGY | Facility: OTHER | Age: 83
Discharge: HOME | End: 2023-01-12
Attending: INTERNAL MEDICINE
Payer: MEDICARE

## 2023-01-12 VITALS
DIASTOLIC BLOOD PRESSURE: 74 MMHG | HEIGHT: 66 IN | SYSTOLIC BLOOD PRESSURE: 134 MMHG | BODY MASS INDEX: 37.77 KG/M2 | WEIGHT: 235 LBS

## 2023-01-12 DIAGNOSIS — R06.09 DOE (DYSPNEA ON EXERTION): ICD-10-CM

## 2023-01-12 LAB
ASCENDING AORTA: 2.8 CM
AV INDEX (PROSTH): 0.76
AV MEAN GRADIENT: 4 MMHG
AV PEAK GRADIENT: 6 MMHG
AV VALVE AREA: 3.28 CM2
AV VELOCITY RATIO: 0.75
BSA FOR ECHO PROCEDURE: 2.23 M2
CV ECHO LV RWT: 0.61 CM
DOP CALC AO PEAK VEL: 1.26 M/S
DOP CALC AO VTI: 26.6 CM
DOP CALC LVOT AREA: 4.3 CM2
DOP CALC LVOT DIAMETER: 2.35 CM
DOP CALC LVOT PEAK VEL: 0.95 M/S
DOP CALC LVOT STROKE VOLUME: 87.14 CM3
DOP CALCLVOT PEAK VEL VTI: 20.1 CM
E WAVE DECELERATION TIME: 207.98 MSEC
E/A RATIO: 0.49
E/E' RATIO: 10.8 M/S
ECHO LV POSTERIOR WALL: 0.93 CM (ref 0.6–1.1)
EJECTION FRACTION: 65 %
FRACTIONAL SHORTENING: 25 % (ref 28–44)
INTERVENTRICULAR SEPTUM: 0.91 CM (ref 0.6–1.1)
IVC DIAMETER: 0.78 CM
IVRT: 111.88 MSEC
LA MAJOR: 5.4 CM
LA MINOR: 5.7 CM
LA WIDTH: 3 CM
LEFT ATRIUM SIZE: 4.46 CM
LEFT ATRIUM VOLUME INDEX MOD: 12.6 ML/M2
LEFT ATRIUM VOLUME INDEX: 29.5 ML/M2
LEFT ATRIUM VOLUME MOD: 27 CM3
LEFT ATRIUM VOLUME: 63.07 CM3
LEFT INTERNAL DIMENSION IN SYSTOLE: 2.29 CM (ref 2.1–4)
LEFT VENTRICLE DIASTOLIC VOLUME INDEX: 17.16 ML/M2
LEFT VENTRICLE DIASTOLIC VOLUME: 36.73 ML
LEFT VENTRICLE MASS INDEX: 35 G/M2
LEFT VENTRICLE SYSTOLIC VOLUME INDEX: 8.4 ML/M2
LEFT VENTRICLE SYSTOLIC VOLUME: 18.01 ML
LEFT VENTRICULAR INTERNAL DIMENSION IN DIASTOLE: 3.06 CM (ref 3.5–6)
LEFT VENTRICULAR MASS: 74.64 G
LV LATERAL E/E' RATIO: 9 M/S
LV SEPTAL E/E' RATIO: 13.5 M/S
LVOT MG: 1.75 MMHG
LVOT MV: 0.62 CM/S
MV PEAK A VEL: 1.11 M/S
MV PEAK E VEL: 0.54 M/S
MV STENOSIS PRESSURE HALF TIME: 60.31 MS
MV VALVE AREA P 1/2 METHOD: 3.65 CM2
PULM VEIN S/D RATIO: 1.52
PV PEAK D VEL: 0.33 M/S
PV PEAK S VEL: 0.5 M/S
PV PEAK VELOCITY: 0.87 CM/S
RA MAJOR: 4.54 CM
RA PRESSURE: 3 MMHG
RA WIDTH: 2.4 CM
RIGHT VENTRICULAR END-DIASTOLIC DIMENSION: 2.76 CM
RV TISSUE DOPPLER FREE WALL SYSTOLIC VELOCITY 1 (APICAL 4 CHAMBER VIEW): 11 CM/S
SINUS: 2.9 CM
STJ: 2.27 CM
TDI LATERAL: 0.06 M/S
TDI SEPTAL: 0.04 M/S
TDI: 0.05 M/S
TRICUSPID ANNULAR PLANE SYSTOLIC EXCURSION: 2.3 CM

## 2023-01-12 PROCEDURE — 93306 ECHO (CUPID ONLY): ICD-10-PCS | Mod: 26,HCNC,, | Performed by: INTERNAL MEDICINE

## 2023-01-12 PROCEDURE — 93306 TTE W/DOPPLER COMPLETE: CPT | Mod: 26,HCNC,, | Performed by: INTERNAL MEDICINE

## 2023-01-12 PROCEDURE — 93306 TTE W/DOPPLER COMPLETE: CPT | Mod: HCNC

## 2023-01-12 NOTE — PROGRESS NOTES
Please inform pt - I reviewed your Echocardiogram -   It showed a normal ejection fraction - 65% which is great. This means the way your heart squeezes to pump is normal. Grade I left ventricular diastolic dysfunction which is consistent with impaired relaxation. (The way the heart relaxes to fill is slightly imparied).   There is normal wall motion. The heart valves look good.     Dr. CLARK

## 2023-05-29 ENCOUNTER — PES CALL (OUTPATIENT)
Dept: ADMINISTRATIVE | Facility: CLINIC | Age: 83
End: 2023-05-29
Payer: MEDICARE

## 2023-05-29 DIAGNOSIS — I10 ESSENTIAL HYPERTENSION: ICD-10-CM

## 2023-05-29 RX ORDER — FUROSEMIDE 40 MG/1
TABLET ORAL
Qty: 90 TABLET | Refills: 2 | Status: SHIPPED | OUTPATIENT
Start: 2023-05-29

## 2023-05-29 NOTE — TELEPHONE ENCOUNTER
Refill Decision Note   Jodi Cole  is requesting a refill authorization.  Brief Assessment and Rationale for Refill:  Approve     Medication Therapy Plan:       Medication Reconciliation Completed: No   Comments:     No Care Gaps recommended.     Note composed:11:16 AM 05/29/2023

## 2023-05-29 NOTE — TELEPHONE ENCOUNTER
No care due was identified.  [facility] Embedded Care Due Messages. Reference number: [id number].   5/29/2023 4:02:12 AM CDT

## 2023-07-22 ENCOUNTER — HOSPITAL ENCOUNTER (EMERGENCY)
Facility: HOSPITAL | Age: 83
Discharge: HOME | End: 2023-07-22
Attending: EMERGENCY MEDICINE
Payer: MEDICARE

## 2023-07-22 VITALS
OXYGEN SATURATION: 97 % | TEMPERATURE: 98 F | DIASTOLIC BLOOD PRESSURE: 90 MMHG | RESPIRATION RATE: 17 BRPM | SYSTOLIC BLOOD PRESSURE: 148 MMHG | HEART RATE: 90 BPM | BODY MASS INDEX: 37.12 KG/M2 | WEIGHT: 230 LBS

## 2023-07-22 DIAGNOSIS — M25.571 RIGHT ANKLE PAIN: ICD-10-CM

## 2023-07-22 PROCEDURE — 99284 EMERGENCY DEPT VISIT MOD MDM: CPT | Mod: HCNC

## 2023-07-22 PROCEDURE — 63600175 PHARM REV CODE 636 W HCPCS: Mod: HCNC | Performed by: PHYSICIAN ASSISTANT

## 2023-07-22 PROCEDURE — 25000003 PHARM REV CODE 250: Mod: HCNC | Performed by: PHYSICIAN ASSISTANT

## 2023-07-22 PROCEDURE — 96372 THER/PROPH/DIAG INJ SC/IM: CPT | Performed by: PHYSICIAN ASSISTANT

## 2023-07-22 RX ORDER — TRIAMCINOLONE ACETONIDE 40 MG/ML
80 INJECTION, SUSPENSION INTRA-ARTICULAR; INTRAMUSCULAR
Status: COMPLETED | OUTPATIENT
Start: 2023-07-22 | End: 2023-07-22

## 2023-07-22 RX ORDER — PREDNISONE 20 MG/1
40 TABLET ORAL DAILY
Qty: 6 TABLET | Refills: 0 | Status: SHIPPED | OUTPATIENT
Start: 2023-07-22 | End: 2023-07-25

## 2023-07-22 RX ORDER — ACETAMINOPHEN 325 MG/1
650 TABLET ORAL
Status: COMPLETED | OUTPATIENT
Start: 2023-07-22 | End: 2023-07-22

## 2023-07-22 RX ADMIN — ACETAMINOPHEN 650 MG: 325 TABLET ORAL at 02:07

## 2023-07-22 RX ADMIN — TRIAMCINOLONE ACETONIDE 80 MG: 200 INJECTION, SUSPENSION INTRA-ARTICULAR; INTRAMUSCULAR at 02:07

## 2023-07-22 NOTE — ED PROVIDER NOTES
Encounter Date: 7/22/2023       History     Chief Complaint   Patient presents with    Ankle Pain     R ankle pain x 1 week, denies injury. Swelling     1:52 PM  Patient is a 82-year-old female with a history of endometrial and uterine cancer no longer on chemotherapy, anemia, HTN, HLD, osteoarthritis who [place] [facility] [place] of right ankle pain for the past 1-2 weeks.    She denies any injury, trauma, falls.  She has noted swelling.  Her pain is worse with weight-bearing and ambulation.  She is unsure if she had gout in this ankle or not.  She complains of moderate pain.  Did not take any medications today for her symptoms.      Review of patient's allergies indicates:  No Known Allergies  Past Medical History:   Diagnosis Date    Anemia     Anemia associated with chemotherapy 9/1/2015    Anemia due to chemotherapy 9/22/2015    Chemotherapy induced nausea and vomiting 6/9/2015    Chemotherapy-induced neutropenia 7/28/2015    Constipation 7/7/2015    Endometrial ca 5/26/2015    Endometrial ca 8/10/2015    Essential hypertension     Hyperlipidemia     Hypertension     Hypokalemia 9/26/2019    NSAID long-term use 9/26/2019    Osteoarthritis     Pain in both hands 2/19/2016    Right-sided low back pain without sciatica 6/23/2016    Uterine cancer     Well woman exam with routine gynecological exam 2/19/2016     Past Surgical History:   Procedure Laterality Date    COLONOSCOPY N/A 5/22/2019    Procedure: COLONOSCOPY;  Surgeon: Kong Rodarte MD;  Location: [address]);  Service: Endoscopy;  Laterality: N/A;    D&C Hysteroscopy  March 18, 2015    DILATION AND CURETTAGE OF UTERUS  1969     D&C and uterine suspension.     HYSTERECTOMY  5/11/15    robotic for endometrial cancer    LYMPHADENECTOMY      PELVIC AND PARA-AORTIC LYMPH NODE DISSECTION      PERCUTANEOUS CRYOTHERAPY OF PERIPHERAL NERVE USING LIQUID NITROUS OXIDE IN CLOSED NEEDLE DEVICE Left 6/14/2021    Procedure: CRYOTHERAPY, NERVE,  PERIPHERAL, PERCUTANEOUS, USING LIQUID NITROUS OXIDE IN CLOSED NEEDLE DEVICE, LEFT KNEE;  Surgeon: BOB Doll;  Location: [facility];  Service: Pain Management;  Laterality: Left;    AZ REMOVAL OF OVARY/TUBE(S)      robotic TLH/BSO and bilatyeral pelvic and para-aortic LND    TOTAL KNEE REPLACEMENT USING COMPUTER NAVIGATION  10/01/2019     Family History   Problem Relation Age of Onset    Cancer Brother 65        pancreatic    Heart disease Father         in his 50's-  at 57     Ovarian cancer Neg Hx     Uterine cancer Neg Hx     Breast cancer Neg Hx     Colon cancer Neg Hx      Social History     Tobacco Use    Smoking status: Never    Smokeless tobacco: Never   Substance Use Topics    Alcohol use: No    Drug use: Never     Review of Systems   Constitutional:  Positive for activity change. Negative for appetite change, chills and fever.   HENT:  Negative for sore throat.    Respiratory:  Negative for shortness of breath.    Cardiovascular:  Negative for chest pain.   Gastrointestinal:  Negative for nausea.   Genitourinary:  Negative for dysuria.   Musculoskeletal:  Positive for arthralgias and joint swelling. Negative for back pain.   Skin:  Negative for rash.   Neurological:  Negative for weakness.   Hematological:  Does not bruise/bleed easily.     Physical Exam     Initial Vitals [23 1257]   BP Pulse Resp Temp SpO2   (!) 157/87 95 18 99.2 °F (37.3 °C) 96 %      MAP       --         Physical Exam    Vitals reviewed.  Constitutional: She appears well-developed and well-nourished. She is not diaphoretic. She is cooperative.  Non-toxic appearance. She does not have a sickly appearance. She does not appear ill. No distress.   HENT:   Head: Normocephalic and atraumatic.   Nose: Nose normal.   Mouth/Throat: No trismus in the jaw.   Eyes: Conjunctivae and EOM are normal.   Neck:   Normal range of motion.  Cardiovascular:  Normal rate and regular rhythm.           Pulses:       Dorsalis pedis  pulses are 2+ on the right side and 2+ on the left side.   Pulmonary/Chest: No accessory muscle usage. No tachypnea. No respiratory distress.   Abdominal: She exhibits no distension.   Musculoskeletal:         General: Normal range of motion.      Cervical back: Normal range of motion.      Right ankle: Swelling present. No deformity. No tenderness. Normal range of motion.      Comments: Passive and active range of motion with the ability to dorsal dorsiflex to 20° and plantar flex to 40°. No erythema or warmth. No rash.       Neurological: She is alert. She has normal strength.   Skin: Skin is warm and dry. No erythema. No pallor.       ED Course   Procedures  Labs Reviewed - No data to display       Imaging Results              X-Ray Ankle Complete Right (Final result)  Result time 07/22/23 15:52:27      Final result by Dann Blanco MD (07/22/23 15:52:27)                   Impression:      Soft tissue swelling surrounds the right ankle.      Electronically signed by: Dann Blanco MD  Date:    07/22/2023  Time:    15:52               Narrative:    EXAMINATION:  XR ANKLE COMPLETE 3 VIEW RIGHT    CLINICAL HISTORY:  Pain in right ankle and joints of right foot    TECHNIQUE:  AP, lateral, and oblique images of the right ankle were performed.    COMPARISON:  None    FINDINGS:  Soft tissue swelling surrounds the right ankle.    No displaced fracture or subluxation.  No cortical destruction or bone lysis.    No soft tissue gas.                                       Medications   acetaminophen tablet 650 mg (650 mg Oral Given 7/22/23 1404)   triamcinolone acetonide injection 80 mg (80 mg Intramuscular Given 7/22/23 1406)     Medical Decision Making:   History:   Old Medical Records: I decided to obtain old medical records.  Old Records Summarized: records from previous admission(s).  Initial Assessment:   Patient is a 82-year-old female who presents the [facility] ED for right ankle pain for 1-2 weeks patient denies any  injury, trauma, falls.   Differential Diagnosis:   Includes but is not limited to DJD, strain, sprain.  Patient has passive and active range of motion intact.  I have an extremely low suspicion for septic arthritis give exam.  Clinical Tests:   Lab Tests: Reviewed  Radiological Study: Reviewed and Ordered  ED Management:  Will obtain x-ray, give medication for acute inflammation, and reassess.           ED Course as of 07/23/23 0744   Sat Jul 22, 2023   1337 BP(!): 157/87 [CL]   1337 Temp: 99.2 °F (37.3 °C) [CL]   1337 Pulse: 95 [CL]   1337 Resp: 18 [CL]   1337 SpO2: 96 % [CL]   1559 X-Ray Ankle Complete Right  No displaced fracture or subluxation.  No cortical destruction or bone lysis. [CL]      ED Course User Index  [CL] Ivon Stewart PA-C          Patient updated with results. I suspect pain from DJD.  She reports improvement in her symptoms here.  Will continue treat with oral steroids for the next 3 days.  Continue OTC acetaminophen.  I personally Ace wrapped her right ankle for stability and compression.  Ice.  Elevate.  Follow-up with sports medicine if symptoms do not improve.  All questions were answered.  Patient comfortable with plan and stable for discharge.       Clinical Impression:   Final diagnoses:  [M25.571] Right ankle pain        ED Disposition Condition    Discharge Stable          ED Prescriptions       Medication Sig Dispense Start Date End Date Auth. Provider    predniSONE (DELTASONE) 20 MG tablet Take 2 tablets (40 mg total) by mouth once daily. for 3 days 6 tablet 7/22/2023 7/25/2023 Ivon Stewart PA-C          Follow-up Information       Follow up With Specialties Details Why Contact Info    [facility] SPORTS MEDICINE Sports Medicine Schedule an appointment as soon as possible for a visit   [address] [id number]  [phone]    [facility] - Emergency Dept Emergency Medicine  If symptoms worsen [address] [id number] [phone]              Ivon Stewart PA-C  07/23/23 0744

## 2023-07-22 NOTE — ED NOTES
LOC: The patient is awake and alert; oriented x 3 and speaking appropriately.  APPEARANCE: Patient resting comfortably, patient is clean and well groomed  SKIN: warm and dry, normal skin turgor & moist mucus membranes, skin intact, no breakdown noted.  MUSCULOSKELETAL: Patient moving all extremities well, no obvious swelling or deformities noted, pain and swelling to rt ankle.   RESPIRATORY: Airway is open and patent,   ; respirations are spontaneous, normal effort and rate  CARDIAC: Patient has a normal rate, no peripheral edema noted, capillary refill < 3 seconds; No complaints of chest pain   ABDOMEN: Soft and non tender to palpation, no distention noted.

## 2023-07-22 NOTE — DISCHARGE INSTRUCTIONS
Show any fractures.  Will treat you with steroids.  Your given injection in the emergency department.  Continue take prednisone 40 mg for the next 3 days.    You can take acetaminophen/tylenol 650 mg every 6 hours or 1000 mg every 8 hours for added relief.  Apply ice to the area for 10-20 minutes every 4 hours. You can apply heat 2 days after for the same duration and frequency.  ELEVATE as much as possible.   Ace wrap for compression and stability.  Follow up with sports medicine (ortho) or PCP.  Return to the ER for new or worsening symptoms.

## 2023-07-31 NOTE — PROGRESS NOTES
Subjective     Patient ID: Jodi Cole is a 82 y.o. female.    Chief Complaint: No chief complaint on file.    HPI  Patient comes in today for her annual follow up for endometrial cancer.   Denies vaginal bleeding.  Disease free interval 8 years     pending today, 12 in 2022.        Mammogram: 11/2022  Colonoscopy: May 2019: normal. No need to repeat       Her oncologic history is:    May 2015: She underwent a robotic-assisted laparoscopic hysterectomy with bilateral salpingo-oophorectomy and bilateral pelvic and para-aortic lymphadenectomy on 5/11/2015. 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 The depth of invasion was 2 mm out of 27 mm. Nodes were negative as was the cytologic washing.    Finished vaginal cuff brachytherapy on 7/20/2015.    Oct 2015: Completed 6 cycles of taxol and carboplatin in October 2015.    CT scan after completion was normal.  was 8.          Review of Systems   Constitutional:  Negative for chills and fever.   HENT:  Negative for mouth sores.    Respiratory:  Negative for chest tightness and shortness of breath.    Cardiovascular:  Negative for chest pain.   Gastrointestinal:  Negative for abdominal distention, nausea and vomiting.   Genitourinary:  Negative for dysuria, hematuria, pelvic pain, vaginal bleeding and vaginal discharge.   Neurological:  Negative for syncope and weakness.          Objective     Physical Exam  Vitals reviewed. Exam conducted with a chaperone present.   Constitutional:       Appearance: Normal appearance.   HENT:      Head: Normocephalic and atraumatic.   Eyes:      Extraocular Movements: Extraocular movements intact.      Conjunctiva/sclera: Conjunctivae normal.      Pupils: Pupils are equal, round, and reactive to light.   Pulmonary:      Effort: Pulmonary effort is normal. No respiratory distress.   Abdominal:      General: There is no distension.      Palpations: Abdomen is soft.       Tenderness: There is no abdominal tenderness.   Genitourinary:     Labia:         Right: No lesion.         Left: No lesion.       Vagina: Normal.      Uterus: Absent.       Adnexa:         Right: No mass.          Left: No mass.     Musculoskeletal:         General: Normal range of motion.   Lymphadenopathy:      Lower Body: No right inguinal adenopathy. No left inguinal adenopathy.   Skin:     General: Skin is warm and dry.   Neurological:      General: No focal deficit present.      Mental Status: She is alert and oriented to person, place, and time. Mental status is at baseline.   Psychiatric:         Mood and Affect: Mood normal.         Behavior: Behavior normal.         Thought Content: Thought content normal.         Judgment: Judgment normal.          Assessment and Plan     1. Endometrial cancer  -     CANCER ANTIGEN 125; Future; Expected date: 08/01/2023    2. Screening mammogram, encounter for  -     Mammo Digital Screening Bilat w/ Seun; Future; Expected date: 11/23/2023        No evidence of disease on today's exam  Feels well, no new symptoms  Disease free interval 8 years  Tumor markers  pending  Schedule MMG     Recommend continued surveillance. RTC annually with  or sooner if needed.      I spent approximately 30 minutes reviewing the available records and evaluating the patient, out of which over 50% of the time was spent face to face with the patient in counseling and coordinating this patient's care.         No follow-ups on file.

## 2023-08-01 ENCOUNTER — OFFICE VISIT (OUTPATIENT)
Dept: GYNECOLOGIC ONCOLOGY | Facility: CLINIC | Age: 83
End: 2023-08-01
Payer: MEDICARE

## 2023-08-01 ENCOUNTER — LAB VISIT (OUTPATIENT)
Dept: LAB | Facility: HOSPITAL | Age: 83
End: 2023-08-01
Attending: OBSTETRICS & GYNECOLOGY
Payer: MEDICARE

## 2023-08-01 VITALS
DIASTOLIC BLOOD PRESSURE: 64 MMHG | HEART RATE: 106 BPM | HEIGHT: 66 IN | WEIGHT: 220.88 LBS | BODY MASS INDEX: 35.5 KG/M2 | SYSTOLIC BLOOD PRESSURE: 144 MMHG

## 2023-08-01 DIAGNOSIS — C54.1 ENDOMETRIAL CANCER: Primary | ICD-10-CM

## 2023-08-01 DIAGNOSIS — C54.1 ENDOMETRIAL CANCER: ICD-10-CM

## 2023-08-01 DIAGNOSIS — Z12.31 SCREENING MAMMOGRAM, ENCOUNTER FOR: ICD-10-CM

## 2023-08-01 LAB — CANCER AG125 SERPL-ACNC: 10 U/ML (ref 0–30)

## 2023-08-01 PROCEDURE — 3078F DIAST BP <80 MM HG: CPT | Mod: HCNC,CPTII,S$GLB, | Performed by: OBSTETRICS & GYNECOLOGY

## 2023-08-01 PROCEDURE — 1101F PT FALLS ASSESS-DOCD LE1/YR: CPT | Mod: HCNC,CPTII,S$GLB, | Performed by: OBSTETRICS & GYNECOLOGY

## 2023-08-01 PROCEDURE — 99999 PR PBB SHADOW E&M-EST. PATIENT-LVL III: CPT | Mod: PBBFAC,HCNC,, | Performed by: OBSTETRICS & GYNECOLOGY

## 2023-08-01 PROCEDURE — 1101F PR PT FALLS ASSESS DOC 0-1 FALLS W/OUT INJ PAST YR: ICD-10-PCS | Mod: HCNC,CPTII,S$GLB, | Performed by: OBSTETRICS & GYNECOLOGY

## 2023-08-01 PROCEDURE — 1126F PR PAIN SEVERITY QUANTIFIED, NO PAIN PRESENT: ICD-10-PCS | Mod: HCNC,CPTII,S$GLB, | Performed by: OBSTETRICS & GYNECOLOGY

## 2023-08-01 PROCEDURE — 86304 IMMUNOASSAY TUMOR CA 125: CPT | Mod: HCNC | Performed by: OBSTETRICS & GYNECOLOGY

## 2023-08-01 PROCEDURE — 3077F PR MOST RECENT SYSTOLIC BLOOD PRESSURE >= 140 MM HG: ICD-10-PCS | Mod: HCNC,CPTII,S$GLB, | Performed by: OBSTETRICS & GYNECOLOGY

## 2023-08-01 PROCEDURE — 1159F MED LIST DOCD IN RCRD: CPT | Mod: HCNC,CPTII,S$GLB, | Performed by: OBSTETRICS & GYNECOLOGY

## 2023-08-01 PROCEDURE — 36415 COLL VENOUS BLD VENIPUNCTURE: CPT | Mod: HCNC | Performed by: OBSTETRICS & GYNECOLOGY

## 2023-08-01 PROCEDURE — 3288F FALL RISK ASSESSMENT DOCD: CPT | Mod: HCNC,CPTII,S$GLB, | Performed by: OBSTETRICS & GYNECOLOGY

## 2023-08-01 PROCEDURE — 3077F SYST BP >= 140 MM HG: CPT | Mod: HCNC,CPTII,S$GLB, | Performed by: OBSTETRICS & GYNECOLOGY

## 2023-08-01 PROCEDURE — 3288F PR FALLS RISK ASSESSMENT DOCUMENTED: ICD-10-PCS | Mod: HCNC,CPTII,S$GLB, | Performed by: OBSTETRICS & GYNECOLOGY

## 2023-08-01 PROCEDURE — 1159F PR MEDICATION LIST DOCUMENTED IN MEDICAL RECORD: ICD-10-PCS | Mod: HCNC,CPTII,S$GLB, | Performed by: OBSTETRICS & GYNECOLOGY

## 2023-08-01 PROCEDURE — 99214 OFFICE O/P EST MOD 30 MIN: CPT | Mod: HCNC,S$GLB,, | Performed by: OBSTETRICS & GYNECOLOGY

## 2023-08-01 PROCEDURE — 3078F PR MOST RECENT DIASTOLIC BLOOD PRESSURE < 80 MM HG: ICD-10-PCS | Mod: HCNC,CPTII,S$GLB, | Performed by: OBSTETRICS & GYNECOLOGY

## 2023-08-01 PROCEDURE — 1126F AMNT PAIN NOTED NONE PRSNT: CPT | Mod: HCNC,CPTII,S$GLB, | Performed by: OBSTETRICS & GYNECOLOGY

## 2023-08-01 PROCEDURE — 99214 PR OFFICE/OUTPT VISIT, EST, LEVL IV, 30-39 MIN: ICD-10-PCS | Mod: HCNC,S$GLB,, | Performed by: OBSTETRICS & GYNECOLOGY

## 2023-08-01 PROCEDURE — 99999 PR PBB SHADOW E&M-EST. PATIENT-LVL III: ICD-10-PCS | Mod: PBBFAC,HCNC,, | Performed by: OBSTETRICS & GYNECOLOGY

## 2023-08-29 ENCOUNTER — HOSPITAL ENCOUNTER (EMERGENCY)
Facility: HOSPITAL | Age: 83
Discharge: HOME | End: 2023-08-30
Attending: EMERGENCY MEDICINE
Payer: MEDICARE

## 2023-08-29 DIAGNOSIS — M25.579 ANKLE PAIN: ICD-10-CM

## 2023-08-29 DIAGNOSIS — M10.9 ACUTE GOUT OF RIGHT ANKLE, UNSPECIFIED CAUSE: Primary | ICD-10-CM

## 2023-08-29 LAB
ALBUMIN SERPL BCP-MCNC: 3.4 G/DL (ref 3.5–5.2)
ALP SERPL-CCNC: 88 U/L (ref 55–135)
ALT SERPL W/O P-5'-P-CCNC: 9 U/L (ref 10–44)
ANION GAP SERPL CALC-SCNC: 11 MMOL/L (ref 8–16)
AST SERPL-CCNC: 11 U/L (ref 10–40)
BASOPHILS # BLD AUTO: 0.03 K/UL (ref 0–0.2)
BASOPHILS NFR BLD: 0.4 % (ref 0–1.9)
BILIRUB SERPL-MCNC: 0.2 MG/DL (ref 0.1–1)
BUN SERPL-MCNC: 24 MG/DL (ref 8–23)
CALCIUM SERPL-MCNC: 9.4 MG/DL (ref 8.7–10.5)
CHLORIDE SERPL-SCNC: 98 MMOL/L (ref 95–110)
CO2 SERPL-SCNC: 31 MMOL/L (ref 23–29)
CREAT SERPL-MCNC: 1.2 MG/DL (ref 0.5–1.4)
CRP SERPL-MCNC: 12.8 MG/L (ref 0–8.2)
DIFFERENTIAL METHOD: ABNORMAL
EOSINOPHIL # BLD AUTO: 0.1 K/UL (ref 0–0.5)
EOSINOPHIL NFR BLD: 2 % (ref 0–8)
ERYTHROCYTE [DISTWIDTH] IN BLOOD BY AUTOMATED COUNT: 15.8 % (ref 11.5–14.5)
ERYTHROCYTE [SEDIMENTATION RATE] IN BLOOD BY PHOTOMETRIC METHOD: 97 MM/HR (ref 0–36)
EST. GFR  (NO RACE VARIABLE): 45.8 ML/MIN/1.73 M^2
GLUCOSE SERPL-MCNC: 119 MG/DL (ref 70–110)
HCT VFR BLD AUTO: 38 % (ref 37–48.5)
HCV AB SERPL QL IA: NORMAL
HGB BLD-MCNC: 11.5 G/DL (ref 12–16)
HIV 1+2 AB+HIV1 P24 AG SERPL QL IA: NORMAL
IMM GRANULOCYTES # BLD AUTO: 0.02 K/UL (ref 0–0.04)
IMM GRANULOCYTES NFR BLD AUTO: 0.3 % (ref 0–0.5)
LYMPHOCYTES # BLD AUTO: 1.7 K/UL (ref 1–4.8)
LYMPHOCYTES NFR BLD: 23.2 % (ref 18–48)
MCH RBC QN AUTO: 25.5 PG (ref 27–31)
MCHC RBC AUTO-ENTMCNC: 30.3 G/DL (ref 32–36)
MCV RBC AUTO: 84 FL (ref 82–98)
MONOCYTES # BLD AUTO: 0.6 K/UL (ref 0.3–1)
MONOCYTES NFR BLD: 8.2 % (ref 4–15)
NEUTROPHILS # BLD AUTO: 4.7 K/UL (ref 1.8–7.7)
NEUTROPHILS NFR BLD: 65.9 % (ref 38–73)
NRBC BLD-RTO: 0 /100 WBC
PLATELET # BLD AUTO: 299 K/UL (ref 150–450)
PMV BLD AUTO: 10.6 FL (ref 9.2–12.9)
POTASSIUM SERPL-SCNC: 3 MMOL/L (ref 3.5–5.1)
PROT SERPL-MCNC: 7.6 G/DL (ref 6–8.4)
RBC # BLD AUTO: 4.51 M/UL (ref 4–5.4)
SODIUM SERPL-SCNC: 140 MMOL/L (ref 136–145)
URATE SERPL-MCNC: 9.2 MG/DL (ref 2.4–5.7)
WBC # BLD AUTO: 7.1 K/UL (ref 3.9–12.7)

## 2023-08-29 PROCEDURE — 87205 SMEAR GRAM STAIN: CPT | Mod: HCNC

## 2023-08-29 PROCEDURE — 85652 RBC SED RATE AUTOMATED: CPT | Mod: HCNC

## 2023-08-29 PROCEDURE — 86803 HEPATITIS C AB TEST: CPT | Mod: HCNC | Performed by: EMERGENCY MEDICINE

## 2023-08-29 PROCEDURE — 25000003 PHARM REV CODE 250: Mod: HCNC

## 2023-08-29 PROCEDURE — 87389 HIV-1 AG W/HIV-1&-2 AB AG IA: CPT | Mod: HCNC | Performed by: EMERGENCY MEDICINE

## 2023-08-29 PROCEDURE — 87070 CULTURE OTHR SPECIMN AEROBIC: CPT | Mod: HCNC

## 2023-08-29 PROCEDURE — 80053 COMPREHEN METABOLIC PANEL: CPT | Mod: HCNC

## 2023-08-29 PROCEDURE — 99284 EMERGENCY DEPT VISIT MOD MDM: CPT | Mod: 25,HCNC

## 2023-08-29 PROCEDURE — 96374 THER/PROPH/DIAG INJ IV PUSH: CPT | Mod: HCNC

## 2023-08-29 PROCEDURE — 85025 COMPLETE CBC W/AUTO DIFF WBC: CPT | Mod: HCNC

## 2023-08-29 PROCEDURE — 87102 FUNGUS ISOLATION CULTURE: CPT | Mod: HCNC

## 2023-08-29 PROCEDURE — 87075 CULTR BACTERIA EXCEPT BLOOD: CPT | Mod: HCNC

## 2023-08-29 PROCEDURE — 87116 MYCOBACTERIA CULTURE: CPT | Mod: HCNC

## 2023-08-29 PROCEDURE — 84550 ASSAY OF BLOOD/URIC ACID: CPT | Mod: HCNC

## 2023-08-29 PROCEDURE — 87206 SMEAR FLUORESCENT/ACID STAI: CPT | Mod: 59,HCNC

## 2023-08-29 PROCEDURE — 86140 C-REACTIVE PROTEIN: CPT | Mod: HCNC

## 2023-08-29 RX ORDER — MORPHINE SULFATE 4 MG/ML
4 INJECTION, SOLUTION INTRAMUSCULAR; INTRAVENOUS ONCE
Status: COMPLETED | OUTPATIENT
Start: 2023-08-30 | End: 2023-08-29

## 2023-08-29 RX ORDER — NAPROXEN 500 MG/1
500 TABLET ORAL
Status: COMPLETED | OUTPATIENT
Start: 2023-08-29 | End: 2023-08-29

## 2023-08-29 RX ADMIN — NAPROXEN 500 MG: 500 TABLET ORAL at 10:08

## 2023-08-29 RX ADMIN — MORPHINE SULFATE 4 MG: 4 INJECTION INTRAVENOUS at 11:08

## 2023-08-29 RX ADMIN — POTASSIUM BICARBONATE 40 MEQ: 391 TABLET, EFFERVESCENT ORAL at 09:08

## 2023-08-29 NOTE — ED PROVIDER NOTES
Encounter Date: 8/29/2023       History     Chief Complaint   Patient presents with    Ankle Pain     R ankle pain x1 month      82-year-old female presents to the emergency department with atraumatic swelling of the right ankle pain that has been present over the last month.  Patient states she has experienced this before multiple times in the past.  The swelling is intermittent in presentation.  She was most recently seen in July for similar complaint.  Patient denies experiencing any falls, trauma.  No fever chills nausea or vomiting.      Review of patient's allergies indicates:  No Known Allergies  Past Medical History:   Diagnosis Date    Anemia     Anemia associated with chemotherapy 9/1/2015    Anemia due to chemotherapy 9/22/2015    Chemotherapy induced nausea and vomiting 6/9/2015    Chemotherapy-induced neutropenia 7/28/2015    Constipation 7/7/2015    Endometrial ca 5/26/2015    Endometrial ca 8/10/2015    Essential hypertension     Hyperlipidemia     Hypertension     Hypokalemia 9/26/2019    NSAID long-term use 9/26/2019    Osteoarthritis     Pain in both hands 2/19/2016    Right-sided low back pain without sciatica 6/23/2016    Uterine cancer     Well woman exam with routine gynecological exam 2/19/2016     Past Surgical History:   Procedure Laterality Date    COLONOSCOPY N/A 5/22/2019    Procedure: COLONOSCOPY;  Surgeon: Kong Rodarte MD;  Location: [facility] ([address] [place] [address]);  Service: Endoscopy;  Laterality: N/A;    D&C Hysteroscopy  March 18, 2015    DILATION AND CURETTAGE OF UTERUS  1969     D&C and uterine suspension.     HYSTERECTOMY  5/11/15    robotic for endometrial cancer    LYMPHADENECTOMY      PELVIC AND PARA-AORTIC LYMPH NODE DISSECTION      PERCUTANEOUS CRYOTHERAPY OF PERIPHERAL NERVE USING LIQUID NITROUS OXIDE IN CLOSED NEEDLE DEVICE Left 6/14/2021    Procedure: CRYOTHERAPY, NERVE, PERIPHERAL, PERCUTANEOUS, USING LIQUID NITROUS OXIDE IN CLOSED NEEDLE DEVICE, LEFT KNEE;  Surgeon: Gladys AUSTIN  BOB Villafuerte;  Location: [facility];  Service: Pain Management;  Laterality: Left;    AZ REMOVAL OF OVARY/TUBE(S)      robotic TLH/BSO and bilatyeral pelvic and para-aortic LND    TOTAL KNEE REPLACEMENT USING COMPUTER NAVIGATION  10/01/2019     Family History   Problem Relation Age of Onset    Cancer Brother 65        pancreatic    Heart disease Father         in his 50's-  at 57     Ovarian cancer Neg Hx     Uterine cancer Neg Hx     Breast cancer Neg Hx     Colon cancer Neg Hx      Social History     Tobacco Use    Smoking status: Never    Smokeless tobacco: Never   Substance Use Topics    Alcohol use: No    Drug use: Never     Review of Systems   Constitutional:  Negative for fever.   HENT:  Negative for sore throat.    Respiratory:  Negative for shortness of breath.    Cardiovascular:  Negative for chest pain.   Gastrointestinal:  Negative for nausea.   Genitourinary:  Negative for dysuria.   Musculoskeletal:  Positive for joint swelling and myalgias. Negative for back pain.   Skin:  Negative for rash.   Neurological:  Negative for weakness.   Hematological:  Does not bruise/bleed easily.       Physical Exam     Initial Vitals [23 1604]   BP Pulse Resp Temp SpO2   (!) 169/81 85 16 99.2 °F (37.3 °C) (!) 93 %      MAP       --         Physical Exam    Vitals reviewed.  Constitutional: She appears well-developed and well-nourished.   HENT:   Head: Normocephalic and atraumatic.   Eyes: Conjunctivae and EOM are normal.   Neck:   Normal range of motion.  Cardiovascular:  Normal rate.           Pulmonary/Chest: No respiratory distress.   Abdominal: Abdomen is soft. She exhibits no distension.   Musculoskeletal:         General: Edema present. Normal range of motion.      Cervical back: Normal range of motion.      Comments: Right ankle:  Non erythematous joint effusion located over the lateral malleolus, patient maintains range of motion although decreased due to discomfort.  There is no erythema or  induration overlying the foot or joint.     Neurological: She is alert and oriented to person, place, and time.   Skin: Skin is warm and dry.   Psychiatric: She has a normal mood and affect. Thought content normal.         ED Course   Procedures  Labs Reviewed   CBC W/ AUTO DIFFERENTIAL - Abnormal; Notable for the following components:       Result Value    Hemoglobin 11.5 (*)     MCH 25.5 (*)     MCHC 30.3 (*)     RDW 15.8 (*)     All other components within normal limits   COMPREHENSIVE METABOLIC PANEL - Abnormal; Notable for the following components:    Potassium 3.0 (*)     CO2 31 (*)     Glucose 119 (*)     BUN 24 (*)     Albumin 3.4 (*)     ALT 9 (*)     eGFR 45.8 (*)     All other components within normal limits   SEDIMENTATION RATE - Abnormal; Notable for the following components:    Sed Rate 97 (*)     All other components within normal limits   C-REACTIVE PROTEIN - Abnormal; Notable for the following components:    CRP 12.8 (*)     All other components within normal limits   URIC ACID - Abnormal; Notable for the following components:    Uric Acid 9.2 (*)     All other components within normal limits    Narrative:     Add on Uric Acid per Dr. Gutierrez @ 19:58 pm to order # [id number]   GRAM STAIN   CULTURE, AEROBIC  (SPECIFY SOURCE)   CULTURE, ANAEROBIC   AFB CULTURE & SMEAR   CULTURE, FUNGUS   HIV 1 / 2 ANTIBODY    Narrative:     Release to patient->Immediate   HEPATITIS C ANTIBODY    Narrative:     Release to patient->Immediate   URIC ACID          Imaging Results              X-Ray Ankle Complete Right (Final result)  Result time 08/29/23 21:09:38      Final result by Henry Jaime MD (08/29/23 21:09:38)                   Impression:      No radiographic evidence of acute displaced fracture or dislocation of the right ankle.  Moderate nonspecific soft tissue edema.      Electronically signed by: Henry Jaime MD  Date:    08/29/2023  Time:    21:09               Narrative:    EXAMINATION:  XR  ANKLE COMPLETE 3 VIEW RIGHT    CLINICAL HISTORY:  Pain in unspecified ankle and joints of unspecified foot    TECHNIQUE:  AP, lateral, and oblique images of the right ankle were performed.    COMPARISON:  07/22/2023    FINDINGS:  There is no radiographic evidence of acute displaced fracture or dislocation.  The ankle mortise appears maintained and symmetric.  There is mild degenerative osteoarthrosis of the hindfoot and midfoot.  There is calcaneal enthesopathic change at the distal Achilles insertion and plantar fascia attachment.  No new aggressive cortical destruction or periosteal reaction identified.  There is moderate nonspecific a subcutaneous soft tissue edema, similar to prior examination.                                       Medications   potassium bicarbonate disintegrating tablet 40 mEq (40 mEq Oral Given 8/29/23 2127)   naproxen tablet 500 mg (500 mg Oral Given 8/29/23 2229)   morphine injection 4 mg (4 mg Intravenous Given 8/29/23 2300)   predniSONE tablet 40 mg (40 mg Oral Given 8/30/23 0245)     Medical Decision Making  Arthritis, goat, also considered but lower suspicion septic arthritis     82-year-old female presents emergency department with atraumatic joint effusion of the right ankle.  I reviewed patient's medical records it appears she is had intermittent episodes over the last year.  While have a lower suspicion for septic arthritis due to warmth and elevated inflammatory markers placed a consult ortho for evaluation.  Patient was given symptomatic management in the emergency department.  Patient was signed out to [facility]  pending joint aspiration results, appropriate treatment will be provided once rule out infection.  Pt discussed with supervising physician        Amount and/or Complexity of Data Reviewed  Labs: ordered. Decision-making details documented in ED Course.  Radiology: ordered.    Risk  Prescription drug management.               ED Course as of 08/30/23 1139   Tue Aug  29, 2023   1936 Spoke with nursing staff to move patient has a CCR for evaluation by Orthopedics, possible joint aspiration. [CR]   2225 Potassium(!): 3.0  Oral placement [CR]   Wed Aug 30, 2023   0205 Gram Stain Result: No WBC's [CC]   0205 Gram Stain Result: No organisms seen [CC]      ED Course User Index  [CC] Isamar Maria PA-C  [CR] Miroslava Ronquillo PA-C                    Clinical Impression:   Final diagnoses:  [M25.579] Ankle pain  [M10.9] Acute gout of right ankle, unspecified cause (Primary)        ED Disposition Condition    Discharge Stable          ED Prescriptions       Medication Sig Dispense Start Date End Date Auth. Provider    predniSONE (DELTASONE) 20 MG tablet Take 2 tablets (40 mg total) by mouth once daily. for 7 days 14 tablet 8/31/2023 9/7/2023 Isamar Maria PA-C          Follow-up Information       Follow up With Specialties Details Why Contact Info Additional Information    [facility] Orthopedics Schedule an appointment as soon as possible for a visit in 1 week  [address], [address]  [facility] [address] [phone] [facility] - Main Building, 5th Floor Please park in [facility] and take Atrium elevator    [facility] - Emergency Dept Emergency Medicine Go to  If symptoms worsen [address]  [facility] [id number] [phone]              Miroslava Ronquillo PA-C  08/30/23 0965

## 2023-08-30 VITALS
OXYGEN SATURATION: 96 % | TEMPERATURE: 98 F | HEART RATE: 76 BPM | RESPIRATION RATE: 16 BRPM | WEIGHT: 220 LBS | SYSTOLIC BLOOD PRESSURE: 172 MMHG | DIASTOLIC BLOOD PRESSURE: 74 MMHG | BODY MASS INDEX: 35.51 KG/M2

## 2023-08-30 PROBLEM — M25.571 RIGHT ANKLE PAIN: Status: ACTIVE | Noted: 2023-08-30

## 2023-08-30 LAB
GRAM STN SPEC: NORMAL
GRAM STN SPEC: NORMAL

## 2023-08-30 PROCEDURE — 63600175 PHARM REV CODE 636 W HCPCS: Mod: HCNC | Performed by: PHYSICIAN ASSISTANT

## 2023-08-30 PROCEDURE — 63600175 PHARM REV CODE 636 W HCPCS: Mod: HCNC

## 2023-08-30 RX ORDER — PREDNISONE 20 MG/1
40 TABLET ORAL
Status: COMPLETED | OUTPATIENT
Start: 2023-08-30 | End: 2023-08-30

## 2023-08-30 RX ORDER — PREDNISONE 20 MG/1
40 TABLET ORAL DAILY
Qty: 14 TABLET | Refills: 0 | Status: SHIPPED | OUTPATIENT
Start: 2023-08-31 | End: 2023-09-07

## 2023-08-30 RX ADMIN — PREDNISONE 40 MG: 20 TABLET ORAL at 02:08

## 2023-08-30 NOTE — PROVIDER PROGRESS NOTES - EMERGENCY DEPT.
Encounter Date: 8/29/2023    ED Physician Progress Notes          ED Physician Hand-off Note:    ED Course: I assumed care of patient from off-going ED physician team. Briefly, Patient is a 82-year-old female presenting for atraumatic ankle swelling.  She underwent arthrocentesis per Orthopedic surgery.    At the time of signout plan was pending Orthopedic surgery recommendation.    Medications given in the ED:    Medications   potassium bicarbonate disintegrating tablet 40 mEq (40 mEq Oral Given 8/29/23 2127)   naproxen tablet 500 mg (500 mg Oral Given 8/29/23 2229)   morphine injection 4 mg (4 mg Intravenous Given 8/29/23 2300)   predniSONE tablet 40 mg (40 mg Oral Given 8/30/23 0245)     Unfortunately, there was not a sufficient sample to needed WBC count but no WBCs or organisms seen on Gram stain.  Case discussed with orthopedics who recommend discharge with treatment as gout with plans for follow-up in clinic. Stressed the importance of follow-up, strict ED return precautions given.  Patient voiced understanding and is comfortable with discharge.

## 2023-08-30 NOTE — ASSESSMENT & PLAN NOTE
Jodi Cole is a 82 y.o. female presenting to the ED with right ankle pain and swelling that has been present for over a month.  Ortho consulted for rule out of septic arthritis of the right ankle.  On presentation, patient was afebrile with a CRP of 12.8, ESR of 97, and WBC of 7.1.  Uric acid elevated to 9.2.  On physical exam, patient noted to have swelling most prominently at the posteromedial aspect of the right ankle along with TTP at the midline of the Achilles insertion site and anterior ankle.  The right ankle was aspirated at the bedside under fluoroscopic guidance (see procedure note for further details).  Due to inadequate fluid sample, WBC of synovial fluid was unable to be obtained.      There is low suspicion for any acute infectious process.  Based on patient previously being treated for gout one year ago with similar symptoms at her left ankle in the setting of elevated uric acid, suspect patient is experiencing an acute gout flare in addition to insertional achilles tendonitis.  Will plan to continue following cultures and recommend treating for gout in addition to WBAT in a short walking boot for suspected insertional achilles tendonitis.  Will plan to have patient follow up in ortho trauma clinic in 1 week for re-evaluation (patient will be messaged with details for followup).        Procedure Note: right ankle aspiration  Patient was explained risks, benefits, and alternatives to treatment and verbalized consent to proceed. Time out was performed and patient name, , site, and procedure were confirmed. Skin was sterilely prepared with alcohol solution. 18 gauge needle on a 20 cc syringe was used for aspiration through anteromedial approach. 0.5 cc of reddish yellow colored fluid collected. Fluid and cultures were obtained and sent for analysis. Aspiration site was covered with a bandaid.

## 2023-08-30 NOTE — HPI
Jodi Cole is a 81 y.o. female with PMH HTN and BL TKA (2019, 2021 by Dr. Fuentes) presents with 1 month of worsening right ankle pain and swelling. She reports waking up with sharp pain at her ankle around the time of July 5th. No recent falls or trauma. No recent illnesses. Endorses multiple previous episodes of ankle swelling, and she was treated for gout 1 year ago for similar symptoms at her left ankle. Her pain has slowly progressed over the past few weeks to the point she is having difficulty bearing weight on her right ankle. She denies a past history of gout flares. Denies febrile episodes at home or N/V.  Ambulates without assistance at baseline.

## 2023-08-30 NOTE — CONSULTS
[facility] - Emergency Dept  Orthopedics  Consult Note    Patient Name: Jodi Cole  MRN: [id number]  Admission Date: 8/29/2023  Hospital Length of Stay: 0 days  Attending Provider: No att. providers found  Primary Care Provider: Margarita Funes MD    Patient information was obtained from patient and ER records.     Inpatient consult to Orthopedic Surgery  Consult performed by: LIAN Kwong MD  Consult ordered by: Miroslava Ronquillo, IAMNI        Subjective:     Principal Problem:Right ankle pain    Chief Complaint:   Chief Complaint   Patient presents with    Ankle Pain     R ankle pain x1 month         HPI: Jodi Cole is a 81 y.o. female with PMH HTN and BL TKA (2019, 2021 by Dr. Fuentes) presents with 1 month of worsening right ankle pain and swelling. She reports waking up with sharp pain at her ankle around the time of July 5th. No recent falls or trauma. No recent illnesses. Endorses multiple previous episodes of ankle swelling, and she was treated for gout 1 year ago for similar symptoms at her left ankle. Her pain has slowly progressed over the past few weeks to the point she is having difficulty bearing weight on her right ankle. She denies a past history of gout flares. Denies febrile episodes at home or N/V.  Ambulates without assistance at baseline.        Past Medical History:   Diagnosis Date    Anemia     Anemia associated with chemotherapy 9/1/2015    Anemia due to chemotherapy 9/22/2015    Chemotherapy induced nausea and vomiting 6/9/2015    Chemotherapy-induced neutropenia 7/28/2015    Constipation 7/7/2015    Endometrial ca 5/26/2015    Endometrial ca 8/10/2015    Essential hypertension     Hyperlipidemia     Hypertension     Hypokalemia 9/26/2019    NSAID long-term use 9/26/2019    Osteoarthritis     Pain in both hands 2/19/2016    Right-sided low back pain without sciatica 6/23/2016    Uterine cancer     Well woman exam with routine gynecological exam 2/19/2016        Past Surgical History:   Procedure Laterality Date    COLONOSCOPY N/A 5/22/2019    Procedure: COLONOSCOPY;  Surgeon: Kong Rodarte MD;  [facility]  Service: Endoscopy;  Laterality: N/A;    D&C Hysteroscopy  March 18, 2015    DILATION AND CURETTAGE OF UTERUS  1969     D&C and uterine suspension.     HYSTERECTOMY  5/11/15    robotic for endometrial cancer    LYMPHADENECTOMY      PELVIC AND PARA-AORTIC LYMPH NODE DISSECTION      PERCUTANEOUS CRYOTHERAPY OF PERIPHERAL NERVE USING LIQUID NITROUS OXIDE IN CLOSED NEEDLE DEVICE Left 6/14/2021    Procedure: CRYOTHERAPY, NERVE, PERIPHERAL, PERCUTANEOUS, USING LIQUID NITROUS OXIDE IN CLOSED NEEDLE DEVICE, LEFT KNEE;  Surgeon: BOB Doll;  Location: [facility];  Service: Pain Management;  Laterality: Left;    MO REMOVAL OF OVARY/TUBE(S)      robotic TLH/BSO and bilatyeral pelvic and para-aortic LND    TOTAL KNEE REPLACEMENT USING COMPUTER NAVIGATION  10/01/2019       Review of patient's allergies indicates:  No Known Allergies    No current facility-administered medications for this encounter.     Current Outpatient Medications   Medication Sig    atorvastatin (LIPITOR) 40 MG tablet Take 1 tablet (40 mg total) by mouth every evening.    furosemide (LASIX) 40 MG tablet TAKE 1 TABLET(40 MG) BY MOUTH EVERY MORNING    losartan (COZAAR) 25 MG tablet TAKE 1 TABLET(25 MG) BY MOUTH EVERY DAY    potassium chloride (KLOR-CON) 10 MEQ TbSR Take 1 tablet (10 mEq total) by mouth once daily. Take one by mouth daily    acetaminophen (TYLENOL) 500 MG tablet Take 1 tablet (500 mg total) by mouth every 6 (six) hours as needed for Pain.    diclofenac sodium (VOLTAREN) 1 % Gel Apply 2 g topically 4 (four) times daily.    multivitamin capsule Take 1 capsule by mouth once daily.    vitamins B1 B6 B12 Tab Take by mouth once daily.     Facility-Administered Medications Ordered in Other Encounters   Medication    midazolam (VERSED) 1 mg/mL  injection 0.5 mg     Family History       Problem Relation (Age of Onset)    Cancer Brother (65)    Heart disease Father          Tobacco Use    Smoking status: Never    Smokeless tobacco: Never   Substance and Sexual Activity    Alcohol use: No    Drug use: Never    Sexual activity: Yes     Partners: Male     ROS  Constitutional: Denies fever/chills  Eyes: Denies change in vision  ENT: Denies sore throat or rhinorrhea   Respiratory: Denies shortness of breath or cough  Cardiovascular: Denies chest pain or palpitations  Gastrointestinal: Denies abdominal pain, nausea, or vomiting  Genitourinary: Denies dysuria and flank pain  Skin: Denies new rash or skin lesions   Allergic/Immunologic: Denies adverse reactions to current medications  Neurological: Denies headaches or dizziness  Musculoskeletal: see HPI    Objective:     Vital Signs (Most Recent):  Temp: 97.1 °F (36.2 °C) (08/29/23 2152)  Pulse: 91 (08/29/23 2152)  Resp: 16 (08/29/23 2300)  BP: (!) 194/88 (08/29/23 2152)  SpO2: 96 % (08/29/23 2152) Vital Signs (24h Range):  Temp:  [97.1 °F (36.2 °C)-99.2 °F (37.3 °C)] 97.1 °F (36.2 °C)  Pulse:  [85-96] 91  Resp:  [16-18] 16  SpO2:  [93 %-96 %] 96 %  BP: (141-194)/(76-88) 194/88     Weight: 99.8 kg (220 lb)     Body mass index is 35.51 kg/m².    No intake or output data in the 24 hours ending 08/30/23 0158     Ortho/SPM Exam  Physical Exam:  General:  no apparent distress, WDWN  HENT:  NCAT, Bilateral ears/eyes normal  CV:  Normal pulses, color, and cap refill  Lungs:  Normal respiratory effort  Neuro: No FND, awake, alert  Psych:  Oriented to Person, Place, Time, and Situation    MSK:       LUE:  Inspection: Skin intact throughout, no swelling, no effusions, no ecchymosis   Palpation: Non-TTP throughout, no palpable abnormality.   ROM: AROM and PROM of the shoulder, elbow, wrist, and hand intact without pain.   Neuro: AIN/PIN/Radial/Median/Ulnar Nerves assessed in isolation without deficit.   SILT throughout.     Vascular: Radial artery palpated 2+. Capillary refill <3s.         RUE:  Inspection: Skin intact throughout, no swelling, no effusions, no ecchymosis   Palpation: Non-TTP throughout, no palpable abnormality.   ROM: AROM and PROM of the shoulder, elbow, wrist, and hand intact without pain.   Neuro: AIN/PIN/Radial/Median/Ulnar Nerves assessed in isolation without deficit.   SILT throughout.    Vascular: Radial artery palpated 2+. Capillary refill <3s.         LLE:  Inspection: Skin intact throughout, no swelling, no effusions, no ecchymosis   Palpation: Non-TTP throughout. No palpable abnormality.   ROM: AROM and PROM of the hip, knee, ankle, and foot intact without pain.   Neuro: TA/EHL/Gastroc/FHL assessed in isolation without deficit.   SILT throughout.    Vascular: Foot is WWP. Capillary refill <3s.         RLE:  Inspection: Skin intact throughout, no open wounds  Swelling present most prominently at the posteromedial ankle  No signs of overlying cellulitis    Palpation: TTP at Achilles tendon insertion and anterior ankle; otherwise non-TTP throughout.  Compartments are soft and compressible   ROM: AROM and PROM of the hip, knee, foot intact without pain.  Active and passive range of motion of the ankle limited secondary to pain   Neuro: TA/Gastroc/EHL/FHL assessed in isolation without deficit.   SILT Sa/Pedroza/DP/SP/T nerve distributions   Vascular: DP and PT arteries palpated 2+. Capillary refill <3s.           Significant Labs: CBC:   Recent Labs   Lab 08/29/23  1906   WBC 7.10   HGB 11.5*   HCT 38.0        Lab Results   Component Value Date    SEDRATE 97 (H) 08/29/2023      Lab Results   Component Value Date    CRP 12.8 (H) 08/29/2023      Lab Results   Component Value Date    URICACID 9.2 (H) 08/29/2023      All pertinent labs within the past 24 hours have been reviewed.    Significant Imaging: I have reviewed and interpreted all pertinent imaging results/findings.    Assessment/Plan:     * Right ankle  margie Cole is a 82 y.o. female presenting to the ED with right ankle pain and swelling that has been present for over a month.  Ortho consulted for rule out of septic arthritis of the right ankle.  On presentation, patient was afebrile with a CRP of 12.8, ESR of 97, and WBC of 7.1.  Uric acid elevated to 9.2.  On physical exam, patient noted to have swelling most prominently at the posteromedial aspect of the right ankle along with TTP at the midline of the Achilles insertion site and anterior ankle.  The right ankle was aspirated at the bedside under fluoroscopic guidance (see procedure note for further details).  Due to inadequate fluid sample, WBC of synovial fluid was unable to be obtained.      There is low suspicion for any acute infectious process.  Based on patient previously being treated for gout one year ago with similar symptoms at her left ankle in the setting of elevated uric acid, suspect patient is experiencing an acute gout flare in addition to insertional achilles tendonitis.  Will plan to continue following cultures and recommend treating for gout in addition to WBAT in a short walking boot for suspected insertional achilles tendonitis.  Will plan to have patient follow up in ortho trauma clinic in 1 week for re-evaluation (patient will be messaged with details for followup).        Procedure Note: right ankle aspiration  Patient was explained risks, benefits, and alternatives to treatment and verbalized consent to proceed. Time out was performed and patient name, , site, and procedure were confirmed. Skin was sterilely prepared with alcohol solution. 18 gauge needle on a 20 cc syringe was used for aspiration through anteromedial approach. 0.5 cc of reddish yellow colored fluid collected. Fluid and cultures were obtained and sent for analysis. Aspiration site was covered with a bandaid.          LIAN Kwong MD  Orthopedics  [facility]

## 2023-08-30 NOTE — SUBJECTIVE & OBJECTIVE
Past Medical History:   Diagnosis Date    Anemia     Anemia associated with chemotherapy 9/1/2015    Anemia due to chemotherapy 9/22/2015    Chemotherapy induced nausea and vomiting 6/9/2015    Chemotherapy-induced neutropenia 7/28/2015    Constipation 7/7/2015    Endometrial ca 5/26/2015    Endometrial ca 8/10/2015    Essential hypertension     Hyperlipidemia     Hypertension     Hypokalemia 9/26/2019    NSAID long-term use 9/26/2019    Osteoarthritis     Pain in both hands 2/19/2016    Right-sided low back pain without sciatica 6/23/2016    Uterine cancer     Well woman exam with routine gynecological exam 2/19/2016       Past Surgical History:   Procedure Laterality Date    COLONOSCOPY N/A 5/22/2019    Procedure: COLONOSCOPY;  Surgeon: Kong Rodarte MD;  Location: [address]);  Service: Endoscopy;  Laterality: N/A;    D&C Hysteroscopy  March 18, 2015    DILATION AND CURETTAGE OF UTERUS  1969     D&C and uterine suspension.     HYSTERECTOMY  5/11/15    robotic for endometrial cancer    LYMPHADENECTOMY      PELVIC AND PARA-AORTIC LYMPH NODE DISSECTION      PERCUTANEOUS CRYOTHERAPY OF PERIPHERAL NERVE USING LIQUID NITROUS OXIDE IN CLOSED NEEDLE DEVICE Left 6/14/2021    Procedure: CRYOTHERAPY, NERVE, PERIPHERAL, PERCUTANEOUS, USING LIQUID NITROUS OXIDE IN CLOSED NEEDLE DEVICE, LEFT KNEE;  Surgeon: BOB Doll;  Location: [facility];  Service: Pain Management;  Laterality: Left;    [place] REMOVAL OF OVARY/TUBE(S)      robotic TLH/BSO and bilatyeral pelvic and para-aortic LND    TOTAL KNEE REPLACEMENT USING COMPUTER NAVIGATION  10/01/2019       Review of patient's allergies indicates:  No Known Allergies    No current facility-administered medications for this encounter.     Current Outpatient Medications   Medication Sig    atorvastatin (LIPITOR) 40 MG tablet Take 1 tablet (40 mg total) by mouth every evening.    furosemide (LASIX) 40 MG tablet TAKE 1 TABLET(40 MG) BY MOUTH EVERY MORNING    losartan  (COZAAR) 25 MG tablet TAKE 1 TABLET(25 MG) BY MOUTH EVERY DAY    potassium chloride (KLOR-CON) 10 MEQ TbSR Take 1 tablet (10 mEq total) by mouth once daily. Take one by mouth daily    acetaminophen (TYLENOL) 500 MG tablet Take 1 tablet (500 mg total) by mouth every 6 (six) hours as needed for Pain.    diclofenac sodium (VOLTAREN) 1 % Gel Apply 2 g topically 4 (four) times daily.    multivitamin capsule Take 1 capsule by mouth once daily.    vitamins B1 B6 B12 Tab Take by mouth once daily.     Facility-Administered Medications Ordered in Other Encounters   Medication    midazolam (VERSED) 1 mg/mL injection 0.5 mg     Family History       Problem Relation (Age of Onset)    Cancer Brother (65)    Heart disease Father          Tobacco Use    Smoking status: Never    Smokeless tobacco: Never   Substance and Sexual Activity    Alcohol use: No    Drug use: Never    Sexual activity: Yes     Partners: Male     ROS  Constitutional: Denies fever/chills  Eyes: Denies change in vision  ENT: Denies sore throat or rhinorrhea   Respiratory: Denies shortness of breath or cough  Cardiovascular: Denies chest pain or palpitations  Gastrointestinal: Denies abdominal pain, nausea, or vomiting  Genitourinary: Denies dysuria and flank pain  Skin: Denies new rash or skin lesions   Allergic/Immunologic: Denies adverse reactions to current medications  Neurological: Denies headaches or dizziness  Musculoskeletal: see HPI    Objective:     Vital Signs (Most Recent):  Temp: 97.1 °F (36.2 °C) (08/29/23 2152)  Pulse: 91 (08/29/23 2152)  Resp: 16 (08/29/23 2300)  BP: (!) 194/88 (08/29/23 2152)  SpO2: 96 % (08/29/23 2152) Vital Signs (24h Range):  Temp:  [97.1 °F (36.2 °C)-99.2 °F (37.3 °C)] 97.1 °F (36.2 °C)  Pulse:  [85-96] 91  Resp:  [16-18] 16  SpO2:  [93 %-96 %] 96 %  BP: (141-194)/(76-88) 194/88     Weight: 99.8 kg (220 lb)     Body mass index is 35.51 kg/m².    No intake or output data in the 24 hours ending 08/30/23 0158     Ortho/SPM  Exam  Physical Exam:  General:  no apparent distress, WDWN  HENT:  NCAT, Bilateral ears/eyes normal  CV:  Normal pulses, color, and cap refill  Lungs:  Normal respiratory effort  Neuro: No FND, awake, alert  Psych:  Oriented to Person, Place, Time, and Situation    MSK:       LUE:  Inspection: Skin intact throughout, no swelling, no effusions, no ecchymosis   Palpation: Non-TTP throughout, no palpable abnormality.   ROM: AROM and PROM of the shoulder, elbow, wrist, and hand intact without pain.   Neuro: AIN/PIN/Radial/Median/Ulnar Nerves assessed in isolation without deficit.   SILT throughout.    Vascular: Radial artery palpated 2+. Capillary refill <3s.         RUE:  Inspection: Skin intact throughout, no swelling, no effusions, no ecchymosis   Palpation: Non-TTP throughout, no palpable abnormality.   ROM: AROM and PROM of the shoulder, elbow, wrist, and hand intact without pain.   Neuro: AIN/PIN/Radial/Median/Ulnar Nerves assessed in isolation without deficit.   SILT throughout.    Vascular: Radial artery palpated 2+. Capillary refill <3s.         LLE:  Inspection: Skin intact throughout, no swelling, no effusions, no ecchymosis   Palpation: Non-TTP throughout. No palpable abnormality.   ROM: AROM and PROM of the hip, knee, ankle, and foot intact without pain.   Neuro: TA/EHL/Gastroc/FHL assessed in isolation without deficit.   SILT throughout.    Vascular: Foot is WWP. Capillary refill <3s.         RLE:  Inspection: Skin intact throughout, no open wounds  Swelling present most prominently at the posteromedial ankle  No signs of overlying cellulitis    Palpation: TTP at Achilles tendon insertion and anterior ankle; otherwise non-TTP throughout.  Compartments are soft and compressible   ROM: AROM and PROM of the hip, knee, foot intact without pain.  Active and passive range of motion of the ankle limited secondary to pain   Neuro: TA/Gastroc/EHL/FHL assessed in isolation without deficit.   SILT Sa/Pedroza/DP/SP/T nerve  distributions   Vascular: DP and PT arteries palpated 2+. Capillary refill <3s.           Significant Labs: CBC:   Recent Labs   Lab 08/29/23  1906   WBC 7.10   HGB 11.5*   HCT 38.0        Lab Results   Component Value Date    SEDRATE 97 (H) 08/29/2023      Lab Results   Component Value Date    CRP 12.8 (H) 08/29/2023      Lab Results   Component Value Date    URICACID 9.2 (H) 08/29/2023      All pertinent labs within the past 24 hours have been reviewed.    Significant Imaging: I have reviewed and interpreted all pertinent imaging results/findings.

## 2023-08-30 NOTE — DISCHARGE INSTRUCTIONS
Diagnosis:  Ankle pain, gout    I suspect your ankle pain is due to gout.  Your lab tests did not suggest a joint infection.  You were seen by our orthopedic surgery doctors.  I am prescribing a course of steroids to help control the inflammation caused by gout. You should take Tylenol as needed for pain up to 3 grams daily which is 6 of the 500 mg extra strength tablets.    Our orthopedic surgery doctors will call to schedule a follow-up appointment.  If you start to have any worsening symptoms, please return to the emergency department.

## 2023-09-02 LAB — BACTERIA SPEC AEROBE CULT: NO GROWTH

## 2023-09-04 ENCOUNTER — TELEPHONE (OUTPATIENT)
Dept: PRIMARY CARE CLINIC | Facility: CLINIC | Age: 83
End: 2023-09-04
Payer: MEDICARE

## 2023-09-04 DIAGNOSIS — I10 ESSENTIAL HYPERTENSION: ICD-10-CM

## 2023-09-04 DIAGNOSIS — E87.6 HYPOKALEMIA: Primary | ICD-10-CM

## 2023-09-04 NOTE — LETTER
September 11, 2023      [facility]  [address]  [facility] [address]  Phone: [phone]  Fax: [phone]       Patient: Jodi Cole   YOB: 1940  Date of Visit: 09/11/2023    ,    I have been trying to reach you via phone to discuss your recent potassium level and confirm how you are taking your Potassium Chloride 10meq. Your last reading was low and  had further recommendations. Can you please give us a call back to further discuss.    Thanks  Romina

## 2023-09-04 NOTE — TELEPHONE ENCOUNTER
Care Due:                  Date            Visit Type   Department     Provider  --------------------------------------------------------------------------------                                EP -                              PRIMARY      [facility]  Last Visit: 01-      CARE ([facility])   CARE           Margarita Funes                              EP -                              PRIMARY      [facility] PRIMARY  Next Visit: 11-      CARE ([facility])   CARE           Margarita Venturarone                                                            Last  Test          Frequency    Reason                     Performed    Due Date  --------------------------------------------------------------------------------    Lipid Panel.  12 months..  atorvastatin.............  10-   10-    [employer] Embedded Care Due Messages. Reference number: [id number].   9/04/2023 4:00:11 AM CDT

## 2023-09-05 RX ORDER — POTASSIUM CHLORIDE 750 MG/1
10 TABLET, EXTENDED RELEASE ORAL
Qty: 90 TABLET | Refills: 0 | Status: SHIPPED | OUTPATIENT
Start: 2023-09-05

## 2023-09-05 NOTE — TELEPHONE ENCOUNTER
I see her potassium was low with recent labs from another provider Please make sure she has been taking her Potassium. Have her take 2 doses today.  Repeat a Potassium and magnesium in 2 wks  Dr. CLARK

## 2023-09-06 ENCOUNTER — TELEPHONE (OUTPATIENT)
Dept: PRIMARY CARE CLINIC | Facility: CLINIC | Age: 83
End: 2023-09-06
Payer: MEDICARE

## 2023-09-06 LAB — BACTERIA SPEC ANAEROBE CULT: NORMAL

## 2023-09-06 NOTE — TELEPHONE ENCOUNTER
----- Message from Hedy Biggs sent at 9/6/2023  3:55 PM CDT -----  Contact: [phone]  Type: Returning a call    Who left a message? Missed call     When did the practice call? About 5 minutes ago.     Comments:

## 2023-09-11 NOTE — TELEPHONE ENCOUNTER
I was finally able to get in touch with patient. She reports not taking her pot chl 10meq every day. States she takes about QOD. I adv her to start taking daily. Repeat lab appt scheduled in 2 weeks

## 2023-09-18 NOTE — PROGRESS NOTES
[facility]  Orthopedics  Progress Note    Patient Name: Jodi Cole  MRN: [id number]  Admission Date: 10/1/2019  Hospital Length of Stay: 0 days  Attending Provider: Joey Fuentes MD  Primary Care Provider: Jose Umana MD  Follow-up For: Procedure(s) (LRB):  REPLACEMENT-KNEE-TOTAL-SIGHT ASSISTED (Right)    Post-Operative Day: 1 Day Post-Op  Subjective:     Principal Problem:Primary osteoarthritis of right knee    Principal Orthopedic Problem: same    Interval History: AFVSS. Walked a few steps with PT yesterday, had some dizziness at that time.    Review of patient's allergies indicates:  No Known Allergies    Current Facility-Administered Medications   Medication    0.9%  NaCl infusion    acetaminophen tablet 1,000 mg    amLODIPine tablet 10 mg    aspirin EC tablet 81 mg    bisacodyl suppository 10 mg    celecoxib capsule 200 mg    famotidine tablet 20 mg    hydrALAZINE tablet 25 mg    midazolam (VERSED) 1 mg/mL injection 0.5 mg    morphine injection 2 mg    morphine injection 2 mg    mupirocin 2 % ointment 1 g    naloxone 0.4 mg/mL injection 0.02 mg    ondansetron injection 4 mg    oxyCODONE immediate release tablet 10 mg    oxyCODONE immediate release tablet 5 mg    polyethylene glycol packet 17 g    pravastatin tablet 40 mg    pregabalin capsule 75 mg    promethazine (PHENERGAN) 6.25 mg in dextrose 5 % 50 mL IVPB    ramelteon tablet 8 mg    ropivacaine (PF) 2 mg/ml (0.2%) infusion    ropivacaine 0.2% ON-Q C-BLOC 400 ML (SELECT A FLOW)    senna-docusate 8.6-50 mg per tablet 1 tablet    triamterene-hydrochlorothiazide 37.5-25 mg per capsule 1 capsule     Objective:     Vital Signs (Most Recent):  Temp: 96.9 °F (36.1 °C) (10/02/19 0407)  Pulse: 68 (10/02/19 0407)  Resp: 16 (10/02/19 0407)  BP: 128/61 (10/02/19 0407)  SpO2: 95 % (10/02/19 0407) Vital Signs (24h Range):  Temp:  [96.9 °F (36.1 °C)-98.1 °F (36.7 °C)] 96.9 °F (36.1 °C)  Pulse:  [67-85]  "68  Resp:  [16-25] 16  SpO2:  [90 %-100 %] 95 %  BP: (128-179)/(61-87) 128/61     Weight: 102.1 kg (225 lb)  Height: 5' 6" (167.6 cm)  Body mass index is 36.32 kg/m².      Intake/Output Summary (Last 24 hours) at 10/2/2019 0620  Last data filed at 10/1/2019 2200  Gross per 24 hour   Intake 2314.77 ml   Output 1050 ml   Net 1264.77 ml       Ortho/SPM Exam   HEENT: normocephalic, atraumatic  Resp: no increased work of breathing  CV: regular rate and rhythm  MSK: moves b/l upper extremities well    Right lower extremity:  Aquacel c/d/i  No ecchymoses, erythema, or swelling  Sensation intact SP/DP/T/Sural/Saphenous nerves  Motor intact EHL/FHL/TA/gastroc  Palpable DP/PT pulses      Significant Labs: All pertinent labs within the past 24 hours have been reviewed.    Significant Imaging: I have reviewed all pertinent imaging results/findings.    Assessment/Plan:     * Primary osteoarthritis of right knee  Jodi Cole is a 78 y.o. female s/p R TKA  - Antibiotics: post-op Ancef  - Weight bearing status: wbat  - Labs: reviewed  - DVT Prophylaxis: mechanical, ASA 81 mg BID  - Lines/Drains: PIV, PNC  - Pain control: multimodal per anesthesia    Dispo: home with outpatient PT      Constipation  GI PPx    Essential hypertension  Home meds          Kiara Salazar MD  Orthopedics  [facility]  " Declined Yes

## 2023-09-21 ENCOUNTER — TELEPHONE (OUTPATIENT)
Dept: PRIMARY CARE CLINIC | Facility: CLINIC | Age: 83
End: 2023-09-21

## 2023-09-21 ENCOUNTER — OFFICE VISIT (OUTPATIENT)
Dept: PRIMARY CARE CLINIC | Facility: CLINIC | Age: 83
End: 2023-09-21
Payer: MEDICARE

## 2023-09-21 VITALS
TEMPERATURE: 99 F | HEIGHT: 66 IN | BODY MASS INDEX: 35.82 KG/M2 | DIASTOLIC BLOOD PRESSURE: 90 MMHG | HEART RATE: 88 BPM | WEIGHT: 222.88 LBS | RESPIRATION RATE: 16 BRPM | SYSTOLIC BLOOD PRESSURE: 146 MMHG | OXYGEN SATURATION: 96 %

## 2023-09-21 DIAGNOSIS — E78.5 HYPERLIPIDEMIA, UNSPECIFIED HYPERLIPIDEMIA TYPE: ICD-10-CM

## 2023-09-21 DIAGNOSIS — C54.1 ENDOMETRIAL CA: ICD-10-CM

## 2023-09-21 DIAGNOSIS — M10.9 GOUT, UNSPECIFIED CAUSE, UNSPECIFIED CHRONICITY, UNSPECIFIED SITE: ICD-10-CM

## 2023-09-21 DIAGNOSIS — I10 ESSENTIAL HYPERTENSION: Primary | ICD-10-CM

## 2023-09-21 DIAGNOSIS — M85.80 OSTEOPENIA, UNSPECIFIED LOCATION: ICD-10-CM

## 2023-09-21 DIAGNOSIS — N18.31 CHRONIC KIDNEY DISEASE, STAGE 3A: ICD-10-CM

## 2023-09-21 DIAGNOSIS — E87.6 HYPOKALEMIA: ICD-10-CM

## 2023-09-21 DIAGNOSIS — E66.01 CLASS 2 SEVERE OBESITY DUE TO EXCESS CALORIES WITH SERIOUS COMORBIDITY AND BODY MASS INDEX (BMI) OF 35.0 TO 35.9 IN ADULT: ICD-10-CM

## 2023-09-21 DIAGNOSIS — Z78.0 POSTMENOPAUSAL: ICD-10-CM

## 2023-09-21 PROCEDURE — 99999 PR PBB SHADOW E&M-EST. PATIENT-LVL IV: ICD-10-PCS | Mod: PBBFAC,HCNC,, | Performed by: INTERNAL MEDICINE

## 2023-09-21 PROCEDURE — 3288F PR FALLS RISK ASSESSMENT DOCUMENTED: ICD-10-PCS | Mod: HCNC,CPTII,S$GLB, | Performed by: INTERNAL MEDICINE

## 2023-09-21 PROCEDURE — 99214 OFFICE O/P EST MOD 30 MIN: CPT | Mod: HCNC,S$GLB,, | Performed by: INTERNAL MEDICINE

## 2023-09-21 PROCEDURE — 1101F PT FALLS ASSESS-DOCD LE1/YR: CPT | Mod: HCNC,CPTII,S$GLB, | Performed by: INTERNAL MEDICINE

## 2023-09-21 PROCEDURE — 3080F PR MOST RECENT DIASTOLIC BLOOD PRESSURE >= 90 MM HG: ICD-10-PCS | Mod: HCNC,CPTII,S$GLB, | Performed by: INTERNAL MEDICINE

## 2023-09-21 PROCEDURE — 3077F PR MOST RECENT SYSTOLIC BLOOD PRESSURE >= 140 MM HG: ICD-10-PCS | Mod: HCNC,CPTII,S$GLB, | Performed by: INTERNAL MEDICINE

## 2023-09-21 PROCEDURE — 1160F PR REVIEW ALL MEDS BY PRESCRIBER/CLIN PHARMACIST DOCUMENTED: ICD-10-PCS | Mod: HCNC,CPTII,S$GLB, | Performed by: INTERNAL MEDICINE

## 2023-09-21 PROCEDURE — 1126F PR PAIN SEVERITY QUANTIFIED, NO PAIN PRESENT: ICD-10-PCS | Mod: HCNC,CPTII,S$GLB, | Performed by: INTERNAL MEDICINE

## 2023-09-21 PROCEDURE — 99999 PR PBB SHADOW E&M-EST. PATIENT-LVL IV: CPT | Mod: PBBFAC,HCNC,, | Performed by: INTERNAL MEDICINE

## 2023-09-21 PROCEDURE — 1160F RVW MEDS BY RX/DR IN RCRD: CPT | Mod: HCNC,CPTII,S$GLB, | Performed by: INTERNAL MEDICINE

## 2023-09-21 PROCEDURE — 1159F PR MEDICATION LIST DOCUMENTED IN MEDICAL RECORD: ICD-10-PCS | Mod: HCNC,CPTII,S$GLB, | Performed by: INTERNAL MEDICINE

## 2023-09-21 PROCEDURE — 1126F AMNT PAIN NOTED NONE PRSNT: CPT | Mod: HCNC,CPTII,S$GLB, | Performed by: INTERNAL MEDICINE

## 2023-09-21 PROCEDURE — 3288F FALL RISK ASSESSMENT DOCD: CPT | Mod: HCNC,CPTII,S$GLB, | Performed by: INTERNAL MEDICINE

## 2023-09-21 PROCEDURE — 1101F PR PT FALLS ASSESS DOC 0-1 FALLS W/OUT INJ PAST YR: ICD-10-PCS | Mod: HCNC,CPTII,S$GLB, | Performed by: INTERNAL MEDICINE

## 2023-09-21 PROCEDURE — 99214 PR OFFICE/OUTPT VISIT, EST, LEVL IV, 30-39 MIN: ICD-10-PCS | Mod: HCNC,S$GLB,, | Performed by: INTERNAL MEDICINE

## 2023-09-21 PROCEDURE — 3077F SYST BP >= 140 MM HG: CPT | Mod: HCNC,CPTII,S$GLB, | Performed by: INTERNAL MEDICINE

## 2023-09-21 PROCEDURE — 3080F DIAST BP >= 90 MM HG: CPT | Mod: HCNC,CPTII,S$GLB, | Performed by: INTERNAL MEDICINE

## 2023-09-21 PROCEDURE — 1159F MED LIST DOCD IN RCRD: CPT | Mod: HCNC,CPTII,S$GLB, | Performed by: INTERNAL MEDICINE

## 2023-09-21 RX ORDER — LOSARTAN POTASSIUM 50 MG/1
50 TABLET ORAL DAILY
Qty: 90 TABLET | Refills: 0 | Status: SHIPPED | OUTPATIENT
Start: 2023-09-21 | End: 2024-01-30

## 2023-09-21 RX ORDER — PREDNISONE 20 MG/1
TABLET ORAL
Qty: 15 TABLET | Refills: 0 | Status: SHIPPED | OUTPATIENT
Start: 2023-09-21

## 2023-09-21 RX ORDER — ATORVASTATIN CALCIUM 40 MG/1
40 TABLET, FILM COATED ORAL NIGHTLY
Qty: 90 TABLET | Refills: 1 | Status: SHIPPED | OUTPATIENT
Start: 2023-09-21 | End: 2023-10-03

## 2023-09-21 NOTE — PROGRESS NOTES
[facility] Note    Chief Complaint      Chief Complaint   Patient presents with    Follow-up       History of Present Illness      Jodi Cole is a 82 y.o. AAF with Endometrial Ca, HTN, Insomnia, OA presents to fu chronic issues. Last visit - 1/9/23.    MAY  - Resolved per pt. She does not get SOB doing housework or going to the grocery She is sedentary stephen since her recent gout flares.  CXR - 1/9/23 - Persistent elevation of the left hemidiaphragm. Echo 1/12/23 - EF 65%; Grade I left ventricular diastolic dysfunction     Gout - ED visit - 6/15/22 - Gout. Rec low purine diet. R ankle pain - ED - 7/22/23- Rx Prednisone x 3 d  and on  8/29/23 - tx with prednisone x 7 d. Has ortho appt Mon. UA - 9.2, ESR - 97, CRP - 12.8 - 8/29/23. Suspect pt may have needed a slower taper.      Left wrist DeQuervain's Tendonitis Right ring trigger finger - Fu by Hand Sx.      Hypokalemia -Potassium level remains  low 3.0 - she had not been taking her Potassium tablet once daily but has since resumed.   Repeat lab appt scheduled in 2 weeks to repeat and check Mg.  I had prev rec a high potassium diet.. Pt  is on lasix.        HTN - Pt prev noncompliant with BP meds.  Her kidney function went down slightly when we restarted her HCTZ prev so I stopped her HCTZ and had her start Amlodipine 2.5 mg/d.  Avoid HCTZ due to recent gout.  She mistakenly stopped her amlodipine and Bp is controlled so will not resume. Pt on Losartan 25 mg/d, and lasix 40 mg/d/ Rec she take Kcl10 mEQ/d.   Rec avoid oral decongestants.      HLD - Pt controlled on Atorvastatin.   Her Cholesterol was not controlled on Pravastatin 40 mg QHS so we changed her to Atorvastatin 40 mg po QHS.  She reports compliance with this. Rec low chol diet and exercise for lifestyle modification.  Will cont to monitor. She will try to cut back to red meat 1/wk. Repeat FLP.      Oa - Pt is s/p RT TKA 10/1/19. S/p Left TKA - 7/8/21.  Fu by OrthoDr. Fuentes.  Pt is  on Tylenol prn - not helping.  Affects Rt shoulder.  Pt on Voltaren gel - no help. She completed PTx for her knee.      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 She was tx with ChemoTx - Carbo and taxol x 6 cycles. Has fu in Aug. and will fu annually with CA -125.  - 10- 8/1/23.     Obesity - BMI - 35.97- Down 12  lb.  she has cut back on portions. She no longer eats late at night. She  cut back on sweets.  Exercise is limited due to knees.       Osteopenia - Calcium max 1000 mg/d and Vit D3 1000 units/d OTC.  In addition, I rec weight-bearing exercises 5 times/wk x 30 minutes as tolerated. We can repeat the DEXA in 2-3 yrs. Will order for Nov.      Noncompliance -Rec compliance with all meds, labs, and fu.       HCM - Flu - admin 9/21/23;  Tdap - ?  defers;  PCV 13 - 1/17/20;  PVX 23 -none- defers; RSV - defers; Shingrix - none - refuses; COVID 19 vaccine (Pfizer) #1 - 3/3/21; #2 3/24/21; #3 plans to set up; MGM - 11/8/22- repeat 1 yr - has order;  DEXA - 11/19/20 - + Osteopenia C-scope - 5/22/19 + polyps - repeat not rec due to age; Ortho - Dr. Fuentes;  Gyn/Onc - Dr. Back; Prev PCP - Dr. Umana       Patient Care Team:  Margarita Funes MD as PCP - General (Internal Medicine)  Jayla Easley MA as Care Coordinator     Health Maintenance:  Immunization History   Administered Date(s) Administered    COVID-19, MRNA, LN-S, PF (Pfizer) (Purple Cap) 03/03/2021, 03/24/2021    Influenza (FLUAD) - Quadrivalent - Adjuvanted - PF *Preferred* (65+) 11/13/2020, 10/10/2022    Influenza - High Dose - PF (65 years and older) 11/19/2019    Pneumococcal Conjugate - 13 Valent 01/17/2020      Health Maintenance   Topic Date Due    TETANUS VACCINE  Never done    Shingles Vaccine (1 of 2) Never done    DEXA Scan  11/19/2023     Lipid Panel  10/11/2027        Past Medical History:  Past Medical History:   Diagnosis Date    Anemia     Anemia associated with chemotherapy 9/1/2015    Anemia due to chemotherapy 9/22/2015    Chemotherapy induced nausea and vomiting 6/9/2015    Chemotherapy-induced neutropenia 7/28/2015    Constipation 7/7/2015    Endometrial ca 5/26/2015    Endometrial ca 8/10/2015    Essential hypertension     Hyperlipidemia     Hypertension     Hypokalemia 9/26/2019    NSAID long-term use 9/26/2019    Osteoarthritis     Pain in both hands 2/19/2016    Right-sided low back pain without sciatica 6/23/2016    Uterine cancer     Well woman exam with routine gynecological exam 2/19/2016       Past Surgical History:   has a past surgical history that includes D&C Hysteroscopy (March 18, 2015); Dilation and curettage of uterus (1969 ); Lymphadenectomy; Hysterectomy (5/11/15); Pelvic and para-aortic lymph node dissection; pr removal of ovary/tube(s); Colonoscopy (N/A, 5/22/2019); Total knee replacement using computer navigation (10/01/2019); and Percutaneous cryotherapy of peripheral nerve using liquid nitrous oxide in closed needle device (Left, 6/14/2021).    Family History:  family history includes Cancer (age of onset: 65) in her brother; Heart disease in her father.     Social History:  Social History     Tobacco Use    Smoking status: Never    Smokeless tobacco: Never   Substance Use Topics    Alcohol use: No    Drug use: Never       Review of Systems   Constitutional:  Negative for chills, diaphoresis and fever.   HENT:  Negative for hearing loss.    Eyes:  Positive for visual disturbance.   Respiratory:  Negative for chest tightness and shortness of breath.    Cardiovascular:  Negative for chest pain and palpitations.   Gastrointestinal:  Negative for abdominal pain, constipation, diarrhea, nausea and vomiting.   Endocrine: Negative for cold intolerance, heat intolerance and polydipsia.   Genitourinary:  Negative for bladder  incontinence, dysuria and frequency.   Musculoskeletal:  Positive for arthralgias.        Rt ankle - swelling. No erythema. 3/10 in severity.    Neurological:  Negative for dizziness and headaches.   Psychiatric/Behavioral:  Negative for dysphoric mood. The patient is not nervous/anxious.         Medications:    Current Outpatient Medications:     acetaminophen (TYLENOL) 500 MG tablet, Take 1 tablet (500 mg total) by mouth every 6 (six) hours as needed for Pain., Disp: 60 tablet, Rfl: 0    furosemide (LASIX) 40 MG tablet, TAKE 1 TABLET(40 MG) BY MOUTH EVERY MORNING, Disp: 90 tablet, Rfl: 2    multivitamin capsule, Take 1 capsule by mouth once daily., Disp: , Rfl:     potassium chloride (KLOR-CON) 10 MEQ TbSR, TAKE 1 TABLET(10 MEQ) BY MOUTH EVERY DAY, Disp: 90 tablet, Rfl: 0    vitamins B1 B6 B12 Tab, Take by mouth once daily., Disp: , Rfl:     atorvastatin (LIPITOR) 40 MG tablet, Take 1 tablet (40 mg total) by mouth every evening., Disp: 90 tablet, Rfl: 1    flu vac 2023 [id number]-[id number],PF, (FLUAD QUAD 2023-24,65Y UP,,PF,) 60 mcg (15 mcg x 4)/0.5 mL Syrg, Inject 0.5 mLs into the muscle once. for 1 dose, Disp: 0.5 mL, Rfl: 0    losartan (COZAAR) 50 MG tablet, Take 1 tablet (50 mg total) by mouth once daily., Disp: 90 tablet, Rfl: 0    predniSONE (DELTASONE) 20 MG tablet, 2 tabs daily x 3 days, then 1.5 tabs daily x 3 days, then 1 tab daily x 3 days, then 0.5 tabs daily x 3 days, Disp: 15 tablet, Rfl: 0  No current facility-administered medications for this visit.    Facility-Administered Medications Ordered in Other Visits:     midazolam (VERSED) 1 mg/mL injection 0.5 mg, 0.5 mg, Intravenous, PRN, Henry Reynoso MD     Allergies:  Review of patient's allergies indicates:  No Known Allergies    Physical Exam      Vital Signs  Temp: 98.6 °F (37 °C)  Temp Source: Oral  Pulse: 88  Resp: 16  SpO2: 96 %  BP: (!) 146/90  BP Location: Left arm  Patient Position: Sitting  Pain Score: 0-No pain  Height and Weight  Height:  "5' 6" (167.6 cm)  Weight: 101.1 kg (222 lb 14.2 oz)  BSA (Calculated - sq m): 2.17 sq meters  BMI (Calculated): 36  Weight in (lb) to have BMI = 25: 154.6      Patient Position: Sitting      Physical Exam  Vitals reviewed.   Constitutional:       General: She is not in acute distress.     Appearance: Normal appearance. She is not ill-appearing, toxic-appearing or diaphoretic.   HENT:      Head: Normocephalic and atraumatic.      Right Ear: Tympanic membrane normal.      Left Ear: Tympanic membrane normal.   Eyes:      Extraocular Movements: Extraocular movements intact.      Conjunctiva/sclera: Conjunctivae normal.      Pupils: Pupils are equal, round, and reactive to light.   Neck:      Vascular: No carotid bruit.   Cardiovascular:      Rate and Rhythm: Normal rate and regular rhythm.      Pulses: Normal pulses.      Heart sounds: Normal heart sounds.   Pulmonary:      Effort: No respiratory distress.      Breath sounds: Normal breath sounds. No wheezing.   Abdominal:      General: Bowel sounds are normal. There is no distension.      Palpations: Abdomen is soft.      Tenderness: There is no abdominal tenderness. There is no guarding or rebound.   Musculoskeletal:      Comments: FROM of tuyet ankles.  NTTP over ankles;+ Swelling no erythema to RT latral malleolous   Neurological:      General: No focal deficit present.      Mental Status: She is alert and oriented to person, place, and time.   Psychiatric:         Mood and Affect: Mood normal.         Behavior: Behavior normal.          Laboratory:  CBC:  Recent Labs   Lab 06/15/22  1855 06/15/22  1901 10/11/22  0750 08/29/23  1906   WBC 5.00  --  5.36 7.10   RBC 4.33  --  4.69 4.51   Hemoglobin 11.4 L  --  11.9 L 11.5 L   POC Hematocrit  --    < >  --   --    Hematocrit 36.0 L  --  39.0 38.0   Platelets 181  --  250 299   MCV 83  --  83 84   MCH 26.3 L  --  25.4 L 25.5 L   MCHC 31.7 L  --  30.5 L 30.3 L    < > = values in this interval not displayed. "       CMP:  Recent Labs   Lab 06/23/21  0847 10/11/22  0750 11/07/22  0720 08/29/23  1906   Glucose 82 87   < > 119 H   Calcium 9.6 9.7   < > 9.4   Albumin 3.6 3.5  --  3.4 L   Total Protein 7.1 7.1  --  7.6   Sodium 146 H 142   < > 140   Potassium 4.0 3.3 L   < > 3.0 L   CO2 30 H 32 H   < > 31 H   Chloride 105 102   < > 98   BUN 19 22   < > 24 H   Creatinine 1.0 1.0   < > 1.2   Alkaline Phosphatase 98 107  --  88   ALT 9 L 9 L  --  9 L   AST 13 11  --  11   Total Bilirubin 0.3 0.6  --  0.2    < > = values in this interval not displayed.            LIPIDS:  Recent Labs   Lab 11/19/20  0845 10/11/22  0750   HDL 67 68   Cholesterol 216 H 210 H   Triglycerides 96 77   LDL Cholesterol 129.8 126.6   HDL/Cholesterol Ratio 31.0 32.4   Non-HDL Cholesterol 149 142   Total Cholesterol/HDL Ratio 3.2 3.1         Recent Labs   Lab 08/29/23  1906   Hepatitis C Ab Non-reactive       Assessment/Plan     Jodi Cole is a 82 y.o.female with:    Essential hypertension  - Uncontrolled today. She reports she has been taking her meds appropriately. Will inc losartan to 50 mg/d. Labs next wk with repeat nurse BP check.      Gout, unspecified cause, unspecified chronicity, unspecified site  -     predniSONE (DELTASONE) 20 MG tablet; 2 tabs daily x 3 days, then 1.5 tabs daily x 3 days, then 1 tab daily x 3 days, then 0.5 tabs daily x 3 days  Dispense: 15 tablet; Refill: 0  - Improved but still c/o pain and has swelling on exam. Will give steroid taper as above. Has Ortho fu  on Mon.     Hyperlipidemia, unspecified hyperlipidemia type  -     atorvastatin (LIPITOR) 40 MG tablet; Take 1 tablet (40 mg total) by mouth every evening.  Dispense: 90 tablet; Refill: 1  -     Lipid Panel; Future; Expected date: 09/21/2023  - Stable.  Cont current regimen.    Chronic kidney disease, stage 3a  - Slightly dec.  Repeat with upcoming labs.       Postmenopausal  -     DXA Bone Density Axial Skeleton 1 or more sites; Future; Expected date:  11/20/2023    Hypokalemia  - Cont Kcl daily.  Repeat labs as above.     Endometrial ca  - Stable.  Cont current regimen.    Class 2 severe obesity due to excess calories with serious comorbidity and body mass index (BMI) of 35.0 to 35.9 in adult  Rec diet and exercise as discussed for wt loss.      Osteopenia, unspecified location  - Stable.  Cont current regimen.      Chronic conditions status updated as per HPI.  Other than changes above, cont current medications and maintain follow up with specialists.  Follow up in about 3 months (around 12/21/2023) for fu chronic issues or sooner if needed.      Margarita Funes MD  [facility]

## 2023-09-21 NOTE — TELEPHONE ENCOUNTER
Lvm requesting a call back . I scheduled a repeat nurse visit during her visit for 3 weeks but will make another for when she comes in for labs.

## 2023-09-21 NOTE — TELEPHONE ENCOUNTER
----- Message from Margarita Funes MD sent at 9/21/2023  1:13 PM CDT -----  Please inform pt her BP was still elevated on repeat. I will inc her Losartan to 50 mg/d. I sent her a new Rx.Rec nurse bp check with labs next wk.     Dr. CLARK

## 2023-09-22 NOTE — TELEPHONE ENCOUNTER
----- Message from Bassam Hernandez sent at 9/21/2023  4:15 PM CDT -----  Contact: [phone]  Patient is returning a phone call.  Who left a message for the patient: Romina  Does patient know what this is regarding:  Yes   Would you like a call back, or a response through your [facility] portal?:   callback  Comments:

## 2023-09-25 ENCOUNTER — OFFICE VISIT (OUTPATIENT)
Dept: ORTHOPEDICS | Facility: CLINIC | Age: 83
End: 2023-09-25
Payer: MEDICARE

## 2023-09-25 VITALS — HEIGHT: 66 IN | BODY MASS INDEX: 35.43 KG/M2 | WEIGHT: 220.44 LBS

## 2023-09-25 DIAGNOSIS — M77.51 TENDONITIS OF ANKLE, RIGHT: Primary | ICD-10-CM

## 2023-09-25 LAB — FUNGUS SPEC CULT: NORMAL

## 2023-09-25 PROCEDURE — 1126F PR PAIN SEVERITY QUANTIFIED, NO PAIN PRESENT: ICD-10-PCS | Mod: HCNC,CPTII,S$GLB, | Performed by: PHYSICIAN ASSISTANT

## 2023-09-25 PROCEDURE — 1101F PT FALLS ASSESS-DOCD LE1/YR: CPT | Mod: HCNC,CPTII,S$GLB, | Performed by: PHYSICIAN ASSISTANT

## 2023-09-25 PROCEDURE — 1159F MED LIST DOCD IN RCRD: CPT | Mod: HCNC,CPTII,S$GLB, | Performed by: PHYSICIAN ASSISTANT

## 2023-09-25 PROCEDURE — 3288F FALL RISK ASSESSMENT DOCD: CPT | Mod: HCNC,CPTII,S$GLB, | Performed by: PHYSICIAN ASSISTANT

## 2023-09-25 PROCEDURE — 3288F PR FALLS RISK ASSESSMENT DOCUMENTED: ICD-10-PCS | Mod: HCNC,CPTII,S$GLB, | Performed by: PHYSICIAN ASSISTANT

## 2023-09-25 PROCEDURE — 1160F PR REVIEW ALL MEDS BY PRESCRIBER/CLIN PHARMACIST DOCUMENTED: ICD-10-PCS | Mod: HCNC,CPTII,S$GLB, | Performed by: PHYSICIAN ASSISTANT

## 2023-09-25 PROCEDURE — 1160F RVW MEDS BY RX/DR IN RCRD: CPT | Mod: HCNC,CPTII,S$GLB, | Performed by: PHYSICIAN ASSISTANT

## 2023-09-25 PROCEDURE — 1101F PR PT FALLS ASSESS DOC 0-1 FALLS W/OUT INJ PAST YR: ICD-10-PCS | Mod: HCNC,CPTII,S$GLB, | Performed by: PHYSICIAN ASSISTANT

## 2023-09-25 PROCEDURE — 1126F AMNT PAIN NOTED NONE PRSNT: CPT | Mod: HCNC,CPTII,S$GLB, | Performed by: PHYSICIAN ASSISTANT

## 2023-09-25 PROCEDURE — 99999 PR PBB SHADOW E&M-EST. PATIENT-LVL III: ICD-10-PCS | Mod: PBBFAC,HCNC,, | Performed by: PHYSICIAN ASSISTANT

## 2023-09-25 PROCEDURE — 99214 OFFICE O/P EST MOD 30 MIN: CPT | Mod: HCNC,S$GLB,, | Performed by: PHYSICIAN ASSISTANT

## 2023-09-25 PROCEDURE — 99214 PR OFFICE/OUTPT VISIT, EST, LEVL IV, 30-39 MIN: ICD-10-PCS | Mod: HCNC,S$GLB,, | Performed by: PHYSICIAN ASSISTANT

## 2023-09-25 PROCEDURE — 99999 PR PBB SHADOW E&M-EST. PATIENT-LVL III: CPT | Mod: PBBFAC,HCNC,, | Performed by: PHYSICIAN ASSISTANT

## 2023-09-25 PROCEDURE — 1159F PR MEDICATION LIST DOCUMENTED IN MEDICAL RECORD: ICD-10-PCS | Mod: HCNC,CPTII,S$GLB, | Performed by: PHYSICIAN ASSISTANT

## 2023-09-25 NOTE — PROGRESS NOTES
SUBJECTIVE:     Chief Complaint & History of Present Illness:  Jodi Cole is a 82 y.o. year old female who presents for evaluation of right ankle pain and swelling.  She states her symptoms began almost 3 months ago.  She was seen in the ED on 8/29- had aspiration which was negative for infection.  There is not a history of trauma.  The pain is located in the lateral aspect of the ankle. She was recently restarted on prednisone taper by her pcp- this has been helpful.  She is doing much better.  She had elevated uric acid but states she does not have a history of gout- had similar episode in left ankle last year but negative for crystals.  There is not a history of previous surgery to the ankle.  The patient does not use an assistive device.      Review of patient's allergies indicates:  No Known Allergies      Current Outpatient Medications   Medication Sig Dispense Refill    acetaminophen (TYLENOL) 500 MG tablet Take 1 tablet (500 mg total) by mouth every 6 (six) hours as needed for Pain. 60 tablet 0    atorvastatin (LIPITOR) 40 MG tablet Take 1 tablet (40 mg total) by mouth every evening. 90 tablet 1    furosemide (LASIX) 40 MG tablet TAKE 1 TABLET(40 MG) BY MOUTH EVERY MORNING 90 tablet 2    losartan (COZAAR) 50 MG tablet Take 1 tablet (50 mg total) by mouth once daily. 90 tablet 0    multivitamin capsule Take 1 capsule by mouth once daily.      potassium chloride (KLOR-CON) 10 MEQ TbSR TAKE 1 TABLET(10 MEQ) BY MOUTH EVERY DAY 90 tablet 0    predniSONE (DELTASONE) 20 MG tablet 2 tabs daily x 3 days, then 1.5 tabs daily x 3 days, then 1 tab daily x 3 days, then 0.5 tabs daily x 3 days 15 tablet 0    vitamins B1 B6 B12 Tab Take by mouth once daily.       No current facility-administered medications for this visit.     Facility-Administered Medications Ordered in Other Visits   Medication Dose Route Frequency Provider Last Rate Last Admin    midazolam (VERSED) 1 mg/mL injection 0.5 mg  0.5 mg Intravenous PRN  Henry Reynoso MD           Past Medical History:   Diagnosis Date    Anemia     Anemia associated with chemotherapy 9/1/2015    Anemia due to chemotherapy 9/22/2015    Chemotherapy induced nausea and vomiting 6/9/2015    Chemotherapy-induced neutropenia 7/28/2015    Constipation 7/7/2015    Endometrial ca 5/26/2015    Endometrial ca 8/10/2015    Essential hypertension     Hyperlipidemia     Hypertension     Hypokalemia 9/26/2019    NSAID long-term use 9/26/2019    Osteoarthritis     Pain in both hands 2/19/2016    Right-sided low back pain without sciatica 6/23/2016    Uterine cancer     Well woman exam with routine gynecological exam 2/19/2016       Past Surgical History:   Procedure Laterality Date    COLONOSCOPY N/A 5/22/2019    Procedure: COLONOSCOPY;  Surgeon: Kong Rodarte MD;  Location: [address];  Service: Endoscopy;  Laterality: N/A;    D&C Hysteroscopy  March 18, 2015    DILATION AND CURETTAGE OF UTERUS  1969     D&C and uterine suspension.     HYSTERECTOMY  5/11/15    robotic for endometrial cancer    LYMPHADENECTOMY      PELVIC AND PARA-AORTIC LYMPH NODE DISSECTION      PERCUTANEOUS CRYOTHERAPY OF PERIPHERAL NERVE USING LIQUID NITROUS OXIDE IN CLOSED NEEDLE DEVICE Left 6/14/2021    Procedure: CRYOTHERAPY, NERVE, PERIPHERAL, PERCUTANEOUS, USING LIQUID NITROUS OXIDE IN CLOSED NEEDLE DEVICE, LEFT KNEE;  Surgeon: BOB Doll;  Location: [facility];  Service: Pain Management;  Laterality: Left;    [place] REMOVAL OF OVARY/TUBE(S)      robotic TLH/BSO and bilatyeral pelvic and para-aortic LND    TOTAL KNEE REPLACEMENT USING COMPUTER NAVIGATION  10/01/2019         Review of Systems:  ROS:  Constitutional: no fever or chills  Eyes: no visual changes  ENT: no nasal congestion or sore throat  Respiratory: no cough or shortness of breath  Musculoskeletal: no arthralgias or myalgias  Behavioral/Psych: no auditory or visual hallucinations    OBJECTIVE:     PHYSICAL EXAM:  General Appearance:  Well nourished, well developed, in no acute distress.  CV: 2+ UE and LE distal pulses bilaterally  RESP: Respirations equal and unlabored  GI: Abdomen soft and non-tender  Neurological: Mood & affect are normal.  right  Foot/Ankle  Skin intact  Minimal swelling today  No warmth or erythema  TTP along lateral ankle, peroneals  No pain or TTP at base of Achilles or medial ankle  Sensation intact  2+ DP    IMAGING:  X-rays of the right ankle, personally reviewed by me, demonstrate mild degenerative changes.  No fracture or dislocation.     ASSESSMENT/PLAN:     82 year old female with right ankle pain    Plan:  - Continue prednisone taper- she has had significant improvement.   - Continue rest, ice and discussed shoe wear  - Follow up if symptoms worsen or fail to improve

## 2023-09-26 NOTE — TELEPHONE ENCOUNTER
N/a;vm full. Will discuss with patient tomorrow at her nurse visit. I have tried to contact her several times

## 2023-09-27 ENCOUNTER — CLINICAL SUPPORT (OUTPATIENT)
Dept: PRIMARY CARE CLINIC | Facility: CLINIC | Age: 83
End: 2023-09-27
Payer: MEDICARE

## 2023-09-27 ENCOUNTER — LAB VISIT (OUTPATIENT)
Dept: LAB | Facility: HOSPITAL | Age: 83
End: 2023-09-27
Attending: INTERNAL MEDICINE
Payer: MEDICARE

## 2023-09-27 VITALS — HEART RATE: 71 BPM | SYSTOLIC BLOOD PRESSURE: 146 MMHG | DIASTOLIC BLOOD PRESSURE: 86 MMHG

## 2023-09-27 DIAGNOSIS — I10 ESSENTIAL HYPERTENSION: Primary | ICD-10-CM

## 2023-09-27 DIAGNOSIS — E78.5 HYPERLIPIDEMIA, UNSPECIFIED HYPERLIPIDEMIA TYPE: ICD-10-CM

## 2023-09-27 DIAGNOSIS — E87.6 HYPOKALEMIA: ICD-10-CM

## 2023-09-27 LAB
CHOLEST SERPL-MCNC: 257 MG/DL (ref 120–199)
CHOLEST/HDLC SERPL: 3.5 {RATIO} (ref 2–5)
HDLC SERPL-MCNC: 73 MG/DL (ref 40–75)
HDLC SERPL: 28.4 % (ref 20–50)
LDLC SERPL CALC-MCNC: 170.4 MG/DL (ref 63–159)
MAGNESIUM SERPL-MCNC: 2.1 MG/DL (ref 1.6–2.6)
NONHDLC SERPL-MCNC: 184 MG/DL
POTASSIUM SERPL-SCNC: 3.8 MMOL/L (ref 3.5–5.1)
TRIGL SERPL-MCNC: 68 MG/DL (ref 30–150)

## 2023-09-27 PROCEDURE — 99999 PR PBB SHADOW E&M-EST. PATIENT-LVL I: CPT | Mod: PBBFAC,HCNC,,

## 2023-09-27 PROCEDURE — 80061 LIPID PANEL: CPT | Mod: HCNC | Performed by: INTERNAL MEDICINE

## 2023-09-27 PROCEDURE — 84132 ASSAY OF SERUM POTASSIUM: CPT | Mod: HCNC | Performed by: INTERNAL MEDICINE

## 2023-09-27 PROCEDURE — 99999 PR PBB SHADOW E&M-EST. PATIENT-LVL I: ICD-10-PCS | Mod: PBBFAC,HCNC,,

## 2023-09-27 PROCEDURE — 36415 COLL VENOUS BLD VENIPUNCTURE: CPT | Mod: HCNC,PN | Performed by: INTERNAL MEDICINE

## 2023-09-27 PROCEDURE — 83735 ASSAY OF MAGNESIUM: CPT | Mod: HCNC | Performed by: INTERNAL MEDICINE

## 2023-09-27 NOTE — PROGRESS NOTES
Patient came in today for a blood pressure check. She is currently on Losartan 50mg/d(started this new dosage yesterday) and Furosemide 40mg/d. Her blood pressure in the right arm was 150/84,pulse 71. Second reading was 146/86.She did not take her medications this morning. She has another nurse visit scheduled on 10/12/23 for a blood pressure check. Patient was advised to take her medication that day so we can obtain an accurate reading.

## 2023-09-28 NOTE — PROGRESS NOTES
Please inform pt -  I reviewed your labs. Your magnesium and Potassium were normal.  Your cholesterol was high. Have you been taking your Atorvastatin 40 mg daily?  If you have been missing doses how often?  If you have not been missing doses I may need to either increase your Atorvastatin to 80 mg or change you to Crestor 40 g.  Dr. CALRK

## 2023-10-03 ENCOUNTER — TELEPHONE (OUTPATIENT)
Dept: PRIMARY CARE CLINIC | Facility: CLINIC | Age: 83
End: 2023-10-03
Payer: MEDICARE

## 2023-10-03 RX ORDER — ROSUVASTATIN CALCIUM 40 MG/1
40 TABLET, COATED ORAL NIGHTLY
Qty: 90 TABLET | Refills: 1 | Status: SHIPPED | OUTPATIENT
Start: 2023-10-03

## 2023-10-03 NOTE — TELEPHONE ENCOUNTER
Ok, well if she is ok with it I would rec changing her to Crestor 40 mg and having her try to set an alarm to remind her to take it. Let me know

## 2023-10-03 NOTE — TELEPHONE ENCOUNTER
Pt was informed of her results and expressed understanding. She states she may sometimes miss 1-2 days a week of her Atorvastatin 40mg

## 2023-10-03 NOTE — TELEPHONE ENCOUNTER
No care due was identified.  [facility] Embedded Care Due Messages. Reference number: [id number].   10/03/2023 2:34:25 PM CDT

## 2023-10-03 NOTE — TELEPHONE ENCOUNTER
----- Message from Margarita Funes MD sent at 9/28/2023  5:55 AM CDT -----  Please inform pt -  I reviewed your labs. Your magnesium and Potassium were normal.  Your cholesterol was high. Have you been taking your Atorvastatin 40 mg daily?  If you have been missing doses how often?  If you have not been missing doses I may need to either increase your Atorvastatin to 80 mg or change you to Crestor 40 g.  Dr. CLARK

## 2023-10-12 ENCOUNTER — CLINICAL SUPPORT (OUTPATIENT)
Dept: PRIMARY CARE CLINIC | Facility: CLINIC | Age: 83
End: 2023-10-12
Payer: MEDICARE

## 2023-10-12 ENCOUNTER — TELEPHONE (OUTPATIENT)
Dept: PRIMARY CARE CLINIC | Facility: CLINIC | Age: 83
End: 2023-10-12

## 2023-10-12 VITALS — OXYGEN SATURATION: 97 % | SYSTOLIC BLOOD PRESSURE: 142 MMHG | DIASTOLIC BLOOD PRESSURE: 80 MMHG | HEART RATE: 72 BPM

## 2023-10-12 DIAGNOSIS — I10 ESSENTIAL HYPERTENSION: Primary | ICD-10-CM

## 2023-10-12 PROCEDURE — 99999 PR PBB SHADOW E&M-EST. PATIENT-LVL III: ICD-10-PCS | Mod: PBBFAC,HCNC,,

## 2023-10-12 PROCEDURE — 99999 PR PBB SHADOW E&M-EST. PATIENT-LVL III: CPT | Mod: PBBFAC,HCNC,,

## 2023-10-12 RX ORDER — AMLODIPINE BESYLATE 2.5 MG/1
2.5 TABLET ORAL DAILY
Qty: 90 TABLET | Refills: 0 | Status: SHIPPED | OUTPATIENT
Start: 2023-10-12 | End: 2024-02-12

## 2023-10-12 NOTE — TELEPHONE ENCOUNTER
Left a voicemail. Dr. Funes thinks her BP is still a little high so she sent amlodipine 2.5mg to her pharmacy, which she wants her to start taking. She wants her to also call immediately if she starts experiencing any leg swelling. I will call again tomorrow.

## 2023-10-12 NOTE — PATIENT INSTRUCTIONS
Ms. Cole is here for blood pressure check.  Patient takes Losartan 50mg once a day.  Patient states she did take her medication this morning.  Patient denies blurred vision, headache, numbness, tingling and N/V.  Patient says she feels good today, she is excited about her birthday coming up in 11 days.  Blood pressure reading was 142/80 in her right arm.  Did not repeat because patient says her ride was waiting for her.  Patient was informed BP reading will be sent to Dr. Funes to further advise.  Patient escorted to the lobby.   Understanding verbalized by patient.

## 2023-10-12 NOTE — LETTER
October 17, 2023    Jodi RICHARDSON Cole  [address] [facility] [address]             [facility]  [address]  [facility] [address]  Phone: [phone]  Fax: [phone] Dear Ms. Cole:    We have been trying to reach you for over a week, and have left you several messages to return the call to the clinic.  Dr. Funes states that your BP is still slightly high. She wants you to add  Amlodipine 2.5 mg  back to your current regimen. You should alert us for any concerns including increased leg swelling. She sent the prescription to your pharmacy.  She also said to enjoy your Bday celebrations.       If you have any questions or concerns, please don't hesitate to call.    Sincerely,        Margarita Funes MD

## 2023-10-12 NOTE — PROGRESS NOTES
BP is still slightly high. Lets have her add  Amlodipine 2.5 mg  back to her current regimen. She should alert us for any concerns including increased leg swelling. I sent the prescription to her pharmacy.  Tell her I said to enjoy her Bday celebrations.     Dr. CLARK

## 2023-10-16 NOTE — TELEPHONE ENCOUNTER
Detailed VM left on patient's callback number to inform that a message was left last week in re: B/P medication.  Please call us back to let us know if she received the message.

## 2023-10-17 NOTE — TELEPHONE ENCOUNTER
Another voicemail left for patient.  Letter with detailed message mailed to patient per Dr. Funes's note.

## 2023-10-18 LAB
ACID FAST MOD KINY STN SPEC: NORMAL
MYCOBACTERIUM SPEC QL CULT: NORMAL

## 2023-11-27 DIAGNOSIS — Z96.652 STATUS POST TOTAL LEFT KNEE REPLACEMENT: Primary | ICD-10-CM

## 2023-11-30 ENCOUNTER — HOSPITAL ENCOUNTER (OUTPATIENT)
Dept: RADIOLOGY | Facility: OTHER | Age: 83
Discharge: HOME | End: 2023-11-30
Attending: OBSTETRICS & GYNECOLOGY
Payer: MEDICARE

## 2023-11-30 DIAGNOSIS — Z12.31 SCREENING MAMMOGRAM, ENCOUNTER FOR: ICD-10-CM

## 2023-11-30 PROCEDURE — 77067 SCR MAMMO BI INCL CAD: CPT | Mod: TC,HCNC

## 2023-11-30 PROCEDURE — 77063 BREAST TOMOSYNTHESIS BI: CPT | Mod: 26,HCNC,, | Performed by: RADIOLOGY

## 2023-11-30 PROCEDURE — 77063 MAMMO DIGITAL SCREENING BILAT WITH TOMO: ICD-10-PCS | Mod: 26,HCNC,, | Performed by: RADIOLOGY

## 2023-11-30 PROCEDURE — 77067 MAMMO DIGITAL SCREENING BILAT WITH TOMO: ICD-10-PCS | Mod: 26,HCNC,, | Performed by: RADIOLOGY

## 2023-11-30 PROCEDURE — 77067 SCR MAMMO BI INCL CAD: CPT | Mod: 26,HCNC,, | Performed by: RADIOLOGY

## 2023-12-22 ENCOUNTER — TELEPHONE (OUTPATIENT)
Dept: ORTHOPEDICS | Facility: CLINIC | Age: 83
End: 2023-12-22
Payer: MEDICARE

## 2023-12-22 NOTE — TELEPHONE ENCOUNTER
Called pt and LVM to call to send a message via the patient portal and we could assitt getting her scheduled for an appointment.       ----- Message from Grady Tavera sent at 12/22/2023 10:01 AM CST -----  Regarding: Advice  Contact: [phone]  Patient is calling to schedule appointment no dates in epic. Please contact pt

## 2024-01-10 ENCOUNTER — TELEPHONE (OUTPATIENT)
Dept: ORTHOPEDICS | Facility: CLINIC | Age: 84
End: 2024-01-10
Payer: MEDICARE

## 2024-01-10 DIAGNOSIS — E78.5 HYPERLIPIDEMIA, UNSPECIFIED HYPERLIPIDEMIA TYPE: ICD-10-CM

## 2024-01-10 RX ORDER — ATORVASTATIN CALCIUM 40 MG/1
40 TABLET, FILM COATED ORAL NIGHTLY
Qty: 90 TABLET | Refills: 1 | OUTPATIENT
Start: 2024-01-10

## 2024-01-10 NOTE — TELEPHONE ENCOUNTER
No care due was identified.  [facility] Embedded Care Due Messages. Reference number: [id number].   1/10/2024 5:04:08 PM CST

## 2024-01-11 NOTE — TELEPHONE ENCOUNTER
Quick DC. Inappropriate Request    Refill Authorization Note   Jodi Cole  is requesting a refill authorization.  Brief Assessment and Rationale for Refill:  Quick Discontinue  Medication Therapy Plan:  DC'd by PCP 10/3/23,reasoning potential dose adjustment    Medication Reconciliation Completed:  No      Comments:     Note composed:10:01 PM 01/10/2024

## 2024-01-29 DIAGNOSIS — I10 ESSENTIAL HYPERTENSION: ICD-10-CM

## 2024-01-29 RX ORDER — LOSARTAN POTASSIUM 25 MG/1
25 TABLET ORAL
Qty: 90 TABLET | Refills: 1 | OUTPATIENT
Start: 2024-01-29

## 2024-01-29 NOTE — TELEPHONE ENCOUNTER
No care due was identified.  [facility] Embedded Care Due Messages. Reference number: [id number].   1/29/2024 12:00:11 PM CST

## 2024-02-26 ENCOUNTER — OFFICE VISIT (OUTPATIENT)
Dept: ORTHOPEDICS | Facility: CLINIC | Age: 84
End: 2024-02-26
Payer: MEDICARE

## 2024-02-26 ENCOUNTER — HOSPITAL ENCOUNTER (OUTPATIENT)
Dept: RADIOLOGY | Facility: HOSPITAL | Age: 84
Discharge: HOME | End: 2024-02-26
Attending: ORTHOPAEDIC SURGERY
Payer: MEDICARE

## 2024-02-26 VITALS — WEIGHT: 229.5 LBS | BODY MASS INDEX: 36.88 KG/M2 | HEIGHT: 66 IN

## 2024-02-26 DIAGNOSIS — Z96.653 STATUS POST TOTAL BILATERAL KNEE REPLACEMENT: Primary | ICD-10-CM

## 2024-02-26 DIAGNOSIS — Z96.652 STATUS POST TOTAL LEFT KNEE REPLACEMENT: ICD-10-CM

## 2024-02-26 PROCEDURE — 73560 X-RAY EXAM OF KNEE 1 OR 2: CPT | Mod: 26,59,HCNC,RT | Performed by: RADIOLOGY

## 2024-02-26 PROCEDURE — 73560 X-RAY EXAM OF KNEE 1 OR 2: CPT | Mod: TC,HCNC,RT

## 2024-02-26 PROCEDURE — 73562 X-RAY EXAM OF KNEE 3: CPT | Mod: 26,HCNC,LT, | Performed by: RADIOLOGY

## 2024-02-26 PROCEDURE — 3288F FALL RISK ASSESSMENT DOCD: CPT | Mod: HCNC,CPTII,S$GLB, | Performed by: ORTHOPAEDIC SURGERY

## 2024-02-26 PROCEDURE — 99999 PR PBB SHADOW E&M-EST. PATIENT-LVL III: CPT | Mod: PBBFAC,HCNC,, | Performed by: ORTHOPAEDIC SURGERY

## 2024-02-26 PROCEDURE — 73562 X-RAY EXAM OF KNEE 3: CPT | Mod: TC,HCNC,LT

## 2024-02-26 PROCEDURE — 99212 OFFICE O/P EST SF 10 MIN: CPT | Mod: HCNC,S$GLB,, | Performed by: ORTHOPAEDIC SURGERY

## 2024-02-26 PROCEDURE — 1126F AMNT PAIN NOTED NONE PRSNT: CPT | Mod: HCNC,CPTII,S$GLB, | Performed by: ORTHOPAEDIC SURGERY

## 2024-02-26 PROCEDURE — 1159F MED LIST DOCD IN RCRD: CPT | Mod: HCNC,CPTII,S$GLB, | Performed by: ORTHOPAEDIC SURGERY

## 2024-02-26 PROCEDURE — 1101F PT FALLS ASSESS-DOCD LE1/YR: CPT | Mod: HCNC,CPTII,S$GLB, | Performed by: ORTHOPAEDIC SURGERY

## 2024-02-26 RX ORDER — ATORVASTATIN CALCIUM 40 MG/1
40 TABLET, FILM COATED ORAL
COMMUNITY
Start: 2023-12-21 | End: 2024-04-16

## 2024-02-26 NOTE — PROGRESS NOTES
Jodi Cole is in for 4 1/2 year follow up for a  right TKA, and three for a left. .  She is doing very well.  No pain in the knees.  She has resumed activities of daily living. She has been active  Exam demonstrates  A well developed female in no distress.  Alert and oriented.  Mood and affect are appropriate.    ROM is 0-120 bilaterally.  The patellae tracks well and there is no instability. The extremities are neurovascularly intact.    Xrays demonstrate a well fixed and positioned prosthesis, bilaterally.        6/27/2022     9:42 AM 10/4/2021     4:40 PM 6/21/2021    10:25 AM 9/23/2019     9:16 AM   PROMIS-10 Questionnaire Scores   Global Physical Health 6 10 12 10   Global Mental health Score 13 10 10 13           6/27/2022     9:40 AM 10/4/2021     4:40 PM 6/21/2021    10:24 AM 9/23/2019     9:16 AM   KOOS Jr. Questionnaire Score   KOOS Jr Score 3 4 13 16           6/27/2022     9:44 AM 10/4/2021     4:41 PM 6/21/2021    10:26 AM   Oxford Knee Questionnaire Score   Oxford Knee Score 46 36 29           6/27/2022     9:45 AM 3/28/2022     9:45 AM 10/4/2021     3:30 PM 6/21/2021    10:30 AM 9/23/2019     8:45 AM   Knee Society and Function Score   FINDINGS - KNEE SOCIETY SCORE 98 98 92 45 40   FINDINGS - KNEE SOCIETY FUNCTION SCORE 90 80 80 50 50            No data to display                Imp:Doing well    F/u in one year with xrays and PROMS

## 2024-04-06 DIAGNOSIS — E78.5 HYPERLIPIDEMIA, UNSPECIFIED HYPERLIPIDEMIA TYPE: Primary | ICD-10-CM

## 2024-04-06 NOTE — TELEPHONE ENCOUNTER
No care due was identified.  [facility] Embedded Care Due Messages. Reference number: [id number].   4/06/2024 3:55:09 AM CDT

## 2024-04-07 NOTE — TELEPHONE ENCOUNTER
Refill Routing Note   Medication(s) are not appropriate for processing by [facility] for the following reason(s):        Drug-drug interaction Duplicate Therapy: atorvastatin, rosuvastatin     [facility] action(s):  Defer             Appointments  past 12m or future 3m with PCP    Date Provider   Last Visit   9/21/2023 Margarita Funes MD   Next Visit   Visit date not found Margarita Funes MD   ED visits in past 90 days: 0        Note composed:11:39 PM 04/06/2024

## 2024-04-07 NOTE — TELEPHONE ENCOUNTER
Crestor and atorvastatin are both on her list.  Please verify that pt is not taking both as she should not be taking both.  We will need to remove the inappropriate Rx from her med list and make sure it is cancelled with her pharm.  I believe we changed her to crestor 40 in Oct.

## 2024-04-11 ENCOUNTER — TELEPHONE (OUTPATIENT)
Dept: PRIMARY CARE CLINIC | Facility: CLINIC | Age: 84
End: 2024-04-11
Payer: MEDICARE

## 2024-04-11 RX ORDER — ROSUVASTATIN CALCIUM 40 MG/1
40 TABLET, COATED ORAL NIGHTLY
Qty: 90 TABLET | Refills: 0 | Status: SHIPPED | OUTPATIENT
Start: 2024-04-11

## 2024-04-11 NOTE — TELEPHONE ENCOUNTER
----- Message from Tri Benoit sent at 4/11/2024  9:44 AM CDT -----  Contact: Home  [phone]  Patient is returning a phone call.  Who left a message for the patient:   Does patient know what this is regarding:    Would you like a call back, or a response through your [facility] portal?:   Call  Comments: Patient said she received a call on today.

## 2024-04-11 NOTE — TELEPHONE ENCOUNTER
----- Message from Dylan Vaughan sent at 4/11/2024 12:46 PM CDT -----  Contact: Self [phone]  Patient is returning a phone call.    Who left a message for the patient: nurse    Does patient know what this is regarding:  unknown     Would you like a call back, or a response through your [facility] portal?:   call back   Comments:

## 2024-04-15 NOTE — PROGRESS NOTES
[facility] Note    Chief Complaint      Chief Complaint   Patient presents with    Follow-up       History of Present Illness      Jodi Cole is a 83 y.o.  AAF with Endometrial Ca, HTN, Insomnia, OA presents to fu chronic issues. Last visit - 9/21/23.    MAY  - Can walk 2 blocks before she gets winded. She does not get SOB doing housework or going to the grocery She is sedentary stephen since her recent gout flares.  CXR - 1/9/23 - Persistent elevation of the left hemidiaphragm. Echo 1/12/23 - EF 65%; Grade I left ventricular diastolic dysfunction     Gout - ED visit - 6/15/22 - Gout. Rec low purine diet. R ankle pain - ED - 7/22/23- Rx Prednisone x 3 d  and on  8/29/23 - tx with prednisone x 7 d. Has ortho appt Mon. UA - 9.2, ESR - 97, CRP - 12.8 - 8/29/23. Suspect pt may have needed a slower taper. No issues recently.      Left wrist DeQuervain's Tendonitis Right ring trigger finger - Fu by Hand Sx.      Hypokalemia - 3.8 - she had not been taking her Potassium tablet once daily but has since resumed and is on higher dose of losartan. Cont to monitor.  Pt  is on lasix.        HTN - Pt prev noncompliant with BP meds.  Avoid HCTZ due to recent gout.    Pt now on Losartan 50 mg/d, amlodipine 2.5mg/d,   lasix 40 mg/d, and  Kcl10 mEQ/d.   Rec avoid oral decongestants. Trace ankle edema off and on.  Magnesium and Potassium were normal.       HLD - Pt on Crestor 40 mg/d. Her Cholesterol was not controlled on  Atorvastatin 40 mg po QHS so we changed her to Crestor 40 mg.  She reports compliance with this. Rec low chol diet and exercise for lifestyle modification.  Will cont to monitor. She will try to cut back to red meat 1/wk. Repeat FLP.      Oa - Pt is s/p RT TKA 10/1/19. S/p Left TKA - 7/8/21.  Fu by Ortho, Dr. Fuentes.  Pt is on Tylenol prn - not helping.  Affects Rt shoulder.  Pt on Voltaren gel - no help. She completed PTx for her knee.      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 She was tx with ChemoTx - Carbo and taxol x 6 cycles. Has fu in Aug. and will fu annually with CA -125.  - 10- 8/1/23.     Obesity - BMI - 36.39 - Up 3  lb since last visit.  she has cut back on portions. She no longer eats late at night. She cut back on sweets.  Exercise is limited due to knees.       Osteopenia - Calcium max 1000 mg/d and Vit D3 1000 units/d OTC.  In addition, I rec weight-bearing exercises 5 times/wk x 30 minutes as tolerated. We can repeat the DEXA in 2 yrs. Has order.       Noncompliance -Rec compliance with all meds, labs, and fu.       HCM - Flu -  9/21/23;  RSV -defers; Tdap - ?  defers;  PCV 13 - 1/17/20;  PVX 23 -none- defers;  Shingrix - none - refuses; COVID 19 vaccine (Pfizer) #1 - 3/3/21; #2 3/24/21; #3 defers; MGM - 11/30/23- repeat 1 yr - has order;  DEXA - 11/19/20 - + Osteopenia- has order;  C-scope - 5/22/19 + polyps - repeat not rec due to age; Ortho - Dr. Fuentes;  Gyn/Onc - Dr. Back; Prev PCP - Dr. Umana     Patient Care Team:  Margarita Funes MD as PCP - General (Internal Medicine)  Jayla Easley MA as Care Coordinator     Health Maintenance:  Immunization History   Administered Date(s) Administered    COVID-19, MRNA, LN-S, PF (Pfizer) (Purple Cap) 03/03/2021, 03/24/2021    Influenza (FLUAD) - Quadrivalent - Adjuvanted - PF *Preferred* (65+) 11/13/2020, 10/10/2022, 09/21/2023    Influenza - High Dose - PF (65 years and older) 11/19/2019    Pneumococcal Conjugate - 13 Valent 01/17/2020      Health Maintenance   Topic Date Due    TETANUS VACCINE  Never done    Shingles Vaccine (1 of 2) Never done    DEXA Scan  11/19/2023    Lipid Panel  09/27/2028        Past Medical History:  Past Medical History:   Diagnosis Date    Anemia     Anemia associated with chemotherapy 9/1/2015     Anemia due to chemotherapy 9/22/2015    Chemotherapy induced nausea and vomiting 6/9/2015    Chemotherapy-induced neutropenia 7/28/2015    Constipation 7/7/2015    Endometrial ca 5/26/2015    Endometrial ca 8/10/2015    Essential hypertension     Hyperlipidemia     Hypertension     Hypokalemia 9/26/2019    NSAID long-term use 9/26/2019    Osteoarthritis     Pain in both hands 2/19/2016    Right-sided low back pain without sciatica 6/23/2016    Uterine cancer     Well woman exam with routine gynecological exam 2/19/2016       Past Surgical History:   has a past surgical history that includes D&C Hysteroscopy (March 18, 2015); Dilation and curettage of uterus (1969 ); Lymphadenectomy; Hysterectomy (5/11/15); Pelvic and para-aortic lymph node dissection; pr removal of ovary/tube(s); Colonoscopy (N/A, 5/22/2019); Total knee replacement using computer navigation (10/01/2019); and Percutaneous cryotherapy of peripheral nerve using liquid nitrous oxide in closed needle device (Left, 6/14/2021).    Family History:  family history includes Cancer (age of onset: 65) in her brother; Heart disease in her father.     Social History:  Social History     Tobacco Use    Smoking status: Never    Smokeless tobacco: Never   Substance Use Topics    Alcohol use: No    Drug use: Never       Review of Systems   Constitutional:  Negative for chills, diaphoresis and fever.   HENT:  Negative for hearing loss.    Eyes:  Positive for visual disturbance.   Respiratory:  Negative for cough and chest tightness.    Cardiovascular:  Positive for leg swelling. Negative for chest pain and palpitations.   Gastrointestinal:  Negative for abdominal pain, constipation, diarrhea, nausea and vomiting.   Endocrine: Negative for cold intolerance, heat intolerance and polydipsia.   Genitourinary:  Negative for bladder incontinence and dysuria.   Musculoskeletal:  Negative for arthralgias and myalgias.   Neurological:  Negative for dizziness and headaches.  "  Psychiatric/Behavioral:  Negative for dysphoric mood. The patient is not nervous/anxious.         Medications:    Current Outpatient Medications:     acetaminophen (TYLENOL) 500 MG tablet, Take 1 tablet (500 mg total) by mouth every 6 (six) hours as needed for Pain., Disp: 60 tablet, Rfl: 0    multivitamin capsule, Take 1 capsule by mouth once daily., Disp: , Rfl:     rosuvastatin (CRESTOR) 40 MG Tab, TAKE 1 TABLET(40 MG) BY MOUTH EVERY EVENING, Disp: 90 tablet, Rfl: 0    vitamins B1 B6 B12 Tab, Take by mouth once daily., Disp: , Rfl:     amLODIPine (NORVASC) 2.5 MG tablet, Take 1 tablet (2.5 mg total) by mouth once daily., Disp: 90 tablet, Rfl: 1    furosemide (LASIX) 40 MG tablet, Take 1 tablet (40 mg total) by mouth every morning., Disp: 90 tablet, Rfl: 2    losartan (COZAAR) 50 MG tablet, Take 1 tablet (50 mg total) by mouth once daily., Disp: 90 tablet, Rfl: 1    potassium chloride (KLOR-CON) 10 MEQ TbSR, Take 1 tablet (10 mEq total) by mouth once daily., Disp: 90 tablet, Rfl: 1  No current facility-administered medications for this visit.    Facility-Administered Medications Ordered in Other Visits:     midazolam (VERSED) 1 mg/mL injection 0.5 mg, 0.5 mg, Intravenous, PRN, Henry Reynoso MD     Allergies:  Review of patient's allergies indicates:  No Known Allergies    Physical Exam      Vital Signs  Temp: 98.1 °F (36.7 °C)  Temp Source: Oral  Pulse: 84  SpO2: 96 %  BP: 134/80  BP Location: Left arm  Patient Position: Sitting  Pain Score: 0-No pain  Height and Weight  Height: 5' 5.98" (167.6 cm)  Weight: 102.2 kg (225 lb 5 oz)  BSA (Calculated - sq m): 2.18 sq meters  BMI (Calculated): 36.4  Weight in (lb) to have BMI = 25: 154.5      Patient Position: Sitting      Physical Exam  Vitals reviewed.   Constitutional:       General: She is not in acute distress.     Appearance: Normal appearance. She is obese. She is not ill-appearing, toxic-appearing or diaphoretic.   HENT:      Head: Normocephalic and " atraumatic.      Right Ear: Tympanic membrane normal.      Left Ear: Tympanic membrane normal.      Mouth/Throat:      Mouth: Mucous membranes are moist.      Pharynx: No posterior oropharyngeal erythema.   Eyes:      Extraocular Movements: Extraocular movements intact.      Conjunctiva/sclera: Conjunctivae normal.      Pupils: Pupils are equal, round, and reactive to light.   Neck:      Vascular: No carotid bruit.   Cardiovascular:      Rate and Rhythm: Normal rate and regular rhythm.      Pulses: Normal pulses.      Heart sounds: Normal heart sounds. No murmur heard.     Comments: Trace ankle edema  Pulmonary:      Breath sounds: No wheezing.      Comments: Dec BS Left base with mild crackles  Abdominal:      General: Bowel sounds are normal. There is no distension.      Palpations: Abdomen is soft.      Tenderness: There is no abdominal tenderness. There is no rebound.   Musculoskeletal:      Cervical back: Neck supple.   Neurological:      General: No focal deficit present.      Mental Status: She is alert and oriented to person, place, and time.   Psychiatric:         Mood and Affect: Mood normal.         Behavior: Behavior normal.          Laboratory:  CBC:  Recent Labs   Lab 06/15/22  1855 06/15/22  1901 10/11/22  0750 08/29/23  1906   WBC 5.00  --  5.36 7.10   RBC 4.33  --  4.69 4.51   Hemoglobin 11.4 L  --  11.9 L 11.5 L   POC Hematocrit  --    < >  --   --    Hematocrit 36.0 L  --  39.0 38.0   Platelets 181  --  250 299   MCV 83  --  83 84   MCH 26.3 L  --  25.4 L 25.5 L   MCHC 31.7 L  --  30.5 L 30.3 L    < > = values in this interval not displayed.       CMP:  Recent Labs   Lab 06/23/21  0847 10/11/22  0750 11/07/22  0720 08/29/23  1906 09/27/23  0831   Glucose 82 87   < > 119 H  --    Calcium 9.6 9.7   < > 9.4  --    Albumin 3.6 3.5  --  3.4 L  --    Total Protein 7.1 7.1  --  7.6  --    Sodium 146 H 142   < > 140  --    Potassium 4.0 3.3 L   < > 3.0 L 3.8   CO2 30 H 32 H   < > 31 H  --    Chloride 105  102   < > 98  --    BUN 19 22   < > 24 H  --    Creatinine 1.0 1.0   < > 1.2  --    Alkaline Phosphatase 98 107  --  88  --    ALT 9 L 9 L  --  9 L  --    AST 13 11  --  11  --    Total Bilirubin 0.3 0.6  --  0.2  --     < > = values in this interval not displayed.          LIPIDS:  Recent Labs   Lab 10/11/22  0750 09/27/23  0831   HDL 68 73   Cholesterol 210 H 257 H   Triglycerides 77 68   LDL Cholesterol 126.6 170.4 H   HDL/Cholesterol Ratio 32.4 28.4   Non-HDL Cholesterol 142 184   Total Cholesterol/HDL Ratio 3.1 3.5             Recent Labs   Lab 08/29/23  1906   Hepatitis C Ab Non-reactive       Assessment/Plan     Jodi Cole is a 83 y.o.female with:    Essential hypertension  -     amLODIPine (NORVASC) 2.5 MG tablet; Take 1 tablet (2.5 mg total) by mouth once daily.  Dispense: 90 tablet; Refill: 1  -     furosemide (LASIX) 40 MG tablet; Take 1 tablet (40 mg total) by mouth every morning.  Dispense: 90 tablet; Refill: 2  -     losartan (COZAAR) 50 MG tablet; Take 1 tablet (50 mg total) by mouth once daily.  Dispense: 90 tablet; Refill: 1  -     potassium chloride (KLOR-CON) 10 MEQ TbSR; Take 1 tablet (10 mEq total) by mouth once daily.  Dispense: 90 tablet; Refill: 1  - Controlled.  Cont current.     Hyperlipidemia, unspecified hyperlipidemia type  -     Lipid Panel; Future; Expected date: 04/16/2024  -     Comprehensive Metabolic Panel; Future; Expected date: 04/16/2024  - Prev uncontrolled so we adjusted regimen.  Cont current. Repeat FLP.     Hypokalemia  - Controlled.  Cont current.  Repeat CMP.     Hyperglycemia  -     Hemoglobin A1C; Future; Expected date: 04/16/2024  - Check Ha1c.     Endometrial ca  - Stable. Fu annually per Dr. Back.     Osteopenia, unspecified location  - Stable.  Cont current regimen.    Class 2 severe obesity due to excess calories with serious comorbidity and body mass index (BMI) of 36.0 to 36.9 in adult  - Rec diet and exercise  for wt loss.      Encounter for vision  screening  -     Ambulatory referral/consult to Optometry; Future; Expected date: 04/23/2024    Screening mammogram, encounter for  -     Mammo Digital Screening Bilat w/ Seun; Future; Expected date: 12/01/2024         Chronic conditions status updated as per HPI.  Other than changes above, cont current medications and maintain follow up with specialists.   Follow up in about 6 months (around 10/16/2024).      Margarita Funes MD  [facility]

## 2024-04-16 ENCOUNTER — LAB VISIT (OUTPATIENT)
Dept: LAB | Facility: HOSPITAL | Age: 84
End: 2024-04-16
Attending: INTERNAL MEDICINE
Payer: MEDICARE

## 2024-04-16 ENCOUNTER — OFFICE VISIT (OUTPATIENT)
Dept: PRIMARY CARE CLINIC | Facility: CLINIC | Age: 84
End: 2024-04-16
Payer: MEDICARE

## 2024-04-16 VITALS
HEIGHT: 66 IN | BODY MASS INDEX: 36.21 KG/M2 | OXYGEN SATURATION: 96 % | WEIGHT: 225.31 LBS | SYSTOLIC BLOOD PRESSURE: 134 MMHG | HEART RATE: 84 BPM | DIASTOLIC BLOOD PRESSURE: 80 MMHG | TEMPERATURE: 98 F

## 2024-04-16 DIAGNOSIS — M85.80 OSTEOPENIA, UNSPECIFIED LOCATION: ICD-10-CM

## 2024-04-16 DIAGNOSIS — Z01.00 ENCOUNTER FOR VISION SCREENING: ICD-10-CM

## 2024-04-16 DIAGNOSIS — E78.5 HYPERLIPIDEMIA, UNSPECIFIED HYPERLIPIDEMIA TYPE: ICD-10-CM

## 2024-04-16 DIAGNOSIS — R73.9 HYPERGLYCEMIA: ICD-10-CM

## 2024-04-16 DIAGNOSIS — C54.1 ENDOMETRIAL CA: ICD-10-CM

## 2024-04-16 DIAGNOSIS — E87.6 HYPOKALEMIA: ICD-10-CM

## 2024-04-16 DIAGNOSIS — I10 ESSENTIAL HYPERTENSION: Primary | ICD-10-CM

## 2024-04-16 DIAGNOSIS — E66.01 CLASS 2 SEVERE OBESITY DUE TO EXCESS CALORIES WITH SERIOUS COMORBIDITY AND BODY MASS INDEX (BMI) OF 36.0 TO 36.9 IN ADULT: ICD-10-CM

## 2024-04-16 DIAGNOSIS — Z12.31 SCREENING MAMMOGRAM, ENCOUNTER FOR: ICD-10-CM

## 2024-04-16 LAB
ALBUMIN SERPL BCP-MCNC: 3.6 G/DL (ref 3.5–5.2)
ALP SERPL-CCNC: 89 U/L (ref 55–135)
ALT SERPL W/O P-5'-P-CCNC: 9 U/L (ref 10–44)
ANION GAP SERPL CALC-SCNC: 11 MMOL/L (ref 8–16)
AST SERPL-CCNC: 14 U/L (ref 10–40)
BILIRUB SERPL-MCNC: 0.2 MG/DL (ref 0.1–1)
BUN SERPL-MCNC: 23 MG/DL (ref 8–23)
CALCIUM SERPL-MCNC: 9.5 MG/DL (ref 8.7–10.5)
CHLORIDE SERPL-SCNC: 105 MMOL/L (ref 95–110)
CHOLEST SERPL-MCNC: 206 MG/DL (ref 120–199)
CHOLEST/HDLC SERPL: 3.3 {RATIO} (ref 2–5)
CO2 SERPL-SCNC: 28 MMOL/L (ref 23–29)
CREAT SERPL-MCNC: 1.2 MG/DL (ref 0.5–1.4)
EST. GFR  (NO RACE VARIABLE): 45.5 ML/MIN/1.73 M^2
GLUCOSE SERPL-MCNC: 78 MG/DL (ref 70–110)
HDLC SERPL-MCNC: 63 MG/DL (ref 40–75)
HDLC SERPL: 30.6 % (ref 20–50)
LDLC SERPL CALC-MCNC: 128 MG/DL (ref 63–159)
NONHDLC SERPL-MCNC: 143 MG/DL
POTASSIUM SERPL-SCNC: 3.7 MMOL/L (ref 3.5–5.1)
PROT SERPL-MCNC: 7.3 G/DL (ref 6–8.4)
SODIUM SERPL-SCNC: 144 MMOL/L (ref 136–145)
TRIGL SERPL-MCNC: 75 MG/DL (ref 30–150)

## 2024-04-16 PROCEDURE — 80061 LIPID PANEL: CPT | Performed by: INTERNAL MEDICINE

## 2024-04-16 PROCEDURE — 83036 HEMOGLOBIN GLYCOSYLATED A1C: CPT | Performed by: INTERNAL MEDICINE

## 2024-04-16 PROCEDURE — 3075F SYST BP GE 130 - 139MM HG: CPT | Mod: CPTII,S$GLB,, | Performed by: INTERNAL MEDICINE

## 2024-04-16 PROCEDURE — 3079F DIAST BP 80-89 MM HG: CPT | Mod: CPTII,S$GLB,, | Performed by: INTERNAL MEDICINE

## 2024-04-16 PROCEDURE — 80053 COMPREHEN METABOLIC PANEL: CPT | Performed by: INTERNAL MEDICINE

## 2024-04-16 PROCEDURE — 1159F MED LIST DOCD IN RCRD: CPT | Mod: CPTII,S$GLB,, | Performed by: INTERNAL MEDICINE

## 2024-04-16 PROCEDURE — 99999 PR PBB SHADOW E&M-EST. PATIENT-LVL IV: CPT | Mod: PBBFAC,,, | Performed by: INTERNAL MEDICINE

## 2024-04-16 PROCEDURE — 1101F PT FALLS ASSESS-DOCD LE1/YR: CPT | Mod: CPTII,S$GLB,, | Performed by: INTERNAL MEDICINE

## 2024-04-16 PROCEDURE — 1160F RVW MEDS BY RX/DR IN RCRD: CPT | Mod: CPTII,S$GLB,, | Performed by: INTERNAL MEDICINE

## 2024-04-16 PROCEDURE — 36415 COLL VENOUS BLD VENIPUNCTURE: CPT | Mod: PN | Performed by: INTERNAL MEDICINE

## 2024-04-16 PROCEDURE — 99214 OFFICE O/P EST MOD 30 MIN: CPT | Mod: S$GLB,,, | Performed by: INTERNAL MEDICINE

## 2024-04-16 PROCEDURE — 3288F FALL RISK ASSESSMENT DOCD: CPT | Mod: CPTII,S$GLB,, | Performed by: INTERNAL MEDICINE

## 2024-04-16 PROCEDURE — 1126F AMNT PAIN NOTED NONE PRSNT: CPT | Mod: CPTII,S$GLB,, | Performed by: INTERNAL MEDICINE

## 2024-04-16 RX ORDER — LOSARTAN POTASSIUM 50 MG/1
50 TABLET ORAL DAILY
Qty: 90 TABLET | Refills: 1 | Status: SHIPPED | OUTPATIENT
Start: 2024-04-16

## 2024-04-16 RX ORDER — POTASSIUM CHLORIDE 750 MG/1
10 TABLET, EXTENDED RELEASE ORAL DAILY
Qty: 90 TABLET | Refills: 1 | Status: SHIPPED | OUTPATIENT
Start: 2024-04-16

## 2024-04-16 RX ORDER — FUROSEMIDE 40 MG/1
40 TABLET ORAL EVERY MORNING
Qty: 90 TABLET | Refills: 2 | Status: SHIPPED | OUTPATIENT
Start: 2024-04-16

## 2024-04-16 RX ORDER — AMLODIPINE BESYLATE 2.5 MG/1
2.5 TABLET ORAL DAILY
Qty: 90 TABLET | Refills: 1 | Status: SHIPPED | OUTPATIENT
Start: 2024-04-16

## 2024-04-17 LAB
ESTIMATED AVG GLUCOSE: 120 MG/DL (ref 68–131)
HBA1C MFR BLD: 5.8 % (ref 4–5.6)

## 2024-04-17 NOTE — PROGRESS NOTES
I sent pt a my chart message -  I reviewed your labs.  Your Ha1c was 5.8 which is consistent with prediabetes.  I recommend you follow a low carb diet.  We will continue to monitor this. Your cholesterol has improved.  Continue your current dose of Rosuvastatin.  Your kidney function remains table.   Dr. CLARK

## 2024-04-19 ENCOUNTER — TELEPHONE (OUTPATIENT)
Dept: PRIMARY CARE CLINIC | Facility: CLINIC | Age: 84
End: 2024-04-19
Payer: MEDICARE

## 2024-04-19 NOTE — TELEPHONE ENCOUNTER
----- Message from Margarita Funes MD sent at 4/17/2024  6:13 AM CDT -----  I sent pt a my chart message -  I reviewed your labs.  Your Ha1c was 5.8 which is consistent with prediabetes.  I recommend you follow a low carb diet.  We will continue to monitor this. Your cholesterol has improved.  Continue your current dose of Rosuvastatin.  Your kidney function remains table.   Dr. CLARK

## 2024-04-19 NOTE — TELEPHONE ENCOUNTER
----- Message from Maru Altman sent at 4/19/2024  4:17 PM CDT -----  Contact: [phone]  Patient is returning a phone call.  Who left a message for the patient: Romina Greco  Does patient know what this is regarding:  n/a   Would you like a call back, or a response through your [facility] portal?:    call back   Comments:

## 2024-04-19 NOTE — LETTER
April 19, 2024    Jodicarrie Cole  [address] [facility] [address]             [facility]  [address]  [facility] [address]  Phone: [phone]  Fax: [phone] Dear Mrs. Cole:    Below are the results from your recent visit:    Resulted Orders   Lipid Panel   Result Value Ref Range    Cholesterol 206 (H) 120 - 199 mg/dL      Comment:      The National Cholesterol Education Program (NCEP) has set the  following guidelines (reference ranges) for Cholesterol:  Optimal.....................<200 mg/dL  Borderline High.............200-239 mg/dL  High........................> or = 240 mg/dL      Triglycerides 75 30 - 150 mg/dL      Comment:      The National Cholesterol Education Program (NCEP) has set the  following guidelines (reference values) for triglycerides:  Normal......................<150 mg/dL  Borderline High.............150-199 mg/dL  High........................200-499 mg/dL      HDL 63 40 - 75 mg/dL      Comment:      The National Cholesterol Education Program (NCEP) has set the  following guidelines (reference values) for HDL Cholesterol:  Low...............<40 mg/dL  Optimal...........>60 mg/dL      LDL Cholesterol 128.0 63.0 - 159.0 mg/dL      Comment:      The National Cholesterol Education Program (NCEP) has set the  following guidelines (reference values) for LDL Cholesterol:  Optimal.......................<130 mg/dL  Borderline High...............130-159 mg/dL  High..........................160-189 mg/dL  Very High.....................>190 mg/dL      HDL/Cholesterol Ratio 30.6 20.0 - 50.0 %    Total Cholesterol/HDL Ratio 3.3 2.0 - 5.0    Non-HDL Cholesterol 143 mg/dL      Comment:      Risk category and Non-HDL cholesterol goals:  Coronary heart disease (CHD)or equivalent (10-year risk of CHD >20%):  Non-HDL cholesterol goal     <130 mg/dL  Two or more CHD risk factors and 10-year risk of CHD <= 20%:  Non-HDL cholesterol goal     <160 mg/dL  0 to 1  CHD risk factor:  Non-HDL cholesterol goal     <190 mg/dL     Comprehensive Metabolic Panel   Result Value Ref Range    Sodium 144 136 - 145 mmol/L    Potassium 3.7 3.5 - 5.1 mmol/L    Chloride 105 95 - 110 mmol/L    CO2 28 23 - 29 mmol/L    Glucose 78 70 - 110 mg/dL    BUN 23 8 - 23 mg/dL    Creatinine 1.2 0.5 - 1.4 mg/dL    Calcium 9.5 8.7 - 10.5 mg/dL    Total Protein 7.3 6.0 - 8.4 g/dL    Albumin 3.6 3.5 - 5.2 g/dL    Total Bilirubin 0.2 0.1 - 1.0 mg/dL      Comment:      For infants and newborns, interpretation of results should be based  on gestational age, weight and in agreement with clinical  observations.    Premature Infant recommended reference ranges:  Up to 24 hours.............<8.0 mg/dL  Up to 48 hours............<12.0 mg/dL  3-5 days..................<15.0 mg/dL  6-29 days.................<15.0 mg/dL      Alkaline Phosphatase 89 55 - 135 U/L    AST 14 10 - 40 U/L    ALT 9 (L) 10 - 44 U/L    eGFR 45.5 (A) >60 mL/min/1.73 m^2    Anion Gap 11 8 - 16 mmol/L   Hemoglobin A1C   Result Value Ref Range    Hemoglobin A1C 5.8 (H) 4.0 - 5.6 %      Comment:      ADA Screening Guidelines:  5.7-6.4%  Consistent with prediabetes  >or=6.5%  Consistent with diabetes    High levels of fetal hemoglobin interfere with the HbA1C  assay. Heterozygous hemoglobin variants (HbS, HgC, etc)do  not significantly interfere with this assay.   However, presence of multiple variants may affect accuracy.      Estimated Avg Glucose 120 68 - 131 mg/dL     I reviewed your labs.  Your Ha1c was 5.8 which is consistent with prediabetes.  I recommend you follow a low carb diet.  We will continue to monitor this. Your cholesterol has improved.  Continue your current dose of Rosuvastatin.  Your kidney function remains table.     Sincerely,        Margarita Funes MD

## 2024-07-15 DIAGNOSIS — E78.5 HYPERLIPIDEMIA, UNSPECIFIED HYPERLIPIDEMIA TYPE: ICD-10-CM

## 2024-07-15 RX ORDER — ROSUVASTATIN CALCIUM 40 MG/1
40 TABLET, COATED ORAL NIGHTLY
Qty: 90 TABLET | Refills: 3 | Status: SHIPPED | OUTPATIENT
Start: 2024-07-15

## 2024-07-15 NOTE — TELEPHONE ENCOUNTER
No care due was identified.  [facility] Embedded Care Due Messages. Reference number: [id number].   7/15/2024 3:57:09 AM CDT

## 2024-07-15 NOTE — TELEPHONE ENCOUNTER
Jodi Cole  is requesting a refill authorization.  Brief Assessment and Rationale for Refill:  Approve     Medication Therapy Plan:         Comments:     Note composed:5:01 AM 07/15/2024

## 2024-08-06 ENCOUNTER — OFFICE VISIT (OUTPATIENT)
Dept: GYNECOLOGIC ONCOLOGY | Facility: CLINIC | Age: 84
End: 2024-08-06
Payer: MEDICARE

## 2024-08-06 ENCOUNTER — LAB VISIT (OUTPATIENT)
Dept: LAB | Facility: HOSPITAL | Age: 84
End: 2024-08-06
Attending: OBSTETRICS & GYNECOLOGY
Payer: MEDICARE

## 2024-08-06 VITALS
HEIGHT: 66 IN | BODY MASS INDEX: 36.48 KG/M2 | DIASTOLIC BLOOD PRESSURE: 66 MMHG | SYSTOLIC BLOOD PRESSURE: 140 MMHG | WEIGHT: 227 LBS | HEART RATE: 92 BPM

## 2024-08-06 DIAGNOSIS — C54.1 ENDOMETRIAL CANCER: Primary | ICD-10-CM

## 2024-08-06 DIAGNOSIS — C54.1 ENDOMETRIAL CANCER: ICD-10-CM

## 2024-08-06 LAB — CANCER AG125 SERPL-ACNC: 10 U/ML (ref 0–30)

## 2024-08-06 PROCEDURE — 1126F AMNT PAIN NOTED NONE PRSNT: CPT | Mod: HCNC,CPTII,S$GLB, | Performed by: OBSTETRICS & GYNECOLOGY

## 2024-08-06 PROCEDURE — 99214 OFFICE O/P EST MOD 30 MIN: CPT | Mod: HCNC,S$GLB,, | Performed by: OBSTETRICS & GYNECOLOGY

## 2024-08-06 PROCEDURE — 99999 PR PBB SHADOW E&M-EST. PATIENT-LVL III: CPT | Mod: PBBFAC,HCNC,, | Performed by: OBSTETRICS & GYNECOLOGY

## 2024-08-06 PROCEDURE — 3078F DIAST BP <80 MM HG: CPT | Mod: HCNC,CPTII,S$GLB, | Performed by: OBSTETRICS & GYNECOLOGY

## 2024-08-06 PROCEDURE — 3288F FALL RISK ASSESSMENT DOCD: CPT | Mod: HCNC,CPTII,S$GLB, | Performed by: OBSTETRICS & GYNECOLOGY

## 2024-08-06 PROCEDURE — 1101F PT FALLS ASSESS-DOCD LE1/YR: CPT | Mod: HCNC,CPTII,S$GLB, | Performed by: OBSTETRICS & GYNECOLOGY

## 2024-08-06 PROCEDURE — 36415 COLL VENOUS BLD VENIPUNCTURE: CPT | Mod: HCNC | Performed by: OBSTETRICS & GYNECOLOGY

## 2024-08-06 PROCEDURE — 86304 IMMUNOASSAY TUMOR CA 125: CPT | Mod: HCNC | Performed by: OBSTETRICS & GYNECOLOGY

## 2024-08-06 PROCEDURE — 3077F SYST BP >= 140 MM HG: CPT | Mod: HCNC,CPTII,S$GLB, | Performed by: OBSTETRICS & GYNECOLOGY

## 2024-08-06 PROCEDURE — 1159F MED LIST DOCD IN RCRD: CPT | Mod: HCNC,CPTII,S$GLB, | Performed by: OBSTETRICS & GYNECOLOGY

## 2024-08-14 ENCOUNTER — TELEPHONE (OUTPATIENT)
Dept: OPHTHALMOLOGY | Facility: CLINIC | Age: 84
End: 2024-08-14
Payer: MEDICARE

## 2024-08-14 ENCOUNTER — OFFICE VISIT (OUTPATIENT)
Dept: OPTOMETRY | Facility: CLINIC | Age: 84
End: 2024-08-14
Payer: MEDICARE

## 2024-08-14 DIAGNOSIS — H26.9 CORTICAL CATARACT OF BOTH EYES: ICD-10-CM

## 2024-08-14 DIAGNOSIS — H52.4 PRESBYOPIA: Primary | ICD-10-CM

## 2024-08-14 DIAGNOSIS — Z83.511 FAMILY HISTORY OF GLAUCOMA IN MOTHER: ICD-10-CM

## 2024-08-14 DIAGNOSIS — Z01.00 ENCOUNTER FOR VISION SCREENING: ICD-10-CM

## 2024-08-14 DIAGNOSIS — H25.13 NS (NUCLEAR SCLEROSIS), BILATERAL: ICD-10-CM

## 2024-08-14 DIAGNOSIS — H40.011 OPEN ANGLE WITH BORDERLINE FINDINGS AND LOW GLAUCOMA RISK IN RIGHT EYE: ICD-10-CM

## 2024-08-14 PROCEDURE — 1126F AMNT PAIN NOTED NONE PRSNT: CPT | Mod: HCNC,CPTII,S$GLB, | Performed by: OPTOMETRIST

## 2024-08-14 PROCEDURE — 99999 PR PBB SHADOW E&M-EST. PATIENT-LVL III: CPT | Mod: PBBFAC,HCNC,, | Performed by: OPTOMETRIST

## 2024-08-14 PROCEDURE — 92015 DETERMINE REFRACTIVE STATE: CPT | Mod: HCNC,S$GLB,, | Performed by: OPTOMETRIST

## 2024-08-14 PROCEDURE — 1159F MED LIST DOCD IN RCRD: CPT | Mod: HCNC,CPTII,S$GLB, | Performed by: OPTOMETRIST

## 2024-08-14 PROCEDURE — 92133 CPTRZD OPH DX IMG PST SGM ON: CPT | Mod: HCNC,S$GLB,, | Performed by: OPTOMETRIST

## 2024-08-14 PROCEDURE — 1101F PT FALLS ASSESS-DOCD LE1/YR: CPT | Mod: HCNC,CPTII,S$GLB, | Performed by: OPTOMETRIST

## 2024-08-14 PROCEDURE — 92004 COMPRE OPH EXAM NEW PT 1/>: CPT | Mod: HCNC,S$GLB,, | Performed by: OPTOMETRIST

## 2024-08-14 PROCEDURE — 3288F FALL RISK ASSESSMENT DOCD: CPT | Mod: HCNC,CPTII,S$GLB, | Performed by: OPTOMETRIST

## 2024-08-14 NOTE — PROGRESS NOTES
HPI     Concerns About Ocular Health     Additional comments: Patient Jodi Cole is an 83 year old female.           Comments    Pt referred by Dr. Margarita Funes for new patient eye exam. Pt states   that she has trouble with near>>distance [facility] over the past few years, uses   OTC +4.00 readers for reading. Pt states that she has trouble with bright   lights and glare. Pt states that she has had a history of floaters but has   not had them in a long time.    DAMIAN: 20+ years ago    Meds: None    POHx: None    FOHx: (+)glaucoma - mother            Last edited by Rehana Waggoner on 8/14/2024  9:59 AM.            Assessment /Plan     For exam results, see Encounter Report.    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

## 2024-10-14 DIAGNOSIS — I10 ESSENTIAL HYPERTENSION: ICD-10-CM

## 2024-10-14 RX ORDER — LOSARTAN POTASSIUM 50 MG/1
TABLET ORAL
Qty: 90 TABLET | Refills: 0 | Status: SHIPPED | OUTPATIENT
Start: 2024-10-14

## 2024-10-14 NOTE — TELEPHONE ENCOUNTER
No care due was identified.  [facility] Embedded Care Due Messages. Reference number: [id number].   10/14/2024 3:57:36 AM CDT

## 2024-10-14 NOTE — TELEPHONE ENCOUNTER
Refill Routing Note   Medication(s) are not appropriate for processing by [facility] for the following reason(s):        Required vitals abnormal    [facility] action(s):  Defer             Appointments  past 12m or future 3m with PCP    Date Provider   Last Visit   4/16/2024 Margarita Funes MD   Next Visit   12/11/2024 Margarita Funes MD   ED visits in past 90 days: 0        Note composed:10:08 AM 10/14/2024

## 2024-10-29 DIAGNOSIS — Z00.00 ENCOUNTER FOR MEDICARE ANNUAL WELLNESS EXAM: ICD-10-CM

## 2024-11-09 DIAGNOSIS — I10 ESSENTIAL HYPERTENSION: ICD-10-CM

## 2024-11-09 NOTE — TELEPHONE ENCOUNTER
No care due was identified.  Health [facility] Embedded Care Due Messages. Reference number: [id number].   11/09/2024 3:56:06 AM CST

## 2024-11-09 NOTE — TELEPHONE ENCOUNTER
Refill Routing Note   Medication(s) are not appropriate for processing by [facility] for the following reason(s):        Required vitals abnormal  (!) 140/66       [facility] action(s):  Defer        Medication Therapy Plan: (!) 140/66      Appointments  past 12m or future 3m with PCP    Date Provider   Last Visit   4/16/2024 Margarita Funes MD   Next Visit   12/11/2024 Margarita Funes MD   ED visits in past 90 days: 0        Note composed:11:35 AM 11/09/2024

## 2024-11-10 RX ORDER — AMLODIPINE BESYLATE 2.5 MG/1
TABLET ORAL
Qty: 90 TABLET | Refills: 1 | Status: SHIPPED | OUTPATIENT
Start: 2024-11-10

## 2024-12-08 NOTE — PROGRESS NOTES
[facility] Note    Chief Complaint      Chief Complaint   Patient presents with    Follow-up       History of Present Illness      Jodi Cole is a 84 y.o.   AAF with Endometrial Ca, HTN, Insomnia, OA presents to fu chronic issues. Her son has had CVA x 2.  Her  and nephew have been ill. Her sister passed away. Last visit - 4/16/24.    Cholesterol has improved. Continue  current dose of Rosuvastatin.      Prediabetes - Ha1c was 5.8 which is consistent with prediabetes. I rec a low carb diet.     MAY  - Can walk 2 blocks before she gets winded. She does not get SOB doing housework or going to the grocery She is sedentary stephen since her recent gout flares.  CXR - 1/9/23 - Persistent elevation of the left hemidiaphragm. Echo 1/12/23 - EF 65%; Grade I left ventricular diastolic dysfunction     Gout - ED visit - 6/15/22 - Gout. Rec low purine diet. R ankle pain - ED - 7/22/23- Rx Prednisone x 3 d  and on  8/29/23 - tx with prednisone x 7 d. Has ortho appt Mon. UA - 9.2, ESR - 97, CRP - 12.8 - 8/29/23. Suspect pt may have needed a slower taper. No issues recently.      Left wrist DeQuervain's Tendonitis Right ring trigger finger - Fu by Hand Sx.      Hypokalemia - 3.8 - she had not been taking her Potassium tablet once daily but has since resumed and is on higher dose of losartan. Cont to monitor.  Pt  is on lasix.        HTN - Pt prev noncompliant with BP meds.  Avoid HCTZ due to recent gout.    Pt now on Losartan 50 mg/d, amlodipine 2.5mg/d,   lasix 40 mg/d, and  Kcl10 mEQ/d.   Rec avoid oral decongestants. Trace ankle edema off and on.  Magnesium and Potassium were normal.       HLD - Pt on Crestor 40 mg/d. Her Cholesterol was not controlled on  Atorvastatin 40 mg po QHS so we changed her to Crestor 40 mg.  She reports compliance with this. Rec low chol diet and exercise for lifestyle modification.  Will cont to monitor. She will try to cut back to red meat 1/wk. Repeat FLP.      Oa - Pt is  s/p RT TKA 10/1/19. S/p Left TKA - 7/8/21.  Fu by Dr. Gina Lunsford.  Pt is on Tylenol prn - not helping.  Affects Rt shoulder.  Pt on Voltaren gel - no help. She completed PTx for her knee.      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 She was tx with ChemoTx - Carbo and taxol x 6 cycles. Has fu in Aug. and will fu annually with CA -125.  - 10- 8/1/23. Fu annually in Aug. With Dr. Back.      Obesity - BMI - 36.05- Up 3  lb since last visit.  she has cut back on portions. She no longer eats late at night. She cut back on sweets.  Exercise is limited due to knees.       Osteopenia - Calcium max 1000 mg/d and Vit D3 1000 units/d OTC.  In addition, I rec weight-bearing exercises 5 times/wk x 30 minutes as tolerated. We can repeat the DEXA in 2 yrs. Has order.       Noncompliance -Rec compliance with all meds, labs, and fu.       HCM - Flu - admin 12/11/24;  RSV -defers; Tdap - ?  defers;  PCV 13 - 1/17/20;  PVX 23 -none- defers;  PCV 20 - none defers; Shingrix - none - refuses; COVID 19 vaccine (Pfizer) #1 - 3/3/21; #2 3/24/21; #3 defers; MGM - 11/30/23- repeat 1 yr - has order;  DEXA - 11/19/20 - + Osteopenia- declines;  C-scope - 5/22/19 + polyps - repeat not rec due to age; Ortho - Dr. Fuentes; Gyn/Onc - Dr. Back; Prev PCP - Dr. Umana    Patient Care Team:  Margarita Funes MD as PCP - General (Internal Medicine)     Health Maintenance:  Immunization History   Administered Date(s) Administered    COVID-19, MRNA, LN-S, PF (Pfizer) (Purple Cap) 03/03/2021, 03/24/2021    Influenza (FLUAD) - Quadrivalent - Adjuvanted - PF *Preferred* (65+) 11/13/2020, 10/10/2022, 09/21/2023    Influenza - Trivalent - Fluad - Adjuvanted - PF (65 years and older 12/11/2024    Influenza - Trivalent - Fluzone High Dose - PF (65 years and  older) 11/19/2019    Pneumococcal Conjugate - 13 Valent 01/17/2020      Health Maintenance   Topic Date Due    TETANUS VACCINE  Never done    Shingles Vaccine (1 of 2) Never done    RSV Vaccine (Age 60+ and Pregnant patients) (1 - 1-dose 75+ series) Never done    Pneumococcal Vaccines (Age 65+) (2 of 2 - PPSV23 or PCV20) 03/13/2020    COVID-19 Vaccine (3 - Pfizer risk series) 04/21/2021    DEXA Scan  11/19/2023    Lipid Panel  04/16/2029    Influenza Vaccine  Completed        Past Medical History:  Past Medical History:   Diagnosis Date    Anemia     Anemia associated with chemotherapy 9/1/2015    Anemia due to chemotherapy 9/22/2015    Chemotherapy induced nausea and vomiting 6/9/2015    Chemotherapy-induced neutropenia 7/28/2015    Constipation 7/7/2015    Endometrial ca 5/26/2015    Endometrial ca 8/10/2015    Essential hypertension     Hyperlipidemia     Hypertension     Hypokalemia 9/26/2019    NSAID long-term use 9/26/2019    Osteoarthritis     Pain in both hands 2/19/2016    Right-sided low back pain without sciatica 6/23/2016    Uterine cancer     Well woman exam with routine gynecological exam 2/19/2016       Past Surgical History:   has a past surgical history that includes D&C Hysteroscopy (March 18, 2015); Dilation and curettage of uterus (1969 ); Lymphadenectomy; Hysterectomy (5/11/15); Pelvic and para-aortic lymph node dissection; pr removal of ovary/tube(s); Colonoscopy (N/A, 5/22/2019); Total knee replacement using computer navigation (10/01/2019); and Percutaneous cryotherapy of peripheral nerve using liquid nitrous oxide in closed needle device (Left, 6/14/2021).    Family History:  family history includes Cancer (age of onset: 65) in her brother; Endometrial cancer in her sister; Heart disease in her father; Kidney failure in her sister; Stroke in her son.     Social History:  Social History     Tobacco Use    Smoking status: Never    Smokeless tobacco: Never   Substance Use Topics    Alcohol use:  No    Drug use: Never       Review of Systems   Constitutional:  Negative for chills, diaphoresis and fever.   HENT:  Negative for nasal congestion, rhinorrhea and sore throat.    Eyes:  Positive for visual disturbance.        Has cataracts - fu by Ophtho - told a candidate for sx but has not decided on when she wants to do so.    Respiratory:  Negative for cough, chest tightness, shortness of breath and wheezing.    Cardiovascular:  Negative for chest pain and palpitations.   Gastrointestinal:  Negative for abdominal pain, constipation, diarrhea, nausea and vomiting.   Endocrine: Positive for heat intolerance. Negative for cold intolerance and polydipsia.   Genitourinary:  Negative for dysuria, frequency and vaginal bleeding.   Musculoskeletal:  Negative for arthralgias and myalgias.   Neurological:  Negative for dizziness and headaches.   Psychiatric/Behavioral:  Negative for depressed mood. The patient is not nervous/anxious.         Medications:    Current Outpatient Medications:     acetaminophen (TYLENOL) 500 MG tablet, Take 1 tablet (500 mg total) by mouth every 6 (six) hours as needed for Pain., Disp: 60 tablet, Rfl: 0    amLODIPine (NORVASC) 2.5 MG tablet, TAKE 1 TABLET(2.5 MG) BY MOUTH DAILY, Disp: 90 tablet, Rfl: 1    multivitamin capsule, Take 1 capsule by mouth once daily., Disp: , Rfl:     rosuvastatin (CRESTOR) 40 MG Tab, TAKE 1 TABLET(40 MG) BY MOUTH EVERY EVENING, Disp: 90 tablet, Rfl: 3    vitamins B1 B6 B12 Tab, Take by mouth once daily., Disp: , Rfl:     furosemide (LASIX) 40 MG tablet, Take 1 tablet (40 mg total) by mouth every morning., Disp: 90 tablet, Rfl: 2    losartan (COZAAR) 50 MG tablet, Take 1 tablet (50 mg total) by mouth once daily., Disp: 90 tablet, Rfl: 1    potassium chloride (KLOR-CON) 10 MEQ TbSR, Take 1 tablet (10 mEq total) by mouth once daily., Disp: 90 tablet, Rfl: 1  No current facility-administered medications for this visit.    Facility-Administered Medications Ordered in  "Other Visits:     midazolam (VERSED) 1 mg/mL injection 0.5 mg, 0.5 mg, Intravenous, PRN, Henry Reynoso MD     Allergies:  Review of patient's allergies indicates:  No Known Allergies    Physical Exam      Vital Signs  Temp: 98.2 °F (36.8 °C)  Pulse: 91  Resp: 20  SpO2: 97 %  BP: 120/70  BP Location: Left arm  Patient Position: Sitting  Pain Score: 0-No pain  Height and Weight  Height: 5' 6" (167.6 cm)  Weight: 101.3 kg (223 lb 5.2 oz)  BSA (Calculated - sq m): 2.17 sq meters  BMI (Calculated): 36.1  Weight in (lb) to have BMI = 25: 154.6      Patient Position: Sitting      Physical Exam  Vitals reviewed.   Constitutional:       General: She is not in acute distress.     Appearance: Normal appearance. She is not ill-appearing, toxic-appearing or diaphoretic.   HENT:      Head: Normocephalic and atraumatic.      Right Ear: Tympanic membrane normal.      Left Ear: Tympanic membrane normal.      Mouth/Throat:      Mouth: Mucous membranes are dry.   Eyes:      Extraocular Movements: Extraocular movements intact.      Pupils: Pupils are equal, round, and reactive to light.   Neck:      Vascular: No carotid bruit.   Cardiovascular:      Rate and Rhythm: Normal rate and regular rhythm.      Pulses: Normal pulses.      Heart sounds: Murmur heard.      Comments: II - III/VI radiates to sternal notch and carotids; trace BLE edema  Pulmonary:      Effort: No respiratory distress.      Breath sounds: Normal breath sounds. No wheezing.   Abdominal:      General: Bowel sounds are normal. There is no distension.      Palpations: Abdomen is soft.      Tenderness: There is no abdominal tenderness. There is no guarding.   Musculoskeletal:      Comments: Arthritic changes to hands   Neurological:      General: No focal deficit present.      Mental Status: She is alert and oriented to person, place, and time.   Psychiatric:         Mood and Affect: Mood normal.         Behavior: Behavior normal.      "     Laboratory:  CBC:  Recent Labs   Lab 06/15/22  1855 06/15/22  1901 10/11/22  0750 08/29/23  1906   WBC 5.00  --  5.36 7.10   RBC 4.33  --  4.69 4.51   Hemoglobin 11.4 L  --  11.9 L 11.5 L   POC Hematocrit  --    < >  --   --    Hematocrit 36.0 L  --  39.0 38.0   Platelets 181  --  250 299   MCV 83  --  83 84   MCH 26.3 L  --  25.4 L 25.5 L   MCHC 31.7 L  --  30.5 L 30.3 L    < > = values in this interval not displayed.       CMP:  Recent Labs   Lab 10/11/22  0750 11/07/22  0720 08/29/23 1906 09/27/23  0831 04/16/24  1200   Glucose 87   < > 119 H  --  78   Calcium 9.7   < > 9.4  --  9.5   Albumin 3.5  --  3.4 L  --  3.6   Total Protein 7.1  --  7.6  --  7.3   Sodium 142   < > 140  --  144   Potassium 3.3 L   < > 3.0 L   < > 3.7   CO2 32 H   < > 31 H  --  28   Chloride 102   < > 98  --  105   BUN 22   < > 24 H  --  23   Creatinine 1.0   < > 1.2  --  1.2   Alkaline Phosphatase 107  --  88  --  89   ALT 9 L  --  9 L  --  9 L   AST 11  --  11  --  14   Total Bilirubin 0.6  --  0.2  --  0.2    < > = values in this interval not displayed.          LIPIDS:  Recent Labs   Lab 10/11/22  0750 09/27/23  0831 04/16/24  1200   HDL 68 73 63   Cholesterol 210 H 257 H 206 H   Triglycerides 77 68 75   LDL Cholesterol 126.6 170.4 H 128.0   HDL/Cholesterol Ratio 32.4 28.4 30.6   Non-HDL Cholesterol 142 184 143   Total Cholesterol/HDL Ratio 3.1 3.5 3.3          A1C:  Recent Labs   Lab 04/16/24  1200   Hemoglobin A1C 5.8 H            Recent Labs   Lab 08/29/23  1906   Hepatitis C Ab Non-reactive       Assessment/Plan     Jodicarrie Cole is a 84 y.o.female with:    Essential hypertension  -     potassium chloride (KLOR-CON) 10 MEQ TbSR; Take 1 tablet (10 mEq total) by mouth once daily.  Dispense: 90 tablet; Refill: 1  -     losartan (COZAAR) 50 MG tablet; Take 1 tablet (50 mg total) by mouth once daily.  Dispense: 90 tablet; Refill: 1  -     furosemide (LASIX) 40 MG tablet; Take 1 tablet (40 mg total) by mouth every morning.   Dispense: 90 tablet; Refill: 2  -     Comprehensive Metabolic Panel; Future; Expected date: 12/11/2024  - Controlled.  Cont current.     Hyperlipidemia, unspecified hyperlipidemia type  -     Lipid Panel; Future; Expected date: 12/11/2024  - Stable.  Repeat FLP.     Endometrial ca  - Stable.       Osteopenia, unspecified location  - Stable.  Cont current regimen.has order for repeat DEXA.     Chronic kidney disease, stage 3a  - Stable.  Cont current regimen.    Class 2 severe obesity due to excess calories with serious comorbidity and body mass index (BMI) of 36.0 to 36.9 in adult  - Rec diet and exercise as discussed for wt loss.      Flu vaccine need  -     influenza (adjuvanted) (Fluad) 45 mcg/0.5 mL IM vaccine (> or = 66 yo) 0.5 mL  - Admin today      Chronic conditions status updated as per HPI.  Other than changes above, cont current medications and maintain follow up with specialists.   Follow up in about 6 months (around 6/11/2025).      Margarita Funes MD  [facility]

## 2024-12-11 ENCOUNTER — OFFICE VISIT (OUTPATIENT)
Dept: PRIMARY CARE CLINIC | Facility: CLINIC | Age: 84
End: 2024-12-11
Payer: MEDICARE

## 2024-12-11 VITALS
TEMPERATURE: 98 F | BODY MASS INDEX: 35.89 KG/M2 | OXYGEN SATURATION: 97 % | WEIGHT: 223.31 LBS | SYSTOLIC BLOOD PRESSURE: 120 MMHG | DIASTOLIC BLOOD PRESSURE: 70 MMHG | HEART RATE: 91 BPM | HEIGHT: 66 IN | RESPIRATION RATE: 20 BRPM

## 2024-12-11 DIAGNOSIS — I10 ESSENTIAL HYPERTENSION: Primary | ICD-10-CM

## 2024-12-11 DIAGNOSIS — E66.01 CLASS 2 SEVERE OBESITY DUE TO EXCESS CALORIES WITH SERIOUS COMORBIDITY AND BODY MASS INDEX (BMI) OF 36.0 TO 36.9 IN ADULT: ICD-10-CM

## 2024-12-11 DIAGNOSIS — N18.31 CHRONIC KIDNEY DISEASE, STAGE 3A: ICD-10-CM

## 2024-12-11 DIAGNOSIS — M85.80 OSTEOPENIA, UNSPECIFIED LOCATION: ICD-10-CM

## 2024-12-11 DIAGNOSIS — C54.1 ENDOMETRIAL CA: ICD-10-CM

## 2024-12-11 DIAGNOSIS — Z23 FLU VACCINE NEED: ICD-10-CM

## 2024-12-11 DIAGNOSIS — E66.812 CLASS 2 SEVERE OBESITY DUE TO EXCESS CALORIES WITH SERIOUS COMORBIDITY AND BODY MASS INDEX (BMI) OF 36.0 TO 36.9 IN ADULT: ICD-10-CM

## 2024-12-11 DIAGNOSIS — E78.5 HYPERLIPIDEMIA, UNSPECIFIED HYPERLIPIDEMIA TYPE: ICD-10-CM

## 2024-12-11 PROCEDURE — 99999 PR PBB SHADOW E&M-EST. PATIENT-LVL IV: CPT | Mod: PBBFAC,HCNC,, | Performed by: INTERNAL MEDICINE

## 2024-12-11 PROCEDURE — 99214 OFFICE O/P EST MOD 30 MIN: CPT | Mod: HCNC,25,S$GLB, | Performed by: INTERNAL MEDICINE

## 2024-12-11 PROCEDURE — 1160F RVW MEDS BY RX/DR IN RCRD: CPT | Mod: HCNC,CPTII,S$GLB, | Performed by: INTERNAL MEDICINE

## 2024-12-11 PROCEDURE — 1101F PT FALLS ASSESS-DOCD LE1/YR: CPT | Mod: HCNC,CPTII,S$GLB, | Performed by: INTERNAL MEDICINE

## 2024-12-11 PROCEDURE — 3288F FALL RISK ASSESSMENT DOCD: CPT | Mod: HCNC,CPTII,S$GLB, | Performed by: INTERNAL MEDICINE

## 2024-12-11 PROCEDURE — 3074F SYST BP LT 130 MM HG: CPT | Mod: HCNC,CPTII,S$GLB, | Performed by: INTERNAL MEDICINE

## 2024-12-11 PROCEDURE — G0008 ADMIN INFLUENZA VIRUS VAC: HCPCS | Mod: HCNC,S$GLB,, | Performed by: INTERNAL MEDICINE

## 2024-12-11 PROCEDURE — 1159F MED LIST DOCD IN RCRD: CPT | Mod: HCNC,CPTII,S$GLB, | Performed by: INTERNAL MEDICINE

## 2024-12-11 PROCEDURE — 90653 IIV ADJUVANT VACCINE IM: CPT | Mod: HCNC,S$GLB,, | Performed by: INTERNAL MEDICINE

## 2024-12-11 PROCEDURE — 3078F DIAST BP <80 MM HG: CPT | Mod: HCNC,CPTII,S$GLB, | Performed by: INTERNAL MEDICINE

## 2024-12-11 PROCEDURE — 1126F AMNT PAIN NOTED NONE PRSNT: CPT | Mod: HCNC,CPTII,S$GLB, | Performed by: INTERNAL MEDICINE

## 2024-12-11 RX ORDER — FUROSEMIDE 40 MG/1
40 TABLET ORAL EVERY MORNING
Qty: 90 TABLET | Refills: 2 | Status: SHIPPED | OUTPATIENT
Start: 2024-12-11

## 2024-12-11 RX ORDER — POTASSIUM CHLORIDE 750 MG/1
10 TABLET, EXTENDED RELEASE ORAL DAILY
Qty: 90 TABLET | Refills: 1 | Status: SHIPPED | OUTPATIENT
Start: 2024-12-11

## 2024-12-11 RX ORDER — LOSARTAN POTASSIUM 50 MG/1
50 TABLET ORAL DAILY
Qty: 90 TABLET | Refills: 1 | Status: SHIPPED | OUTPATIENT
Start: 2024-12-11

## 2024-12-16 ENCOUNTER — LAB VISIT (OUTPATIENT)
Dept: LAB | Facility: HOSPITAL | Age: 84
End: 2024-12-16
Attending: INTERNAL MEDICINE
Payer: MEDICARE

## 2024-12-16 DIAGNOSIS — I10 ESSENTIAL HYPERTENSION: ICD-10-CM

## 2024-12-16 DIAGNOSIS — E78.5 HYPERLIPIDEMIA, UNSPECIFIED HYPERLIPIDEMIA TYPE: ICD-10-CM

## 2024-12-16 LAB
ALBUMIN SERPL BCP-MCNC: 3.4 G/DL (ref 3.5–5.2)
ALP SERPL-CCNC: 84 U/L (ref 40–150)
ALT SERPL W/O P-5'-P-CCNC: 9 U/L (ref 10–44)
ANION GAP SERPL CALC-SCNC: 10 MMOL/L (ref 8–16)
AST SERPL-CCNC: 12 U/L (ref 10–40)
BILIRUB SERPL-MCNC: 0.3 MG/DL (ref 0.1–1)
BUN SERPL-MCNC: 24 MG/DL (ref 8–23)
CALCIUM SERPL-MCNC: 9.3 MG/DL (ref 8.7–10.5)
CHLORIDE SERPL-SCNC: 104 MMOL/L (ref 95–110)
CHOLEST SERPL-MCNC: 176 MG/DL (ref 120–199)
CHOLEST/HDLC SERPL: 3 {RATIO} (ref 2–5)
CO2 SERPL-SCNC: 32 MMOL/L (ref 23–29)
CREAT SERPL-MCNC: 1.1 MG/DL (ref 0.5–1.4)
EST. GFR  (NO RACE VARIABLE): 50.2 ML/MIN/1.73 M^2
GLUCOSE SERPL-MCNC: 90 MG/DL (ref 70–110)
HDLC SERPL-MCNC: 58 MG/DL (ref 40–75)
HDLC SERPL: 33 % (ref 20–50)
LDLC SERPL CALC-MCNC: 104.4 MG/DL (ref 63–159)
NONHDLC SERPL-MCNC: 118 MG/DL
POTASSIUM SERPL-SCNC: 3.8 MMOL/L (ref 3.5–5.1)
PROT SERPL-MCNC: 6.8 G/DL (ref 6–8.4)
SODIUM SERPL-SCNC: 146 MMOL/L (ref 136–145)
TRIGL SERPL-MCNC: 68 MG/DL (ref 30–150)

## 2024-12-16 PROCEDURE — 36415 COLL VENOUS BLD VENIPUNCTURE: CPT | Mod: HCNC,PN | Performed by: INTERNAL MEDICINE

## 2024-12-16 PROCEDURE — 80053 COMPREHEN METABOLIC PANEL: CPT | Mod: HCNC | Performed by: INTERNAL MEDICINE

## 2024-12-16 PROCEDURE — 80061 LIPID PANEL: CPT | Mod: HCNC | Performed by: INTERNAL MEDICINE

## 2024-12-25 DIAGNOSIS — E87.0 HYPERNATREMIA: Primary | ICD-10-CM

## 2024-12-30 NOTE — ED NOTES
Hpi Title: Evaluation of Skin Lesions MD Rodgers to bedside for ankle joint tap, pt on monitor for procedure

## 2025-02-03 ENCOUNTER — TELEPHONE (OUTPATIENT)
Dept: GYNECOLOGIC ONCOLOGY | Facility: CLINIC | Age: 85
End: 2025-02-03
Payer: MEDICARE

## 2025-02-03 DIAGNOSIS — I10 ESSENTIAL HYPERTENSION: ICD-10-CM

## 2025-02-03 RX ORDER — AMLODIPINE BESYLATE 2.5 MG/1
2.5 TABLET ORAL DAILY
Qty: 90 TABLET | Refills: 1 | Status: SHIPPED | OUTPATIENT
Start: 2025-02-03

## 2025-02-03 RX ORDER — LOSARTAN POTASSIUM 50 MG/1
50 TABLET ORAL DAILY
Qty: 90 TABLET | Refills: 1 | Status: SHIPPED | OUTPATIENT
Start: 2025-02-03

## 2025-02-03 NOTE — TELEPHONE ENCOUNTER
----- Message from Anastasia sent at 2/3/2025 10:04 AM CST -----  Contact: [phone]  Requesting an RX refill or new RX.    Is this a refill or new RX: Refill     RX name and strength (copy/paste from chart):  losartan (COZAAR) 50 MG tablet     Is this a 30 day or 90 day RX: 90    Pharmacy name and phone # (copy/paste from chart):    [id number] [place], [place] - [address]  [address] [facility] [address]  Phone: [phone] Fax: [phone]       Requesting an RX refill or new RX.    Is this a refill or new RX: Refill     RX name and strength (copy/paste from chart):  amLODIPine (NORVASC) 2.5 MG tablet     Is this a 30 day or 90 day RX: 90    Pharmacy name and phone # (copy/paste from chart):    [id number] [place], [place] - [address] [facility] [address]  [id number]  [facility] [address]  Phone: [phone] Fax: [phone]

## 2025-02-03 NOTE — TELEPHONE ENCOUNTER
No care due was identified.  Health [facility] Embedded Care Due Messages. Reference number: [id number].   2/03/2025 10:37:59 AM CST

## 2025-02-03 NOTE — TELEPHONE ENCOUNTER
LOV 12/11/24  Annual   RTC 6/11/25    Patient is requesting a pharmacy transfer for her amlodipine and losartan. The [facility].     New pharmacy: [facility]

## 2025-02-05 ENCOUNTER — TELEPHONE (OUTPATIENT)
Dept: GYNECOLOGIC ONCOLOGY | Facility: CLINIC | Age: 85
End: 2025-02-05
Payer: MEDICARE

## 2025-02-21 DIAGNOSIS — Z00.00 ENCOUNTER FOR MEDICARE ANNUAL WELLNESS EXAM: ICD-10-CM

## 2025-06-10 NOTE — PROGRESS NOTES
[facility] Note    Chief Complaint      Chief Complaint   Patient presents with    Follow-up       History of Present Illness      Jodi Cole is a 84 y.o.  AAF with Endometrial Ca, HTN, Insomnia, OA presents to fu chronic issues. Her son has had CVA x 2.  Her  and nephew have been ill.  Last visit - 12/11/24    Hypernatremia - Sodium was a little high. We will continue to monitor this. Pt never got repeat in 2 wks. Will reorder.        Prediabetes - Ha1c was 5.8 which is consistent with prediabetes. I rec a low carb diet.     MAY  - Can walk 2 blocks before she gets winded. She does not get SOB doing housework or going to the grocery She is sedentary stephen since her recent gout flares.  CXR - 1/9/23 - Persistent elevation of the left hemidiaphragm. Echo 1/12/23 - EF 65%; Grade I left ventricular diastolic dysfunction     Gout - ED visit - 6/15/22 - Gout. Rec low purine diet. R ankle pain - ED - 7/22/23- Rx Prednisone x 3 d  and on  8/29/23 - tx with prednisone x 7 d. Has ortho appt Mon. UA - 9.2, ESR - 97, CRP - 12.8 - 8/29/23. Suspect pt may have needed a slower taper. No issues recently.      Left wrist DeQuervain's Tendonitis Right ring trigger finger - Fu by Hand Sx.      Hypokalemia - 3.8 - she had not been taking her Potassium tablet once daily but has since resumed and is on higher dose of losartan. Cont to monitor.  Pt  is on lasix.        HTN - Pt prev noncompliant with BP meds.  Avoid HCTZ due to recent gout.    Pt now on Losartan 50 mg/d, amlodipine 2.5mg/d,   lasix 40 mg/d, and  Kcl10 mEQ/d.   Rec avoid oral decongestants. Trace ankle edema off and on.  Magnesium and Potassium were normal.        HLD - Pt on Crestor 40 mg/d. Cholesterol has improved. Continue  current dose of Rosuvastatin.  She reports compliance with this. Rec low chol diet and exercise for lifestyle modification.  Will cont to monitor. She will try to cut back to red meat 1/wk.      Oa - Pt is s/p RT TKA  10/1/19. S/p Left TKA - 7/8/21.  Fu by Dr. Gina Lunsford.  Pt is on Tylenol prn - not helping.  Affects Rt shoulder.  Pt on Voltaren gel - no help. She completed PTx for her knee.      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 She was tx with ChemoTx - Carbo and taxol x 6 cycles. Has fu in Aug. and will fu annually with CA -125.  - 10- 8/1/23. Has fu with Dr. Back in aug.      Obesity - BMI - 35.5- Down 3 lb since last visit.  she has cut back on portions. She no longer eats late at night. She cut back on sweets.  Exercise is limited due to knees.       Osteopenia - Calcium max 1000 mg/d and Vit D3 1000 units/d OTC.  In addition, I rec weight-bearing exercises 5 times/wk x 30 minutes as tolerated. We can repeat the DEXA in 2 yrs. Has order.       Noncompliance -Rec compliance with all meds, labs, and fu.       HCM - Flu - 12/11/24;  RSV -defers; Tdap - ?  defers;  PCV 13 - 1/17/20;  PVX 23 -none- defers;  PCV 20 - none defers; Shingrix - none - refuses; COVID 19 vaccine (Pfizer) #1 - 3/3/21; #2 3/24/21; #3 defers; MGM - 11/30/23- repeat 1 yr - has order;  DEXA - 11/19/20 - + Osteopenia- declines;  C-scope - 5/22/19 + polyps - repeat not rec due to age; Ortho - Dr. Fuentes; Gyn/Onc - Dr. Back; Prev PCP - Dr. Umana    Patient Care Team:  Margarita Funes MD as PCP - General (Internal Medicine)     Health Maintenance:  Immunization History   Administered Date(s) Administered    COVID-19, MRNA, LN-S, PF (Pfizer) (Purple Cap) 03/03/2021, 03/24/2021    Influenza (FLUAD) - Quadrivalent - Adjuvanted - PF *Preferred* (65+) 11/13/2020, 10/10/2022, 09/21/2023    Influenza - Trivalent - Fluad - Adjuvanted - PF (65 years and older 12/11/2024    Influenza - Trivalent - Fluzone High Dose - PF (65 years and older) 11/19/2019     Pneumococcal Conjugate - 13 Valent 01/17/2020      Health Maintenance   Topic Date Due    TETANUS VACCINE  Never done    Shingles Vaccine (1 of 2) Never done    RSV Vaccine (Age 60+ and Pregnant patients) (1 - 1-dose 75+ series) Never done    Pneumococcal Vaccines (Age 50+) (2 of 2 - PPSV23) 03/13/2020    COVID-19 Vaccine (3 - Pfizer risk series) 04/21/2021    DEXA Scan  11/19/2023    Lipid Panel  12/16/2029    Influenza Vaccine  Completed        Past Medical History:  Past Medical History:   Diagnosis Date    Anemia     Anemia associated with chemotherapy 9/1/2015    Anemia due to chemotherapy 9/22/2015    Chemotherapy induced nausea and vomiting 6/9/2015    Chemotherapy-induced neutropenia 7/28/2015    Constipation 7/7/2015    Endometrial ca 5/26/2015    Endometrial ca 8/10/2015    Essential hypertension     Hyperlipidemia     Hypertension     Hypokalemia 9/26/2019    NSAID long-term use 9/26/2019    Osteoarthritis     Pain in both hands 2/19/2016    Right-sided low back pain without sciatica 6/23/2016    Uterine cancer     Well woman exam with routine gynecological exam 2/19/2016       Past Surgical History:   has a past surgical history that includes D&C Hysteroscopy (March 18, 2015); Dilation and curettage of uterus (1969 ); Lymphadenectomy; Hysterectomy (5/11/15); Pelvic and para-aortic lymph node dissection; pr removal of ovary/tube(s); Colonoscopy (N/A, 5/22/2019); Total knee replacement using computer navigation (10/01/2019); and Percutaneous cryotherapy of peripheral nerve using liquid nitrous oxide in closed needle device (Left, 6/14/2021).    Family History:  family history includes Cancer (age of onset: 65) in her brother; Endometrial cancer in her sister; Heart disease in her father; Kidney failure in her sister; Stroke in her son.     Social History:  Social History[1]    Review of Systems   Constitutional:  Negative for chills, diaphoresis, fever and night sweats.   HENT:  Negative for nasal  "congestion and sore throat.    Eyes:  Negative for visual disturbance.   Respiratory:  Positive for cough.         Getting over a cold   Cardiovascular:  Negative for chest pain and palpitations.   Gastrointestinal:  Negative for abdominal pain, constipation, diarrhea, nausea and vomiting.   Genitourinary:  Negative for bladder incontinence, dysuria, frequency and nocturia.   Musculoskeletal:  Negative for arthralgias and myalgias.   Neurological:  Negative for dizziness and headaches.   Psychiatric/Behavioral:  Positive for sleep disturbance. Negative for depressed mood. The patient is nervous/anxious.         Goes to bed at 10 PM. Falls asleep at  6AM sometimes.  This started at the end of the year when her  became ill. He's doing better. She sometimes naps. When she falls asleep she is able to stay asleep. It's falling asleep that is the issue. She does not eat or drink late at night. She does not watch TV in  the bedroom. Does not snore.          Medications:  Current Medications[2]     Allergies:  Review of patient's allergies indicates:  No Known Allergies    Physical Exam      Vital Signs  Temp: 98.4 °F (36.9 °C)  Temp Source: Oral  Pulse: 88  Resp: 18  SpO2: 95 %  BP: 138/84  BP Location: Right arm  Patient Position: Sitting  Pain Score: 0-No pain  Height and Weight  Height: 5' 6" (167.6 cm)  Weight: 99.8 kg (220 lb 0.3 oz)  BSA (Calculated - sq m): 2.16 sq meters  BMI (Calculated): 35.5  Weight in (lb) to have BMI = 25: 154.6      Patient Position: Sitting      Physical Exam  Vitals reviewed.   Constitutional:       General: She is not in acute distress.     Appearance: Normal appearance. She is not ill-appearing, toxic-appearing or diaphoretic.   HENT:      Head: Normocephalic and atraumatic.      Right Ear: Tympanic membrane normal.      Left Ear: Tympanic membrane normal.      Mouth/Throat:      Mouth: Mucous membranes are moist.   Eyes:      Conjunctiva/sclera: Conjunctivae normal.      Pupils: " Pupils are equal, round, and reactive to light.      Comments: Tee cataracts; :eft esotropia   Cardiovascular:      Rate and Rhythm: Normal rate and regular rhythm.      Pulses: Normal pulses.      Heart sounds: Normal heart sounds.   Pulmonary:      Effort: No respiratory distress.      Breath sounds: Normal breath sounds. No wheezing.   Abdominal:      General: Bowel sounds are normal. There is no distension.      Palpations: Abdomen is soft.      Tenderness: There is no abdominal tenderness. There is no guarding or rebound.   Skin:     Comments: Post neck - hyperpigmented and dry/scaly   Neurological:      General: No focal deficit present.      Mental Status: She is alert and oriented to person, place, and time.   Psychiatric:         Mood and Affect: Mood normal.         Behavior: Behavior normal.          Laboratory:  CBC:  Recent Labs   Lab 06/15/22  1855 06/15/22  1901 10/11/22  0750 08/29/23  1906   WBC 5.00  --  5.36 7.10   RBC 4.33  --  4.69 4.51   Hemoglobin 11.4 L  --  11.9 L 11.5 L   POC Hematocrit  --    < >  --   --    Hematocrit 36.0 L  --  39.0 38.0   Platelets 181  --  250 299   MCV 83  --  83 84   MCH 26.3 L  --  25.4 L 25.5 L   MCHC 31.7 L  --  30.5 L 30.3 L    < > = values in this interval not displayed.       CMP:  Recent Labs   Lab 08/29/23  1906 09/27/23  0831 04/16/24  1200 12/16/24  0820   Glucose 119 H  --  78 90   Calcium 9.4  --  9.5 9.3   Albumin 3.4 L  --  3.6 3.4 L   Total Protein 7.6  --  7.3 6.8   Sodium 140  --  144 146 H   Potassium 3.0 L   < > 3.7 3.8   CO2 31 H  --  28 32 H   Chloride 98  --  105 104   BUN 24 H  --  23 24 H   Creatinine 1.2  --  1.2 1.1   Alkaline Phosphatase 88  --  89 84   ALT 9 L  --  9 L 9 L   AST 11  --  14 12   Total Bilirubin 0.2  --  0.2 0.3    < > = values in this interval not displayed.       LIPIDS:  Recent Labs   Lab 09/27/23  0831 04/16/24  1200 12/16/24  0820   HDL 73 63 58   Cholesterol 257 H 206 H 176   Triglycerides 68 75 68   LDL Cholesterol  170.4 H 128.0 104.4   HDL/Cholesterol Ratio 28.4 30.6 33.0   Non-HDL Cholesterol 184 143 118   Total Cholesterol/HDL Ratio 3.5 3.3 3.0     A1C:  Recent Labs   Lab 04/16/24  1200   Hemoglobin A1C 5.8 H         Recent Labs   Lab 08/29/23  1906   Hepatitis C Ab Non-reactive       Assessment/Plan     Jodi Cole is a 84 y.o.female with:    Essential hypertension  -     CBC Auto Differential; Future; Expected date: 06/11/2025  -     potassium chloride (KLOR-CON) 10 MEQ TbSR; Take 1 tablet (10 mEq total) by mouth once daily.  Dispense: 90 tablet; Refill: 1  - Controlled.  Cont current.     Hyperlipidemia, unspecified hyperlipidemia type  -     rosuvastatin (CRESTOR) 40 MG Tab; Take 1 tablet (40 mg total) by mouth every evening.  Dispense: 90 tablet; Refill: 3  - Controlled.  Cont current.     Osteopenia, unspecified location  - Stable.  Cont current regimen.    Prediabetes  -     Hemoglobin A1C; Future; Expected date: 06/11/2025  - Stable. Rec low carb diet.  Repeat Ha1c.     Insomnia, unspecified type  -     traZODone (DESYREL) 50 MG tablet; Take 1 tablet (50 mg total) by mouth nightly as needed for Insomnia. Take 2 hrs prior to expected bedtime  Dispense: 30 tablet; Refill: 0  - Will try a trial of Trazodone 50 mg 2 hrs before expected bedtime prn insomnia. Pt has good sleep hygiene.     Endometrial ca  - Had fu in Aug with Dr. Back. Ja.     Hypernatremia  -     Basic Metabolic Panel; Future; Expected date: 06/11/2025  - Repeat BMP.     Hypokalemia  - Stable.  Cont current regimen. Repeat BMP.     Chronic kidney disease, stage 3a  - Stable.  Cont current regimen. Repeat BMP.     Dermatitis  -     triamcinolone acetonide 0.025% (KENALOG) 0.025 % cream; Apply to neck BID x 2 wks  Dispense: 80 g; Refill: 0  - Trial of triamcinolone x 2 wks and if no improvement fu with Derm.     Class 2 severe obesity due to excess calories with serious comorbidity and body mass index (BMI) of 36.0 to 36.9 in adult  - Rec low carb  diet and exercise as tolerated for wt loss.     Screening mammogram, encounter for  -     Mammo Digital Screening Bilat w/ Seun (XPD); Future; Expected date: 06/11/2025         Chronic conditions status updated as per HPI.  Other than changes above, cont current medications and maintain follow up with specialists.   Follow up in about 6 months (around 12/11/2025) for as prev sched or sooner if needed.      Margarita Funes MD  [facility]                       [1]   Social History  Tobacco Use    Smoking status: Never    Smokeless tobacco: Never   Vaping Use    Vaping status: Never Used   Substance Use Topics    Alcohol use: No    Drug use: Never   [2]   Current Outpatient Medications:     acetaminophen (TYLENOL) 500 MG tablet, Take 1 tablet (500 mg total) by mouth every 6 (six) hours as needed for Pain., Disp: 60 tablet, Rfl: 0    amLODIPine (NORVASC) 2.5 MG tablet, Take 1 tablet (2.5 mg total) by mouth once daily., Disp: 90 tablet, Rfl: 1    furosemide (LASIX) 40 MG tablet, Take 1 tablet (40 mg total) by mouth every morning., Disp: 90 tablet, Rfl: 2    losartan (COZAAR) 50 MG tablet, Take 1 tablet (50 mg total) by mouth once daily., Disp: 90 tablet, Rfl: 1    multivitamin capsule, Take 1 capsule by mouth once daily., Disp: , Rfl:     vitamins B1 B6 B12 Tab, Take by mouth once daily., Disp: , Rfl:     potassium chloride (KLOR-CON) 10 MEQ TbSR, Take 1 tablet (10 mEq total) by mouth once daily., Disp: 90 tablet, Rfl: 1    rosuvastatin (CRESTOR) 40 MG Tab, Take 1 tablet (40 mg total) by mouth every evening., Disp: 90 tablet, Rfl: 3    traZODone (DESYREL) 50 MG tablet, Take 1 tablet (50 mg total) by mouth nightly as needed for Insomnia. Take 2 hrs prior to expected bedtime, Disp: 30 tablet, Rfl: 0    triamcinolone acetonide 0.025% (KENALOG) 0.025 % cream, Apply to neck BID x 2 wks, Disp: 80 g, Rfl: 0  No current facility-administered medications for this visit.    Facility-Administered Medications Ordered in  Other Visits:     midazolam (VERSED) 1 mg/mL injection 0.5 mg, 0.5 mg, Intravenous, PRN, Henry Reynoso MD

## 2025-06-11 ENCOUNTER — RESULTS FOLLOW-UP (OUTPATIENT)
Dept: PRIMARY CARE CLINIC | Facility: CLINIC | Age: 85
End: 2025-06-11

## 2025-06-11 ENCOUNTER — LAB VISIT (OUTPATIENT)
Dept: LAB | Facility: HOSPITAL | Age: 85
End: 2025-06-11
Attending: INTERNAL MEDICINE
Payer: MEDICARE

## 2025-06-11 ENCOUNTER — OFFICE VISIT (OUTPATIENT)
Dept: PRIMARY CARE CLINIC | Facility: CLINIC | Age: 85
End: 2025-06-11
Payer: MEDICARE

## 2025-06-11 VITALS
SYSTOLIC BLOOD PRESSURE: 138 MMHG | OXYGEN SATURATION: 95 % | HEART RATE: 88 BPM | DIASTOLIC BLOOD PRESSURE: 84 MMHG | HEIGHT: 66 IN | RESPIRATION RATE: 18 BRPM | WEIGHT: 220 LBS | BODY MASS INDEX: 35.36 KG/M2 | TEMPERATURE: 98 F

## 2025-06-11 DIAGNOSIS — C54.1 ENDOMETRIAL CA: ICD-10-CM

## 2025-06-11 DIAGNOSIS — N18.31 CHRONIC KIDNEY DISEASE, STAGE 3A: ICD-10-CM

## 2025-06-11 DIAGNOSIS — G47.00 INSOMNIA, UNSPECIFIED TYPE: ICD-10-CM

## 2025-06-11 DIAGNOSIS — N18.30 ACUTE RENAL FAILURE SUPERIMPOSED ON STAGE 3 CHRONIC KIDNEY DISEASE, UNSPECIFIED ACUTE RENAL FAILURE TYPE, UNSPECIFIED WHETHER STAGE 3A OR 3B CKD: ICD-10-CM

## 2025-06-11 DIAGNOSIS — R73.03 PREDIABETES: ICD-10-CM

## 2025-06-11 DIAGNOSIS — E87.0 HYPERNATREMIA: ICD-10-CM

## 2025-06-11 DIAGNOSIS — I10 ESSENTIAL HYPERTENSION: ICD-10-CM

## 2025-06-11 DIAGNOSIS — E66.01 CLASS 2 SEVERE OBESITY DUE TO EXCESS CALORIES WITH SERIOUS COMORBIDITY AND BODY MASS INDEX (BMI) OF 36.0 TO 36.9 IN ADULT: ICD-10-CM

## 2025-06-11 DIAGNOSIS — M85.80 OSTEOPENIA, UNSPECIFIED LOCATION: ICD-10-CM

## 2025-06-11 DIAGNOSIS — E66.812 CLASS 2 SEVERE OBESITY DUE TO EXCESS CALORIES WITH SERIOUS COMORBIDITY AND BODY MASS INDEX (BMI) OF 36.0 TO 36.9 IN ADULT: ICD-10-CM

## 2025-06-11 DIAGNOSIS — E78.5 HYPERLIPIDEMIA, UNSPECIFIED HYPERLIPIDEMIA TYPE: ICD-10-CM

## 2025-06-11 DIAGNOSIS — I10 ESSENTIAL HYPERTENSION: Primary | ICD-10-CM

## 2025-06-11 DIAGNOSIS — N17.9 ACUTE RENAL FAILURE SUPERIMPOSED ON STAGE 3 CHRONIC KIDNEY DISEASE, UNSPECIFIED ACUTE RENAL FAILURE TYPE, UNSPECIFIED WHETHER STAGE 3A OR 3B CKD: ICD-10-CM

## 2025-06-11 DIAGNOSIS — E87.6 HYPOKALEMIA: ICD-10-CM

## 2025-06-11 DIAGNOSIS — L30.9 DERMATITIS: ICD-10-CM

## 2025-06-11 DIAGNOSIS — Z12.31 SCREENING MAMMOGRAM, ENCOUNTER FOR: ICD-10-CM

## 2025-06-11 LAB
ABSOLUTE EOSINOPHIL (OHS): 0.43 K/UL
ABSOLUTE MONOCYTE (OHS): 1.08 K/UL (ref 0.3–1)
ABSOLUTE NEUTROPHIL COUNT (OHS): 4.8 K/UL (ref 1.8–7.7)
ANION GAP (OHS): 11 MMOL/L (ref 8–16)
BASOPHILS # BLD AUTO: 0.04 K/UL
BASOPHILS NFR BLD AUTO: 0.5 %
BUN SERPL-MCNC: 25 MG/DL (ref 8–23)
CALCIUM SERPL-MCNC: 9.3 MG/DL (ref 8.7–10.5)
CHLORIDE SERPL-SCNC: 99 MMOL/L (ref 95–110)
CO2 SERPL-SCNC: 33 MMOL/L (ref 23–29)
CREAT SERPL-MCNC: 1.4 MG/DL (ref 0.5–1.4)
EAG (OHS): 120 MG/DL (ref 68–131)
ERYTHROCYTE [DISTWIDTH] IN BLOOD BY AUTOMATED COUNT: 14.7 % (ref 11.5–14.5)
GFR SERPLBLD CREATININE-BSD FMLA CKD-EPI: 38 ML/MIN/1.73/M2
GLUCOSE SERPL-MCNC: 83 MG/DL (ref 70–110)
HBA1C MFR BLD: 5.8 % (ref 4–5.6)
HCT VFR BLD AUTO: 35.8 % (ref 37–48.5)
HGB BLD-MCNC: 10.7 GM/DL (ref 12–16)
IMM GRANULOCYTES # BLD AUTO: 0.05 K/UL (ref 0–0.04)
IMM GRANULOCYTES NFR BLD AUTO: 0.7 % (ref 0–0.5)
LYMPHOCYTES # BLD AUTO: 1.25 K/UL (ref 1–4.8)
MCH RBC QN AUTO: 24.7 PG (ref 27–31)
MCHC RBC AUTO-ENTMCNC: 29.9 G/DL (ref 32–36)
MCV RBC AUTO: 83 FL (ref 82–98)
NUCLEATED RBC (/100WBC) (OHS): 0 /100 WBC
PLATELET # BLD AUTO: 237 K/UL (ref 150–450)
PMV BLD AUTO: 11.1 FL (ref 9.2–12.9)
POTASSIUM SERPL-SCNC: 3.7 MMOL/L (ref 3.5–5.1)
RBC # BLD AUTO: 4.33 M/UL (ref 4–5.4)
RELATIVE EOSINOPHIL (OHS): 5.6 %
RELATIVE LYMPHOCYTE (OHS): 16.3 % (ref 18–48)
RELATIVE MONOCYTE (OHS): 14.1 % (ref 4–15)
RELATIVE NEUTROPHIL (OHS): 62.8 % (ref 38–73)
SODIUM SERPL-SCNC: 143 MMOL/L (ref 136–145)
WBC # BLD AUTO: 7.65 K/UL (ref 3.9–12.7)

## 2025-06-11 PROCEDURE — 80048 BASIC METABOLIC PNL TOTAL CA: CPT

## 2025-06-11 PROCEDURE — 1126F AMNT PAIN NOTED NONE PRSNT: CPT | Mod: CPTII,S$GLB,, | Performed by: INTERNAL MEDICINE

## 2025-06-11 PROCEDURE — 36415 COLL VENOUS BLD VENIPUNCTURE: CPT | Mod: PN

## 2025-06-11 PROCEDURE — 1160F RVW MEDS BY RX/DR IN RCRD: CPT | Mod: CPTII,S$GLB,, | Performed by: INTERNAL MEDICINE

## 2025-06-11 PROCEDURE — G2211 COMPLEX E/M VISIT ADD ON: HCPCS | Mod: S$GLB,,, | Performed by: INTERNAL MEDICINE

## 2025-06-11 PROCEDURE — 3075F SYST BP GE 130 - 139MM HG: CPT | Mod: CPTII,S$GLB,, | Performed by: INTERNAL MEDICINE

## 2025-06-11 PROCEDURE — 3079F DIAST BP 80-89 MM HG: CPT | Mod: CPTII,S$GLB,, | Performed by: INTERNAL MEDICINE

## 2025-06-11 PROCEDURE — 1101F PT FALLS ASSESS-DOCD LE1/YR: CPT | Mod: CPTII,S$GLB,, | Performed by: INTERNAL MEDICINE

## 2025-06-11 PROCEDURE — 99214 OFFICE O/P EST MOD 30 MIN: CPT | Mod: S$GLB,,, | Performed by: INTERNAL MEDICINE

## 2025-06-11 PROCEDURE — 85025 COMPLETE CBC W/AUTO DIFF WBC: CPT

## 2025-06-11 PROCEDURE — 99999 PR PBB SHADOW E&M-EST. PATIENT-LVL IV: CPT | Mod: PBBFAC,,, | Performed by: INTERNAL MEDICINE

## 2025-06-11 PROCEDURE — 83036 HEMOGLOBIN GLYCOSYLATED A1C: CPT

## 2025-06-11 PROCEDURE — 3288F FALL RISK ASSESSMENT DOCD: CPT | Mod: CPTII,S$GLB,, | Performed by: INTERNAL MEDICINE

## 2025-06-11 PROCEDURE — 1159F MED LIST DOCD IN RCRD: CPT | Mod: CPTII,S$GLB,, | Performed by: INTERNAL MEDICINE

## 2025-06-11 RX ORDER — FUROSEMIDE 40 MG/1
TABLET ORAL
Qty: 45 TABLET | Refills: 1 | Status: SHIPPED | OUTPATIENT
Start: 2025-06-11

## 2025-06-11 RX ORDER — TRIAMCINOLONE ACETONIDE 0.25 MG/G
CREAM TOPICAL
Qty: 80 G | Refills: 0 | Status: SHIPPED | OUTPATIENT
Start: 2025-06-11

## 2025-06-11 RX ORDER — POTASSIUM CHLORIDE 750 MG/1
10 TABLET, EXTENDED RELEASE ORAL DAILY
Qty: 90 TABLET | Refills: 1 | Status: SHIPPED | OUTPATIENT
Start: 2025-06-11

## 2025-06-11 RX ORDER — ROSUVASTATIN CALCIUM 40 MG/1
40 TABLET, COATED ORAL NIGHTLY
Qty: 90 TABLET | Refills: 3 | Status: SHIPPED | OUTPATIENT
Start: 2025-06-11

## 2025-06-11 RX ORDER — AMLODIPINE BESYLATE 5 MG/1
5 TABLET ORAL DAILY
Qty: 90 TABLET | Refills: 1 | Status: SHIPPED | OUTPATIENT
Start: 2025-06-11

## 2025-06-11 RX ORDER — TRAZODONE HYDROCHLORIDE 50 MG/1
TABLET ORAL
Qty: 30 TABLET | Refills: 0 | Status: SHIPPED | OUTPATIENT
Start: 2025-06-11

## 2025-07-09 DIAGNOSIS — G47.00 INSOMNIA, UNSPECIFIED TYPE: ICD-10-CM

## 2025-07-09 NOTE — TELEPHONE ENCOUNTER
Refill Routing Note   Medication(s) are not appropriate for processing by [facility] for the following reason(s):        New or recently adjusted medication    [facility] action(s):  Defer               Appointments  past 12m or future 3m with PCP    Date Provider   Last Visit   6/11/2025 Margarita Funes MD   Next Visit   Visit date not found Margarita Funes MD   ED visits in past 90 days: 0        Note composed:12:03 PM 07/09/2025

## 2025-07-09 NOTE — TELEPHONE ENCOUNTER
See how pt feels she is doing with the trazodone so I know if we need to refill it or not. Let me know.   Dr. CLARK

## 2025-07-09 NOTE — TELEPHONE ENCOUNTER
No care due was identified.  [facility] Embedded Care Due Messages. Reference number: [id number].   7/09/2025 3:27:48 AM CDT

## 2025-07-10 RX ORDER — TRAZODONE HYDROCHLORIDE 50 MG/1
TABLET ORAL
Qty: 30 TABLET | Refills: 0 | Status: SHIPPED | OUTPATIENT
Start: 2025-07-10

## 2025-08-10 DIAGNOSIS — I10 ESSENTIAL HYPERTENSION: ICD-10-CM

## 2025-08-11 RX ORDER — LOSARTAN POTASSIUM 50 MG/1
TABLET ORAL
Qty: 90 TABLET | Refills: 3 | Status: SHIPPED | OUTPATIENT
Start: 2025-08-11

## 2025-08-19 ENCOUNTER — LAB VISIT (OUTPATIENT)
Dept: LAB | Facility: HOSPITAL | Age: 85
End: 2025-08-19
Attending: OBSTETRICS & GYNECOLOGY
Payer: MEDICARE

## 2025-08-19 ENCOUNTER — OFFICE VISIT (OUTPATIENT)
Dept: GYNECOLOGIC ONCOLOGY | Facility: CLINIC | Age: 85
End: 2025-08-19
Payer: MEDICARE

## 2025-08-19 VITALS
HEIGHT: 66 IN | BODY MASS INDEX: 35.22 KG/M2 | WEIGHT: 219.13 LBS | DIASTOLIC BLOOD PRESSURE: 76 MMHG | SYSTOLIC BLOOD PRESSURE: 157 MMHG | OXYGEN SATURATION: 97 % | TEMPERATURE: 98 F | HEART RATE: 73 BPM

## 2025-08-19 DIAGNOSIS — Z85.42 HISTORY OF ENDOMETRIAL CANCER: Primary | ICD-10-CM

## 2025-08-19 DIAGNOSIS — Z85.42 HISTORY OF ENDOMETRIAL CANCER: ICD-10-CM

## 2025-08-19 DIAGNOSIS — C54.1 ENDOMETRIAL CANCER: Primary | ICD-10-CM

## 2025-08-19 LAB — CANCER AG125 SERPL-ACNC: 10 UNIT/ML

## 2025-08-19 PROCEDURE — 86304 IMMUNOASSAY TUMOR CA 125: CPT | Mod: HCNC

## 2025-08-19 PROCEDURE — 99999 PR PBB SHADOW E&M-EST. PATIENT-LVL III: CPT | Mod: PBBFAC,HCNC,, | Performed by: OBSTETRICS & GYNECOLOGY

## 2025-08-19 PROCEDURE — 36415 COLL VENOUS BLD VENIPUNCTURE: CPT | Mod: HCNC

## 2025-08-21 ENCOUNTER — TELEPHONE (OUTPATIENT)
Dept: GYNECOLOGIC ONCOLOGY | Facility: CLINIC | Age: 85
End: 2025-08-21
Payer: MEDICARE

## (undated) DEVICE — UNDERGLOVES BIOGEL PI SIZE 8

## (undated) DEVICE — BOWL CEMENT

## (undated) DEVICE — PUMP COLD THERAPY

## (undated) DEVICE — TOURNIQUET SB QC DP 34X4IN

## (undated) DEVICE — SYR 50CC LL

## (undated) DEVICE — DRESSING AQUACEL AG ADV 3.5X12

## (undated) DEVICE — GLOVE BIOGEL SKINSENSE PI 8.5

## (undated) DEVICE — DRAPE INCISE IOBAN 2 23X33IN

## (undated) DEVICE — SUT VICRYL PLUS 0 CT1 18IN

## (undated) DEVICE — HOOD T-5 TEAR AWAY STERILE

## (undated) DEVICE — SUT VICRYL PLUS 2-0 CT1 18

## (undated) DEVICE — GLOVE BIOGEL SKINSENSE PI 6.5

## (undated) DEVICE — MASK FLYTE HOOD PEEL AWAY

## (undated) DEVICE — UNDERGLOVES BIOGEL PI SIZE 8.5

## (undated) DEVICE — TAPE SURG DURAPORE 2 X10YD

## (undated) DEVICE — ADHESIVE DERMABOND ADVANCED

## (undated) DEVICE — SET CEMENT (SCULP)

## (undated) DEVICE — SEE MEDLINE ITEM 157131

## (undated) DEVICE — SUT MONOCRYL 3-0 PS-1

## (undated) DEVICE — Device

## (undated) DEVICE — SUT VICRYL+ 1 CT1 18IN

## (undated) DEVICE — KIT TOTAL KNEE TKOFG

## (undated) DEVICE — DRAPE STERI INSTRUMENT 1018

## (undated) DEVICE — KIT IRR SUCTION HND PIECE

## (undated) DEVICE — DRESSING AQUACEL ADH 4X10IN

## (undated) DEVICE — SEE MEDLINE ITEM 146298

## (undated) DEVICE — GLOVE BIOGEL SKINSENSE PI 8.0

## (undated) DEVICE — BANDAGE ACE ELASTIC 6"

## (undated) DEVICE — ELECTRODE REM PLYHSV RETURN 9

## (undated) DEVICE — PAD COLD THERAPY KNEE WRAP ON

## (undated) DEVICE — PADDING CAST 6IN DELTA ROLL

## (undated) DEVICE — UNDERGLOVE BIOGEL PI SZ 6.5 LF

## (undated) DEVICE — BLADE RECIP RIBBED

## (undated) DEVICE — SEE MEDLINE ITEM 152515